# Patient Record
Sex: MALE | HISPANIC OR LATINO | Employment: OTHER | URBAN - METROPOLITAN AREA
[De-identification: names, ages, dates, MRNs, and addresses within clinical notes are randomized per-mention and may not be internally consistent; named-entity substitution may affect disease eponyms.]

---

## 2024-07-23 ENCOUNTER — APPOINTMENT (OUTPATIENT)
Dept: CARDIOLOGY | Facility: HOSPITAL | Age: 69
DRG: 682 | End: 2024-07-23

## 2024-07-23 ENCOUNTER — APPOINTMENT (OUTPATIENT)
Dept: GENERAL RADIOLOGY | Facility: HOSPITAL | Age: 69
DRG: 682 | End: 2024-07-23

## 2024-07-23 ENCOUNTER — HOSPITAL ENCOUNTER (INPATIENT)
Facility: HOSPITAL | Age: 69
LOS: 21 days | Discharge: HOSPICE | DRG: 682 | End: 2024-08-13
Attending: EMERGENCY MEDICINE | Admitting: INTERNAL MEDICINE

## 2024-07-23 DIAGNOSIS — I50.1 ACUTE PULMONARY EDEMA WITH CONGESTIVE HEART FAILURE: ICD-10-CM

## 2024-07-23 DIAGNOSIS — E87.1 HYPONATREMIA: ICD-10-CM

## 2024-07-23 DIAGNOSIS — N18.4 CHRONIC RENAL FAILURE (CRF), STAGE 4 (SEVERE): Primary | ICD-10-CM

## 2024-07-23 DIAGNOSIS — R79.89 ELEVATED TROPONIN LEVEL NOT DUE MYOCARDIAL INFARCTION: ICD-10-CM

## 2024-07-23 DIAGNOSIS — Z51.5 HOSPICE CARE PATIENT: ICD-10-CM

## 2024-07-23 DIAGNOSIS — D63.1 ANEMIA DUE TO STAGE 4 CHRONIC KIDNEY DISEASE: ICD-10-CM

## 2024-07-23 DIAGNOSIS — N18.4 ANEMIA DUE TO STAGE 4 CHRONIC KIDNEY DISEASE: ICD-10-CM

## 2024-07-23 LAB
ALBUMIN SERPL-MCNC: 3.3 G/DL (ref 3.5–5.2)
ALBUMIN/GLOB SERPL: 1.1 G/DL
ALP SERPL-CCNC: 97 U/L (ref 39–117)
ALT SERPL W P-5'-P-CCNC: 28 U/L (ref 1–41)
ANION GAP SERPL CALCULATED.3IONS-SCNC: 11.1 MMOL/L (ref 5–15)
ANION GAP SERPL CALCULATED.3IONS-SCNC: 13.6 MMOL/L (ref 5–15)
ANION GAP SERPL CALCULATED.3IONS-SCNC: 16 MMOL/L (ref 5–15)
ANION GAP SERPL CALCULATED.3IONS-SCNC: 9 MMOL/L (ref 5–15)
AST SERPL-CCNC: 39 U/L (ref 1–40)
BACTERIA UR QL AUTO: NORMAL /HPF
BASOPHILS # BLD AUTO: 0.02 10*3/MM3 (ref 0–0.2)
BASOPHILS # BLD AUTO: 0.02 10*3/MM3 (ref 0–0.2)
BASOPHILS NFR BLD AUTO: 0.3 % (ref 0–1.5)
BASOPHILS NFR BLD AUTO: 0.3 % (ref 0–1.5)
BILIRUB SERPL-MCNC: 0.2 MG/DL (ref 0–1.2)
BILIRUB UR QL STRIP: NEGATIVE
BUN SERPL-MCNC: 74 MG/DL (ref 8–23)
BUN SERPL-MCNC: 76 MG/DL (ref 8–23)
BUN SERPL-MCNC: 80 MG/DL (ref 8–23)
BUN SERPL-MCNC: 93 MG/DL (ref 8–23)
BUN/CREAT SERPL: 16.1 (ref 7–25)
BUN/CREAT SERPL: 16.3 (ref 7–25)
BUN/CREAT SERPL: 17.9 (ref 7–25)
BUN/CREAT SERPL: 18.9 (ref 7–25)
CALCIUM SPEC-SCNC: 7.3 MG/DL (ref 8.6–10.5)
CALCIUM SPEC-SCNC: 7.4 MG/DL (ref 8.6–10.5)
CHLORIDE SERPL-SCNC: 82 MMOL/L (ref 98–107)
CHLORIDE SERPL-SCNC: 82 MMOL/L (ref 98–107)
CHLORIDE SERPL-SCNC: 83 MMOL/L (ref 98–107)
CHLORIDE SERPL-SCNC: 84 MMOL/L (ref 98–107)
CHLORIDE UR-SCNC: 54 MMOL/L
CLARITY UR: CLEAR
CO2 SERPL-SCNC: 21 MMOL/L (ref 22–29)
CO2 SERPL-SCNC: 22.4 MMOL/L (ref 22–29)
CO2 SERPL-SCNC: 22.9 MMOL/L (ref 22–29)
CO2 SERPL-SCNC: 24 MMOL/L (ref 22–29)
COLOR UR: YELLOW
CREAT SERPL-MCNC: 4.48 MG/DL (ref 0.76–1.27)
CREAT SERPL-MCNC: 4.61 MG/DL (ref 0.76–1.27)
CREAT SERPL-MCNC: 4.66 MG/DL (ref 0.76–1.27)
CREAT SERPL-MCNC: 4.93 MG/DL (ref 0.76–1.27)
CREAT UR-MCNC: 58.7 MG/DL
D-LACTATE SERPL-SCNC: 1.1 MMOL/L (ref 0.5–2)
DEPRECATED RDW RBC AUTO: 41.8 FL (ref 37–54)
DEPRECATED RDW RBC AUTO: 47.3 FL (ref 37–54)
EGFRCR SERPLBLD CKD-EPI 2021: 11.9 ML/MIN/1.73
EGFRCR SERPLBLD CKD-EPI 2021: 12.8 ML/MIN/1.73
EGFRCR SERPLBLD CKD-EPI 2021: 12.9 ML/MIN/1.73
EGFRCR SERPLBLD CKD-EPI 2021: 13.4 ML/MIN/1.73
EOSINOPHIL # BLD AUTO: 0.09 10*3/MM3 (ref 0–0.4)
EOSINOPHIL # BLD AUTO: 0.12 10*3/MM3 (ref 0–0.4)
EOSINOPHIL NFR BLD AUTO: 1.3 % (ref 0.3–6.2)
EOSINOPHIL NFR BLD AUTO: 1.6 % (ref 0.3–6.2)
ERYTHROCYTE [DISTWIDTH] IN BLOOD BY AUTOMATED COUNT: 13.3 % (ref 12.3–15.4)
ERYTHROCYTE [DISTWIDTH] IN BLOOD BY AUTOMATED COUNT: 14.9 % (ref 12.3–15.4)
FLUAV SUBTYP SPEC NAA+PROBE: NOT DETECTED
FLUBV RNA ISLT QL NAA+PROBE: NOT DETECTED
FOLATE SERPL-MCNC: 10.5 NG/ML (ref 4.78–24.2)
GEN 5 2HR TROPONIN T REFLEX: 78 NG/L
GLOBULIN UR ELPH-MCNC: 3.1 GM/DL
GLUCOSE BLDC GLUCOMTR-MCNC: 137 MG/DL (ref 70–130)
GLUCOSE BLDC GLUCOMTR-MCNC: 142 MG/DL (ref 70–130)
GLUCOSE BLDC GLUCOMTR-MCNC: 150 MG/DL (ref 70–130)
GLUCOSE BLDC GLUCOMTR-MCNC: 152 MG/DL (ref 70–130)
GLUCOSE SERPL-MCNC: 121 MG/DL (ref 65–99)
GLUCOSE SERPL-MCNC: 126 MG/DL (ref 65–99)
GLUCOSE SERPL-MCNC: 129 MG/DL (ref 65–99)
GLUCOSE SERPL-MCNC: 152 MG/DL (ref 65–99)
GLUCOSE UR STRIP-MCNC: ABNORMAL MG/DL
HBA1C MFR BLD: 6.1 % (ref 4.8–5.6)
HCT VFR BLD AUTO: 24.2 % (ref 37.5–51)
HCT VFR BLD AUTO: 25.2 % (ref 37.5–51)
HGB BLD-MCNC: 8.1 G/DL (ref 13–17.7)
HGB BLD-MCNC: 8.4 G/DL (ref 13–17.7)
HGB UR QL STRIP.AUTO: NEGATIVE
HYALINE CASTS UR QL AUTO: NORMAL /LPF
IMM GRANULOCYTES # BLD AUTO: 0.03 10*3/MM3 (ref 0–0.05)
IMM GRANULOCYTES # BLD AUTO: 0.04 10*3/MM3 (ref 0–0.05)
IMM GRANULOCYTES NFR BLD AUTO: 0.4 % (ref 0–0.5)
IMM GRANULOCYTES NFR BLD AUTO: 0.6 % (ref 0–0.5)
IRON 24H UR-MRATE: 46 MCG/DL (ref 59–158)
IRON SATN MFR SERPL: 17 % (ref 20–50)
KETONES UR QL STRIP: NEGATIVE
LEUKOCYTE ESTERASE UR QL STRIP.AUTO: NEGATIVE
LYMPHOCYTES # BLD AUTO: 0.6 10*3/MM3 (ref 0.7–3.1)
LYMPHOCYTES # BLD AUTO: 0.61 10*3/MM3 (ref 0.7–3.1)
LYMPHOCYTES NFR BLD AUTO: 7.8 % (ref 19.6–45.3)
LYMPHOCYTES NFR BLD AUTO: 8.9 % (ref 19.6–45.3)
MAGNESIUM SERPL-MCNC: 4.5 MG/DL (ref 1.6–2.4)
MAGNESIUM SERPL-MCNC: 6.8 MG/DL (ref 1.6–2.4)
MCH RBC QN AUTO: 28.8 PG (ref 26.6–33)
MCH RBC QN AUTO: 29.5 PG (ref 26.6–33)
MCHC RBC AUTO-ENTMCNC: 33.3 G/DL (ref 31.5–35.7)
MCHC RBC AUTO-ENTMCNC: 33.5 G/DL (ref 31.5–35.7)
MCV RBC AUTO: 86.3 FL (ref 79–97)
MCV RBC AUTO: 88 FL (ref 79–97)
MONOCYTES # BLD AUTO: 0.38 10*3/MM3 (ref 0.1–0.9)
MONOCYTES # BLD AUTO: 0.4 10*3/MM3 (ref 0.1–0.9)
MONOCYTES NFR BLD AUTO: 4.9 % (ref 5–12)
MONOCYTES NFR BLD AUTO: 5.8 % (ref 5–12)
NEUTROPHILS NFR BLD AUTO: 5.69 10*3/MM3 (ref 1.7–7)
NEUTROPHILS NFR BLD AUTO: 6.55 10*3/MM3 (ref 1.7–7)
NEUTROPHILS NFR BLD AUTO: 83.1 % (ref 42.7–76)
NEUTROPHILS NFR BLD AUTO: 85 % (ref 42.7–76)
NITRITE UR QL STRIP: NEGATIVE
NRBC BLD AUTO-RTO: 0 /100 WBC (ref 0–0.2)
NT-PROBNP SERPL-MCNC: 8498 PG/ML (ref 0–900)
OSMOLALITY SERPL: 284 MOSM/KG (ref 280–301)
OSMOLALITY UR: 344 MOSM/KG
PH UR STRIP.AUTO: 6.5 [PH] (ref 5–8)
PHOSPHATE SERPL-MCNC: 6.7 MG/DL (ref 2.5–4.5)
PHOSPHATE SERPL-MCNC: NORMAL MG/DL
PLATELET # BLD AUTO: 379 10*3/MM3 (ref 140–450)
PLATELET # BLD AUTO: 414 10*3/MM3 (ref 140–450)
PMV BLD AUTO: 10.1 FL (ref 6–12)
PMV BLD AUTO: 10.3 FL (ref 6–12)
POTASSIUM SERPL-SCNC: 3.6 MMOL/L (ref 3.5–5.2)
POTASSIUM SERPL-SCNC: 3.6 MMOL/L (ref 3.5–5.2)
POTASSIUM SERPL-SCNC: 3.7 MMOL/L (ref 3.5–5.2)
POTASSIUM SERPL-SCNC: 4.3 MMOL/L (ref 3.5–5.2)
PROT ?TM UR-MCNC: 591.7 MG/DL
PROT SERPL-MCNC: 6.4 G/DL (ref 6–8.5)
PROT UR QL STRIP: ABNORMAL
PROT/CREAT UR: ABNORMAL MG/G CREA (ref 0–200)
RBC # BLD AUTO: 2.75 10*6/MM3 (ref 4.14–5.8)
RBC # BLD AUTO: 2.92 10*6/MM3 (ref 4.14–5.8)
RBC # UR STRIP: NORMAL /HPF
REF LAB TEST METHOD: NORMAL
SARS-COV-2 RNA RESP QL NAA+PROBE: NOT DETECTED
SODIUM SERPL-SCNC: 116 MMOL/L (ref 136–145)
SODIUM SERPL-SCNC: 117 MMOL/L (ref 136–145)
SODIUM SERPL-SCNC: 118 MMOL/L (ref 136–145)
SODIUM SERPL-SCNC: 120 MMOL/L (ref 136–145)
SODIUM UR-SCNC: 46 MMOL/L
SP GR UR STRIP: 1.02 (ref 1–1.03)
SQUAMOUS #/AREA URNS HPF: NORMAL /HPF
TIBC SERPL-MCNC: 274 MCG/DL (ref 298–536)
TRANSFERRIN SERPL-MCNC: 184 MG/DL (ref 200–360)
TROPONIN T DELTA: -9 NG/L
TROPONIN T SERPL HS-MCNC: 87 NG/L
TSH SERPL DL<=0.05 MIU/L-ACNC: 1.76 UIU/ML (ref 0.27–4.2)
TSH SERPL DL<=0.05 MIU/L-ACNC: NORMAL M[IU]/L
UROBILINOGEN UR QL STRIP: ABNORMAL
VIT B12 BLD-MCNC: >2000 PG/ML (ref 211–946)
WBC # UR STRIP: NORMAL /HPF
WBC NRBC COR # BLD AUTO: 6.85 10*3/MM3 (ref 3.4–10.8)
WBC NRBC COR # BLD AUTO: 7.7 10*3/MM3 (ref 3.4–10.8)

## 2024-07-23 PROCEDURE — 83735 ASSAY OF MAGNESIUM: CPT

## 2024-07-23 PROCEDURE — 84156 ASSAY OF PROTEIN URINE: CPT | Performed by: INTERNAL MEDICINE

## 2024-07-23 PROCEDURE — 99254 IP/OBS CNSLTJ NEW/EST MOD 60: CPT | Performed by: INTERNAL MEDICINE

## 2024-07-23 PROCEDURE — 82746 ASSAY OF FOLIC ACID SERUM: CPT | Performed by: INTERNAL MEDICINE

## 2024-07-23 PROCEDURE — 85025 COMPLETE CBC W/AUTO DIFF WBC: CPT

## 2024-07-23 PROCEDURE — 84300 ASSAY OF URINE SODIUM: CPT | Performed by: INTERNAL MEDICINE

## 2024-07-23 PROCEDURE — 83735 ASSAY OF MAGNESIUM: CPT | Performed by: INTERNAL MEDICINE

## 2024-07-23 PROCEDURE — 63710000001 INSULIN REGULAR HUMAN PER 5 UNITS

## 2024-07-23 PROCEDURE — 36415 COLL VENOUS BLD VENIPUNCTURE: CPT

## 2024-07-23 PROCEDURE — 25010000002 HYDRALAZINE PER 20 MG: Performed by: INTERNAL MEDICINE

## 2024-07-23 PROCEDURE — 80053 COMPREHEN METABOLIC PANEL: CPT | Performed by: EMERGENCY MEDICINE

## 2024-07-23 PROCEDURE — 82570 ASSAY OF URINE CREATININE: CPT | Performed by: INTERNAL MEDICINE

## 2024-07-23 PROCEDURE — 83880 ASSAY OF NATRIURETIC PEPTIDE: CPT | Performed by: EMERGENCY MEDICINE

## 2024-07-23 PROCEDURE — 25010000002 HYDRALAZINE PER 20 MG

## 2024-07-23 PROCEDURE — 82948 REAGENT STRIP/BLOOD GLUCOSE: CPT

## 2024-07-23 PROCEDURE — 84443 ASSAY THYROID STIM HORMONE: CPT | Performed by: INTERNAL MEDICINE

## 2024-07-23 PROCEDURE — 93010 ELECTROCARDIOGRAM REPORT: CPT | Performed by: INTERNAL MEDICINE

## 2024-07-23 PROCEDURE — 93005 ELECTROCARDIOGRAM TRACING: CPT | Performed by: EMERGENCY MEDICINE

## 2024-07-23 PROCEDURE — 85025 COMPLETE CBC W/AUTO DIFF WBC: CPT | Performed by: EMERGENCY MEDICINE

## 2024-07-23 PROCEDURE — 84443 ASSAY THYROID STIM HORMONE: CPT

## 2024-07-23 PROCEDURE — 82436 ASSAY OF URINE CHLORIDE: CPT | Performed by: INTERNAL MEDICINE

## 2024-07-23 PROCEDURE — 99285 EMERGENCY DEPT VISIT HI MDM: CPT

## 2024-07-23 PROCEDURE — 84484 ASSAY OF TROPONIN QUANT: CPT | Performed by: EMERGENCY MEDICINE

## 2024-07-23 PROCEDURE — 84100 ASSAY OF PHOSPHORUS: CPT

## 2024-07-23 PROCEDURE — 83605 ASSAY OF LACTIC ACID: CPT | Performed by: EMERGENCY MEDICINE

## 2024-07-23 PROCEDURE — 82607 VITAMIN B-12: CPT | Performed by: INTERNAL MEDICINE

## 2024-07-23 PROCEDURE — 99284 EMERGENCY DEPT VISIT MOD MDM: CPT | Performed by: EMERGENCY MEDICINE

## 2024-07-23 PROCEDURE — 83935 ASSAY OF URINE OSMOLALITY: CPT | Performed by: INTERNAL MEDICINE

## 2024-07-23 PROCEDURE — 83540 ASSAY OF IRON: CPT | Performed by: INTERNAL MEDICINE

## 2024-07-23 PROCEDURE — 83036 HEMOGLOBIN GLYCOSYLATED A1C: CPT

## 2024-07-23 PROCEDURE — 81001 URINALYSIS AUTO W/SCOPE: CPT | Performed by: INTERNAL MEDICINE

## 2024-07-23 PROCEDURE — 84466 ASSAY OF TRANSFERRIN: CPT | Performed by: INTERNAL MEDICINE

## 2024-07-23 PROCEDURE — 71045 X-RAY EXAM CHEST 1 VIEW: CPT

## 2024-07-23 PROCEDURE — 84100 ASSAY OF PHOSPHORUS: CPT | Performed by: INTERNAL MEDICINE

## 2024-07-23 PROCEDURE — 25010000002 BUMETANIDE PER 0.5 MG: Performed by: INTERNAL MEDICINE

## 2024-07-23 PROCEDURE — 87636 SARSCOV2 & INF A&B AMP PRB: CPT | Performed by: EMERGENCY MEDICINE

## 2024-07-23 PROCEDURE — 83930 ASSAY OF BLOOD OSMOLALITY: CPT | Performed by: INTERNAL MEDICINE

## 2024-07-23 RX ORDER — ACETAMINOPHEN 325 MG/1
650 TABLET ORAL EVERY 4 HOURS PRN
Status: DISCONTINUED | OUTPATIENT
Start: 2024-07-23 | End: 2024-08-13 | Stop reason: HOSPADM

## 2024-07-23 RX ORDER — LEVOTHYROXINE SODIUM 0.1 MG/1
100 TABLET ORAL DAILY
COMMUNITY

## 2024-07-23 RX ORDER — SODIUM CHLORIDE 9 MG/ML
40 INJECTION, SOLUTION INTRAVENOUS AS NEEDED
Status: DISCONTINUED | OUTPATIENT
Start: 2024-07-23 | End: 2024-08-13 | Stop reason: HOSPADM

## 2024-07-23 RX ORDER — VALSARTAN 160 MG/1
1 TABLET ORAL DAILY
COMMUNITY
Start: 2024-06-13 | End: 2024-08-13 | Stop reason: HOSPADM

## 2024-07-23 RX ORDER — BUMETANIDE 0.25 MG/ML
4 INJECTION INTRAMUSCULAR; INTRAVENOUS EVERY 8 HOURS
Qty: 48 ML | Refills: 0 | Status: COMPLETED | OUTPATIENT
Start: 2024-07-23 | End: 2024-07-24

## 2024-07-23 RX ORDER — AMOXICILLIN 250 MG
2 CAPSULE ORAL 2 TIMES DAILY
Status: DISCONTINUED | OUTPATIENT
Start: 2024-07-23 | End: 2024-08-13 | Stop reason: HOSPADM

## 2024-07-23 RX ORDER — DEXTROSE MONOHYDRATE 25 G/50ML
25 INJECTION, SOLUTION INTRAVENOUS
Status: DISCONTINUED | OUTPATIENT
Start: 2024-07-23 | End: 2024-08-13 | Stop reason: HOSPADM

## 2024-07-23 RX ORDER — IBUPROFEN 600 MG/1
1 TABLET ORAL
Status: DISCONTINUED | OUTPATIENT
Start: 2024-07-23 | End: 2024-08-13 | Stop reason: HOSPADM

## 2024-07-23 RX ORDER — SODIUM CHLORIDE 0.9 % (FLUSH) 0.9 %
10 SYRINGE (ML) INJECTION EVERY 12 HOURS SCHEDULED
Status: DISCONTINUED | OUTPATIENT
Start: 2024-07-23 | End: 2024-08-13 | Stop reason: HOSPADM

## 2024-07-23 RX ORDER — SODIUM CHLORIDE 0.9 % (FLUSH) 0.9 %
10 SYRINGE (ML) INJECTION AS NEEDED
Status: DISCONTINUED | OUTPATIENT
Start: 2024-07-23 | End: 2024-08-13 | Stop reason: HOSPADM

## 2024-07-23 RX ORDER — NICOTINE POLACRILEX 4 MG
15 LOZENGE BUCCAL
Status: DISCONTINUED | OUTPATIENT
Start: 2024-07-23 | End: 2024-08-13 | Stop reason: HOSPADM

## 2024-07-23 RX ORDER — HYDRALAZINE HYDROCHLORIDE 20 MG/ML
10 INJECTION INTRAMUSCULAR; INTRAVENOUS EVERY 4 HOURS PRN
Status: DISCONTINUED | OUTPATIENT
Start: 2024-07-23 | End: 2024-07-25

## 2024-07-23 RX ORDER — BISACODYL 10 MG
10 SUPPOSITORY, RECTAL RECTAL DAILY PRN
Status: DISCONTINUED | OUTPATIENT
Start: 2024-07-23 | End: 2024-08-13 | Stop reason: HOSPADM

## 2024-07-23 RX ORDER — NITROGLYCERIN 0.4 MG/1
0.4 TABLET SUBLINGUAL
Status: DISCONTINUED | OUTPATIENT
Start: 2024-07-23 | End: 2024-08-13 | Stop reason: HOSPADM

## 2024-07-23 RX ORDER — BISACODYL 5 MG/1
5 TABLET, DELAYED RELEASE ORAL DAILY PRN
Status: DISCONTINUED | OUTPATIENT
Start: 2024-07-23 | End: 2024-08-13 | Stop reason: HOSPADM

## 2024-07-23 RX ORDER — LEVOTHYROXINE SODIUM 0.1 MG/1
100 TABLET ORAL DAILY
Status: DISCONTINUED | OUTPATIENT
Start: 2024-07-23 | End: 2024-08-13 | Stop reason: HOSPADM

## 2024-07-23 RX ORDER — ACETAMINOPHEN 650 MG/1
650 SUPPOSITORY RECTAL EVERY 4 HOURS PRN
Status: DISCONTINUED | OUTPATIENT
Start: 2024-07-23 | End: 2024-08-13 | Stop reason: HOSPADM

## 2024-07-23 RX ORDER — ONDANSETRON 2 MG/ML
4 INJECTION INTRAMUSCULAR; INTRAVENOUS EVERY 6 HOURS PRN
Status: DISCONTINUED | OUTPATIENT
Start: 2024-07-23 | End: 2024-08-13 | Stop reason: HOSPADM

## 2024-07-23 RX ORDER — ALLOPURINOL 100 MG/1
2 TABLET ORAL DAILY
COMMUNITY
Start: 2024-06-10 | End: 2024-08-13 | Stop reason: HOSPADM

## 2024-07-23 RX ORDER — CARVEDILOL 25 MG/1
25 TABLET ORAL 2 TIMES DAILY
COMMUNITY

## 2024-07-23 RX ORDER — POLYETHYLENE GLYCOL 3350 17 G/17G
17 POWDER, FOR SOLUTION ORAL DAILY PRN
Status: DISCONTINUED | OUTPATIENT
Start: 2024-07-23 | End: 2024-08-13 | Stop reason: HOSPADM

## 2024-07-23 RX ORDER — CARVEDILOL 25 MG/1
25 TABLET ORAL 2 TIMES DAILY
Status: DISCONTINUED | OUTPATIENT
Start: 2024-07-23 | End: 2024-07-24 | Stop reason: ALTCHOICE

## 2024-07-23 RX ORDER — HYDRALAZINE HYDROCHLORIDE 20 MG/ML
10 INJECTION INTRAMUSCULAR; INTRAVENOUS EVERY 6 HOURS
Status: DISCONTINUED | OUTPATIENT
Start: 2024-07-23 | End: 2024-07-24

## 2024-07-23 RX ORDER — AMLODIPINE BESYLATE 10 MG/1
1 TABLET ORAL EVERY 12 HOURS SCHEDULED
COMMUNITY
Start: 2024-06-10

## 2024-07-23 RX ADMIN — CARVEDILOL 25 MG: 25 TABLET, FILM COATED ORAL at 21:26

## 2024-07-23 RX ADMIN — INSULIN HUMAN 2 UNITS: 100 INJECTION, SOLUTION PARENTERAL at 06:26

## 2024-07-23 RX ADMIN — HYDRALAZINE HYDROCHLORIDE 10 MG: 20 INJECTION INTRAMUSCULAR; INTRAVENOUS at 19:40

## 2024-07-23 RX ADMIN — Medication 10 ML: at 21:39

## 2024-07-23 RX ADMIN — HYDRALAZINE HYDROCHLORIDE 10 MG: 20 INJECTION INTRAMUSCULAR; INTRAVENOUS at 20:17

## 2024-07-23 RX ADMIN — BUMETANIDE 4 MG: 0.25 INJECTION INTRAMUSCULAR; INTRAVENOUS at 09:54

## 2024-07-23 RX ADMIN — BUMETANIDE 4 MG: 0.25 INJECTION INTRAMUSCULAR; INTRAVENOUS at 17:48

## 2024-07-23 RX ADMIN — Medication 10 ML: at 09:55

## 2024-07-23 RX ADMIN — HYDRALAZINE HYDROCHLORIDE 10 MG: 20 INJECTION INTRAMUSCULAR; INTRAVENOUS at 13:47

## 2024-07-23 NOTE — CASE MANAGEMENT/SOCIAL WORK
Discharge Planning Assessment  [facility]     Patient Name: Marcial Bernard  MRN: [id number]  Today's Date: 7/23/2024    Admit Date: 7/23/2024    Plan: Home no needs   Discharge Needs Assessment       Row Name 07/23/24 3314       Living Environment    People in Home child(erin), adult;spouse    Current Living Arrangements home    Potentially Unsafe Housing Conditions none    In the past 12 months has the electric, gas, oil, or water company threatened to shut off services in your home? No    Primary Care Provided by self    Provides Primary Care For no one    Family Caregiver if Needed none    Quality of Family Relationships supportive    Able to Return to Prior Arrangements no       Resource/Environmental Concerns    Resource/Environmental Concerns none    Transportation Concerns none       Transportation Needs    In the past 12 months, has lack of transportation kept you from medical appointments or from getting medications? no    In the past 12 months, has lack of transportation kept you from meetings, work, or from getting things needed for daily living? No       Food Insecurity    Within the past 12 months, you worried that your food would run out before you got the money to buy more. Never true    Within the past 12 months, the food you bought just didn't last and you didn't have money to get more. Never true       Transition Planning    Patient/Family Anticipates Transition to home with family    Patient/Family Anticipated Services at Transition none       Discharge Needs Assessment    Equipment Currently Used at Home none    Concerns to be Addressed discharge planning    Equipment Needed After Discharge none                   Discharge Plan       Row Name 07/23/24 8265       Plan    Plan Home no needs    Plan Comments CCP spoke to family member at bedside via intrepter.  Pt lives in a house with his wife and adult daughter.  Pt uses no DME to ambulate.  Pt is IADL's.  He plans to return home at discharge  He  has no insurance  CCP will follow for med assist to evaluate patient.  CCP following for needs                  Continued Care and Services - Admitted Since 7/23/2024    No active coordination exists for this encounter.          Demographic Summary    No documentation.                  Functional Status    No documentation.                  Psychosocial    No documentation.                  Abuse/Neglect    No documentation.                  Legal    No documentation.                  Substance Abuse    No documentation.                  Patient Forms    No documentation.                     Marcelle Romero RN

## 2024-07-23 NOTE — CONSULTS
Date of Hospital Visit: 2024  Encounter Provider: Neptali Becker MD  Place of Service: [facility] CARDIOLOGY  Patient Name: Marcial Bernard  :1955  Referral Provider: No ref. provider found    Chief complaint weakness     History of Present Illness Marcial Bernard is a 69 year old pt with a history of HTN, DM, hypothyroidism, diabetic retinopathy and CKD.  The patient is originally from [facility] and does not speak much English.  He is confused and cannot answer questions even with a .  The majority of historical details are taken from family at bedside.  Apparently he is known about chronic kidney disease for several years and has been following with a nephrologist in [facility] for the last 10 to 15 years.  He has been in the [facility] in 2023.  They state that he followed with a cardiologist for hypertension but are unaware of any specific pathology such as coronary disease or structural heart disease.    Pt presented to  [facility] ER on 24 with complaints of weakness. Per his family, pt was unable to walk and they had to left him out of the chair to the bed. He is also having increased bilateral lower extremity swelling and cough. In ER, troponin T 87, proBNP 8498, , CR 4.93, Lactate 1.1, WBC 7.70, HGB 8.4, CXR showed bilateral alveolar interstitial infiltrates concerning for fluid overload. Nephrology consulted for stage 4 CKD.     EKG shows sinus bradycardia with nonspecific interventricular conduction delay and no acute ischemic changes.  There is an echocardiogram from [facility] 2023 which showed hyperdynamic left ventricle, EF greater than 70% and grade 1 diastolic dysfunction with no significant valvular heart disease.    HPI was reviewed, updated and amended when necessary.    ECHO 23  Left ventricular global and regional systolic function is normal.   Calculated left ventricular ejection fraction of 73 % (Biplane  "Hastings's   method).       Past Medical History:   Diagnosis Date    Asthma     Dementia     Renal disorder        History reviewed. No pertinent surgical history.    Medications Prior to Admission   Medication Sig Dispense Refill Last Dose    allopurinol (ZYLOPRIM) 100 MG tablet Take 2 tablets by mouth Daily.   7/22/2024    amLODIPine (NORVASC) 10 MG tablet Take 1 tablet by mouth Every 12 (Twelve) Hours.   7/22/2024    carvedilol (COREG) 25 MG tablet Take 1 tablet by mouth 2 (Two) Times a Day.   7/22/2024    levothyroxine (SYNTHROID, LEVOTHROID) 100 MCG tablet Take 1 tablet by mouth Daily.   7/22/2024    valsartan (DIOVAN) 160 MG tablet Take 1 tablet by mouth Daily.   7/22/2024       Current Meds  Scheduled Meds:bumetanide, 4 mg, Intravenous, Q8H  insulin regular, 2-7 Units, Subcutaneous, Q6H  senna-docusate sodium, 2 tablet, Oral, BID  sodium chloride, 10 mL, Intravenous, Q12H      Continuous Infusions:   PRN Meds:.  acetaminophen **OR** acetaminophen    senna-docusate sodium **AND** polyethylene glycol **AND** bisacodyl **AND** bisacodyl    dextrose    dextrose    glucagon (human recombinant)    hydrALAZINE    nitroglycerin    ondansetron    sodium chloride    sodium chloride    Allergies as of 07/23/2024    (No Known Allergies)       Social History     Socioeconomic History    Marital status:        History reviewed. No pertinent family history.    Review of Systems   Unable to perform ROS: Mental status change            Objective:   Temp:  [97.5 °F (36.4 °C)-97.8 °F (36.6 °C)] 97.5 °F (36.4 °C)  Heart Rate:  [45-54] 50  Resp:  [12-17] 12  BP: (127-181)/() 127/78  Body mass index is 32.56 kg/m².  Flowsheet Rows      Flowsheet Row First Filed Value   Admission Height 175.3 cm (69\") Documented at 07/23/2024 0044   Admission Weight 90.7 kg (200 lb) Documented at 07/23/2024 0044          Vitals:    07/23/24 0758   BP:    Pulse:    Resp:    Temp: 97.5 °F (36.4 °C)   SpO2:        Vitals reviewed. "   Constitutional:       Appearance: Frail. Acutely ill-appearing.   Neck:      Vascular: No JVR. JVD normal.   Pulmonary:      Effort: Pulmonary effort is normal.      Breath sounds: No wheezing. No rhonchi. Rales present.   Chest:      Chest wall: Not tender to palpatation.   Cardiovascular:      PMI at left midclavicular line. Normal rate. Regular rhythm. Normal S1. Normal S2.       Murmurs: There is no murmur.      No gallop.  No click. No rub.   Pulses:     Intact distal pulses.   Edema:     Pretibial: bilateral 3+ edema of the pretibial area.     Ankle: bilateral 3+ edema of the ankle.     Feet: bilateral 3+ edema of the feet.  Abdominal:      General: There is distension.      Palpations: Abdomen is soft.      Tenderness: There is no abdominal tenderness.   Musculoskeletal: Normal range of motion.         General: No tenderness. Skin:     General: Skin is warm and dry.   Neurological:      Mental Status: Exhibits altered mental status.                 Lab Review:      Results from last 7 days   Lab Units 07/23/24  0700 07/23/24  0110   SODIUM mmol/L 117* 116*   POTASSIUM mmol/L 3.6 4.3   CHLORIDE mmol/L 84* 82*   CO2 mmol/L 24.0 22.9   BUN mg/dL 80* 93*   CREATININE mg/dL 4.48* 4.93*   CALCIUM mg/dL 7.3* 7.4*   BILIRUBIN mg/dL  --  0.2   ALK PHOS U/L  --  97   ALT (SGPT) U/L  --  28   AST (SGOT) U/L  --  39   GLUCOSE mg/dL 129* 152*     Results from last 7 days   Lab Units 07/23/24  0251 07/23/24  0110   HSTROP T ng/L 78* 87*     @LABRCNTbnp@  Results from last 7 days   Lab Units 07/23/24  0550 07/23/24  0110   WBC 10*3/mm3 6.85 7.70   HEMOGLOBIN g/dL 8.1* 8.4*   HEMATOCRIT % 24.2* 25.2*   PLATELETS 10*3/mm3 379 414         Results from last 7 days   Lab Units 07/23/24  0700   MAGNESIUM mg/dL 6.8*     @LABNT(chol,trig,hdl,ldl)                      I personally viewed and interpreted the patient's EKG/Telemetry data    Chronic renal failure (CRF), stage 4 (severe)    Hyponatremia    Acute pulmonary edema  with congestive heart failure    Anemia due to stage 4 chronic kidney disease    Elevated troponin level not due myocardial infarction    Assessment and Plan:    Elevated cardiac enzymes -likely NSTEMI type II secondary to heart failure, acute on chronic kidney disease.  Repeat echocardiogram.  Patient is making urine and is currently on diuretic regimen per nephrology.  No indication for ischemic workup at this time.  Hypervolemic state -multifactorial.  There is certainly an element of acute on chronic diastolic heart failure and we will continue diuretics.  Blood pressure is poorly controlled and we will address that below.  Advanced kidney disease being managed by Dr. Flannery.  Per family report he is at a steady decline over the past 2 months.  We will follow diagnostic testing results and clinical response to medical intervention for further treatment recommendations.  CKD stage IV  Diabetes -A1c 6.1  Hypertension -patient is currently not scheduled for valsartan given acute kidney dysfunction and amlodipine was held.  Altered mental status at this time and get a schedule IV hydralazine but plan to switch back to oral amlodipine once he is optimized and taking pills reliably.    Neptali Becker MD  07/23/24  10:35 EDT.  Time spent in reviewing chart, discussion and examination:

## 2024-07-23 NOTE — PLAN OF CARE
Goal Outcome Evaluation:      Pt arrived on unit at 0500 from [facility] ED. Pt and family are Czech speaking and require . Pt is AO, VSS. No complaints of pain. Pt states he makes very little urine. Nephrology consult entered for today.

## 2024-07-23 NOTE — Clinical Note
Level of Care: Critical Care [6]   Diagnosis: Chronic renal failure (CRF), stage 4 (severe) [1937034]   Admitting Physician: OSMAN HOPE [[id number]]   Isolate for COVID?: No [0]   Certification: I Certify That Inpatient Hospital Services Are Medically Necessary For Greater Than 2 Midnights

## 2024-07-23 NOTE — PLAN OF CARE
Goal Outcome Evaluation:      VSS, patient not very alert but arousable, follows commands, ~500 UOP on Bumex plus an unmeasured occurrence in brief.  No acute events this shift, will continue to monior.    Problem: Skin Injury Risk Increased  Goal: Skin Health and Integrity  Outcome: Ongoing, Progressing  Intervention: Optimize Skin Protection  Recent Flowsheet Documentation  Taken 7/23/2024 1800 by Paulo Almonte RN  Head of Bed (HOB) Positioning: HOB at 20-30 degrees  Taken 7/23/2024 1600 by Paulo Almonte RN  Head of Bed (HOB) Positioning: HOB at 20-30 degrees  Taken 7/23/2024 1400 by Paulo Almonte RN  Head of Bed (HOB) Positioning: HOB at 20-30 degrees  Taken 7/23/2024 1200 by Paulo Almonte RN  Head of Bed (HOB) Positioning: HOB at 20-30 degrees  Taken 7/23/2024 1000 by Paulo Almonte RN  Head of Bed (HOB) Positioning: HOB at 20-30 degrees  Taken 7/23/2024 0800 by Paulo Almonte RN  Head of Bed (HOB) Positioning: HOB at 20-30 degrees     Problem: Adjustment to Illness (Chronic Kidney Disease)  Goal: Optimal Coping with Chronic Illness  Outcome: Ongoing, Progressing  Intervention: Support Psychosocial Response  Recent Flowsheet Documentation  Taken 7/23/2024 1600 by Paulo Almonte RN  Family/Support System Care: support provided  Taken 7/23/2024 1200 by Paulo Almonte RN  Family/Support System Care: support provided  Taken 7/23/2024 0800 by Paulo Almonte RN  Family/Support System Care: support provided     Problem: Electrolyte Imbalance (Chronic Kidney Disease)  Goal: Electrolyte Balance  Outcome: Ongoing, Progressing     Problem: Fluid Volume Excess (Chronic Kidney Disease)  Goal: Fluid Balance  Outcome: Ongoing, Progressing     Problem: Functional Decline (Chronic Kidney Disease)  Goal: Optimal Functional Ability  Outcome: Ongoing, Progressing     Problem: Hematologic Alteration (Chronic Kidney Disease)  Goal: Absence of Anemia Signs and Symptoms  Outcome: Ongoing, Progressing     Problem:  Oral Intake Inadequate (Chronic Kidney Disease)  Goal: Optimal Oral Intake  Outcome: Ongoing, Progressing     Problem: Pain (Chronic Kidney Disease)  Goal: Acceptable Pain Control  Outcome: Ongoing, Progressing     Problem: Renal Function Impairment (Chronic Kidney Disease)  Goal: Minimize Renal Failure Effects  Outcome: Ongoing, Progressing  Intervention: Protect and Monitor Dialysis Access Site  Recent Flowsheet Documentation  Taken 7/23/2024 1800 by Paulo Almonte RN  Safety Precautions: emergency equipment at bedside  Taken 7/23/2024 1700 by Paulo Almonte RN  Safety Precautions: emergency equipment at bedside  Taken 7/23/2024 1600 by Paulo Almonte RN  Safety Precautions: emergency equipment at bedside  Taken 7/23/2024 1500 by Paulo Almonte RN  Safety Precautions: emergency equipment at bedside  Taken 7/23/2024 1400 by Paulo Almonte RN  Safety Precautions: emergency equipment at bedside  Taken 7/23/2024 1300 by Paulo Almonte RN  Safety Precautions: emergency equipment at bedside  Taken 7/23/2024 1200 by Paulo Almonte RN  Safety Precautions: emergency equipment at bedside  Taken 7/23/2024 1100 by Paulo Almonte RN  Safety Precautions: emergency equipment at bedside  Taken 7/23/2024 1000 by Paulo Almonte RN  Safety Precautions: emergency equipment at bedside  Taken 7/23/2024 0900 by Paulo Almonte RN  Safety Precautions: emergency equipment at bedside  Taken 7/23/2024 0800 by Paulo Almonte RN  Safety Precautions: emergency equipment at bedside  Intervention: Monitor and Support Renal Function  Recent Flowsheet Documentation  Taken 7/23/2024 1800 by Paulo Almonte RN  Medication Review/Management: medications reviewed  Taken 7/23/2024 1700 by Paulo Almonte RN  Medication Review/Management: medications reviewed  Taken 7/23/2024 1600 by Paulo Almonte RN  Medication Review/Management: medications reviewed  Taken 7/23/2024 1500 by Paulo Almonte RN  Medication Review/Management:  medications reviewed  Taken 7/23/2024 1400 by Paulo Almonte RN  Medication Review/Management: medications reviewed  Taken 7/23/2024 1300 by Paulo Almonte RN  Medication Review/Management: medications reviewed  Taken 7/23/2024 1200 by Paulo Almonte RN  Medication Review/Management: medications reviewed  Taken 7/23/2024 1100 by Paulo Almonte RN  Medication Review/Management: medications reviewed  Taken 7/23/2024 1000 by Paulo Almonte RN  Medication Review/Management: medications reviewed  Taken 7/23/2024 0900 by Paulo Almonte RN  Medication Review/Management: medications reviewed  Taken 7/23/2024 0800 by Paulo Almonte RN  Medication Review/Management: medications reviewed     Problem: Adult Inpatient Plan of Care  Goal: Plan of Care Review  Outcome: Ongoing, Progressing  Goal: Patient-Specific Goal (Individualized)  Outcome: Ongoing, Progressing  Goal: Absence of Hospital-Acquired Illness or Injury  Outcome: Ongoing, Progressing  Intervention: Identify and Manage Fall Risk  Recent Flowsheet Documentation  Taken 7/23/2024 1800 by Paulo Almonte RN  Safety Promotion/Fall Prevention:   safety round/check completed   room organization consistent   fall prevention program maintained   clutter free environment maintained   activity supervised  Taken 7/23/2024 1700 by Paulo Almonte RN  Safety Promotion/Fall Prevention:   safety round/check completed   room organization consistent   fall prevention program maintained   clutter free environment maintained   activity supervised  Taken 7/23/2024 1600 by Paulo Almonte RN  Safety Promotion/Fall Prevention:   safety round/check completed   room organization consistent   fall prevention program maintained   clutter free environment maintained   activity supervised  Taken 7/23/2024 1500 by Paulo Almonte RN  Safety Promotion/Fall Prevention:   safety round/check completed   room organization consistent   fall prevention program maintained   clutter free  environment maintained   activity supervised  Taken 7/23/2024 1400 by Paulo Almonte RN  Safety Promotion/Fall Prevention:   safety round/check completed   room organization consistent   fall prevention program maintained   clutter free environment maintained   activity supervised  Taken 7/23/2024 1300 by Paulo Almonte RN  Safety Promotion/Fall Prevention:   safety round/check completed   room organization consistent   fall prevention program maintained   clutter free environment maintained   activity supervised  Taken 7/23/2024 1200 by Paulo Almonte RN  Safety Promotion/Fall Prevention:   safety round/check completed   room organization consistent   fall prevention program maintained   clutter free environment maintained   activity supervised  Taken 7/23/2024 1100 by Paulo Almonte RN  Safety Promotion/Fall Prevention:   safety round/check completed   room organization consistent   fall prevention program maintained   clutter free environment maintained   activity supervised  Taken 7/23/2024 1000 by Paulo Almonte RN  Safety Promotion/Fall Prevention:   safety round/check completed   room organization consistent   fall prevention program maintained   clutter free environment maintained   activity supervised  Taken 7/23/2024 0900 by Paulo Almonte RN  Safety Promotion/Fall Prevention:   safety round/check completed   room organization consistent   fall prevention program maintained   clutter free environment maintained   activity supervised  Taken 7/23/2024 0800 by Paulo Almonte RN  Safety Promotion/Fall Prevention:   safety round/check completed   room organization consistent   fall prevention program maintained   clutter free environment maintained   activity supervised  Intervention: Prevent Skin Injury  Recent Flowsheet Documentation  Taken 7/23/2024 1800 by Paulo Almonte RN  Body Position:   tilted   left  Taken 7/23/2024 1600 by Paulo Almonte RN  Body Position:   tilted   right  Taken  7/23/2024 1400 by Paulo Almonte RN  Body Position:   tilted   left  Taken 7/23/2024 1200 by Paulo Almonte RN  Body Position:   tilted   right  Taken 7/23/2024 1000 by Paulo Almonte RN  Body Position:   tilted   left  Taken 7/23/2024 0800 by Paulo Almonte RN  Body Position:   tilted   right  Intervention: Prevent and Manage VTE (Venous Thromboembolism) Risk  Recent Flowsheet Documentation  Taken 7/23/2024 1600 by Paulo Almonte RN  VTE Prevention/Management:   bilateral   sequential compression devices on  Taken 7/23/2024 1200 by Paulo Almonte RN  VTE Prevention/Management:   bilateral   sequential compression devices on  Taken 7/23/2024 0800 by Paulo Almonte RN  VTE Prevention/Management:   bilateral   sequential compression devices on  Intervention: Prevent Infection  Recent Flowsheet Documentation  Taken 7/23/2024 1800 by Paulo Almonte RN  Infection Prevention:   visitors restricted/screened   single patient room provided   rest/sleep promoted   personal protective equipment utilized   hand hygiene promoted   environmental surveillance performed  Taken 7/23/2024 1700 by Paulo Almonte RN  Infection Prevention:   visitors restricted/screened   single patient room provided   rest/sleep promoted   personal protective equipment utilized   hand hygiene promoted   environmental surveillance performed  Taken 7/23/2024 1600 by Paulo Almonte RN  Infection Prevention:   visitors restricted/screened   single patient room provided   rest/sleep promoted   personal protective equipment utilized   hand hygiene promoted   environmental surveillance performed  Taken 7/23/2024 1500 by Paulo Almonte RN  Infection Prevention:   visitors restricted/screened   single patient room provided   rest/sleep promoted   personal protective equipment utilized   hand hygiene promoted   environmental surveillance performed  Taken 7/23/2024 1400 by Paulo Almonte RN  Infection Prevention:   visitors  restricted/screened   single patient room provided   rest/sleep promoted   personal protective equipment utilized   hand hygiene promoted   environmental surveillance performed  Taken 7/23/2024 1300 by Paulo Almonte RN  Infection Prevention:   visitors restricted/screened   single patient room provided   rest/sleep promoted   personal protective equipment utilized   hand hygiene promoted   environmental surveillance performed  Taken 7/23/2024 1200 by Paulo Almonte RN  Infection Prevention:   visitors restricted/screened   single patient room provided   rest/sleep promoted   personal protective equipment utilized   hand hygiene promoted   environmental surveillance performed  Taken 7/23/2024 1100 by Paulo Almonte RN  Infection Prevention:   visitors restricted/screened   single patient room provided   rest/sleep promoted   personal protective equipment utilized   hand hygiene promoted   environmental surveillance performed  Taken 7/23/2024 1000 by Paulo Almonte RN  Infection Prevention:   visitors restricted/screened   single patient room provided   rest/sleep promoted   personal protective equipment utilized   hand hygiene promoted   environmental surveillance performed  Taken 7/23/2024 0900 by Paulo Almonte RN  Infection Prevention:   visitors restricted/screened   single patient room provided   rest/sleep promoted   personal protective equipment utilized   hand hygiene promoted   environmental surveillance performed  Taken 7/23/2024 0800 by Paulo Almonte RN  Infection Prevention:   visitors restricted/screened   single patient room provided   rest/sleep promoted   personal protective equipment utilized   hand hygiene promoted   environmental surveillance performed  Goal: Optimal Comfort and Wellbeing  Outcome: Ongoing, Progressing  Intervention: Provide Person-Centered Care  Recent Flowsheet Documentation  Taken 7/23/2024 1600 by Paulo Almonte RN  Trust Relationship/Rapport:   care explained    reassurance provided   thoughts/feelings acknowledged  Taken 7/23/2024 1200 by Paulo Almonte, RN  Trust Relationship/Rapport:   care explained   reassurance provided   thoughts/feelings acknowledged  Taken 7/23/2024 0800 by Paulo Almonte, RN  Trust Relationship/Rapport:   care explained   reassurance provided   thoughts/feelings acknowledged  Goal: Readiness for Transition of Care  Outcome: Ongoing, Progressing

## 2024-07-23 NOTE — [facility] PROVIDER NOTE
Subjective   History of Present Illness  Patient has had chronic renal insufficiency for a long time.  They believe though that his medications are causing his legs to swell and not his renal failure.  They are resistant to allowing him to go on dialysis and there hopes are that his diet will help to keep him from going to dialysis.  The patient was brought to us not because he could not walk which she could not we literally had to lift him with the family present to get him into bed.  And despite what they had said that he does not know or understand that he needs to get up out of bed or get up out of the chair he is incapable of doing so he is too weak and his legs are too swollen.  He has apparently had a cough and they are thinking that it is his asthma however he is not wheezing.  He does have some crackles at the base I suspect he is volume overload secondary to his renal insufficiency.  There is certainly some resistance to addressing his kidneys and they would like to address other things that they feel they can reverse.      Review of Systems   Respiratory:  Positive for shortness of breath.    Cardiovascular:  Positive for leg swelling.       Past Medical History:   Diagnosis Date    Asthma     Dementia     Renal disorder        No Known Allergies    History reviewed. No pertinent surgical history.    History reviewed. No pertinent family history.    Social History     Socioeconomic History    Marital status:            Objective   Physical Exam  Vitals and nursing note reviewed.   Constitutional:       General: He is not in acute distress.     Appearance: Normal appearance. He is not ill-appearing, toxic-appearing or diaphoretic.   HENT:      Head: Normocephalic and atraumatic.      Nose: Nose normal.   Eyes:      Extraocular Movements: Extraocular movements intact.      Pupils: Pupils are equal, round, and reactive to light.   Cardiovascular:      Rate and Rhythm: Normal rate and regular rhythm.    Pulmonary:      Effort: Pulmonary effort is normal. No respiratory distress.      Breath sounds: Normal breath sounds. No stridor. No wheezing or rhonchi.   Abdominal:      General: Bowel sounds are normal. There is distension.      Palpations: Abdomen is soft.      Comments: Patient possibly has a ascites very protuberant belly does not appear to be tender   Musculoskeletal:         General: Swelling present.      Cervical back: Normal range of motion and neck supple.      Comments: Patient is unable to move his legs are very large from all the swelling and he cannot stand according to nursing when I got him out of his chair.   Skin:     General: Skin is warm and dry.   Neurological:      Mental Status: He is alert.         Procedures           ED Course                                           Medical Decision Making  Patient has a acute on chronic renal failure stage IV anemia secondary to that elevated troponin secondary to that congestive heart failure with pulmonary edema secondary to that and he has hyponatremia hypochloremia.  Patient is can to require dialysis his GFR is only 11.  I discussed this with the family and they realize that he likely will need dialysis now.  Notifying the hospitalist patient may end up in the ICU versus stepdown his potassium is normal and he is not in significant respiratory distress although he is bradycardic at 44.    Discussed the case with Stacie Mcclure and she is excepted the patient for Dr. Bruno    Problems Addressed:  Acute pulmonary edema with congestive heart failure: complicated acute illness or injury  Anemia due to stage 4 chronic kidney disease: complicated acute illness or injury  Chronic renal failure (CRF), stage 4 (severe): complicated acute illness or injury  Elevated troponin level not due myocardial infarction: complicated acute illness or injury  Hyponatremia: complicated acute illness or injury    Amount and/or Complexity of Data Reviewed  Labs:  ordered.     Details: Patient's white count is only 7.7 but his hemoglobin is 8.4 consistent with chronic renal failure.  Patient's BUN is 93 creatinine is 4.93 again stage IV renal failure.  Patient's sodium is only 116 his potassium is normal though at 4.3 and his chlorides low as well at 82.  His calcium is only 7.4.  The patient's troponin is 87 and very elevated and his BNP is also very elevated at 8498 consistent with his chest x-ray congestive heart failure secondary to his chronic renal failure.  Radiology: ordered.     Details: Chest x-ray shows pulmonary edema with pleural effusion congestive heart failure  ECG/medicine tests: ordered.     Details: EKG interpreted by myself shows a sinus bradycardia 44 heart rate RI interval 285 ms QT corrected 473 ms there is a nonspecific interventricular conduction delay and a prolonged RI interval.    Risk  Decision regarding hospitalization.        Final diagnoses:   Chronic renal failure (CRF), stage 4 (severe)   Hyponatremia   Acute pulmonary edema with congestive heart failure   Anemia due to stage 4 chronic kidney disease   Elevated troponin level not due myocardial infarction       ED Disposition  ED Disposition       ED Disposition   Decision to Admit    Condition   --    Comment   Level of Care: Critical Care [6]   Diagnosis: Chronic renal failure (CRF), stage 4 (severe) [[id number]]   Admitting Physician: OSMAN HOPE [[id number]]   Isolate for COVID?: No [0]   Certification: I Certify That Inpatient Hospital Services Are Medically Necessary For Greater Than 2 Midnights                 No follow-up provider specified.       Medication List      No changes were made to your prescriptions during this visit.

## 2024-07-23 NOTE — PROGRESS NOTES
LOS: 0 days   Patient Care Team:  Justina Liriano APRN as PCP - General (Nurse Practitioner)    Subjective     Patient new to me today picking up coverage from Dr. Bruno I reviewed his another records patient with chronic renal insufficiency who presents from outside ED with weakness and hyponatremia was transferred here for further management, he has a history of hypertension type 2 diabetes hypothyroidism chronic kidney disease, diabetic retinopathy  Patient does not speak much even with  most of history is from his family.  He has been a diabetic since 2018 only takes metformin, has had increasing swelling for a couple of weeks he recently started a medication they thought it might be that they were waiting a follow-up visit with his primary care provider he goes to some kind of clinic for that but that is not for another week or so.  He has had increased shortness of breath and swelling minimal cough no fevers or chills no chest pain.  Only heart problem is that he has hypertension.  Does not smoke drink or use illicit drugs.  Review of Systems:          Objective     Vital Signs  Vital Sign Min/Max for last 24 hours  Temp  Min: 97.8 °F (36.6 °C)  Max: 97.8 °F (36.6 °C)   BP  Min: 127/78  Max: 181/71   Pulse  Min: 45  Max: 54   Resp  Min: 12  Max: 17   SpO2  Min: 90 %  Max: 98 %   Flow (L/min)  Min: 2  Max: 2   Weight  Min: 90.7 kg (200 lb)  Max: 100 kg (220 lb 7.4 oz)        Ventilator/Non-Invasive Ventilation Settings (From admission, onward)      None                         Body mass index is 32.56 kg/m².  No intake/output data recorded.  No intake/output data recorded.        Physical Exam:  General Appearance: Obese  male resting in bed he is not real communicative, even with a  he defers everything to his family  Eyes: Conjunctiva are clear he has almost anicteric appearance to his sclera but I am not certain.  Pupils are equal and reactive to  light  ENT: His membranes are moist no erythema no exudates Mallampati type II almost 3 airway  Neck: Trachea midline neck is obese no visible jugular venous distention and I do not see any definite hepatojugular reflux  Lungs: Rales bilaterally he is not using accessory muscles  Cardiac: Regular rate rhythm I do not hear definite murmur  Abdomen: Soft nontender no palpable hepatosplenomegaly or masses  : Not examined  Musculoskeletal: He has 4+ lower extremity edema and upper extremity edema as well at least 2+, he is get some presacral edema so I think he is really more appropriately anasarca  Skin: Warm and dry no jaundice no petechiae  Neuro: He follows commands he is moving his extremities he is just not communicating much  Extremities/P Vascular: Palpable radial pulses too much edema I do not palpate pulses in the distal lower extremities but the feet are warm  MSE: Hard to tell unusual personality does not make eye contact he avoids it.       Labs:  Results from last 7 days   Lab Units 07/23/24  0110   GLUCOSE mg/dL 152*   SODIUM mmol/L 116*   POTASSIUM mmol/L 4.3   CO2 mmol/L 22.9   CHLORIDE mmol/L 82*   ANION GAP mmol/L 11.1   CREATININE mg/dL 4.93*   BUN mg/dL 93*   BUN / CREAT RATIO  18.9   CALCIUM mg/dL 7.4*   ALK PHOS U/L 97   TOTAL PROTEIN g/dL 6.4   ALT (SGPT) U/L 28   AST (SGOT) U/L 39   BILIRUBIN mg/dL 0.2   ALBUMIN g/dL 3.3*   GLOBULIN gm/dL 3.1     Estimated Creatinine Clearance: 16.2 mL/min (A) (by C-G formula based on SCr of 4.93 mg/dL (H)).      Results from last 7 days   Lab Units 07/23/24  0550 07/23/24  0110   WBC 10*3/mm3 6.85 7.70   RBC 10*6/mm3 2.75* 2.92*   HEMOGLOBIN g/dL 8.1* 8.4*   HEMATOCRIT % 24.2* 25.2*   MCV fL 88.0 86.3   MCH pg 29.5 28.8   MCHC g/dL 33.5 33.3   RDW % 14.9 13.3   RDW-SD fl 47.3 41.8   MPV fL 10.3 10.1   PLATELETS 10*3/mm3 379 414   NEUTROPHIL % % 83.1* 85.0*   LYMPHOCYTE % % 8.9* 7.8*   MONOCYTES % % 5.8 4.9*   EOSINOPHIL % % 1.3 1.6   BASOPHIL % % 0.3 0.3    IMM GRAN % % 0.6* 0.4   NEUTROS ABS 10*3/mm3 5.69 6.55   LYMPHS ABS 10*3/mm3 0.61* 0.60*   MONOS ABS 10*3/mm3 0.40 0.38   EOS ABS 10*3/mm3 0.09 0.12   BASOS ABS 10*3/mm3 0.02 0.02   IMMATURE GRANS (ABS) 10*3/mm3 0.04 0.03   NRBC /100 WBC 0.0  --          Results from last 7 days   Lab Units 07/23/24  0251 07/23/24  0110   HSTROP T ng/L 78* 87*     Results from last 7 days   Lab Units 07/23/24  0110   PROBNP pg/mL 8,498.0*         Results from last 7 days   Lab Units 07/23/24  0110   LACTATE mmol/L 1.1         Microbiology Results (last 10 days)       Procedure Component Value - Date/Time    COVID-19 and FLU A/B PCR, 1 HR TAT - Swab, Nasopharynx [[id number]]  (Normal) Collected: 07/23/24 0047    Lab Status: Final result Specimen: Swab from Nasopharynx Updated: 07/23/24 0110     COVID19 Not Detected     Influenza A PCR Not Detected     Influenza B PCR Not Detected    Narrative:      Fact sheet for providers: https://www.fda.gov/media/544621/download    Fact sheet for patients: https://www.fda.gov/media/494340/download    Test performed by PCR.                insulin regular, 2-7 Units, Subcutaneous, Q6H  senna-docusate sodium, 2 tablet, Oral, BID  sodium chloride, 10 mL, Intravenous, Q12H           Diagnostics:  XR Chest 1 View    Result Date: 7/23/2024  SINGLE VIEW OF THE CHEST  HISTORY: Cough and leg swelling  COMPARISON: None available.  FINDINGS: There is cardiomegaly. There are bilateral alveolar and interstitial infiltrates. Some of this may be related to edema. Superimposed pneumonia is not excluded, particularly within the right upper lobe. No pneumothorax is seen. I cannot exclude a trace left pleural effusion. There is calcification of the aorta.      Bilateral alveolar and interstitial infiltrates.  This report was finalized on 7/23/2024 1:52 AM by Dr. Giovana Koch M.D on Workstation: [id number]        Reviewed his admit chest x-ray    Active Hospital Problems    Diagnosis  POA    **Chronic renal  failure (CRF), stage 4 (severe) [N18.4]  Unknown    Hyponatremia [E87.1]  Unknown    Acute pulmonary edema with congestive heart failure [I50.1]  Unknown    Anemia due to stage 4 chronic kidney disease [N18.4, D63.1]  Unknown    Elevated troponin level not due myocardial infarction [R79.89]  Unknown      Resolved Hospital Problems   No resolved problems to display.         Assessment & Plan     Hyponatremia symptomatic likely slow process getting here correct slowly, nephrology helping and assisting with this  Chronic kidney disease stage IV with acute kidney injury if he does not show good response he may need dialysis in the very near future  CHF volume overload with pulmonary edema echocardiogram 9/23 showed grade 1 diastolic dysfunction with an LVEF of 73%.  I am going to get a repeat echocardiogram  Elevated troponin can find an old EKG his current EKG has a nonspecific interventricular conduction delay troponin could be up due to renal dysfunction or could be non-ST elevation MI type I or type II certainly has reason for demand ischemia.  Cardiology to evaluate  Anemia moderate question chronic disease or other will check indices  Diabetes mellitus type 2 sliding scale insulin for now  Hypertension blood pressure been very labile he was a little bradycardic earlier better now we will restart Coreg and get a hold amlodipine with all of his edema and will hold his ACE inhibitor with his renal dysfunction.,  As needed hydralazine.  Hypothyroidism on replacement continue  Weakness question some mildly altered mental status he certainly unusual personality if not probably related to hyponatremia uremia/metabolic encephalopathy he does not have focal deficits will follow as we correct his sodium    Plan for disposition:    Juan Ventura Jr, MD  07/23/24  06:34 EDT    Time: Critical care time 44 minutes

## 2024-07-23 NOTE — H&P
Group: [facility]        HISTORY AND PHYSICAL    Patient Identification:  Marcial Bernard  69 y.o.  male  1955  [id number]            CC: Weakness    History of Present Illness:  Marcial Bernard is a 69-year-old gentleman with a past medical history of hypertension, diabetes mellitus, hypothyroidism, diabetic retinopathy, and chronic kidney disease.  Patient is a poor historian, not interactive to provide history.  History obtained through conversation with ED provider and review of patient's medical chart.    He presented to the [facility] at [facility] on [facility]/23/2024 with complaints of weakness.  Patient's family reported that patient was unable to walk and they had to lift him to get him out of chair to the bed, which is uncommon for him.  Family also noted increased swelling of bilateral lower extremities and cough.  Family is not present at bedside at time of assessment, however they report that they brought him into the emergency department with concerns for asthma exacerbation.  They are aware of his poor kidney function, however do not seem to be aware of the severity of his kidney failure.  He has been historically resistant to conversations about dialysis in hopes that improving diet may improve kidney function (communicated to me by ER provider, again family is at bedside).  ED evaluation included: High-sensitivity troponin 87, reflex 78, proBNP 8498, sodium  116, creatinine 4.93, lactate 1.1, WBC 7.70, hemoglobin 8.4.  Chest x-ray shows bilateral alveolar interstitial infiltrates concerning for fluid overload.    Admission to the intensive care unit was requested for hyponatremia and acute renal failure.    Review of Systems:  Limited due to patient's participation in exam- denies shortness of breath, chest pain, abdominal pain.     Past Medical History:  Past Medical History:   Diagnosis Date    Asthma     Dementia     Renal disorder        Past Surgical History:  History reviewed. No  "pertinent surgical history.     Home Meds:  No medications prior to admission.       Allergies:  No Known Allergies    Social History:   Social History     Socioeconomic History    Marital status:        Family History:  History reviewed. No pertinent family history.    Physical Exam:  /75   Pulse (!) 49   Temp 97.8 °F (36.6 °C) (Oral)   Resp 12   Ht 175.3 cm (69\")   Wt 90.7 kg (200 lb)   SpO2 95%   BMI 29.53 kg/m²  Body mass index is 29.53 kg/m². 95% 90.7 kg (200 lb)    Constitutional: Middle aged pt in bed, No acute respiratory distress, no accessory muscle use  Head: - NCAT  Eyes: No pallor, Anicteric conjunctiva, EOMI.  ENMT:  Mallampati 3, no oral thrush. Moist MM.   NECK: Trachea midline, No thyromegaly, no palpable cervical LNpathy  Heart: Nicho, regular, no murmur. 3+ BLE edema  Lungs: ANA M +, crackles throughout, no wheezing  Abdomen: Soft, rounded. No tenderness, guarding or rigidity. No palpable masses  Extremities: Extremities warm and well perfused. No cyanosis/ clubbing  Neuro: Conscious, alert to self, time, unclear about situation, no focal deficits    PPE recommended per [facility] infectious disease Isolation protocol for the current clinical scenario(as mentioned below) was followed.      LABS:  COVID19   Date Value Ref Range Status   07/23/2024 Not Detected Not Detected - Ref. Range Final       Lab Results   Component Value Date    CALCIUM 7.4 (L) 07/23/2024     Results from last 7 days   Lab Units 07/23/24  0110   SODIUM mmol/L 116*   POTASSIUM mmol/L 4.3   CHLORIDE mmol/L 82*   CO2 mmol/L 22.9   BUN mg/dL 93*   CREATININE mg/dL 4.93*   GLUCOSE mg/dL 152*   CALCIUM mg/dL 7.4*   WBC 10*3/mm3 7.70   HEMOGLOBIN g/dL 8.4*   PLATELETS 10*3/mm3 414   ALT (SGPT) U/L 28   AST (SGOT) U/L 39   PROBNP pg/mL 8,498.0*     Lab Results   Component Value Date    TROPONINT 78 (C) 07/23/2024     Results from last 7 days   Lab Units 07/23/24  0251 07/23/24  0110   HSTROP T ng/L 78* 87* " "        Results from last 7 days   Lab Units 07/23/24  0110   LACTATE mmol/L 1.1         Results from last 7 days   Lab Units 07/23/24  0047   INFLUENZA B PCR  Not Detected             No results found for: \"TSH\"  Estimated Creatinine Clearance: 15.5 mL/min (A) (by C-G formula based on SCr of 4.93 mg/dL (H)).         Imaging: I personally visualized the images of scans/x-rays performed within last 3 days.      Assessment:  Chronic kidney disease  Hypervolemic hyponatremia  Anemia  Hyperglycemia  Type 2 diabetes mellitus  Hypertension  Hypothyroidism  Diabetic retinopathy    Recommendations:  Chronic kidney disease  Hypervolemic hyponatremia  Creatinine 4.93 with marked decrease in urine output.  Nephrology consulted.  Bladder scans ordered and as needed straight catheterization.  Sodium 116 with symptoms of fatigue and weakness-defer management to nephrology.    Anemia  Hemoglobin 8.4, no evidence of acute blood loss.  No indication to transfuse at this time.  Suspect related to chronic kidney disease.    Hyperglycemia  Type 2 diabetes mellitus  Checks and sliding scale insulin ordered.  Home medication: Metformin, hold.  Check hemoglobin A1c.    Hypertension  Per office visit from 2022, patient was on multiple antihypertensive medications including amlodipine, carvedilol, losartan- need to reconcile medications to determine current regimen.   Hydralazine PRN ordered for SBP > 160    Hypothyroidism  Per office visit from 2022, patient was on levothyroxine, need to reconcile home medication list.   Check TSH.    Diabetic retinopathy    NPO  Full code  SCDs      Patient was placed in face mask upon entering room and kept mask on throughout our encounter. I wore full protective equipment throughout this patient encounter including a face mask, gown and gloves. Hand hygiene was performed before donning protective equipment and after removal when leaving the room.    Laura Jewell, APRN  7/23/2024  04:44 EDT      Much " of this encounter note is an electronic transcription/translation of spoken language to printed text using Dragon Software.

## 2024-07-23 NOTE — CONSULTS
[facility] Consult Note      Patient Name: Marcial Bernard  : 1955  MRN: [id number]  Primary Care Physician:  Justina Liriano APRN  Referring Physician: No ref. provider found  Date of admission: 2024    Subjective     Reason for Consult: Acute kidney injury on chronic kidney disease and severe hyponatremia    HPI:   Marcial Bernard is a 69 y.o. male patient was admitted on 2024 when he presented with difficulty walking was shortness of breath noted to have increased creatinine from his baseline and also hyponatremia and evidence of congestive heart failure  Patient has been diabetic for approximately 20 years with known diabetic retinopathy and polyneuropathy and nephropathy also history of hypertension for approximately 30 years.  Reported history of dementia and asthma  I could not get much information from the patient my discussion was mainly with his daughter via , he has been experiencing additional to his shortness of breath and swelling difficulty walking    Review of Systems:   Not obtainable    Personal History     Past Medical History:   Diagnosis Date    Asthma     Dementia     Renal disorder        History reviewed. No pertinent surgical history.    Family History: family history is not on file.    Social History:      Home Medications:  Prior to Admission medications    Medication Sig Start Date End Date Taking? Authorizing Provider   allopurinol (ZYLOPRIM) 100 MG tablet Take 2 tablets by mouth Daily. 6/10/24  Yes ProviderPiyush MD   amLODIPine (NORVASC) 10 MG tablet Take 1 tablet by mouth Every 12 (Twelve) Hours. 6/10/24  Yes ProviderPiyush MD   carvedilol (COREG) 25 MG tablet Take 1 tablet by mouth 2 (Two) Times a Day.   Yes ProviderPiyush MD   levothyroxine (SYNTHROID, LEVOTHROID) 100 MCG tablet Take 1 tablet by mouth Daily.   Yes ProviderPiyush MD   valsartan (DIOVAN) 160 MG tablet Take 1 tablet by mouth Daily.  6/13/24  Yes Provider, Historical, MD       Allergies:  No Known Allergies    Objective     Vitals:   Temp:  [97.5 °F (36.4 °C)-97.8 °F (36.6 °C)] 97.5 °F (36.4 °C)  Heart Rate:  [45-54] 50  Resp:  [12-17] 12  BP: (127-181)/() 127/78  Flow (L/min):  [2] 2  No intake or output data in the 24 hours ending 07/23/24 0834    Physical Exam:   Constitutional: Lethargic, difficult to arouse, chronically ill, no acute distress.  HEENT: Sclera anicteric, no conjunctival injection  Neck: Increased JVD  Respiratory: Bilateral rhonchi and crackles, nonlabored respiration  Cardiovascular: RRR, no murmurs, positive S3 gallop, no carotid bruit  Gastrointestinal: Positive bowel sounds, abdomen is soft, nontender and nondistended  : No palpable bladder  Musculoskeletal: 3+ lower extremity edema, no clubbing or cyanosis  Psychiatric: Unable to assess  Neurologic: Unable to assess   Skin: Warm and dry       Scheduled Meds:     bumetanide, 4 mg, Intravenous, Q8H  insulin regular, 2-7 Units, Subcutaneous, Q6H  senna-docusate sodium, 2 tablet, Oral, BID  sodium chloride, 10 mL, Intravenous, Q12H      IV Meds:        Results Reviewed:   I have personally reviewed the results from the time of this admission to 7/23/2024 08:34 EDT     Lab Results   Component Value Date    GLUCOSE 129 (H) 07/23/2024    CALCIUM 7.3 (L) 07/23/2024     (C) 07/23/2024    K 3.6 07/23/2024    CO2 24.0 07/23/2024    CL 84 (L) 07/23/2024    BUN 80 (H) 07/23/2024    CREATININE 4.48 (H) 07/23/2024    BCR 17.9 07/23/2024    ANIONGAP 9.0 07/23/2024      Lab Results   Component Value Date    MG 6.8 (H) 07/23/2024    PHOS 6.7 (H) 07/23/2024    ALBUMIN 3.3 (L) 07/23/2024           Assessment / Plan       Chronic renal failure (CRF), stage 4 (severe)    Hyponatremia    Acute pulmonary edema with congestive heart failure    Anemia due to stage 4 chronic kidney disease    Elevated troponin level not due myocardial infarction      ASSESSMENT:  Acute kidney injury  on chronic kidney disease stage IV creatinine was 4.93 on admission repeat 7 AM was 4.48 his baseline creatinine in the 2.4 range as noted in September 2023.  Secondary to cardiorenal syndrome  Chronic kidney disease stage IV baseline creatinine about 2.4-second diabetic and hypertensive glomerulosclerosis  Congestive heart failure with cardiorenal syndrome ejection fraction is not known at this time.  Diabetes mellitus type 2 with renal complication and diabetic retinopathy  Hypertension with chronic kidney disease  Hyponatremia associated with volume excess    PLAN:  Check random urine for sodium, chloride, urine and serum osmolality, random urine for protein to creatinine ratio  Diurese with bumetanide 4 mg every 8 hours x 3 doses  Bladder scan and consider placing a Cheema catheter if urine volume greater than 400 cc  Monitor his sodium closely to prevent the rapid rise of the sodium to avoid osmotic demyelination  No indication for dialysis at this time  Surveillance labs  Monitor for diuretic toxicity    I reviewed the chart and other providers notes, reviewed labs and imaging  I discussed the case with the patient's daughter at the bedside also with the patient's nurse.    Thank you for involving us in the care of Marcial Bernard.  Please feel free to call with any questions.    Mike Flannery MD  07/23/24  08:34 EDT    [facility]  [phone]      Please note that portions of this note were completed with a voice recognition program.

## 2024-07-24 ENCOUNTER — APPOINTMENT (OUTPATIENT)
Dept: CARDIOLOGY | Facility: HOSPITAL | Age: 69
DRG: 682 | End: 2024-07-24

## 2024-07-24 LAB
ALBUMIN SERPL-MCNC: 3.1 G/DL (ref 3.5–5.2)
ALBUMIN/GLOB SERPL: 1.1 G/DL
ALP SERPL-CCNC: 89 U/L (ref 39–117)
ALT SERPL W P-5'-P-CCNC: 27 U/L (ref 1–41)
ANION GAP SERPL CALCULATED.3IONS-SCNC: 13.1 MMOL/L (ref 5–15)
ANION GAP SERPL CALCULATED.3IONS-SCNC: 14 MMOL/L (ref 5–15)
ANION GAP SERPL CALCULATED.3IONS-SCNC: 15.2 MMOL/L (ref 5–15)
ANION GAP SERPL CALCULATED.3IONS-SCNC: 16.8 MMOL/L (ref 5–15)
ANION GAP SERPL CALCULATED.3IONS-SCNC: 17.9 MMOL/L (ref 5–15)
ASCENDING AORTA: 3.2 CM
AST SERPL-CCNC: 25 U/L (ref 1–40)
BASOPHILS # BLD AUTO: 0.03 10*3/MM3 (ref 0–0.2)
BASOPHILS NFR BLD AUTO: 0.3 % (ref 0–1.5)
BH CV ECHO LEFT VENTRICLE GLOBAL LONGITUDINAL STRAIN: -22.1 %
BH CV ECHO MEAS - ACS: 2.2 CM
BH CV ECHO MEAS - AO MAX PG: 22.1 MMHG
BH CV ECHO MEAS - AO MEAN PG: 10 MMHG
BH CV ECHO MEAS - AO ROOT DIAM: 3.3 CM
BH CV ECHO MEAS - AO V2 MAX: 235 CM/SEC
BH CV ECHO MEAS - AO V2 VTI: 57.3 CM
BH CV ECHO MEAS - AVA(I,D): 1.88 CM2
BH CV ECHO MEAS - EDV(CUBED): 140.6 ML
BH CV ECHO MEAS - EDV(MOD-SP2): 150 ML
BH CV ECHO MEAS - EDV(MOD-SP4): 134 ML
BH CV ECHO MEAS - EF(MOD-BP): 62.8 %
BH CV ECHO MEAS - EF(MOD-SP2): 64 %
BH CV ECHO MEAS - EF(MOD-SP4): 63.4 %
BH CV ECHO MEAS - ESV(CUBED): 22 ML
BH CV ECHO MEAS - ESV(MOD-SP2): 54 ML
BH CV ECHO MEAS - ESV(MOD-SP4): 49 ML
BH CV ECHO MEAS - FS: 46.2 %
BH CV ECHO MEAS - IVS/LVPW: 1.14 CM
BH CV ECHO MEAS - IVSD: 1.2 CM
BH CV ECHO MEAS - LAT PEAK E' VEL: 7.8 CM/SEC
BH CV ECHO MEAS - LV MASS(C)D: 227.6 GRAMS
BH CV ECHO MEAS - LV MAX PG: 7.5 MMHG
BH CV ECHO MEAS - LV MEAN PG: 4 MMHG
BH CV ECHO MEAS - LV V1 MAX: 137 CM/SEC
BH CV ECHO MEAS - LV V1 VTI: 34.3 CM
BH CV ECHO MEAS - LVIDD: 5.2 CM
BH CV ECHO MEAS - LVIDS: 2.8 CM
BH CV ECHO MEAS - LVOT AREA: 3.1 CM2
BH CV ECHO MEAS - LVOT DIAM: 2 CM
BH CV ECHO MEAS - LVPWD: 1.05 CM
BH CV ECHO MEAS - MED PEAK E' VEL: 7.2 CM/SEC
BH CV ECHO MEAS - MR MAX PG: 115.8 MMHG
BH CV ECHO MEAS - MR MAX VEL: 538 CM/SEC
BH CV ECHO MEAS - MV A DUR: 0.14 SEC
BH CV ECHO MEAS - MV A MAX VEL: 106 CM/SEC
BH CV ECHO MEAS - MV DEC SLOPE: 601 CM/SEC2
BH CV ECHO MEAS - MV DEC TIME: 0.18 SEC
BH CV ECHO MEAS - MV E MAX VEL: 123 CM/SEC
BH CV ECHO MEAS - MV E/A: 1.16
BH CV ECHO MEAS - MV MAX PG: 7.6 MMHG
BH CV ECHO MEAS - MV MEAN PG: 3 MMHG
BH CV ECHO MEAS - MV P1/2T: 64.3 MSEC
BH CV ECHO MEAS - MV V2 VTI: 50.1 CM
BH CV ECHO MEAS - MVA(P1/2T): 3.4 CM2
BH CV ECHO MEAS - MVA(VTI): 2.15 CM2
BH CV ECHO MEAS - PA ACC TIME: 0.22 SEC
BH CV ECHO MEAS - PA V2 MAX: 166 CM/SEC
BH CV ECHO MEAS - PULM A REVS DUR: 0.12 SEC
BH CV ECHO MEAS - PULM A REVS VEL: 25.3 CM/SEC
BH CV ECHO MEAS - PULM DIAS VEL: 72.2 CM/SEC
BH CV ECHO MEAS - PULM S/D: 1.16
BH CV ECHO MEAS - PULM SYS VEL: 83.9 CM/SEC
BH CV ECHO MEAS - RV MAX PG: 5.1 MMHG
BH CV ECHO MEAS - RV V1 MAX: 113 CM/SEC
BH CV ECHO MEAS - RV V1 VTI: 28.4 CM
BH CV ECHO MEAS - SV(LVOT): 107.8 ML
BH CV ECHO MEAS - SV(MOD-SP2): 96 ML
BH CV ECHO MEAS - SV(MOD-SP4): 85 ML
BH CV ECHO MEAS - TAPSE (>1.6): 2.6 CM
BH CV ECHO MEASUREMENTS AVERAGE E/E' RATIO: 16.4
BH CV XLRA - RV BASE: 3.9 CM
BH CV XLRA - RV LENGTH: 7.9 CM
BH CV XLRA - RV MID: 3.7 CM
BH CV XLRA - TDI S': 15.9 CM/SEC
BILIRUB SERPL-MCNC: 0.2 MG/DL (ref 0–1.2)
BUN SERPL-MCNC: 73 MG/DL (ref 8–23)
BUN SERPL-MCNC: 74 MG/DL (ref 8–23)
BUN SERPL-MCNC: 74 MG/DL (ref 8–23)
BUN SERPL-MCNC: 76 MG/DL (ref 8–23)
BUN SERPL-MCNC: 79 MG/DL (ref 8–23)
BUN/CREAT SERPL: 14.6 (ref 7–25)
BUN/CREAT SERPL: 15.8 (ref 7–25)
BUN/CREAT SERPL: 16.2 (ref 7–25)
BUN/CREAT SERPL: 16.7 (ref 7–25)
BUN/CREAT SERPL: 18.5 (ref 7–25)
CALCIUM SPEC-SCNC: 7 MG/DL (ref 8.6–10.5)
CALCIUM SPEC-SCNC: 7.1 MG/DL (ref 8.6–10.5)
CALCIUM SPEC-SCNC: 7.2 MG/DL (ref 8.6–10.5)
CALCIUM SPEC-SCNC: 7.4 MG/DL (ref 8.6–10.5)
CALCIUM SPEC-SCNC: 7.5 MG/DL (ref 8.6–10.5)
CHLORIDE SERPL-SCNC: 82 MMOL/L (ref 98–107)
CHLORIDE SERPL-SCNC: 83 MMOL/L (ref 98–107)
CHLORIDE SERPL-SCNC: 84 MMOL/L (ref 98–107)
CHLORIDE SERPL-SCNC: 85 MMOL/L (ref 98–107)
CHLORIDE SERPL-SCNC: 85 MMOL/L (ref 98–107)
CO2 SERPL-SCNC: 21.1 MMOL/L (ref 22–29)
CO2 SERPL-SCNC: 22.2 MMOL/L (ref 22–29)
CO2 SERPL-SCNC: 22.8 MMOL/L (ref 22–29)
CO2 SERPL-SCNC: 23.9 MMOL/L (ref 22–29)
CO2 SERPL-SCNC: 24 MMOL/L (ref 22–29)
CREAT SERPL-MCNC: 4.26 MG/DL (ref 0.76–1.27)
CREAT SERPL-MCNC: 4.54 MG/DL (ref 0.76–1.27)
CREAT SERPL-MCNC: 4.58 MG/DL (ref 0.76–1.27)
CREAT SERPL-MCNC: 4.67 MG/DL (ref 0.76–1.27)
CREAT SERPL-MCNC: 5.01 MG/DL (ref 0.76–1.27)
DEPRECATED RDW RBC AUTO: 46 FL (ref 37–54)
EGFRCR SERPLBLD CKD-EPI 2021: 11.7 ML/MIN/1.73
EGFRCR SERPLBLD CKD-EPI 2021: 12.7 ML/MIN/1.73
EGFRCR SERPLBLD CKD-EPI 2021: 13 ML/MIN/1.73
EGFRCR SERPLBLD CKD-EPI 2021: 13.2 ML/MIN/1.73
EGFRCR SERPLBLD CKD-EPI 2021: 14.2 ML/MIN/1.73
EOSINOPHIL # BLD AUTO: 0.1 10*3/MM3 (ref 0–0.4)
EOSINOPHIL NFR BLD AUTO: 1.1 % (ref 0.3–6.2)
ERYTHROCYTE [DISTWIDTH] IN BLOOD BY AUTOMATED COUNT: 14.7 % (ref 12.3–15.4)
GLOBULIN UR ELPH-MCNC: 2.8 GM/DL
GLUCOSE BLDC GLUCOMTR-MCNC: 104 MG/DL (ref 70–130)
GLUCOSE BLDC GLUCOMTR-MCNC: 105 MG/DL (ref 70–130)
GLUCOSE BLDC GLUCOMTR-MCNC: 116 MG/DL (ref 70–130)
GLUCOSE BLDC GLUCOMTR-MCNC: 117 MG/DL (ref 70–130)
GLUCOSE BLDC GLUCOMTR-MCNC: 122 MG/DL (ref 70–130)
GLUCOSE SERPL-MCNC: 103 MG/DL (ref 65–99)
GLUCOSE SERPL-MCNC: 104 MG/DL (ref 65–99)
GLUCOSE SERPL-MCNC: 122 MG/DL (ref 65–99)
GLUCOSE SERPL-MCNC: 92 MG/DL (ref 65–99)
GLUCOSE SERPL-MCNC: 99 MG/DL (ref 65–99)
HCT VFR BLD AUTO: 24.8 % (ref 37.5–51)
HGB BLD-MCNC: 8.3 G/DL (ref 13–17.7)
IMM GRANULOCYTES # BLD AUTO: 0.11 10*3/MM3 (ref 0–0.05)
IMM GRANULOCYTES NFR BLD AUTO: 1.2 % (ref 0–0.5)
LEFT ATRIUM VOLUME INDEX: 29.8 ML/M2
LYMPHOCYTES # BLD AUTO: 0.74 10*3/MM3 (ref 0.7–3.1)
LYMPHOCYTES NFR BLD AUTO: 8.2 % (ref 19.6–45.3)
MAGNESIUM SERPL-MCNC: 6.5 MG/DL (ref 1.6–2.4)
MCH RBC QN AUTO: 29 PG (ref 26.6–33)
MCHC RBC AUTO-ENTMCNC: 33.5 G/DL (ref 31.5–35.7)
MCV RBC AUTO: 86.7 FL (ref 79–97)
MONOCYTES # BLD AUTO: 0.62 10*3/MM3 (ref 0.1–0.9)
MONOCYTES NFR BLD AUTO: 6.9 % (ref 5–12)
NEUTROPHILS NFR BLD AUTO: 7.37 10*3/MM3 (ref 1.7–7)
NEUTROPHILS NFR BLD AUTO: 82.3 % (ref 42.7–76)
NRBC BLD AUTO-RTO: 0 /100 WBC (ref 0–0.2)
PHOSPHATE SERPL-MCNC: 7 MG/DL (ref 2.5–4.5)
PLATELET # BLD AUTO: 417 10*3/MM3 (ref 140–450)
PMV BLD AUTO: 10 FL (ref 6–12)
POTASSIUM SERPL-SCNC: 3.1 MMOL/L (ref 3.5–5.2)
POTASSIUM SERPL-SCNC: 3.2 MMOL/L (ref 3.5–5.2)
POTASSIUM SERPL-SCNC: 3.4 MMOL/L (ref 3.5–5.2)
POTASSIUM SERPL-SCNC: 3.5 MMOL/L (ref 3.5–5.2)
POTASSIUM SERPL-SCNC: 3.6 MMOL/L (ref 3.5–5.2)
PROT SERPL-MCNC: 5.9 G/DL (ref 6–8.5)
QT INTERVAL: 552 MS
QTC INTERVAL: 473 MS
RBC # BLD AUTO: 2.86 10*6/MM3 (ref 4.14–5.8)
SINUS: 2.6 CM
SODIUM SERPL-SCNC: 120 MMOL/L (ref 136–145)
SODIUM SERPL-SCNC: 121 MMOL/L (ref 136–145)
SODIUM SERPL-SCNC: 123 MMOL/L (ref 136–145)
STJ: 2.31 CM
URATE SERPL-MCNC: 8 MG/DL (ref 3.4–7)
WBC NRBC COR # BLD AUTO: 8.97 10*3/MM3 (ref 3.4–10.8)

## 2024-07-24 PROCEDURE — 92610 EVALUATE SWALLOWING FUNCTION: CPT

## 2024-07-24 PROCEDURE — 25010000002 POTASSIUM CHLORIDE 10 MEQ/100ML SOLUTION: Performed by: INTERNAL MEDICINE

## 2024-07-24 PROCEDURE — 93356 MYOCRD STRAIN IMG SPCKL TRCK: CPT

## 2024-07-24 PROCEDURE — 84550 ASSAY OF BLOOD/URIC ACID: CPT | Performed by: INTERNAL MEDICINE

## 2024-07-24 PROCEDURE — 93306 TTE W/DOPPLER COMPLETE: CPT

## 2024-07-24 PROCEDURE — 83735 ASSAY OF MAGNESIUM: CPT | Performed by: INTERNAL MEDICINE

## 2024-07-24 PROCEDURE — 85025 COMPLETE CBC W/AUTO DIFF WBC: CPT | Performed by: INTERNAL MEDICINE

## 2024-07-24 PROCEDURE — 63710000001 INSULIN REGULAR HUMAN PER 5 UNITS

## 2024-07-24 PROCEDURE — 25010000002 BUMETANIDE PER 0.5 MG: Performed by: INTERNAL MEDICINE

## 2024-07-24 PROCEDURE — 99232 SBSQ HOSP IP/OBS MODERATE 35: CPT | Performed by: INTERNAL MEDICINE

## 2024-07-24 PROCEDURE — 80053 COMPREHEN METABOLIC PANEL: CPT | Performed by: INTERNAL MEDICINE

## 2024-07-24 PROCEDURE — 84100 ASSAY OF PHOSPHORUS: CPT | Performed by: INTERNAL MEDICINE

## 2024-07-24 PROCEDURE — 82948 REAGENT STRIP/BLOOD GLUCOSE: CPT

## 2024-07-24 PROCEDURE — 93306 TTE W/DOPPLER COMPLETE: CPT | Performed by: INTERNAL MEDICINE

## 2024-07-24 PROCEDURE — 25010000002 HYDRALAZINE PER 20 MG

## 2024-07-24 PROCEDURE — 93356 MYOCRD STRAIN IMG SPCKL TRCK: CPT | Performed by: INTERNAL MEDICINE

## 2024-07-24 PROCEDURE — 25010000002 HYDRALAZINE PER 20 MG: Performed by: INTERNAL MEDICINE

## 2024-07-24 RX ORDER — HYDRALAZINE HYDROCHLORIDE 20 MG/ML
20 INJECTION INTRAMUSCULAR; INTRAVENOUS EVERY 6 HOURS
Status: DISCONTINUED | OUTPATIENT
Start: 2024-07-24 | End: 2024-07-26

## 2024-07-24 RX ORDER — POTASSIUM CHLORIDE 7.45 MG/ML
10 INJECTION INTRAVENOUS
Status: COMPLETED | OUTPATIENT
Start: 2024-07-24 | End: 2024-07-25

## 2024-07-24 RX ORDER — BUMETANIDE 0.25 MG/ML
4 INJECTION INTRAMUSCULAR; INTRAVENOUS ONCE
Status: COMPLETED | OUTPATIENT
Start: 2024-07-24 | End: 2024-07-24

## 2024-07-24 RX ORDER — POTASSIUM CHLORIDE 7.45 MG/ML
10 INJECTION INTRAVENOUS
Status: COMPLETED | OUTPATIENT
Start: 2024-07-24 | End: 2024-07-24

## 2024-07-24 RX ORDER — POTASSIUM CHLORIDE 750 MG/1
40 TABLET, FILM COATED, EXTENDED RELEASE ORAL ONCE
Status: DISCONTINUED | OUTPATIENT
Start: 2024-07-24 | End: 2024-07-24

## 2024-07-24 RX ORDER — POTASSIUM CHLORIDE 7.45 MG/ML
10 INJECTION INTRAVENOUS
Status: DISCONTINUED | OUTPATIENT
Start: 2024-07-24 | End: 2024-07-24

## 2024-07-24 RX ADMIN — POTASSIUM CHLORIDE 10 MEQ: 7.46 INJECTION, SOLUTION INTRAVENOUS at 23:47

## 2024-07-24 RX ADMIN — METOPROLOL TARTRATE 2.5 MG: 1 INJECTION, SOLUTION INTRAVENOUS at 23:50

## 2024-07-24 RX ADMIN — POTASSIUM CHLORIDE 10 MEQ: 7.46 INJECTION, SOLUTION INTRAVENOUS at 20:22

## 2024-07-24 RX ADMIN — Medication 10 ML: at 09:14

## 2024-07-24 RX ADMIN — POTASSIUM CHLORIDE 10 MEQ: 7.46 INJECTION, SOLUTION INTRAVENOUS at 22:43

## 2024-07-24 RX ADMIN — HYDRALAZINE HYDROCHLORIDE 10 MG: 20 INJECTION INTRAMUSCULAR; INTRAVENOUS at 00:21

## 2024-07-24 RX ADMIN — POTASSIUM CHLORIDE 10 MEQ: 7.46 INJECTION, SOLUTION INTRAVENOUS at 21:44

## 2024-07-24 RX ADMIN — HYDRALAZINE HYDROCHLORIDE 10 MG: 20 INJECTION INTRAMUSCULAR; INTRAVENOUS at 22:14

## 2024-07-24 RX ADMIN — Medication 10 ML: at 21:45

## 2024-07-24 RX ADMIN — HYDRALAZINE HYDROCHLORIDE 10 MG: 20 INJECTION INTRAMUSCULAR; INTRAVENOUS at 06:21

## 2024-07-24 RX ADMIN — POTASSIUM CHLORIDE 10 MEQ: 7.46 INJECTION, SOLUTION INTRAVENOUS at 12:01

## 2024-07-24 RX ADMIN — HYDRALAZINE HYDROCHLORIDE 20 MG: 20 INJECTION INTRAMUSCULAR; INTRAVENOUS at 12:26

## 2024-07-24 RX ADMIN — BUMETANIDE 4 MG: 0.25 INJECTION INTRAMUSCULAR; INTRAVENOUS at 09:13

## 2024-07-24 RX ADMIN — BUMETANIDE 4 MG: 0.25 INJECTION INTRAMUSCULAR; INTRAVENOUS at 00:20

## 2024-07-24 RX ADMIN — HYDRALAZINE HYDROCHLORIDE 20 MG: 20 INJECTION INTRAMUSCULAR; INTRAVENOUS at 18:21

## 2024-07-24 RX ADMIN — BUMETANIDE 1 MG/HR: 0.25 INJECTION INTRAMUSCULAR; INTRAVENOUS at 10:14

## 2024-07-24 RX ADMIN — POTASSIUM CHLORIDE 10 MEQ: 7.46 INJECTION, SOLUTION INTRAVENOUS at 10:59

## 2024-07-24 RX ADMIN — INSULIN HUMAN 2 UNITS: 100 INJECTION, SOLUTION PARENTERAL at 00:21

## 2024-07-24 NOTE — THERAPY EVALUATION
Acute Care - Speech Language Pathology   Swallow Initial Evaluation [facility]     Patient Name: Marcial Bernard  : 1955  MRN: [id number]  Today's Date: 2024               Admit Date: 2024    Visit Dx:     ICD-10-CM ICD-9-CM   1. Chronic renal failure (CRF), stage 4 (severe)  N18.4 585.4   2. Hyponatremia  E87.1 276.1   3. Acute pulmonary edema with congestive heart failure  I50.1 428.1   4. Anemia due to stage 4 chronic kidney disease  N18.4 285.21    D63.1 585.4   5. Elevated troponin level not due myocardial infarction  R79.89 790.6     Patient Active Problem List   Diagnosis    Chronic renal failure (CRF), stage 4 (severe)    Hyponatremia    Acute pulmonary edema with congestive heart failure    Anemia due to stage 4 chronic kidney disease    Elevated troponin level not due myocardial infarction     Past Medical History:   Diagnosis Date    Asthma     Dementia     Renal disorder      History reviewed. No pertinent surgical history.    SLP Recommendation and Plan  SLP Swallowing Diagnosis: other (see comments) (dysphagia 2/2 cognition) (24)  SLP Diet Recommendation: ice chips between meals after oral care, with supervision (24)     SLP Rec. for Method of Medication Administration: meds crushed, with puree, as tolerated (24)     Monitor for Signs of Aspiration: yes, notify SLP if any concerns (24)  Recommended Diagnostics: reassess via clinical swallow evaluation (24)           Therapy Frequency (Swallow): PRN (24)  Predicted Duration Therapy Intervention (Days): until discharge (24)  Oral Care Recommendations: Before ice/water (24)                                        Plan of Care Reviewed With: patient  Outcome Evaluation: Patient seen for clinical swallow assessment. Spouse and son-in-law at bedside.  ipad utilized. Pt with vascular dementia. Good appetite at baseline per wife, but is a total  "feed d/t tremors. Pt does chew large pills instead of swallowing whole, which is often seen with dementia. Pt only follow single step commands with 25% acc. Voice clear indicating current management of secretions. Phasic bite observed with spoon trials, which is not a baseline behavior for patient. Adequate bolus preparation with ice. Voice clear post swallow. Laryngeal elevation adequate. Pt unable to accept spoon and cup drinks adequately and trials \"dropped into\" oral cavity. Anterior loss with cup drinks. Swallow appeared mistimed. Voice clear. Pt required verbal cues to swallow puree trials. No oral residue. Spontaneous swallow appears intake, but volitional swallow is impaired likely 2/2 cognitive state. SLP recs NPO. Can present critical meds crushed in puree. Can allow free water protocol with ice. Suspect pt will tolerate PO diet once sodium levels improve. Results discussed in detail with patient and family present. All questions answered via  ipad.      SWALLOW EVALUATION (Last 72 Hours)       SLP Adult Swallow Evaluation       Row Name 07/24/24 1100                   Rehab Evaluation    Document Type evaluation  -SH        Patient Effort poor  -           General Information    Patient Profile Reviewed yes  -        Pertinent History Of Current Problem \"encephalopathy metabolic secondary to hyponatremia and probably uremia\", vascular dementia  -        Current Method of Nutrition NPO  -        Precautions/Limitations, Vision difficult to assess  -        Precautions/Limitations, Hearing WFL;for purposes of eval  -        Prior Level of Function-Communication English as a second language  -        Prior Level of Function-Swallowing no diet consistency restrictions;other (see comments)  dysphagia with pills  -        Plans/Goals Discussed with family;agreed upon  -        Barriers to Rehab cognitive status  -        Family Goals for Discharge patient able to eat/drink without " "coughing/choking;patient able to return to all previous activities/roles  -           Oral Musculature and Cranial Nerve Assessment    Oral Motor General Assessment generalized oral motor weakness;other (see comments)  unable to open eyes despite commands, oriented to name and partially to   -           Clinical Swallow Eval    Clinical Swallow Evaluation Summary Patient seen for clinical swallow assessment. Spouse and son-in-law at bedside.  ipad utilized. Pt with vascular dementia. Good appetite at baseline per wife, but is a total feed d/t tremors. Pt does chew large pills instead of swallowing whole, which is often seen with dementia. Pt only follow single step commands with 25% acc. Voice clear indicating current management of secretions. Phasic bite observed with spoon trials, which is not a baseline behavior for patient. Adequate bolus preparation with ice. Voice clear post swallow. Laryngeal elevation adequate. Pt unable to accept spoon and cup drinks adequately and trials \"dropped into\" oral cavity. Anterior loss with cup drinks. Swallow appeared mistimed. Voice clear. Pt required verbal cues to swallow puree trials. No oral residue. Spontaneous swallow appears intake, but volitional swallow is impaired likely 2/2 cognitive state. SLP recs NPO. Can present critical meds crushed in puree. Can allow free water protocol with ice. Suspect pt will tolerate PO diet once sodium levels improve. Results discussed in detail with patient and family present. All questions answered via  ipad.  -           SLP Evaluation Clinical Impression    SLP Swallowing Diagnosis other (see comments)  dysphagia 2/2 cognition  -           Recommendations    Therapy Frequency (Swallow) PRN  -        Predicted Duration Therapy Intervention (Days) until discharge  -        SLP Diet Recommendation ice chips between meals after oral care, with supervision  -        Recommended Diagnostics reassess " via clinical swallow evaluation  -        Oral Care Recommendations Before ice/water  -        SLP Rec. for Method of Medication Administration meds crushed;with puree;as tolerated  -        Monitor for Signs of Aspiration yes;notify SLP if any concerns  -                  User Key  (r) = Recorded By, (t) = Taken By, (c) = Cosigned By      Initials Name Effective Dates     Magdalena Romo SLP 01/05/24 -                     EDUCATION  The patient has been educated in the following areas:   Dysphagia (Swallowing Impairment).                Time Calculation:    Time Calculation- SLP       Row Name 07/24/24 1556             Time Calculation- [facility]    SLP Start Time 1030  -      SLP Received On 07/24/24  -                User Key  (r) = Recorded By, (t) = Taken By, (c) = Cosigned By      Initials Name Provider Type    Magdalena Fox SLP Speech and Language Pathologist                    Therapy Charges for Today       Code Description Service Date Service Provider Modifiers Qty    [id number]  ST EVAL ORAL PHARYNG SWALLOW 4 7/24/2024 Magdalena Romo SLP GN 1                 COLLIN Plata  7/24/2024

## 2024-07-24 NOTE — PROGRESS NOTES
"[facility]    Patient Name: Marcial Bernard  :1955  69 y.o.  LOS: 1  Encounter Provider: Neptali Becker Jr, MD      Patient Care Team:  Justina Liriano APRN as PCP - General (Nurse Practitioner)    Chief Complaint: Follow-up altered mental status, acute on chronic kidney disease, acute on chronic diastolic heart failure, diabetes    Interval History: Negative fluid balance if I's and O's are accurate.  A.m. labs reviewed.  Nephrology following for diuretic versus dialysis needs.  Currently family does not want dialysis.  Discussed cardiac update with daughter at bedside.       Objective   Vital Signs  Temp:  [97.9 °F (36.6 °C)-99.8 °F (37.7 °C)] 98.1 °F (36.7 °C)  Heart Rate:  [46-66] 51  Resp:  [12] 12  BP: (132-193)/() 151/76    Intake/Output Summary (Last 24 hours) at 2024 0928  Last data filed at 2024 0600  Gross per 24 hour   Intake --   Output 1400 ml   Net -1400 ml     Flowsheet Rows      Flowsheet Row First Filed Value   Admission Height 175.3 cm (69\") Documented at 2024 0044   Admission Weight 90.7 kg (200 lb) Documented at 2024 0044              Physical Exam  Vitals reviewed.   Constitutional:       Appearance: Frail. Acutely ill-appearing.   Neck:      Vascular: No JVR. JVD normal.   Pulmonary:      Effort: Pulmonary effort is normal.      Breath sounds: No wheezing. No rhonchi. Rales present.   Chest:      Chest wall: Not tender to palpatation.   Cardiovascular:      PMI at left midclavicular line. Normal rate. Regular rhythm. Normal S1. Normal S2.       Murmurs: There is no murmur.      No gallop.  No click. No rub.   Pulses:     Intact distal pulses.   Edema:     Pretibial: bilateral 3+ edema of the pretibial area.     Ankle: bilateral 3+ edema of the ankle.     Feet: bilateral 3+ edema of the feet.  Abdominal:      General: There is distension.      Palpations: Abdomen is soft.      Tenderness: There is no abdominal tenderness. " "  Musculoskeletal: Normal range of motion.         General: No tenderness. Skin:     General: Skin is warm and dry.   Neurological:      Mental Status: Exhibits altered mental status.     Physical exam was reviewed, updated and amended when necessary.    Pertinent Test Results:  Results from last 7 days   Lab Units 07/24/24  0546 07/24/24  0324 07/23/24  2344 07/23/24  1756 07/23/24  1200 07/23/24  0700 07/23/24  0110   SODIUM mmol/L 120* 123* 121* 120* 118* 117* 116*   POTASSIUM mmol/L 3.1* 3.2* 3.5 3.6 3.7 3.6 4.3   CHLORIDE mmol/L 83* 85* 82* 83* 82* 84* 82*   CO2 mmol/L 23.9 24.0 22.2 21.0* 22.4 24.0 22.9   BUN mg/dL 74* 79* 76* 76* 74* 80* 93*   CREATININE mg/dL 4.58* 4.26* 4.54* 4.66* 4.61* 4.48* 4.93*   GLUCOSE mg/dL 92 99 122* 126* 121* 129* 152*   CALCIUM mg/dL 7.1* 7.4* 7.5* 7.3* 7.3* 7.3* 7.4*   AST (SGOT) U/L  --  25  --   --   --   --  39   ALT (SGPT) U/L  --  27  --   --   --   --  28     Results from last 7 days   Lab Units 07/23/24  0251 07/23/24  0110   HSTROP T ng/L 78* 87*     Results from last 7 days   Lab Units 07/24/24  0324 07/23/24  0550 07/23/24  0110   WBC 10*3/mm3 8.97 6.85 7.70   HEMOGLOBIN g/dL 8.3* 8.1* 8.4*   HEMATOCRIT % 24.8* 24.2* 25.2*   PLATELETS 10*3/mm3 417 379 414         Results from last 7 days   Lab Units 07/24/24  0324 07/23/24  0700 07/23/24  0550   MAGNESIUM mg/dL 6.5* 6.8* 4.5*           Invalid input(s): \"LDLCALC\"  Results from last 7 days   Lab Units 07/23/24  0110   PROBNP pg/mL 8,498.0*     Results from last 7 days   Lab Units 07/23/24  0700   TSH uIU/mL 1.760           Medication Review:   carvedilol, 25 mg, Oral, BID  hydrALAZINE, 10 mg, Intravenous, Q6H  insulin regular, 2-7 Units, Subcutaneous, Q6H  levothyroxine, 100 mcg, Oral, Daily  senna-docusate sodium, 2 tablet, Oral, BID  sodium chloride, 10 mL, Intravenous, Q12H         bumetanide, 1 mg/hr        Assessment & Plan     Active Hospital Problems    Diagnosis  POA    **Chronic renal failure (CRF), stage 4 " (severe) [N18.4]  Unknown    Hyponatremia [E87.1]  Unknown    Acute pulmonary edema with congestive heart failure [I50.1]  Unknown    Anemia due to stage 4 chronic kidney disease [N18.4, D63.1]  Unknown    Elevated troponin level not due myocardial infarction [R79.89]  Unknown      Resolved Hospital Problems   No resolved problems to display.        Elevated cardiac enzymes -likely NSTEMI type II secondary to heart failure, acute on chronic kidney disease.  Repeat echocardiogram pending.  Patient is making urine and is currently on diuretic regimen per nephrology.  No indication for ischemic workup at this time.  Hypervolemic state -multifactorial.  There is certainly an element of acute on chronic diastolic heart failure and we will continue diuretics.  Blood pressure is poorly controlled and we will address that below.  Advanced kidney disease being managed by Dr. Flannery.  Per family report he is at a steady decline over the past 2 months.  We will follow diagnostic testing results and clinical response to medical intervention for further treatment recommendations.  CKD stage IV  Diabetes -A1c 6.1  Hypertension -patient is currently not scheduled for valsartan given acute kidney dysfunction and amlodipine was held.  Altered mental status at this time and will increase dose of schedule IV hydralazine but plan to switch back to oral amlodipine once he is optimized and taking pills reliably.  Altered mental status -multifactorial.  BUN 75-80.           Neptali Becker Jr, MD  [facility]   [facility]  [address] [place] [address] - [address]  [place] [address]  Office: [phone]    07/24/24  09:28 EDT

## 2024-07-24 NOTE — PLAN OF CARE
Goal Outcome Evaluation:  Progress: no change   Pt lethargic, oriented to self. Moving all extremities.  IPad utilized during assessments. Bumex gtt on. Awaiting evening lab results. Family updated at bedside with .

## 2024-07-24 NOTE — PROGRESS NOTES
LOS: 1 day   Patient Care Team:  Justina Liriano APRN as PCP - General (Nurse Practitioner)    Subjective     patient with chronic renal insufficiency who presents from outside ED with weakness and hyponatremia was transferred here for further management, he has a history of hypertension type 2 diabetes hypothyroidism chronic kidney disease, diabetic retinopathy  Patient still not talking a whole lot he did answer a few questions via the  and his family he denies shortness of breath or chest pain.  He indicated that he probably would do dialysis if that is the only option  Review of Systems:          Objective     Vital Signs  Vital Sign Min/Max for last 24 hours  Temp  Min: 97.5 °F (36.4 °C)  Max: 99.8 °F (37.7 °C)   BP  Min: 130/57  Max: 193/113   Pulse  Min: 42  Max: 66   No data recorded   SpO2  Min: 91 %  Max: 99 %   Flow (L/min)  Min: 2  Max: 2   No data recorded        Ventilator/Non-Invasive Ventilation Settings (From admission, onward)      None                         Body mass index is 32.56 kg/m².  I/O last 3 completed shifts:  In: -   Out: 500 [Urine:500]  I/O this shift:  In: -   Out: 800 [Urine:800]        Physical Exam:  General Appearance: Obese  male resting in bed he is not real communicative, more so than yesterday  Eyes: Conjunctiva are clear .  Pupils are equal and reactive to light  ENT: His membranes are moist no erythema no exudates Mallampati type II almost 3 airway  Neck: Trachea midline neck is obese no visible jugular venous distention and I do not see any definite hepatojugular reflux  Lungs: Rales bilaterally he is not using accessory muscles  Cardiac: Regular rate rhythm I do not hear definite murmur  Abdomen: Soft nontender no palpable hepatosplenomegaly or masses  : Not examined  Musculoskeletal: He has 4+ lower extremity edema and upper extremity edema as well at least 2+, he has some presacral edema so I think he is really more appropriately  anasarca no improvement from yesterday  Skin: Warm and dry no jaundice no petechiae  Neuro: He follows commands he is moving his extremities he is just not communicating much  Extremities/P Vascular: Palpable radial pulses too much edema I do not palpate pulses in the distal lower extremities but the feet are warm  MSE: Hard to tell unusual personality does not make eye contact he avoids it.       Labs:  Results from last 7 days   Lab Units 07/24/24  0546 07/24/24  0324 07/23/24  2344 07/23/24  1756 07/23/24  1200 07/23/24  0700 07/23/24  0550 07/23/24  0110   GLUCOSE mg/dL 92 99 122* 126* 121* 129*  --  152*   SODIUM mmol/L 120* 123* 121* 120* 118* 117*  --  116*   POTASSIUM mmol/L 3.1* 3.2* 3.5 3.6 3.7 3.6  --  4.3   MAGNESIUM mg/dL  --  6.5*  --   --   --  6.8* 4.5*  --    CO2 mmol/L 23.9 24.0 22.2 21.0* 22.4 24.0  --  22.9   CHLORIDE mmol/L 83* 85* 82* 83* 82* 84*  --  82*   ANION GAP mmol/L 13.1 14.0 16.8* 16.0* 13.6 9.0  --  11.1   CREATININE mg/dL 4.58* 4.26* 4.54* 4.66* 4.61* 4.48*  --  4.93*   BUN mg/dL 74* 79* 76* 76* 74* 80*  --  93*   BUN / CREAT RATIO  16.2 18.5 16.7 16.3 16.1 17.9  --  18.9   CALCIUM mg/dL 7.1* 7.4* 7.5* 7.3* 7.3* 7.3*  --  7.4*   ALK PHOS U/L  --  89  --   --   --   --   --  97   TOTAL PROTEIN g/dL  --  5.9*  --   --   --   --   --  6.4   ALT (SGPT) U/L  --  27  --   --   --   --   --  28   AST (SGOT) U/L  --  25  --   --   --   --   --  39   BILIRUBIN mg/dL  --  0.2  --   --   --   --   --  0.2   ALBUMIN g/dL  --  3.1*  --   --   --   --   --  3.3*   GLOBULIN gm/dL  --  2.8  --   --   --   --   --  3.1     Estimated Creatinine Clearance: 17.5 mL/min (A) (by C-G formula based on SCr of 4.58 mg/dL (H)).      Results from last 7 days   Lab Units 07/24/24  0324 07/23/24  0550 07/23/24  0110   WBC 10*3/mm3 8.97 6.85 7.70   RBC 10*6/mm3 2.86* 2.75* 2.92*   HEMOGLOBIN g/dL 8.3* 8.1* 8.4*   HEMATOCRIT % 24.8* 24.2* 25.2*   MCV fL 86.7 88.0 86.3   MCH pg 29.0 29.5 28.8   MCHC g/dL 33.5 33.5  33.3   RDW % 14.7 14.9 13.3   RDW-SD fl 46.0 47.3 41.8   MPV fL 10.0 10.3 10.1   PLATELETS 10*3/mm3 417 379 414   NEUTROPHIL % % 82.3* 83.1* 85.0*   LYMPHOCYTE % % 8.2* 8.9* 7.8*   MONOCYTES % % 6.9 5.8 4.9*   EOSINOPHIL % % 1.1 1.3 1.6   BASOPHIL % % 0.3 0.3 0.3   IMM GRAN % % 1.2* 0.6* 0.4   NEUTROS ABS 10*3/mm3 7.37* 5.69 6.55   LYMPHS ABS 10*3/mm3 0.74 0.61* 0.60*   MONOS ABS 10*3/mm3 0.62 0.40 0.38   EOS ABS 10*3/mm3 0.10 0.09 0.12   BASOS ABS 10*3/mm3 0.03 0.02 0.02   IMMATURE GRANS (ABS) 10*3/mm3 0.11* 0.04 0.03   NRBC /100 WBC 0.0 0.0  --          Results from last 7 days   Lab Units 07/23/24  0251 07/23/24  0110   HSTROP T ng/L 78* 87*     Results from last 7 days   Lab Units 07/23/24  0110   PROBNP pg/mL 8,498.0*     Results from last 7 days   Lab Units 07/23/24  0700   TSH uIU/mL 1.760     Results from last 7 days   Lab Units 07/23/24  0110   LACTATE mmol/L 1.1         Microbiology Results (last 10 days)       Procedure Component Value - Date/Time    COVID-19 and FLU A/B PCR, 1 HR TAT - Swab, Nasopharynx [[id number]]  (Normal) Collected: 07/23/24 0047    Lab Status: Final result Specimen: Swab from Nasopharynx Updated: 07/23/24 0110     COVID19 Not Detected     Influenza A PCR Not Detected     Influenza B PCR Not Detected    Narrative:      Fact sheet for providers: https://www.fda.gov/media/022597/download    Fact sheet for patients: https://www.fda.gov/media/905774/download    Test performed by PCR.                carvedilol, 25 mg, Oral, BID  hydrALAZINE, 10 mg, Intravenous, Q6H  insulin regular, 2-7 Units, Subcutaneous, Q6H  levothyroxine, 100 mcg, Oral, Daily  senna-docusate sodium, 2 tablet, Oral, BID  sodium chloride, 10 mL, Intravenous, Q12H           Diagnostics:  XR Chest 1 View    Result Date: 7/23/2024  SINGLE VIEW OF THE CHEST  HISTORY: Cough and leg swelling  COMPARISON: None available.  FINDINGS: There is cardiomegaly. There are bilateral alveolar and interstitial infiltrates. Some of this  may be related to edema. Superimposed pneumonia is not excluded, particularly within the right upper lobe. No pneumothorax is seen. I cannot exclude a trace left pleural effusion. There is calcification of the aorta.      Bilateral alveolar and interstitial infiltrates.  This report was finalized on 7/23/2024 1:52 AM by Dr. Giovana Koch M.D on Workstation: [id number]        Reviewed his admit chest x-ray    Active Hospital Problems    Diagnosis  POA    **Chronic renal failure (CRF), stage 4 (severe) [N18.4]  Unknown    Hyponatremia [E87.1]  Unknown    Acute pulmonary edema with congestive heart failure [I50.1]  Unknown    Anemia due to stage 4 chronic kidney disease [N18.4, D63.1]  Unknown    Elevated troponin level not due myocardial infarction [R79.89]  Unknown      Resolved Hospital Problems   No resolved problems to display.         Assessment & Plan     Hyponatremia symptomatic likely slow process getting here correct slowly, nephrology helping and assisting with this   encephalopathy metabolic secondary to hyponatremia and probably uremia  Chronic kidney disease stage IV with acute kidney injury if he does not show good response he may need dialysis in the very near future, patient is very reluctant.  CHF volume overload with pulmonary edema echocardiogram 9/23 showed grade 1 diastolic dysfunction with an LVEF of 73%.  I am going to get a repeat echocardiogram  Elevated troponin can find an old EKG his current EKG has a nonspecific interventricular conduction delay troponin could be up due to renal dysfunction or could be non-ST elevation MI type I or type II certainly has reason for demand ischemia.  Cardiology to evaluate  Anemia moderate question chronic disease or other will check indices  Diabetes mellitus type 2 sliding scale insulin for now  Hypertension blood pressure been very labile he was a little bradycardic earlier better now we will restart Coreg and get a hold amlodipine with all of his  edema and will hold his ACE inhibitor with his renal dysfunction.,  As needed hydralazine.  Hypothyroidism on replacement continue      Plan for disposition:    Juan Ventura Jr, MD  07/24/24  06:25 EDT    Time: Critical care time 34 minutes

## 2024-07-24 NOTE — PLAN OF CARE
Goal Outcome Evaluation:   Pt VSS. Pt remains lethargic but follows commands. Pt still on NPO status but able to tolerate sips with PO meds. Pt sodium continues to trend up (see charting). Pt urine output remains adequate with 800ml out this shift.

## 2024-07-24 NOTE — PLAN OF CARE
"Goal Outcome Evaluation:  Plan of Care Reviewed With: patient           Outcome Evaluation: Patient seen for clinical swallow assessment. Spouse and son-in-law at bedside.  ipad utilized. Pt with vascular dementia. Good appetite at baseline per wife, but is a total feed d/t tremors. Pt does chew large pills instead of swallowing whole, which is often seen with dementia. Pt only follow single step commands with 25% acc. Voice clear indicating current management of secretions. Phasic bite observed with spoon trials, which is not a baseline behavior for patient. Adequate bolus preparation with ice. Voice clear post swallow. Laryngeal elevation adequate. Pt unable to accept spoon and cup drinks adequately and trials \"dropped into\" oral cavity. Anterior loss with cup drinks. Swallow appeared mistimed. Voice clear. Pt required verbal cues to swallow puree trials. No oral residue. Spontaneous swallow appears intake, but volitional swallow is impaired likely 2/2 cognitive state. SLP recs NPO. Can present critical meds crushed in puree. Can allow free water protocol with ice. Suspect pt will tolerate PO diet once sodium levels improve. Results discussed in detail with patient and family present. All questions answered via  ipad.                               "

## 2024-07-24 NOTE — PROGRESS NOTES
[facility] Progress Note      Patient Name: Marcial Bernard  : 1955  MRN: [id number]  Primary Care Physician:  Justina Liriano APRN  Date of admission: 2024    Subjective     Interval History:   Follow-up acute kidney injury on chronic kidney disease and hyponatremia    The patient received the IV diuretics he has good urine output but his renal function is worsening, he denies any chest pain, less short of breath, no nausea or vomiting, no abdominal pain, no dysuria or gross hematuria has significant lower extremity edema    Review of Systems:   As noted above    Objective     Vitals:   Temp:  [97.9 °F (36.6 °C)-99.8 °F (37.7 °C)] 99.8 °F (37.7 °C)  Heart Rate:  [42-66] 53  BP: (130-193)/() 151/76  Flow (L/min):  [2] 2    Intake/Output Summary (Last 24 hours) at 2024 0829  Last data filed at 2024 0600  Gross per 24 hour   Intake --   Output 1400 ml   Net -1400 ml       Physical Exam:    General Appearance: alert, awake, chronically ill, no acute distress   Skin: warm and dry  HEENT: oral mucosa normal, nonicteric sclera  Neck: Increased JVD  Lungs: Bilateral rhonchi and crackles, breathing effort not labored  Heart: RRR, positive S3, no rub  Abdomen: soft, nontender, nondistended  : no palpable bladder  Extremities: 3+ lower extremity edema  Neuro: Moving all extremities    Scheduled Meds:     bumetanide, 4 mg, Intravenous, Once  carvedilol, 25 mg, Oral, BID  hydrALAZINE, 10 mg, Intravenous, Q6H  insulin regular, 2-7 Units, Subcutaneous, Q6H  levothyroxine, 100 mcg, Oral, Daily  potassium chloride, 40 mEq, Oral, Once  senna-docusate sodium, 2 tablet, Oral, BID  sodium chloride, 10 mL, Intravenous, Q12H      IV Meds:   bumetanide, 1 mg/hr        Results Reviewed:   I have personally reviewed the results from the time of this admission to 2024 08:29 EDT     Results from last 7 days   Lab Units 24  0546 24  0324 24  7135  07/23/24  0700 07/23/24  0110   SODIUM mmol/L 120* 123* 121*   < > 116*   POTASSIUM mmol/L 3.1* 3.2* 3.5   < > 4.3   CHLORIDE mmol/L 83* 85* 82*   < > 82*   CO2 mmol/L 23.9 24.0 22.2   < > 22.9   BUN mg/dL 74* 79* 76*   < > 93*   CREATININE mg/dL 4.58* 4.26* 4.54*   < > 4.93*   CALCIUM mg/dL 7.1* 7.4* 7.5*   < > 7.4*   BILIRUBIN mg/dL  --  0.2  --   --  0.2   ALK PHOS U/L  --  89  --   --  97   ALT (SGPT) U/L  --  27  --   --  28   AST (SGOT) U/L  --  25  --   --  39   GLUCOSE mg/dL 92 99 122*   < > 152*    < > = values in this interval not displayed.       Estimated Creatinine Clearance: 17.2 mL/min (A) (by C-G formula based on SCr of 4.58 mg/dL (H)).    Results from last 7 days   Lab Units 07/24/24  0324 07/23/24  0700 07/23/24  0550   MAGNESIUM mg/dL 6.5* 6.8* 4.5*   PHOSPHORUS mg/dL 7.0* 6.7*  --        Results from last 7 days   Lab Units 07/24/24  0324   URIC ACID mg/dL 8.0*       Results from last 7 days   Lab Units 07/24/24  0324 07/23/24  0550 07/23/24  0110   WBC 10*3/mm3 8.97 6.85 7.70   HEMOGLOBIN g/dL 8.3* 8.1* 8.4*   PLATELETS 10*3/mm3 417 379 414             Assessment / Plan     ASSESSMENT:  Acute kidney injury on chronic kidney disease stage IV creatinine was 4.93 on admission, yesterday morning was 4.48 and this morning 4.58 his baseline creatinine in the 2.4 range as noted in September 2023.  Secondary to cardiorenal syndrome  Chronic kidney disease stage IV baseline creatinine about 2.4-second diabetic and hypertensive glomerulosclerosis  Congestive heart failure with cardiorenal syndrome ejection fraction is not known at this time.  Diabetes mellitus type 2 with renal complication and diabetic retinopathy  Hypertension with chronic kidney disease  Hyponatremia associated with volume excess, sodium is 120  Hypokalemia associate with diuretic potassium 3.1    PLAN:  Will start the patient on Bumex drip 1 mg/h after giving him another bolus of Bumex 4 mg IV x 1  Replete potassium  May need dialysis  in the next 24 hours  Will continue to monitor sodium very closely to prevent rapid rise.  I discussed the case with the patient and his daughter at the bedside also patient's nurse with the use of an online   Surveillance labs    I reviewed the chart and other providers notes, reviewed labs.  Copied text in this note has been reviewed and is accurate as of 07/24/24.       Thank you for involving us in the care of Marcial Bernard.  Please feel free to call with any questions.    Mike Flannery MD  07/24/24  08:29 EDT    [facility]  [phone]    Please note that portions of this note were completed with a voice recognition program.

## 2024-07-25 LAB
ALBUMIN SERPL-MCNC: 2.8 G/DL (ref 3.5–5.2)
ALBUMIN/GLOB SERPL: 1 G/DL
ALP SERPL-CCNC: 88 U/L (ref 39–117)
ALT SERPL W P-5'-P-CCNC: 23 U/L (ref 1–41)
ANION GAP SERPL CALCULATED.3IONS-SCNC: 15.9 MMOL/L (ref 5–15)
ANION GAP SERPL CALCULATED.3IONS-SCNC: 16 MMOL/L (ref 5–15)
ANION GAP SERPL CALCULATED.3IONS-SCNC: 17.4 MMOL/L (ref 5–15)
ANION GAP SERPL CALCULATED.3IONS-SCNC: 17.7 MMOL/L (ref 5–15)
AST SERPL-CCNC: 18 U/L (ref 1–40)
BASOPHILS # BLD AUTO: 0.03 10*3/MM3 (ref 0–0.2)
BASOPHILS NFR BLD AUTO: 0.4 % (ref 0–1.5)
BILIRUB SERPL-MCNC: 0.2 MG/DL (ref 0–1.2)
BUN SERPL-MCNC: 67 MG/DL (ref 8–23)
BUN SERPL-MCNC: 78 MG/DL (ref 8–23)
BUN SERPL-MCNC: 79 MG/DL (ref 8–23)
BUN SERPL-MCNC: 80 MG/DL (ref 8–23)
BUN/CREAT SERPL: 14.7 (ref 7–25)
BUN/CREAT SERPL: 17.1 (ref 7–25)
BUN/CREAT SERPL: 17.4 (ref 7–25)
BUN/CREAT SERPL: 17.7 (ref 7–25)
CALCIUM SPEC-SCNC: 7.1 MG/DL (ref 8.6–10.5)
CALCIUM SPEC-SCNC: 7.3 MG/DL (ref 8.6–10.5)
CALCIUM SPEC-SCNC: 7.4 MG/DL (ref 8.6–10.5)
CALCIUM SPEC-SCNC: 7.4 MG/DL (ref 8.6–10.5)
CHLORIDE SERPL-SCNC: 84 MMOL/L (ref 98–107)
CHLORIDE SERPL-SCNC: 85 MMOL/L (ref 98–107)
CO2 SERPL-SCNC: 21.6 MMOL/L (ref 22–29)
CO2 SERPL-SCNC: 22.1 MMOL/L (ref 22–29)
CO2 SERPL-SCNC: 22.3 MMOL/L (ref 22–29)
CO2 SERPL-SCNC: 23 MMOL/L (ref 22–29)
CREAT SERPL-MCNC: 4.53 MG/DL (ref 0.76–1.27)
CREAT SERPL-MCNC: 4.54 MG/DL (ref 0.76–1.27)
CREAT SERPL-MCNC: 4.56 MG/DL (ref 0.76–1.27)
CREAT SERPL-MCNC: 4.57 MG/DL (ref 0.76–1.27)
DEPRECATED RDW RBC AUTO: 45.1 FL (ref 37–54)
EGFRCR SERPLBLD CKD-EPI 2021: 13.1 ML/MIN/1.73
EGFRCR SERPLBLD CKD-EPI 2021: 13.1 ML/MIN/1.73
EGFRCR SERPLBLD CKD-EPI 2021: 13.2 ML/MIN/1.73
EGFRCR SERPLBLD CKD-EPI 2021: 13.2 ML/MIN/1.73
EOSINOPHIL # BLD AUTO: 0.2 10*3/MM3 (ref 0–0.4)
EOSINOPHIL NFR BLD AUTO: 2.4 % (ref 0.3–6.2)
ERYTHROCYTE [DISTWIDTH] IN BLOOD BY AUTOMATED COUNT: 14.6 % (ref 12.3–15.4)
GLOBULIN UR ELPH-MCNC: 2.8 GM/DL
GLUCOSE BLDC GLUCOMTR-MCNC: 117 MG/DL (ref 70–130)
GLUCOSE BLDC GLUCOMTR-MCNC: 126 MG/DL (ref 70–130)
GLUCOSE BLDC GLUCOMTR-MCNC: 167 MG/DL (ref 70–130)
GLUCOSE SERPL-MCNC: 106 MG/DL (ref 65–99)
GLUCOSE SERPL-MCNC: 108 MG/DL (ref 65–99)
GLUCOSE SERPL-MCNC: 108 MG/DL (ref 65–99)
GLUCOSE SERPL-MCNC: 110 MG/DL (ref 65–99)
HCT VFR BLD AUTO: 26.4 % (ref 37.5–51)
HGB BLD-MCNC: 8.9 G/DL (ref 13–17.7)
IMM GRANULOCYTES # BLD AUTO: 0.09 10*3/MM3 (ref 0–0.05)
IMM GRANULOCYTES NFR BLD AUTO: 1.1 % (ref 0–0.5)
LYMPHOCYTES # BLD AUTO: 0.85 10*3/MM3 (ref 0.7–3.1)
LYMPHOCYTES NFR BLD AUTO: 10 % (ref 19.6–45.3)
MAGNESIUM SERPL-MCNC: 5.7 MG/DL (ref 1.6–2.4)
MCH RBC QN AUTO: 28.8 PG (ref 26.6–33)
MCHC RBC AUTO-ENTMCNC: 33.7 G/DL (ref 31.5–35.7)
MCV RBC AUTO: 85.4 FL (ref 79–97)
MONOCYTES # BLD AUTO: 0.75 10*3/MM3 (ref 0.1–0.9)
MONOCYTES NFR BLD AUTO: 8.9 % (ref 5–12)
NEUTROPHILS NFR BLD AUTO: 6.54 10*3/MM3 (ref 1.7–7)
NEUTROPHILS NFR BLD AUTO: 77.2 % (ref 42.7–76)
NRBC BLD AUTO-RTO: 0 /100 WBC (ref 0–0.2)
PHOSPHATE SERPL-MCNC: 7.3 MG/DL (ref 2.5–4.5)
PLATELET # BLD AUTO: 449 10*3/MM3 (ref 140–450)
PMV BLD AUTO: 9.8 FL (ref 6–12)
POTASSIUM SERPL-SCNC: 3.3 MMOL/L (ref 3.5–5.2)
POTASSIUM SERPL-SCNC: 3.7 MMOL/L (ref 3.5–5.2)
POTASSIUM SERPL-SCNC: 3.9 MMOL/L (ref 3.5–5.2)
POTASSIUM SERPL-SCNC: 4.1 MMOL/L (ref 3.5–5.2)
PROT SERPL-MCNC: 5.6 G/DL (ref 6–8.5)
RBC # BLD AUTO: 3.09 10*6/MM3 (ref 4.14–5.8)
SODIUM SERPL-SCNC: 123 MMOL/L (ref 136–145)
SODIUM SERPL-SCNC: 124 MMOL/L (ref 136–145)
URATE SERPL-MCNC: 8.4 MG/DL (ref 3.4–7)
WBC NRBC COR # BLD AUTO: 8.46 10*3/MM3 (ref 3.4–10.8)

## 2024-07-25 PROCEDURE — 25010000002 BUMETANIDE PER 0.5 MG: Performed by: INTERNAL MEDICINE

## 2024-07-25 PROCEDURE — 63710000001 INSULIN REGULAR HUMAN PER 5 UNITS

## 2024-07-25 PROCEDURE — 25010000002 HYDRALAZINE PER 20 MG

## 2024-07-25 PROCEDURE — 84100 ASSAY OF PHOSPHORUS: CPT | Performed by: INTERNAL MEDICINE

## 2024-07-25 PROCEDURE — 82948 REAGENT STRIP/BLOOD GLUCOSE: CPT

## 2024-07-25 PROCEDURE — 80053 COMPREHEN METABOLIC PANEL: CPT | Performed by: INTERNAL MEDICINE

## 2024-07-25 PROCEDURE — 85025 COMPLETE CBC W/AUTO DIFF WBC: CPT | Performed by: INTERNAL MEDICINE

## 2024-07-25 PROCEDURE — 83735 ASSAY OF MAGNESIUM: CPT | Performed by: INTERNAL MEDICINE

## 2024-07-25 PROCEDURE — 25010000002 HYDRALAZINE PER 20 MG: Performed by: INTERNAL MEDICINE

## 2024-07-25 PROCEDURE — 84550 ASSAY OF BLOOD/URIC ACID: CPT | Performed by: INTERNAL MEDICINE

## 2024-07-25 PROCEDURE — 99232 SBSQ HOSP IP/OBS MODERATE 35: CPT | Performed by: INTERNAL MEDICINE

## 2024-07-25 RX ORDER — POTASSIUM CHLORIDE 750 MG/1
40 TABLET, FILM COATED, EXTENDED RELEASE ORAL EVERY 4 HOURS
Status: COMPLETED | OUTPATIENT
Start: 2024-07-25 | End: 2024-07-25

## 2024-07-25 RX ORDER — HYDRALAZINE HYDROCHLORIDE 20 MG/ML
20 INJECTION INTRAMUSCULAR; INTRAVENOUS EVERY 4 HOURS PRN
Status: DISCONTINUED | OUTPATIENT
Start: 2024-07-25 | End: 2024-08-11

## 2024-07-25 RX ADMIN — HYDRALAZINE HYDROCHLORIDE 20 MG: 20 INJECTION INTRAMUSCULAR; INTRAVENOUS at 00:58

## 2024-07-25 RX ADMIN — METOPROLOL TARTRATE 5 MG: 1 INJECTION, SOLUTION INTRAVENOUS at 23:45

## 2024-07-25 RX ADMIN — POTASSIUM CHLORIDE 40 MEQ: 750 TABLET, EXTENDED RELEASE ORAL at 09:19

## 2024-07-25 RX ADMIN — METOPROLOL TARTRATE 2.5 MG: 1 INJECTION, SOLUTION INTRAVENOUS at 05:34

## 2024-07-25 RX ADMIN — HYDRALAZINE HYDROCHLORIDE 10 MG: 20 INJECTION INTRAMUSCULAR; INTRAVENOUS at 20:37

## 2024-07-25 RX ADMIN — BUMETANIDE 1 MG/HR: 0.25 INJECTION INTRAMUSCULAR; INTRAVENOUS at 09:02

## 2024-07-25 RX ADMIN — HYDRALAZINE HYDROCHLORIDE 20 MG: 20 INJECTION INTRAMUSCULAR; INTRAVENOUS at 18:19

## 2024-07-25 RX ADMIN — ACETAMINOPHEN 325MG 650 MG: 325 TABLET ORAL at 20:37

## 2024-07-25 RX ADMIN — HYDRALAZINE HYDROCHLORIDE 20 MG: 20 INJECTION INTRAMUSCULAR; INTRAVENOUS at 08:07

## 2024-07-25 RX ADMIN — POTASSIUM CHLORIDE 40 MEQ: 750 TABLET, EXTENDED RELEASE ORAL at 05:35

## 2024-07-25 RX ADMIN — Medication 10 ML: at 08:08

## 2024-07-25 RX ADMIN — HYDRALAZINE HYDROCHLORIDE 20 MG: 20 INJECTION INTRAMUSCULAR; INTRAVENOUS at 12:30

## 2024-07-25 RX ADMIN — INSULIN HUMAN 2 UNITS: 100 INJECTION, SOLUTION PARENTERAL at 17:52

## 2024-07-25 NOTE — PROGRESS NOTES
LOS: 2 days   Patient Care Team:  Justina Liriano APRN as PCP - General (Nurse Practitioner)    Subjective     patient with chronic renal insufficiency who presents from outside ED with weakness and hyponatremia was transferred here for further management, he has a history of hypertension type 2 diabetes hypothyroidism chronic kidney disease, diabetic retinopathy  Patient talking much more today he talked with the  and answered all the questions seem to be answering questions appropriately he has not had anything to eat or drink and he is hungry.  Review of Systems:          Objective     Vital Signs  Vital Sign Min/Max for last 24 hours  Temp  Min: 97.9 °F (36.6 °C)  Max: 99.5 °F (37.5 °C)   BP  Min: 101/80  Max: 172/83   Pulse  Min: 49  Max: 72   Resp  Min: 12  Max: 16   SpO2  Min: 95 %  Max: 99 %   Flow (L/min)  Min: 2  Max: 2   Weight  Min: 95.7 kg (211 lb)  Max: 97 kg (213 lb 13.5 oz)        Ventilator/Non-Invasive Ventilation Settings (From admission, onward)      None                         Body mass index is 31.58 kg/m².  I/O last 3 completed shifts:  In: 286 [I.V.:254; IV Piggyback:32]  Out: 2300 [Urine:2300]  I/O this shift:  In: 463 [I.V.:463]  Out: 1900 [Urine:1900]        Physical Exam:  General Appearance: Obese  male resting in bed talking and answering questions appropriately he is following commands, does not look in acute distress  Eyes: Conjunctiva are clear .  Pupils are equal and reactive to light  ENT: His membranes are moist no erythema no exudates Mallampati type II almost 3 airway  Neck: Trachea midline neck is obese no visible jugular venous distention and I do not see any definite hepatojugular reflux  Lungs: Rales have resolved chest is pretty clear little decreased towards the bases  Cardiac: Regular rate rhythm I do not hear definite murmur  Abdomen: Soft nontender no palpable hepatosplenomegaly or masses  : Not examined  Musculoskeletal: He has 3+  lower extremity edema and upper extremity edema as well at least 1+, he has some presacral edema   Skin: Warm and dry no jaundice no petechiae  Neuro: He follows commands he is moving his extremities he is just not communicating much  Extremities/P Vascular: Palpable radial pulses they Feel some dorsalis pedis pulses in both feet, they are warm  MSE: A little more engaged today       Labs:  Results from last 7 days   Lab Units 07/25/24  0420 07/25/24  0011 07/24/24  1828 07/24/24  1322 07/24/24  0546 07/24/24  0324 07/23/24  2344 07/23/24  1200 07/23/24  0700 07/23/24  0550 07/23/24  0110   GLUCOSE mg/dL 106* 110* 104* 103* 92 99 122*   < > 129*  --  152*   SODIUM mmol/L 123* 123* 123* 123* 120* 123* 121*   < > 117*  --  116*   POTASSIUM mmol/L 3.3* 3.7 3.4* 3.6 3.1* 3.2* 3.5   < > 3.6  --  4.3   MAGNESIUM mg/dL 5.7*  --   --   --   --  6.5*  --   --  6.8* 4.5*  --    CO2 mmol/L 23.0 22.1 21.1* 22.8 23.9 24.0 22.2   < > 24.0  --  22.9   CHLORIDE mmol/L 84* 85* 84* 85* 83* 85* 82*   < > 84*  --  82*   ANION GAP mmol/L 16.0* 15.9* 17.9* 15.2* 13.1 14.0 16.8*   < > 9.0  --  11.1   CREATININE mg/dL 4.57* 4.56* 5.01* 4.67* 4.58* 4.26* 4.54*   < > 4.48*  --  4.93*   BUN mg/dL 78* 67* 73* 74* 74* 79* 76*   < > 80*  --  93*   BUN / CREAT RATIO  17.1 14.7 14.6 15.8 16.2 18.5 16.7   < > 17.9  --  18.9   CALCIUM mg/dL 7.3* 7.1* 7.2* 7.0* 7.1* 7.4* 7.5*   < > 7.3*  --  7.4*   ALK PHOS U/L 88  --   --   --   --  89  --   --   --   --  97   TOTAL PROTEIN g/dL 5.6*  --   --   --   --  5.9*  --   --   --   --  6.4   ALT (SGPT) U/L 23  --   --   --   --  27  --   --   --   --  28   AST (SGOT) U/L 18  --   --   --   --  25  --   --   --   --  39   BILIRUBIN mg/dL 0.2  --   --   --   --  0.2  --   --   --   --  0.2   ALBUMIN g/dL 2.8*  --   --   --   --  3.1*  --   --   --   --  3.3*   GLOBULIN gm/dL 2.8  --   --   --   --  2.8  --   --   --   --  3.1    < > = values in this interval not displayed.     Estimated Creatinine Clearance:  17.3 mL/min (A) (by C-G formula based on SCr of 4.57 mg/dL (H)).      Results from last 7 days   Lab Units 07/25/24  0420 07/24/24  0324 07/23/24  0550 07/23/24  0110   WBC 10*3/mm3 8.46 8.97 6.85 7.70   RBC 10*6/mm3 3.09* 2.86* 2.75* 2.92*   HEMOGLOBIN g/dL 8.9* 8.3* 8.1* 8.4*   HEMATOCRIT % 26.4* 24.8* 24.2* 25.2*   MCV fL 85.4 86.7 88.0 86.3   MCH pg 28.8 29.0 29.5 28.8   MCHC g/dL 33.7 33.5 33.5 33.3   RDW % 14.6 14.7 14.9 13.3   RDW-SD fl 45.1 46.0 47.3 41.8   MPV fL 9.8 10.0 10.3 10.1   PLATELETS 10*3/mm3 449 417 379 414   NEUTROPHIL % % 77.2* 82.3* 83.1* 85.0*   LYMPHOCYTE % % 10.0* 8.2* 8.9* 7.8*   MONOCYTES % % 8.9 6.9 5.8 4.9*   EOSINOPHIL % % 2.4 1.1 1.3 1.6   BASOPHIL % % 0.4 0.3 0.3 0.3   IMM GRAN % % 1.1* 1.2* 0.6* 0.4   NEUTROS ABS 10*3/mm3 6.54 7.37* 5.69 6.55   LYMPHS ABS 10*3/mm3 0.85 0.74 0.61* 0.60*   MONOS ABS 10*3/mm3 0.75 0.62 0.40 0.38   EOS ABS 10*3/mm3 0.20 0.10 0.09 0.12   BASOS ABS 10*3/mm3 0.03 0.03 0.02 0.02   IMMATURE GRANS (ABS) 10*3/mm3 0.09* 0.11* 0.04 0.03   NRBC /100 WBC 0.0 0.0 0.0  --          Results from last 7 days   Lab Units 07/23/24  0251 07/23/24  0110   HSTROP T ng/L 78* 87*     Results from last 7 days   Lab Units 07/23/24  0110   PROBNP pg/mL 8,498.0*     Results from last 7 days   Lab Units 07/23/24  0700   TSH uIU/mL 1.760     Results from last 7 days   Lab Units 07/23/24  0110   LACTATE mmol/L 1.1         Microbiology Results (last 10 days)       Procedure Component Value - Date/Time    COVID-19 and FLU A/B PCR, 1 HR TAT - Swab, Nasopharynx [[id number]]  (Normal) Collected: 07/23/24 0047    Lab Status: Final result Specimen: Swab from Nasopharynx Updated: 07/23/24 0110     COVID19 Not Detected     Influenza A PCR Not Detected     Influenza B PCR Not Detected    Narrative:      Fact sheet for providers: https://www.fda.gov/media/532980/download    Fact sheet for patients: https://www.fda.gov/media/205344/download    Test performed by PCR.                hydrALAZINE,  20 mg, Intravenous, Q6H  insulin regular, 2-7 Units, Subcutaneous, Q6H  levothyroxine, 100 mcg, Oral, Daily  metoprolol tartrate, 2.5 mg, Intravenous, Q6H  potassium chloride, 40 mEq, Oral, Q4H  senna-docusate sodium, 2 tablet, Oral, BID  sodium chloride, 10 mL, Intravenous, Q12H      bumetanide, 1 mg/hr, Last Rate: 1 mg/hr (07/24/24 1014)        Diagnostics:  XR Chest 1 View    Result Date: 7/23/2024  SINGLE VIEW OF THE CHEST  HISTORY: Cough and leg swelling  COMPARISON: None available.  FINDINGS: There is cardiomegaly. There are bilateral alveolar and interstitial infiltrates. Some of this may be related to edema. Superimposed pneumonia is not excluded, particularly within the right upper lobe. No pneumothorax is seen. I cannot exclude a trace left pleural effusion. There is calcification of the aorta.      Bilateral alveolar and interstitial infiltrates.  This report was finalized on 7/23/2024 1:52 AM by Dr. Giovana Koch M.D on Workstation: [id number]      Results for orders placed during the hospital encounter of 07/23/24    Adult Transthoracic Echo Complete W/ Cont if Necessary Per Protocol    Interpretation Summary    Left ventricular systolic function is normal. Left ventricular ejection fraction appears to be 61 - 65%.    Left ventricular wall thickness is consistent with mild concentric hypertrophy.    Left ventricular diastolic function was indeterminate.    There is a small (<1cm) pericardial effusion. There is no evidence of cardiac tamponade.    There is a moderate sized left pleural effusion.    Reviewed his admit chest x-ray    Active Hospital Problems    Diagnosis  POA    **Chronic renal failure (CRF), stage 4 (severe) [N18.4]  Unknown    Hyponatremia [E87.1]  Unknown    Acute pulmonary edema with congestive heart failure [I50.1]  Unknown    Anemia due to stage 4 chronic kidney disease [N18.4, D63.1]  Unknown    Elevated troponin level not due myocardial infarction [R79.89]  Unknown       Resolved Hospital Problems   No resolved problems to display.         Assessment & Plan     Hyponatremia symptomatic likely slow process getting here correct slowly, nephrology helping and assisting with this   encephalopathy metabolic secondary to hyponatremia and probably uremia proved.  Chronic kidney disease stage IV with acute kidney injury but did show good response to Bumex drip his potassium actually went down-hold the drip and replace potassium he diuresed well his creatinine improved slightly.  No dialysis at this time continue diuresis management per nephrology  CHF volume overload with pulmonary edema echocardiogram 9/23 showed grade 1 diastolic dysfunction echo done.  Patient indeterminate for diastolic function normal systolic function  Elevated troponin can find an old EKG his current EKG has a nonspecific interventricular conduction delay troponin could be up due to renal dysfunction or could be non-ST elevation MI type I or type II certainly has reason for demand ischemia.  Discussed with cardiology likely non-ST elevation MI type II demand ischemia indication for ischemic workup at this time  Anemia moderate question chronic disease or other will check indices  Diabetes mellitus type 2 sliding scale insulin for now  Hypertension blood pressure been very labile he was a little bradycardic earlier better now we will restart Coreg and  hold amlodipine with all of his edema and will hold his ACE inhibitor with his renal dysfunction.,  As needed hydralazine.  Hypothyroidism on replacement continue    Talked with him via  services also answered family questions via  service  Plan for disposition:    Juan Ventura Jr, MD  07/25/24  06:31 EDT    Time: Critical care time 34 minutes

## 2024-07-25 NOTE — PLAN OF CARE
Goal Outcome Evaluation:      Pt VSS. Pt AO only to self. Pt continues to need potassium replacement. Nephrology notified of low values following previous replacement, and PO dose ordered. Pt remains on Bumex infusion. Pt urine output near 1500 for shift (see charting).

## 2024-07-25 NOTE — PROGRESS NOTES
[facility] Progress Note      Patient Name: Marcial Bernard  : 1955  MRN: [id number]  Primary Care Physician:  Justina Liriano APRN  Date of admission: 2024    Subjective     Interval History:   Follow-up acute kidney injury on chronic kidney disease and hyponatremia    Patient is obtunded but arousable, he has increased urine output currently on Bumex drip    Review of Systems:   As noted above    Objective     Vitals:   Temp:  [97.9 °F (36.6 °C)-99.5 °F (37.5 °C)] 97.9 °F (36.6 °C)  Heart Rate:  [49-72] 52  Resp:  [16] 16  BP: (101-182)/(58-91) 182/73  Flow (L/min):  [2] 2    Intake/Output Summary (Last 24 hours) at 2024 0907  Last data filed at 2024 0600  Gross per 24 hour   Intake 749 ml   Output 2800 ml   Net -2051 ml       Physical Exam:    General Appearance: Lethargic, arousable, chronically ill, no acute distress   Skin: warm and dry  HEENT: oral mucosa normal, nonicteric sclera  Neck: Increased JVD  Lungs: Bilateral rhonchi and crackles, breathing effort not labored  Heart: RRR, positive S3, no rub  Abdomen: soft, nontender, nondistended  : no palpable bladder  Extremities: 3+ lower extremity edema  Neuro: Moving all extremities    Scheduled Meds:     hydrALAZINE, 20 mg, Intravenous, Q6H  insulin regular, 2-7 Units, Subcutaneous, Q6H  levothyroxine, 100 mcg, Oral, Daily  metoprolol tartrate, 2.5 mg, Intravenous, Q6H  potassium chloride, 40 mEq, Oral, Q4H  senna-docusate sodium, 2 tablet, Oral, BID  sodium chloride, 10 mL, Intravenous, Q12H      IV Meds:   bumetanide, 1 mg/hr, Last Rate: 1 mg/hr (24 0902)        Results Reviewed:   I have personally reviewed the results from the time of this admission to 2024 09:07 EDT     Results from last 7 days   Lab Units 24  0420 24  0011 24  1828 24  0546 24  0324 24  0700 24  0110   SODIUM mmol/L 123* 123* 123*   < > 123*   < > 116*   POTASSIUM mmol/L  3.3* 3.7 3.4*   < > 3.2*   < > 4.3   CHLORIDE mmol/L 84* 85* 84*   < > 85*   < > 82*   CO2 mmol/L 23.0 22.1 21.1*   < > 24.0   < > 22.9   BUN mg/dL 78* 67* 73*   < > 79*   < > 93*   CREATININE mg/dL 4.57* 4.56* 5.01*   < > 4.26*   < > 4.93*   CALCIUM mg/dL 7.3* 7.1* 7.2*   < > 7.4*   < > 7.4*   BILIRUBIN mg/dL 0.2  --   --   --  0.2  --  0.2   ALK PHOS U/L 88  --   --   --  89  --  97   ALT (SGPT) U/L 23  --   --   --  27  --  28   AST (SGOT) U/L 18  --   --   --  25  --  39   GLUCOSE mg/dL 106* 110* 104*   < > 99   < > 152*    < > = values in this interval not displayed.       Estimated Creatinine Clearance: 17.3 mL/min (A) (by C-G formula based on SCr of 4.57 mg/dL (H)).    Results from last 7 days   Lab Units 07/25/24  0420 07/24/24  0324 07/23/24  0700   MAGNESIUM mg/dL 5.7* 6.5* 6.8*   PHOSPHORUS mg/dL 7.3* 7.0* 6.7*       Results from last 7 days   Lab Units 07/25/24  0420 07/24/24  0324   URIC ACID mg/dL 8.4* 8.0*       Results from last 7 days   Lab Units 07/25/24  0420 07/24/24  0324 07/23/24  0550 07/23/24  0110   WBC 10*3/mm3 8.46 8.97 6.85 7.70   HEMOGLOBIN g/dL 8.9* 8.3* 8.1* 8.4*   PLATELETS 10*3/mm3 449 417 379 414             Assessment / Plan     ASSESSMENT:  Acute kidney injury on chronic kidney disease stage IV creatinine was 4.93 on admission, yesterday morning was 4.48 and this morning 4.57, very stable over the past 24 hours, his baseline creatinine in the 2.4 range as noted in September 2023.  Secondary to cardiorenal syndrome  Chronic kidney disease stage IV baseline creatinine about 2.4-second diabetic and hypertensive glomerulosclerosis  Acute on chronic diastolic congestive heart failure with cardiorenal syndrome, volume status improving with diuresis  Diabetes mellitus type 2 with renal complication and diabetic retinopathy  Hypertension with chronic kidney disease  Hyponatremia associated with volume excess, sodium is up to 123  Hypokalemia associate with diuretic potassium  3.3    PLAN:  Continue Bumex drip  Replete potassium  No indication for dialysis today  Will continue to monitor sodium very closely to prevent rapid rise.  I discussed the case with Dr. Ventura and the patient's nurse  Surveillance labs    I reviewed the chart and other providers notes, reviewed labs.  Copied text in this note has been reviewed and is accurate as of 07/25/24.       Thank you for involving us in the care of Marcial Bernard.  Please feel free to call with any questions.    Mike Flannery MD  07/25/24  09:07 EDT    [facility]  [phone]    Please note that portions of this note were completed with a voice recognition program.

## 2024-07-25 NOTE — PROGRESS NOTES
"[facility]    Patient Name: Marcial Bernard  :1955  69 y.o.  LOS: 2  Encounter Provider: Neptali Becker Jr, MD      Patient Care Team:  Justina Liriano APRN as PCP - General (Nurse Practitioner)    Chief Complaint: Follow-up altered mental status, acute on chronic kidney disease, acute on chronic diastolic heart failure    Interval History: Negative fluid balance 1500 cc over past 24 hours.  Nephrology following.  Digital documentation reviewed.  Discussed with wife at bedside.  Cardiogram shows normal EF with no significant valvular heart disease.       Objective   Vital Signs  Temp:  [97.9 °F (36.6 °C)-99.5 °F (37.5 °C)] 98 °F (36.7 °C)  Heart Rate:  [49-64] 56  BP: (101-182)/(61-91) 182/73    Intake/Output Summary (Last 24 hours) at 2024 1303  Last data filed at 2024 0600  Gross per 24 hour   Intake 749 ml   Output 2250 ml   Net -1501 ml     Flowsheet Rows      Flowsheet Row First Filed Value   Admission Height 175.3 cm (69\") Documented at 2024 0044   Admission Weight 90.7 kg (200 lb) Documented at 2024 0044              Vitals reviewed.   Constitutional:       Appearance: Frail. Acutely ill-appearing.   Neck:      Vascular: No JVR. JVD normal.   Pulmonary:      Effort: Pulmonary effort is normal.      Breath sounds: No wheezing. No rhonchi. Bibasilar Rales present.   Chest:      Chest wall: Not tender to palpatation.   Cardiovascular:      PMI at left midclavicular line. Normal rate. Regular rhythm. Normal S1. Normal S2.       Murmurs: There is no murmur.      No gallop.  No click. No rub.   Pulses:     Intact distal pulses.   Edema:     Pretibial: bilateral 2+ edema of the pretibial area.     Ankle: bilateral 2+ edema of the ankle.     Feet: bilateral 2+ edema of the feet.  Abdominal:      General: Bowel sounds are normal.      Palpations: Abdomen is soft.      Tenderness: There is no abdominal tenderness.   Musculoskeletal: Normal range of motion.       " "  General: No tenderness. Skin:     General: Skin is warm and dry.   Neurological:      Mental Status: Exhibits altered mental status.           Pertinent Test Results:  Results from last 7 days   Lab Units 07/25/24  1057 07/25/24  0420 07/25/24  0011 07/24/24  1828 07/24/24  1322 07/24/24  0546 07/24/24  0324 07/23/24  0700 07/23/24  0110   SODIUM mmol/L 124* 123* 123* 123* 123* 120* 123*   < > 116*   POTASSIUM mmol/L 4.1 3.3* 3.7 3.4* 3.6 3.1* 3.2*   < > 4.3   CHLORIDE mmol/L 84* 84* 85* 84* 85* 83* 85*   < > 82*   CO2 mmol/L 22.3 23.0 22.1 21.1* 22.8 23.9 24.0   < > 22.9   BUN mg/dL 79* 78* 67* 73* 74* 74* 79*   < > 93*   CREATININE mg/dL 4.54* 4.57* 4.56* 5.01* 4.67* 4.58* 4.26*   < > 4.93*   GLUCOSE mg/dL 108* 106* 110* 104* 103* 92 99   < > 152*   CALCIUM mg/dL 7.4* 7.3* 7.1* 7.2* 7.0* 7.1* 7.4*   < > 7.4*   AST (SGOT) U/L  --  18  --   --   --   --  25  --  39   ALT (SGPT) U/L  --  23  --   --   --   --  27  --  28    < > = values in this interval not displayed.     Results from last 7 days   Lab Units 07/23/24  0251 07/23/24  0110   HSTROP T ng/L 78* 87*     Results from last 7 days   Lab Units 07/25/24  0420 07/24/24  0324 07/23/24  0550 07/23/24  0110   WBC 10*3/mm3 8.46 8.97 6.85 7.70   HEMOGLOBIN g/dL 8.9* 8.3* 8.1* 8.4*   HEMATOCRIT % 26.4* 24.8* 24.2* 25.2*   PLATELETS 10*3/mm3 449 417 379 414         Results from last 7 days   Lab Units 07/25/24  0420 07/24/24  0324 07/23/24  0700 07/23/24  0550   MAGNESIUM mg/dL 5.7* 6.5* 6.8* 4.5*           Invalid input(s): \"LDLCALC\"  Results from last 7 days   Lab Units 07/23/24  0110   PROBNP pg/mL 8,498.0*     Results from last 7 days   Lab Units 07/23/24  0700   TSH uIU/mL 1.760           Medication Review:   hydrALAZINE, 20 mg, Intravenous, Q6H  insulin regular, 2-7 Units, Subcutaneous, Q6H  levothyroxine, 100 mcg, Oral, Daily  metoprolol tartrate, 5 mg, Intravenous, Q6H  senna-docusate sodium, 2 tablet, Oral, BID  sodium chloride, 10 mL, Intravenous, Q12H     "     bumetanide, 1 mg/hr, Last Rate: 1 mg/hr (07/25/24 0902)        Assessment & Plan     Active Hospital Problems    Diagnosis  POA    **Chronic renal failure (CRF), stage 4 (severe) [N18.4]  Unknown    Hyponatremia [E87.1]  Unknown    Acute pulmonary edema with congestive heart failure [I50.1]  Unknown    Anemia due to stage 4 chronic kidney disease [N18.4, D63.1]  Unknown    Elevated troponin level not due myocardial infarction [R79.89]  Unknown      Resolved Hospital Problems   No resolved problems to display.        Elevated cardiac enzymes -likely NSTEMI type II secondary to heart failure, acute on chronic kidney disease.  Echocardiogram shows EF 60 to 65% with mild concentric hypertrophy, small pericardial effusion with no evidence of tamponade and moderate size left pleural effusion.  Patient is making urine and is currently on diuretic regimen per nephrology.  No indication for ischemic workup at this time.  Hypervolemic state -multifactorial.  There is certainly an element of acute on chronic diastolic heart failure and there is better response to current diuretic regimen managed by nephrology.  Blood pressure is poorly controlled and we will address that below.  Per family report he is at a steady decline over the past 2 months.  We will follow diagnostic testing results and clinical response to medical intervention for further treatment recommendations.  CKD stage IV  Diabetes -A1c 6.1  Hypertension -patient is currently not scheduled for valsartan given acute kidney dysfunction and amlodipine was held.  Altered mental status at this time and will increase dose of schedule IV hydralazine but plan to switch back to oral amlodipine once he is optimized and taking pills reliably.  Carvedilol being held and we will increase IV beta-blocker scheduled.  Altered mental status -multifactorial.  BUN 75-80.           Neptali Becker Jr, MD  [facility]   [facility]  [address]  [facility] [address]  [place] [address]  Office: [phone]    07/25/24  13:03 EDT

## 2024-07-26 LAB
ALBUMIN SERPL-MCNC: 2.8 G/DL (ref 3.5–5.2)
ANION GAP SERPL CALCULATED.3IONS-SCNC: 13.2 MMOL/L (ref 5–15)
ANION GAP SERPL CALCULATED.3IONS-SCNC: 14 MMOL/L (ref 5–15)
ANION GAP SERPL CALCULATED.3IONS-SCNC: 14.4 MMOL/L (ref 5–15)
BASOPHILS # BLD AUTO: 0.04 10*3/MM3 (ref 0–0.2)
BASOPHILS NFR BLD AUTO: 0.5 % (ref 0–1.5)
BUN SERPL-MCNC: 73 MG/DL (ref 8–23)
BUN SERPL-MCNC: 75 MG/DL (ref 8–23)
BUN SERPL-MCNC: 78 MG/DL (ref 8–23)
BUN/CREAT SERPL: 15.2 (ref 7–25)
BUN/CREAT SERPL: 16.2 (ref 7–25)
BUN/CREAT SERPL: 16.3 (ref 7–25)
CALCIUM SPEC-SCNC: 7.4 MG/DL (ref 8.6–10.5)
CALCIUM SPEC-SCNC: 7.6 MG/DL (ref 8.6–10.5)
CALCIUM SPEC-SCNC: 7.7 MG/DL (ref 8.6–10.5)
CHLORIDE SERPL-SCNC: 86 MMOL/L (ref 98–107)
CHLORIDE SERPL-SCNC: 87 MMOL/L (ref 98–107)
CHLORIDE SERPL-SCNC: 88 MMOL/L (ref 98–107)
CO2 SERPL-SCNC: 24 MMOL/L (ref 22–29)
CO2 SERPL-SCNC: 24.6 MMOL/L (ref 22–29)
CO2 SERPL-SCNC: 25.8 MMOL/L (ref 22–29)
CREAT SERPL-MCNC: 4.5 MG/DL (ref 0.76–1.27)
CREAT SERPL-MCNC: 4.8 MG/DL (ref 0.76–1.27)
CREAT SERPL-MCNC: 4.94 MG/DL (ref 0.76–1.27)
DEPRECATED RDW RBC AUTO: 46.9 FL (ref 37–54)
EGFRCR SERPLBLD CKD-EPI 2021: 11.9 ML/MIN/1.73
EGFRCR SERPLBLD CKD-EPI 2021: 12.3 ML/MIN/1.73
EGFRCR SERPLBLD CKD-EPI 2021: 13.3 ML/MIN/1.73
EOSINOPHIL # BLD AUTO: 0.45 10*3/MM3 (ref 0–0.4)
EOSINOPHIL NFR BLD AUTO: 5.1 % (ref 0.3–6.2)
ERYTHROCYTE [DISTWIDTH] IN BLOOD BY AUTOMATED COUNT: 14.6 % (ref 12.3–15.4)
GLUCOSE BLDC GLUCOMTR-MCNC: 104 MG/DL (ref 70–130)
GLUCOSE BLDC GLUCOMTR-MCNC: 112 MG/DL (ref 70–130)
GLUCOSE BLDC GLUCOMTR-MCNC: 128 MG/DL (ref 70–130)
GLUCOSE BLDC GLUCOMTR-MCNC: 136 MG/DL (ref 70–130)
GLUCOSE SERPL-MCNC: 105 MG/DL (ref 65–99)
GLUCOSE SERPL-MCNC: 106 MG/DL (ref 65–99)
GLUCOSE SERPL-MCNC: 153 MG/DL (ref 65–99)
HCT VFR BLD AUTO: 28 % (ref 37.5–51)
HGB BLD-MCNC: 9.4 G/DL (ref 13–17.7)
IMM GRANULOCYTES # BLD AUTO: 0.08 10*3/MM3 (ref 0–0.05)
IMM GRANULOCYTES NFR BLD AUTO: 0.9 % (ref 0–0.5)
LYMPHOCYTES # BLD AUTO: 0.84 10*3/MM3 (ref 0.7–3.1)
LYMPHOCYTES NFR BLD AUTO: 9.5 % (ref 19.6–45.3)
MAGNESIUM SERPL-MCNC: 4.6 MG/DL (ref 1.6–2.4)
MCH RBC QN AUTO: 29.5 PG (ref 26.6–33)
MCHC RBC AUTO-ENTMCNC: 33.6 G/DL (ref 31.5–35.7)
MCV RBC AUTO: 87.8 FL (ref 79–97)
MONOCYTES # BLD AUTO: 1.08 10*3/MM3 (ref 0.1–0.9)
MONOCYTES NFR BLD AUTO: 12.3 % (ref 5–12)
NEUTROPHILS NFR BLD AUTO: 6.31 10*3/MM3 (ref 1.7–7)
NEUTROPHILS NFR BLD AUTO: 71.7 % (ref 42.7–76)
NRBC BLD AUTO-RTO: 0 /100 WBC (ref 0–0.2)
PHOSPHATE SERPL-MCNC: 7 MG/DL (ref 2.5–4.5)
PLATELET # BLD AUTO: 446 10*3/MM3 (ref 140–450)
PMV BLD AUTO: 10.2 FL (ref 6–12)
POTASSIUM SERPL-SCNC: 3.3 MMOL/L (ref 3.5–5.2)
POTASSIUM SERPL-SCNC: 3.4 MMOL/L (ref 3.5–5.2)
POTASSIUM SERPL-SCNC: 3.7 MMOL/L (ref 3.5–5.2)
RBC # BLD AUTO: 3.19 10*6/MM3 (ref 4.14–5.8)
SODIUM SERPL-SCNC: 124 MMOL/L (ref 136–145)
SODIUM SERPL-SCNC: 126 MMOL/L (ref 136–145)
SODIUM SERPL-SCNC: 127 MMOL/L (ref 136–145)
URATE SERPL-MCNC: 8.7 MG/DL (ref 3.4–7)
WBC NRBC COR # BLD AUTO: 8.8 10*3/MM3 (ref 3.4–10.8)

## 2024-07-26 PROCEDURE — 84550 ASSAY OF BLOOD/URIC ACID: CPT | Performed by: INTERNAL MEDICINE

## 2024-07-26 PROCEDURE — 82948 REAGENT STRIP/BLOOD GLUCOSE: CPT

## 2024-07-26 PROCEDURE — 99232 SBSQ HOSP IP/OBS MODERATE 35: CPT | Performed by: INTERNAL MEDICINE

## 2024-07-26 PROCEDURE — 80069 RENAL FUNCTION PANEL: CPT | Performed by: INTERNAL MEDICINE

## 2024-07-26 PROCEDURE — 25010000002 HYDRALAZINE PER 20 MG: Performed by: INTERNAL MEDICINE

## 2024-07-26 PROCEDURE — 85025 COMPLETE CBC W/AUTO DIFF WBC: CPT | Performed by: INTERNAL MEDICINE

## 2024-07-26 PROCEDURE — 25010000002 HYDRALAZINE PER 20 MG

## 2024-07-26 PROCEDURE — 25010000002 BUMETANIDE PER 0.5 MG: Performed by: INTERNAL MEDICINE

## 2024-07-26 PROCEDURE — 83735 ASSAY OF MAGNESIUM: CPT | Performed by: INTERNAL MEDICINE

## 2024-07-26 PROCEDURE — 80048 BASIC METABOLIC PNL TOTAL CA: CPT | Performed by: INTERNAL MEDICINE

## 2024-07-26 PROCEDURE — 92526 ORAL FUNCTION THERAPY: CPT

## 2024-07-26 RX ORDER — CLONIDINE HYDROCHLORIDE 0.1 MG/1
0.1 TABLET ORAL EVERY 12 HOURS SCHEDULED
Status: DISCONTINUED | OUTPATIENT
Start: 2024-07-26 | End: 2024-07-27

## 2024-07-26 RX ORDER — HYDRALAZINE HYDROCHLORIDE 20 MG/ML
40 INJECTION INTRAMUSCULAR; INTRAVENOUS EVERY 6 HOURS
Status: DISCONTINUED | OUTPATIENT
Start: 2024-07-26 | End: 2024-07-27

## 2024-07-26 RX ORDER — POTASSIUM CHLORIDE 1.5 G/1.58G
20 POWDER, FOR SOLUTION ORAL ONCE
Status: COMPLETED | OUTPATIENT
Start: 2024-07-26 | End: 2024-07-26

## 2024-07-26 RX ORDER — CARVEDILOL 25 MG/1
25 TABLET ORAL 2 TIMES DAILY WITH MEALS
Status: DISCONTINUED | OUTPATIENT
Start: 2024-07-26 | End: 2024-07-27

## 2024-07-26 RX ORDER — POTASSIUM CHLORIDE 1.5 G/1.58G
40 POWDER, FOR SOLUTION ORAL EVERY 4 HOURS
Status: COMPLETED | OUTPATIENT
Start: 2024-07-26 | End: 2024-07-26

## 2024-07-26 RX ORDER — AMLODIPINE BESYLATE 10 MG/1
10 TABLET ORAL
Status: DISCONTINUED | OUTPATIENT
Start: 2024-07-26 | End: 2024-08-13 | Stop reason: HOSPADM

## 2024-07-26 RX ADMIN — CLONIDINE HYDROCHLORIDE 0.1 MG: 0.1 TABLET ORAL at 22:26

## 2024-07-26 RX ADMIN — POTASSIUM CHLORIDE 40 MEQ: 1.5 POWDER, FOR SOLUTION ORAL at 21:15

## 2024-07-26 RX ADMIN — POTASSIUM CHLORIDE 40 MEQ: 1.5 POWDER, FOR SOLUTION ORAL at 18:38

## 2024-07-26 RX ADMIN — HYDRALAZINE HYDROCHLORIDE 20 MG: 20 INJECTION INTRAMUSCULAR; INTRAVENOUS at 06:47

## 2024-07-26 RX ADMIN — LEVOTHYROXINE SODIUM 100 MCG: 100 TABLET ORAL at 12:22

## 2024-07-26 RX ADMIN — METOPROLOL TARTRATE 5 MG: 1 INJECTION, SOLUTION INTRAVENOUS at 05:50

## 2024-07-26 RX ADMIN — HYDRALAZINE HYDROCHLORIDE 20 MG: 20 INJECTION INTRAMUSCULAR; INTRAVENOUS at 00:34

## 2024-07-26 RX ADMIN — SENNOSIDES AND DOCUSATE SODIUM 2 TABLET: 50; 8.6 TABLET ORAL at 14:57

## 2024-07-26 RX ADMIN — HYDRALAZINE HYDROCHLORIDE 40 MG: 20 INJECTION INTRAMUSCULAR; INTRAVENOUS at 12:24

## 2024-07-26 RX ADMIN — SENNOSIDES AND DOCUSATE SODIUM 2 TABLET: 50; 8.6 TABLET ORAL at 20:20

## 2024-07-26 RX ADMIN — AMLODIPINE BESYLATE 10 MG: 10 TABLET ORAL at 12:21

## 2024-07-26 RX ADMIN — CARVEDILOL 25 MG: 25 TABLET, FILM COATED ORAL at 18:39

## 2024-07-26 RX ADMIN — POTASSIUM CHLORIDE 20 MEQ: 1.5 POWDER, FOR SOLUTION ORAL at 06:47

## 2024-07-26 RX ADMIN — HYDRALAZINE HYDROCHLORIDE 40 MG: 20 INJECTION INTRAMUSCULAR; INTRAVENOUS at 18:38

## 2024-07-26 RX ADMIN — HYDRALAZINE HYDROCHLORIDE 20 MG: 20 INJECTION INTRAMUSCULAR; INTRAVENOUS at 08:18

## 2024-07-26 RX ADMIN — HYDRALAZINE HYDROCHLORIDE 20 MG: 20 INJECTION INTRAMUSCULAR; INTRAVENOUS at 02:00

## 2024-07-26 RX ADMIN — HYDRALAZINE HYDROCHLORIDE 20 MG: 20 INJECTION INTRAMUSCULAR; INTRAVENOUS at 21:09

## 2024-07-26 RX ADMIN — Medication 10 ML: at 09:30

## 2024-07-26 RX ADMIN — Medication 10 ML: at 20:20

## 2024-07-26 RX ADMIN — BUMETANIDE 1 MG/HR: 0.25 INJECTION INTRAMUSCULAR; INTRAVENOUS at 02:00

## 2024-07-26 NOTE — PLAN OF CARE
Goal Outcome Evaluation:   Pt A&Ox1 overnight, SBP elevated throughout shift, PRN and scheduled meds given with little to no improvement, pulm notified, recommended to add more PO meds today to help resolve the HTN. K+ 3.4, replacements ordered per nephrology. Bumex gtt continued

## 2024-07-26 NOTE — PLAN OF CARE
Goal Outcome Evaluation:  Plan of Care Reviewed With: patient           Outcome Evaluation: Patient seen for clinical swallow assessment.  ipad utilized. Oral mech exam unremarkable. Patient able to follow commands with delay and is speaking fluently. Family present and report cardio approved food last date. Family reports patient tolerated a tray of food, but NPO still listed in chart. No overt s/s of aspiration with ice, thins via spoon/cup/straw, puree, mixed, or regular solids. No oral residue. SLP recs regular and thins. Meds as mabel. Family reports patient often chews large pills at baseline, consider crushing large pills.Assist with meals. Message left for [facility] regarding diet initiation.

## 2024-07-26 NOTE — THERAPY RE-EVALUATION
Acute Care - Speech Language Pathology   Swallow Re-Evaluation [facility]     Patient Name: Marcial Bernard  : 1955  MRN: [id number]  Today's Date: 2024               Admit Date: 2024    Visit Dx:     ICD-10-CM ICD-9-CM   1. Chronic renal failure (CRF), stage 4 (severe)  N18.4 585.4   2. Hyponatremia  E87.1 276.1   3. Acute pulmonary edema with congestive heart failure  I50.1 428.1   4. Anemia due to stage 4 chronic kidney disease  N18.4 285.21    D63.1 585.4   5. Elevated troponin level not due myocardial infarction  R79.89 790.6     Patient Active Problem List   Diagnosis    Chronic renal failure (CRF), stage 4 (severe)    Hyponatremia    Acute pulmonary edema with congestive heart failure    Anemia due to stage 4 chronic kidney disease    Elevated troponin level not due myocardial infarction     Past Medical History:   Diagnosis Date    Asthma     Dementia     Renal disorder      History reviewed. No pertinent surgical history.    SLP Recommendation and Plan                                                                               Plan of Care Reviewed With: patient  Outcome Evaluation: Patient seen for clinical swallow assessment.  ipad utilized. Oral mech exam unremarkable. Patient able to follow commands with delay and is speaking fluently. Family present and report cardio approved food last date. Family reports patient tolerated a tray of food, but NPO still listed in chart. No overt s/s of aspiration with ice, thins via spoon/cup/straw, puree, mixed, or regular solids. No oral residue. SLP recs regular and thins. Meds as mabel. Family reports patient often chews large pills at baseline, consider crushing large pills.      SWALLOW EVALUATION (Last 72 Hours)       SLP Adult Swallow Evaluation       Row Name 24 1100 24 1100       Rehab Evaluation    Document Type re-evaluation  -SH evaluation  -SH    Patient Effort good  -SH poor  -SH       General Information     "Patient Profile Reviewed yes  -SH yes  -    Pertinent History Of Current Problem -- \"encephalopathy metabolic secondary to hyponatremia and probably uremia\", vascular dementia  -    Current Method of Nutrition -- NPO  -    Precautions/Limitations, Vision -- difficult to assess  -    Precautions/Limitations, Hearing -- WFL;for purposes of eval  -    Prior Level of Function-Communication -- English as a second language  -    Prior Level of Function-Swallowing -- no diet consistency restrictions;other (see comments)  dysphagia with pills  -    Plans/Goals Discussed with -- family;agreed upon  -    Barriers to Rehab -- cognitive status  -    Family Goals for Discharge -- patient able to eat/drink without coughing/choking;patient able to return to all previous activities/roles  -       Pain    Additional Documentation Pain Scale: FACES Pre/Post-Treatment (Group)  - --       Pain Scale: FACES Pre/Post-Treatment    Pain: FACES Scale, Pretreatment 0-->no hurt  - --       Oral Musculature and Cranial Nerve Assessment    Oral Motor General Assessment -- generalized oral motor weakness;other (see comments)  unable to open eyes despite commands, oriented to name and partially to   -       Clinical Swallow Eval    Clinical Swallow Evaluation Summary -- Patient seen for clinical swallow assessment. Spouse and son-in-law at bedside.  ipad utilized. Pt with vascular dementia. Good appetite at baseline per wife, but is a total feed d/t tremors. Pt does chew large pills instead of swallowing whole, which is often seen with dementia. Pt only follow single step commands with 25% acc. Voice clear indicating current management of secretions. Phasic bite observed with spoon trials, which is not a baseline behavior for patient. Adequate bolus preparation with ice. Voice clear post swallow. Laryngeal elevation adequate. Pt unable to accept spoon and cup drinks adequately and trials \"dropped into\" oral " cavity. Anterior loss with cup drinks. Swallow appeared mistimed. Voice clear. Pt required verbal cues to swallow puree trials. No oral residue. Spontaneous swallow appears intake, but volitional swallow is impaired likely 2/2 cognitive state. SLP recs NPO. Can present critical meds crushed in puree. Can allow free water protocol with ice. Suspect pt will tolerate PO diet once sodium levels improve. Results discussed in detail with patient and family present. All questions answered via  ipad.  -       SLP Evaluation Clinical Impression    SLP Swallowing Diagnosis -- other (see comments)  dysphagia 2/2 cognition  -       Recommendations    Therapy Frequency (Swallow) -- PRN  -    Predicted Duration Therapy Intervention (Days) -- until discharge  -    SLP Diet Recommendation -- ice chips between meals after oral care, with supervision  -    Recommended Diagnostics -- reassess via clinical swallow evaluation  -    Oral Care Recommendations -- Before ice/water  -    SLP Rec. for Method of Medication Administration -- meds crushed;with puree;as tolerated  -    Monitor for Signs of Aspiration -- yes;notify SLP if any concerns  -              User Key  (r) = Recorded By, (t) = Taken By, (c) = Cosigned By      Initials Name Effective Dates     Magdalena Romo, SLP 01/05/24 -                     EDUCATION  The patient has been educated in the following areas:   Dysphagia (Swallowing Impairment).        SLP GOALS       Row Name 07/26/24 1100             (LTG) Patient will demonstrate functional swallow for    Diet Texture (Demonstrate functional swallow) regular textures  -      Liquid viscosity (Demonstrate functional swallow) thin liquids  -      Graham (Demonstrate functional swallow) independently (over 90% accuracy)  -      Comment (Demonstrate functional swallow) Patient seen for clinical swallow assessment.  ipad utilized. Oral mech exam unremarkable. Patient able to  follow commands with delay and is speaking fluently. Family present and report cardio approved food last date. Family reports patient tolerated a tray of food, but NPO still listed in chart. No overt s/s of aspiration with ice, thins via spoon/cup/straw, puree, mixed, or regular solids. No oral residue. SLP recs regular and thins. Meds as mabel. Family reports patient often chews large pills at baseline, consider crushing large pills.Assist with meals. Message left for [facility] regarding diet initiation.  -                User Key  (r) = Recorded By, (t) = Taken By, (c) = Cosigned By      Initials Name Provider Type    Magdalena Fox SLP Speech and Language Pathologist                         Time Calculation:    Time Calculation- SLP       Row Name 07/26/24 1131             Time Calculation- SLP    SLP Start Time 1000  -      SLP Received On 07/26/24  -                User Key  (r) = Recorded By, (t) = Taken By, (c) = Cosigned By      Initials Name Provider Type     Magdalena Romo SLP Speech and Language Pathologist                    Therapy Charges for Today       Code Description Service Date Service Provider Modifiers Qty    [id number]  ST TREATMENT SWALLOW 4 7/26/2024 Magdalena Romo SLP GN 1                 COLLIN Plata  7/26/2024

## 2024-07-26 NOTE — PROGRESS NOTES
"[facility]    Patient Name: Marcial Bernard  :1955  69 y.o.  LOS: 3  Encounter Provider: Neptali Becker Jr, MD      Patient Care Team:  Justina Liriano APRN as PCP - General (Nurse Practitioner)    Chief Complaint: Follow-up altered mental status, uremia, acute on chronic kidney disease, acute on chronic diastolic heart failure, diabetes, uncontrolled hypertension    Interval History: Mental status slightly improved.  He maintains a negative fluid balance and creatinine is stable.       Objective   Vital Signs  Temp:  [97.9 °F (36.6 °C)-98.6 °F (37 °C)] 98.5 °F (36.9 °C)  Heart Rate:  [50-61] 54  Resp:  [18-20] 18  BP: (145-204)/(63-91) 145/68    Intake/Output Summary (Last 24 hours) at 2024 1431  Last data filed at 2024 1100  Gross per 24 hour   Intake 189.06 ml   Output 3800 ml   Net -3610.94 ml     Flowsheet Rows      Flowsheet Row First Filed Value   Admission Height 175.3 cm (69\") Documented at 2024 0044   Admission Weight 90.7 kg (200 lb) Documented at 2024 0044              Physical Exam  Vitals reviewed.   Constitutional:       Appearance: Frail. Acutely ill-appearing.   Neck:      Vascular: No JVR. JVD normal.   Pulmonary:      Effort: Pulmonary effort is normal.      Breath sounds: No wheezing. No rhonchi. Bibasilar Rales present.   Chest:      Chest wall: Not tender to palpatation.   Cardiovascular:      PMI at left midclavicular line. Normal rate. Regular rhythm. Normal S1. Normal S2.       Murmurs: There is no murmur.      No gallop.  No click. No rub.   Pulses:     Intact distal pulses.   Edema:     Pretibial: bilateral 2+ edema of the pretibial area.     Ankle: bilateral 2+ edema of the ankle.     Feet: bilateral 2+ edema of the feet.  Abdominal:      General: Bowel sounds are normal.      Palpations: Abdomen is soft.      Tenderness: There is no abdominal tenderness.   Musculoskeletal: Normal range of motion.         General: No tenderness. " "Skin:     General: Skin is warm and dry.   Neurological:      Mental Status: Exhibits altered mental status.     Physical exam was reviewed, updated and amended when necessary.    Pertinent Test Results:  Results from last 7 days   Lab Units 07/26/24  0415 07/26/24  0002 07/25/24  1658 07/25/24  1057 07/25/24  0420 07/25/24  0011 07/24/24  1828 07/24/24  0546 07/24/24  0324 07/23/24  0700 07/23/24  0110   SODIUM mmol/L 126* 124* 123* 124* 123* 123* 123*   < > 123*   < > 116*   POTASSIUM mmol/L 3.4* 3.7 3.9 4.1 3.3* 3.7 3.4*   < > 3.2*   < > 4.3   CHLORIDE mmol/L 87* 86* 84* 84* 84* 85* 84*   < > 85*   < > 82*   CO2 mmol/L 24.6 24.0 21.6* 22.3 23.0 22.1 21.1*   < > 24.0   < > 22.9   BUN mg/dL 73* 78* 80* 79* 78* 67* 73*   < > 79*   < > 93*   CREATININE mg/dL 4.50* 4.80* 4.53* 4.54* 4.57* 4.56* 5.01*   < > 4.26*   < > 4.93*   GLUCOSE mg/dL 106* 105* 108* 108* 106* 110* 104*   < > 99   < > 152*   CALCIUM mg/dL 7.7* 7.4* 7.4* 7.4* 7.3* 7.1* 7.2*   < > 7.4*   < > 7.4*   AST (SGOT) U/L  --   --   --   --  18  --   --   --  25  --  39   ALT (SGPT) U/L  --   --   --   --  23  --   --   --  27  --  28    < > = values in this interval not displayed.     Results from last 7 days   Lab Units 07/23/24  0251 07/23/24  0110   HSTROP T ng/L 78* 87*     Results from last 7 days   Lab Units 07/26/24  0415 07/25/24  0420 07/24/24  0324 07/23/24  0550 07/23/24  0110   WBC 10*3/mm3 8.80 8.46 8.97 6.85 7.70   HEMOGLOBIN g/dL 9.4* 8.9* 8.3* 8.1* 8.4*   HEMATOCRIT % 28.0* 26.4* 24.8* 24.2* 25.2*   PLATELETS 10*3/mm3 446 449 417 379 414         Results from last 7 days   Lab Units 07/26/24  0415 07/25/24  0420 07/24/24  0324 07/23/24  0700 07/23/24  0550   MAGNESIUM mg/dL 4.6* 5.7* 6.5* 6.8* 4.5*           Invalid input(s): \"LDLCALC\"  Results from last 7 days   Lab Units 07/23/24  0110   PROBNP pg/mL 8,498.0*     Results from last 7 days   Lab Units 07/23/24  0700   TSH uIU/mL 1.760           Medication Review:   amLODIPine, 10 mg, Oral, " Q24H  carvedilol, 25 mg, Oral, BID With Meals  hydrALAZINE, 40 mg, Intravenous, Q6H  insulin regular, 2-7 Units, Subcutaneous, Q6H  levothyroxine, 100 mcg, Oral, Daily  senna-docusate sodium, 2 tablet, Oral, BID  sodium chloride, 10 mL, Intravenous, Q12H         bumetanide, 1 mg/hr, Last Rate: 1 mg/hr (07/26/24 0800)        Assessment & Plan     Active Hospital Problems    Diagnosis  POA    **Chronic renal failure (CRF), stage 4 (severe) [N18.4]  Unknown    Hyponatremia [E87.1]  Unknown    Acute pulmonary edema with congestive heart failure [I50.1]  Unknown    Anemia due to stage 4 chronic kidney disease [N18.4, D63.1]  Unknown    Elevated troponin level not due myocardial infarction [R79.89]  Unknown      Resolved Hospital Problems   No resolved problems to display.        Elevated cardiac enzymes -likely NSTEMI type II secondary to heart failure, acute on chronic kidney disease.  Echocardiogram shows EF 60 to 65% with mild concentric hypertrophy, small pericardial effusion with no evidence of tamponade and moderate size left pleural effusion.  Patient is making urine and is currently on diuretic regimen per nephrology.  No indication for ischemic workup at this time.  Hypervolemic state -multifactorial.  There is certainly an element of acute on chronic diastolic heart failure and there is better response to current diuretic regimen managed by nephrology.  Blood pressure is poorly controlled and we will address that below.  Per family report he is at a steady decline over the past 2 months.  We will follow diagnostic testing results and clinical response to medical intervention for further treatment recommendations.  CKD stage IV  Diabetes -A1c 6.1  Hypertension -patient is cleared by swallow study to start taking pills again.  We will restart amlodipine and carvedilol.  He is on very high dose of IV hydralazine but tolerating it well.  Monitor clinical progress  Altered mental status -multifactorial.  BUN  75-80.           Neptali Becker Jr, MD  [facility]   [facility]  [address]  [place] [address]  Office: [phone]    07/26/24  14:31 EDT

## 2024-07-26 NOTE — PROGRESS NOTES
[facility] Progress Note      Patient Name: Marcial Bernard  : 1955  MRN: [id number]  Primary Care Physician:  Justina Liriano APRN  Date of admission: 2024    Subjective     Interval History:   Follow-up acute kidney injury on chronic kidney disease and hyponatremia    The patient is more awake and alert, he is currently on Bumex drip he has excellent urine output his renal function is stable continue to have significant edema denies chest pain or shortness of air, no nausea or vomiting.    Review of Systems:   As noted above    Objective     Vitals:   Temp:  [97.9 °F (36.6 °C)-98.6 °F (37 °C)] 97.9 °F (36.6 °C)  Heart Rate:  [50-60] 55  Resp:  [18] 18  BP: (158-204)/(63-91) 169/72  Flow (L/min):  [2] 2    Intake/Output Summary (Last 24 hours) at 2024 1057  Last data filed at 2024 0600  Gross per 24 hour   Intake 89.06 ml   Output 3500 ml   Net -3410.94 ml       Physical Exam:    General Appearance: More awake, chronically ill, no acute distress   Skin: warm and dry  HEENT: oral mucosa normal, nonicteric sclera  Neck: Increased JVD  Lungs: Bilateral rhonchi and crackles, breathing effort not labored  Heart: RRR, positive S3, no rub  Abdomen: soft, nontender, nondistended  : no palpable bladder  Extremities: 3+ lower extremity edema  Neuro: Moving all extremities    Scheduled Meds:     hydrALAZINE, 40 mg, Intravenous, Q6H  insulin regular, 2-7 Units, Subcutaneous, Q6H  levothyroxine, 100 mcg, Oral, Daily  metoprolol tartrate, 5 mg, Intravenous, Q6H  senna-docusate sodium, 2 tablet, Oral, BID  sodium chloride, 10 mL, Intravenous, Q12H      IV Meds:   bumetanide, 1 mg/hr, Last Rate: 1 mg/hr (24 0200)        Results Reviewed:   I have personally reviewed the results from the time of this admission to 2024 10:57 EDT     Results from last 7 days   Lab Units 24  0415 24  0002 24  1658 24  1057 24  0420 24  0546  07/24/24 0324 07/23/24  0700 07/23/24  0110   SODIUM mmol/L 126* 124* 123*   < > 123*   < > 123*   < > 116*   POTASSIUM mmol/L 3.4* 3.7 3.9   < > 3.3*   < > 3.2*   < > 4.3   CHLORIDE mmol/L 87* 86* 84*   < > 84*   < > 85*   < > 82*   CO2 mmol/L 24.6 24.0 21.6*   < > 23.0   < > 24.0   < > 22.9   BUN mg/dL 73* 78* 80*   < > 78*   < > 79*   < > 93*   CREATININE mg/dL 4.50* 4.80* 4.53*   < > 4.57*   < > 4.26*   < > 4.93*   CALCIUM mg/dL 7.7* 7.4* 7.4*   < > 7.3*   < > 7.4*   < > 7.4*   BILIRUBIN mg/dL  --   --   --   --  0.2  --  0.2  --  0.2   ALK PHOS U/L  --   --   --   --  88  --  89  --  97   ALT (SGPT) U/L  --   --   --   --  23  --  27  --  28   AST (SGOT) U/L  --   --   --   --  18  --  25  --  39   GLUCOSE mg/dL 106* 105* 108*   < > 106*   < > 99   < > 152*    < > = values in this interval not displayed.       Estimated Creatinine Clearance: 17 mL/min (A) (by C-G formula based on SCr of 4.5 mg/dL (H)).    Results from last 7 days   Lab Units 07/26/24 0415 07/25/24 0420 07/24/24  0324   MAGNESIUM mg/dL 4.6* 5.7* 6.5*   PHOSPHORUS mg/dL 7.0* 7.3* 7.0*       Results from last 7 days   Lab Units 07/26/24 0415 07/25/24  0420 07/24/24  0324   URIC ACID mg/dL 8.7* 8.4* 8.0*       Results from last 7 days   Lab Units 07/26/24 0415 07/25/24  0420 07/24/24  0324 07/23/24  0550 07/23/24  0110   WBC 10*3/mm3 8.80 8.46 8.97 6.85 7.70   HEMOGLOBIN g/dL 9.4* 8.9* 8.3* 8.1* 8.4*   PLATELETS 10*3/mm3 446 449 417 379 414             Assessment / Plan     ASSESSMENT:  Acute kidney injury on chronic kidney disease stage IV creatinine was 4.93 on admission, creatinine today 4.5, potassium 3.4 and sodium up to 126, uric acid 8.7,, his baseline creatinine in the 2.4 range as noted in September 2023.  Secondary to cardiorenal syndrome  Chronic kidney disease stage IV baseline creatinine about 2.4-second diabetic and hypertensive glomerulosclerosis  Acute on chronic diastolic congestive heart failure with cardiorenal syndrome,  volume status improving with diuresis  Diabetes mellitus type 2 with renal complication and diabetic retinopathy  Hypertension with chronic kidney disease  Hyponatremia associated with volume excess, sodium is up to 126  Hypokalemia associate with diuretic potassium 3.4    PLAN:  Continue Bumex drip  Replete potassium  No indication for dialysis today.  I discussed the case with the patient and his family and the nursing staff with the use of an audiovisual   Surveillance labs    I reviewed the chart and other providers notes, reviewed labs.  Copied text in this note has been reviewed and is accurate as of 07/26/24.       Thank you for involving us in the care of Marcial Bernard.  Please feel free to call with any questions.    Mike Flannery MD  07/26/24  10:57 EDT    [facility]  [phone]    Please note that portions of this note were completed with a voice recognition program.

## 2024-07-26 NOTE — PROGRESS NOTES
LOS: 3 days   Patient Care Team:  Justina Liriano APRN as PCP - General (Nurse Practitioner)    Subjective     patient with chronic renal insufficiency who presents from outside ED with weakness and hyponatremia was transferred here for further management, he has a history of hypertension type 2 diabetes hypothyroidism chronic kidney disease, diabetic retinopathy  Patient denies shortness of breath or pain although when he goes to lift his right arm he says he has pain apparently he had an IV may be infiltrated over there that is caused some pain that is what he and his wife seem to indicate.  No nausea and no shortness of breath.  Review of Systems:          Objective     Vital Signs  Vital Sign Min/Max for last 24 hours  Temp  Min: 97.9 °F (36.6 °C)  Max: 98.6 °F (37 °C)   BP  Min: 143/83  Max: 185/70   Pulse  Min: 49  Max: 60   No data recorded   SpO2  Min: 94 %  Max: 99 %   Flow (L/min)  Min: 2  Max: 2   No data recorded        Ventilator/Non-Invasive Ventilation Settings (From admission, onward)      None                         Body mass index is 31.58 kg/m².  I/O last 3 completed shifts:  In: 788.7 [I.V.:717; IV Piggyback:71.7]  Out: 4150 [Urine:4150]  I/O this shift:  In: 49.4 [IV Piggyback:49.4]  Out: 2150 [Urine:2150]        Physical Exam:  General Appearance: Obese  male resting in bed talking and answering questions appropriately he is following commands, does not look in acute distress  Eyes: Conjunctiva are clear .  Pupils are equal and reactive to light  ENT: His membranes are moist no erythema no exudates Mallampati type II almost 3 airway  Neck: Trachea midline neck is obese no visible jugular venous distention and I do not see any definite hepatojugular reflux  Lungs: Rales have resolved chest is pretty clear little decreased towards the bases  Cardiac: Regular rate rhythm I do not hear definite murmur  Abdomen: Soft nontender no palpable hepatosplenomegaly or masses  :  Not examined  Musculoskeletal: He has 2-3+ lower extremity edema and upper extremity edema as well at least 1+, he has some presacral edema.  I do not see any definite wound or evidence of cellulitis in that right arm the right forearm is a little more swollen than the left  Skin: Warm and dry no jaundice no petechiae  Neuro: He follows commands he is moving his extremities to command.  he does not talk a whole lot but he seems to be answering questions appropriately via the   Extremities/P Vascular: Palpable radial pulses they Feel some dorsalis pedis pulses in both feet, they are warm  MSE: Still a pretty flat affect but he is answering questions and responding       Labs:  Results from last 7 days   Lab Units 07/26/24  0415 07/26/24  0002 07/25/24  1658 07/25/24  1057 07/25/24  0420 07/25/24  0011 07/24/24  1828 07/24/24  0546 07/24/24  0324 07/23/24  1200 07/23/24  0700 07/23/24  0550 07/23/24  0110   GLUCOSE mg/dL 106* 105* 108* 108* 106* 110* 104*   < > 99   < > 129*  --  152*   SODIUM mmol/L 126* 124* 123* 124* 123* 123* 123*   < > 123*   < > 117*  --  116*   POTASSIUM mmol/L 3.4* 3.7 3.9 4.1 3.3* 3.7 3.4*   < > 3.2*   < > 3.6  --  4.3   MAGNESIUM mg/dL 4.6*  --   --   --  5.7*  --   --   --  6.5*  --  6.8* 4.5*  --    CO2 mmol/L 24.6 24.0 21.6* 22.3 23.0 22.1 21.1*   < > 24.0   < > 24.0  --  22.9   CHLORIDE mmol/L 87* 86* 84* 84* 84* 85* 84*   < > 85*   < > 84*  --  82*   ANION GAP mmol/L 14.4 14.0 17.4* 17.7* 16.0* 15.9* 17.9*   < > 14.0   < > 9.0  --  11.1   CREATININE mg/dL 4.50* 4.80* 4.53* 4.54* 4.57* 4.56* 5.01*   < > 4.26*   < > 4.48*  --  4.93*   BUN mg/dL 73* 78* 80* 79* 78* 67* 73*   < > 79*   < > 80*  --  93*   BUN / CREAT RATIO  16.2 16.3 17.7 17.4 17.1 14.7 14.6   < > 18.5   < > 17.9  --  18.9   CALCIUM mg/dL 7.7* 7.4* 7.4* 7.4* 7.3* 7.1* 7.2*   < > 7.4*   < > 7.3*  --  7.4*   ALK PHOS U/L  --   --   --   --  88  --   --   --  89  --   --   --  97   TOTAL PROTEIN g/dL  --   --   --    --  5.6*  --   --   --  5.9*  --   --   --  6.4   ALT (SGPT) U/L  --   --   --   --  23  --   --   --  27  --   --   --  28   AST (SGOT) U/L  --   --   --   --  18  --   --   --  25  --   --   --  39   BILIRUBIN mg/dL  --   --   --   --  0.2  --   --   --  0.2  --   --   --  0.2   ALBUMIN g/dL 2.8*  --   --   --  2.8*  --   --   --  3.1*  --   --   --  3.3*   GLOBULIN gm/dL  --   --   --   --  2.8  --   --   --  2.8  --   --   --  3.1    < > = values in this interval not displayed.     Estimated Creatinine Clearance: 17.5 mL/min (A) (by C-G formula based on SCr of 4.5 mg/dL (H)).      Results from last 7 days   Lab Units 07/26/24  0415 07/25/24  0420 07/24/24  0324 07/23/24  0550 07/23/24  0110   WBC 10*3/mm3 8.80 8.46 8.97 6.85 7.70   RBC 10*6/mm3 3.19* 3.09* 2.86* 2.75* 2.92*   HEMOGLOBIN g/dL 9.4* 8.9* 8.3* 8.1* 8.4*   HEMATOCRIT % 28.0* 26.4* 24.8* 24.2* 25.2*   MCV fL 87.8 85.4 86.7 88.0 86.3   MCH pg 29.5 28.8 29.0 29.5 28.8   MCHC g/dL 33.6 33.7 33.5 33.5 33.3   RDW % 14.6 14.6 14.7 14.9 13.3   RDW-SD fl 46.9 45.1 46.0 47.3 41.8   MPV fL 10.2 9.8 10.0 10.3 10.1   PLATELETS 10*3/mm3 446 449 417 379 414   NEUTROPHIL % % 71.7 77.2* 82.3* 83.1* 85.0*   LYMPHOCYTE % % 9.5* 10.0* 8.2* 8.9* 7.8*   MONOCYTES % % 12.3* 8.9 6.9 5.8 4.9*   EOSINOPHIL % % 5.1 2.4 1.1 1.3 1.6   BASOPHIL % % 0.5 0.4 0.3 0.3 0.3   IMM GRAN % % 0.9* 1.1* 1.2* 0.6* 0.4   NEUTROS ABS 10*3/mm3 6.31 6.54 7.37* 5.69 6.55   LYMPHS ABS 10*3/mm3 0.84 0.85 0.74 0.61* 0.60*   MONOS ABS 10*3/mm3 1.08* 0.75 0.62 0.40 0.38   EOS ABS 10*3/mm3 0.45* 0.20 0.10 0.09 0.12   BASOS ABS 10*3/mm3 0.04 0.03 0.03 0.02 0.02   IMMATURE GRANS (ABS) 10*3/mm3 0.08* 0.09* 0.11* 0.04 0.03   NRBC /100 WBC 0.0 0.0 0.0 0.0  --          Results from last 7 days   Lab Units 07/23/24  0251 07/23/24  0110   HSTROP T ng/L 78* 87*     Results from last 7 days   Lab Units 07/23/24  0110   PROBNP pg/mL 8,498.0*     Results from last 7 days   Lab Units 07/23/24  0700   TSH uIU/mL  1.760     Results from last 7 days   Lab Units 07/23/24  0110   LACTATE mmol/L 1.1         Microbiology Results (last 10 days)       Procedure Component Value - Date/Time    COVID-19 and FLU A/B PCR, 1 HR TAT - Swab, Nasopharynx [[id number]]  (Normal) Collected: 07/23/24 0047    Lab Status: Final result Specimen: Swab from Nasopharynx Updated: 07/23/24 0110     COVID19 Not Detected     Influenza A PCR Not Detected     Influenza B PCR Not Detected    Narrative:      Fact sheet for providers: https://www.fda.gov/media/580249/download    Fact sheet for patients: https://www.fda.gov/media/805289/download    Test performed by PCR.                hydrALAZINE, 20 mg, Intravenous, Q6H  insulin regular, 2-7 Units, Subcutaneous, Q6H  levothyroxine, 100 mcg, Oral, Daily  metoprolol tartrate, 5 mg, Intravenous, Q6H  potassium chloride, 20 mEq, Oral, Once  senna-docusate sodium, 2 tablet, Oral, BID  sodium chloride, 10 mL, Intravenous, Q12H      bumetanide, 1 mg/hr, Last Rate: 1 mg/hr (07/26/24 0200)        Diagnostics:  XR Chest 1 View    Result Date: 7/23/2024  SINGLE VIEW OF THE CHEST  HISTORY: Cough and leg swelling  COMPARISON: None available.  FINDINGS: There is cardiomegaly. There are bilateral alveolar and interstitial infiltrates. Some of this may be related to edema. Superimposed pneumonia is not excluded, particularly within the right upper lobe. No pneumothorax is seen. I cannot exclude a trace left pleural effusion. There is calcification of the aorta.      Bilateral alveolar and interstitial infiltrates.  This report was finalized on 7/23/2024 1:52 AM by Dr. Giovana Koch M.D on Workstation: [id number]      Results for orders placed during the hospital encounter of 07/23/24    Adult Transthoracic Echo Complete W/ Cont if Necessary Per Protocol    Interpretation Summary    Left ventricular systolic function is normal. Left ventricular ejection fraction appears to be 61 - 65%.    Left ventricular wall thickness  is consistent with mild concentric hypertrophy.    Left ventricular diastolic function was indeterminate.    There is a small (<1cm) pericardial effusion. There is no evidence of cardiac tamponade.    There is a moderate sized left pleural effusion.    Reviewed his admit chest x-ray    Active Hospital Problems    Diagnosis  POA    **Chronic renal failure (CRF), stage 4 (severe) [N18.4]  Unknown    Hyponatremia [E87.1]  Unknown    Acute pulmonary edema with congestive heart failure [I50.1]  Unknown    Anemia due to stage 4 chronic kidney disease [N18.4, D63.1]  Unknown    Elevated troponin level not due myocardial infarction [R79.89]  Unknown      Resolved Hospital Problems   No resolved problems to display.         Assessment & Plan     Hyponatremia symptomatic likely slow process getting here correct slowly, nephrology helping and assisting with this and he is slowly correcting  encephalopathy metabolic secondary to hyponatremia and probably uremia improved.  Chronic kidney disease stage IV with acute kidney injury but did show good response to Bumex we have been able to hold off on dialysis continue diuresis per nephrology  CHF volume overload with pulmonary edema echocardiogram 9/23 showed grade 1 diastolic dysfunction echo done.  Current echocardiogram indeterminate for diastolic function, normal systolic function.  Patient responded well to diuretics, he is -7 L now over the last 3 days  Elevated troponin can find an old EKG his current EKG has a nonspecific interventricular conduction delay troponin could be up due to renal dysfunction or could be non-ST elevation MI type I or type II certainly has reason for demand ischemia.  Discussed with cardiology likely non-ST elevation MI type II demand ischemia indication for ischemic workup at this time  Anemia moderate chronic disease   Diabetes mellitus type 2 sliding scale insulin for now  Hypertension blood pressure been very labile trending higher cardiology and  nephrology are adjusting up medications.  Hypothyroidism on replacement continue    Talked with him via  services also answered family questions via  service  Plan for disposition:    Juan Ventura Jr, MD  07/26/24  06:42 EDT    Time: Critical care time 39 minutes

## 2024-07-26 NOTE — SIGNIFICANT NOTE
"   07/26/24 1012   Peripheral IV 07/26/24 1012 Anterior;Left;Upper Arm   Placement date: If unknown, DO NOT use \"Add Comment\" note/Placement time: If unknown, DO NOT use \"Add Comment\" note: 07/26/24 1012   Hand Hygiene Completed: Yes  Size (Gauge): 20 G  Orientation: Anterior;Left;Upper  Location: Arm  Site Prep: Chlorhexi...   Site Assessment Clean;Dry   Dressing Type Transparent   Line Status Blood return noted   Dressing Status Clean;Dry;Intact   Phlebitis 0-->no symptoms     Ultrasound Inserted IV Site: KIP    Catheter Length: 2.5IN    Diameter:0.45CM    Depth:1.5CM      Vascular Access Score=5  1) Palpable / Visible / Dis  2) Palpable / Viasible / Not Distended  3) Easily Palpable / Not Visibile  4) Poorly Palpable / Visible  5) Poorly / Nonpalpable / NV  "

## 2024-07-27 LAB
ALBUMIN SERPL-MCNC: 2.6 G/DL (ref 3.5–5.2)
ALBUMIN/GLOB SERPL: 0.9 G/DL
ALP SERPL-CCNC: 75 U/L (ref 39–117)
ALT SERPL W P-5'-P-CCNC: 19 U/L (ref 1–41)
ANION GAP SERPL CALCULATED.3IONS-SCNC: 12.1 MMOL/L (ref 5–15)
ANION GAP SERPL CALCULATED.3IONS-SCNC: 13.2 MMOL/L (ref 5–15)
ANION GAP SERPL CALCULATED.3IONS-SCNC: 14.8 MMOL/L (ref 5–15)
ANION GAP SERPL CALCULATED.3IONS-SCNC: 14.8 MMOL/L (ref 5–15)
AST SERPL-CCNC: 18 U/L (ref 1–40)
BASOPHILS # BLD AUTO: 0.04 10*3/MM3 (ref 0–0.2)
BASOPHILS NFR BLD AUTO: 0.4 % (ref 0–1.5)
BILIRUB SERPL-MCNC: <0.2 MG/DL (ref 0–1.2)
BUN SERPL-MCNC: 73 MG/DL (ref 8–23)
BUN SERPL-MCNC: 74 MG/DL (ref 8–23)
BUN/CREAT SERPL: 15.3 (ref 7–25)
BUN/CREAT SERPL: 15.5 (ref 7–25)
BUN/CREAT SERPL: 16.4 (ref 7–25)
BUN/CREAT SERPL: 16.4 (ref 7–25)
CALCIUM SPEC-SCNC: 7.5 MG/DL (ref 8.6–10.5)
CHLORIDE SERPL-SCNC: 90 MMOL/L (ref 98–107)
CHLORIDE SERPL-SCNC: 92 MMOL/L (ref 98–107)
CO2 SERPL-SCNC: 25.2 MMOL/L (ref 22–29)
CO2 SERPL-SCNC: 25.2 MMOL/L (ref 22–29)
CO2 SERPL-SCNC: 25.9 MMOL/L (ref 22–29)
CO2 SERPL-SCNC: 26.8 MMOL/L (ref 22–29)
CREAT SERPL-MCNC: 4.51 MG/DL (ref 0.76–1.27)
CREAT SERPL-MCNC: 4.51 MG/DL (ref 0.76–1.27)
CREAT SERPL-MCNC: 4.76 MG/DL (ref 0.76–1.27)
CREAT SERPL-MCNC: 4.77 MG/DL (ref 0.76–1.27)
DEPRECATED RDW RBC AUTO: 45.6 FL (ref 37–54)
EGFRCR SERPLBLD CKD-EPI 2021: 12.4 ML/MIN/1.73
EGFRCR SERPLBLD CKD-EPI 2021: 12.5 ML/MIN/1.73
EGFRCR SERPLBLD CKD-EPI 2021: 13.3 ML/MIN/1.73
EGFRCR SERPLBLD CKD-EPI 2021: 13.3 ML/MIN/1.73
EOSINOPHIL # BLD AUTO: 0.56 10*3/MM3 (ref 0–0.4)
EOSINOPHIL NFR BLD AUTO: 6.1 % (ref 0.3–6.2)
ERYTHROCYTE [DISTWIDTH] IN BLOOD BY AUTOMATED COUNT: 14.4 % (ref 12.3–15.4)
GLOBULIN UR ELPH-MCNC: 2.8 GM/DL
GLUCOSE BLDC GLUCOMTR-MCNC: 112 MG/DL (ref 70–130)
GLUCOSE BLDC GLUCOMTR-MCNC: 125 MG/DL (ref 70–130)
GLUCOSE BLDC GLUCOMTR-MCNC: 128 MG/DL (ref 70–130)
GLUCOSE BLDC GLUCOMTR-MCNC: 135 MG/DL (ref 70–130)
GLUCOSE BLDC GLUCOMTR-MCNC: 175 MG/DL (ref 70–130)
GLUCOSE BLDC GLUCOMTR-MCNC: 193 MG/DL (ref 70–130)
GLUCOSE SERPL-MCNC: 117 MG/DL (ref 65–99)
GLUCOSE SERPL-MCNC: 117 MG/DL (ref 65–99)
GLUCOSE SERPL-MCNC: 122 MG/DL (ref 65–99)
GLUCOSE SERPL-MCNC: 154 MG/DL (ref 65–99)
HCT VFR BLD AUTO: 26.5 % (ref 37.5–51)
HGB BLD-MCNC: 8.8 G/DL (ref 13–17.7)
IMM GRANULOCYTES # BLD AUTO: 0.07 10*3/MM3 (ref 0–0.05)
IMM GRANULOCYTES NFR BLD AUTO: 0.8 % (ref 0–0.5)
LYMPHOCYTES # BLD AUTO: 0.78 10*3/MM3 (ref 0.7–3.1)
LYMPHOCYTES NFR BLD AUTO: 8.5 % (ref 19.6–45.3)
MAGNESIUM SERPL-MCNC: 4.2 MG/DL (ref 1.6–2.4)
MCH RBC QN AUTO: 29 PG (ref 26.6–33)
MCHC RBC AUTO-ENTMCNC: 33.2 G/DL (ref 31.5–35.7)
MCV RBC AUTO: 87.5 FL (ref 79–97)
MONOCYTES # BLD AUTO: 1 10*3/MM3 (ref 0.1–0.9)
MONOCYTES NFR BLD AUTO: 11 % (ref 5–12)
NEUTROPHILS NFR BLD AUTO: 6.68 10*3/MM3 (ref 1.7–7)
NEUTROPHILS NFR BLD AUTO: 73.2 % (ref 42.7–76)
NRBC BLD AUTO-RTO: 0 /100 WBC (ref 0–0.2)
PHOSPHATE SERPL-MCNC: 6.5 MG/DL (ref 2.5–4.5)
PLATELET # BLD AUTO: 395 10*3/MM3 (ref 140–450)
PMV BLD AUTO: 9.5 FL (ref 6–12)
POTASSIUM SERPL-SCNC: 3.3 MMOL/L (ref 3.5–5.2)
POTASSIUM SERPL-SCNC: 3.6 MMOL/L (ref 3.5–5.2)
POTASSIUM SERPL-SCNC: 3.6 MMOL/L (ref 3.5–5.2)
POTASSIUM SERPL-SCNC: 3.9 MMOL/L (ref 3.5–5.2)
PROT SERPL-MCNC: 5.4 G/DL (ref 6–8.5)
RBC # BLD AUTO: 3.03 10*6/MM3 (ref 4.14–5.8)
SODIUM SERPL-SCNC: 130 MMOL/L (ref 136–145)
URATE SERPL-MCNC: 8.5 MG/DL (ref 3.4–7)
WBC NRBC COR # BLD AUTO: 9.13 10*3/MM3 (ref 3.4–10.8)

## 2024-07-27 PROCEDURE — 84550 ASSAY OF BLOOD/URIC ACID: CPT | Performed by: INTERNAL MEDICINE

## 2024-07-27 PROCEDURE — 99232 SBSQ HOSP IP/OBS MODERATE 35: CPT | Performed by: INTERNAL MEDICINE

## 2024-07-27 PROCEDURE — 25010000002 HEPARIN (PORCINE) PER 1000 UNITS: Performed by: INTERNAL MEDICINE

## 2024-07-27 PROCEDURE — 85025 COMPLETE CBC W/AUTO DIFF WBC: CPT | Performed by: INTERNAL MEDICINE

## 2024-07-27 PROCEDURE — 25010000002 BUMETANIDE PER 0.5 MG: Performed by: INTERNAL MEDICINE

## 2024-07-27 PROCEDURE — 84100 ASSAY OF PHOSPHORUS: CPT | Performed by: INTERNAL MEDICINE

## 2024-07-27 PROCEDURE — 82948 REAGENT STRIP/BLOOD GLUCOSE: CPT

## 2024-07-27 PROCEDURE — 25010000002 HYDRALAZINE PER 20 MG: Performed by: INTERNAL MEDICINE

## 2024-07-27 PROCEDURE — 83735 ASSAY OF MAGNESIUM: CPT | Performed by: INTERNAL MEDICINE

## 2024-07-27 PROCEDURE — 80053 COMPREHEN METABOLIC PANEL: CPT | Performed by: INTERNAL MEDICINE

## 2024-07-27 PROCEDURE — 63710000001 INSULIN REGULAR HUMAN PER 5 UNITS: Performed by: INTERNAL MEDICINE

## 2024-07-27 RX ORDER — HYDRALAZINE HYDROCHLORIDE 25 MG/1
25 TABLET, FILM COATED ORAL EVERY 8 HOURS SCHEDULED
Status: DISCONTINUED | OUTPATIENT
Start: 2024-07-27 | End: 2024-07-29

## 2024-07-27 RX ORDER — HEPARIN SODIUM 5000 [USP'U]/ML
5000 INJECTION, SOLUTION INTRAVENOUS; SUBCUTANEOUS EVERY 12 HOURS SCHEDULED
Status: DISCONTINUED | OUTPATIENT
Start: 2024-07-27 | End: 2024-08-08

## 2024-07-27 RX ORDER — CARVEDILOL 12.5 MG/1
12.5 TABLET ORAL 2 TIMES DAILY WITH MEALS
Status: DISCONTINUED | OUTPATIENT
Start: 2024-07-27 | End: 2024-08-03

## 2024-07-27 RX ORDER — POTASSIUM CHLORIDE 1.5 G/1.58G
40 POWDER, FOR SOLUTION ORAL
Status: COMPLETED | OUTPATIENT
Start: 2024-07-27 | End: 2024-07-27

## 2024-07-27 RX ADMIN — SENNOSIDES AND DOCUSATE SODIUM 2 TABLET: 50; 8.6 TABLET ORAL at 22:42

## 2024-07-27 RX ADMIN — LEVOTHYROXINE SODIUM 100 MCG: 100 TABLET ORAL at 08:58

## 2024-07-27 RX ADMIN — HYDRALAZINE HYDROCHLORIDE 40 MG: 20 INJECTION INTRAMUSCULAR; INTRAVENOUS at 00:57

## 2024-07-27 RX ADMIN — HEPARIN SODIUM 5000 UNITS: 5000 INJECTION INTRAVENOUS; SUBCUTANEOUS at 20:52

## 2024-07-27 RX ADMIN — AMLODIPINE BESYLATE 10 MG: 10 TABLET ORAL at 08:58

## 2024-07-27 RX ADMIN — CARVEDILOL 12.5 MG: 12.5 TABLET, FILM COATED ORAL at 17:10

## 2024-07-27 RX ADMIN — POTASSIUM CHLORIDE 40 MEQ: 1.5 POWDER, FOR SOLUTION ORAL at 22:42

## 2024-07-27 RX ADMIN — HYDRALAZINE HYDROCHLORIDE 25 MG: 25 TABLET ORAL at 22:42

## 2024-07-27 RX ADMIN — POTASSIUM CHLORIDE 40 MEQ: 1.5 POWDER, FOR SOLUTION ORAL at 20:50

## 2024-07-27 RX ADMIN — CLONIDINE HYDROCHLORIDE 0.1 MG: 0.1 TABLET ORAL at 08:58

## 2024-07-27 RX ADMIN — HYDRALAZINE HYDROCHLORIDE 25 MG: 25 TABLET ORAL at 15:29

## 2024-07-27 RX ADMIN — CARVEDILOL 25 MG: 25 TABLET, FILM COATED ORAL at 08:58

## 2024-07-27 RX ADMIN — SENNOSIDES AND DOCUSATE SODIUM 2 TABLET: 50; 8.6 TABLET ORAL at 08:58

## 2024-07-27 RX ADMIN — BUMETANIDE 1 MG/HR: 0.25 INJECTION INTRAMUSCULAR; INTRAVENOUS at 02:23

## 2024-07-27 RX ADMIN — HYDRALAZINE HYDROCHLORIDE 40 MG: 20 INJECTION INTRAMUSCULAR; INTRAVENOUS at 06:40

## 2024-07-27 RX ADMIN — Medication 10 ML: at 21:03

## 2024-07-27 RX ADMIN — INSULIN HUMAN 2 UNITS: 100 INJECTION, SOLUTION PARENTERAL at 17:11

## 2024-07-27 RX ADMIN — Medication 10 ML: at 09:25

## 2024-07-27 NOTE — PLAN OF CARE
Goal Outcome Evaluation:   Patient is A&Ox2.oxygen saturation mentain in room air. Was very drowsy in the morning but after 2pm patient is more awake.poor oral intake.adequate urine output,consult flowchart. Inj Bumex@1mg/hr continue . Has transfer to telemetry unit order. Waiting for bed.

## 2024-07-27 NOTE — NURSING NOTE
Pt alert to self/ drowsy/lethargic. Moves all extremities. Follows some commands   used for all assessments.   Na 130 at midnight; nephrology notified.   1200 urine output.

## 2024-07-27 NOTE — PROGRESS NOTES
[facility] Progress Note      Patient Name: Marcial Bernard  : 1955  MRN: [id number]  Primary Care Physician:  Justina Liriano APRN  Date of admission: 2024    Subjective     Interval History:   Follow-up acute kidney injury on chronic kidney disease and hyponatremia    The patient is more awake and alert, he is currently on Bumex drip he has excellent urine output his renal function is stable continue to have significant edema denies chest pain or shortness of air, no nausea or vomiting.  Urine output over the past 24 hours was 3100 and he had 9 L negative fluid balance since admission    Review of Systems:   As noted above    Objective     Vitals:   Temp:  [97.9 °F (36.6 °C)-98.5 °F (36.9 °C)] 98 °F (36.7 °C)  Heart Rate:  [51-68] 55  Resp:  [12-20] 12  BP: (127-196)/(49-84) 161/75  Flow (L/min):  [2] 2    Intake/Output Summary (Last 24 hours) at 2024 0926  Last data filed at 2024 0500  Gross per 24 hour   Intake 889 ml   Output 2800 ml   Net -1911 ml       Physical Exam:    General Appearance: More awake, chronically ill, no acute distress   Skin: warm and dry  HEENT: oral mucosa normal, nonicteric sclera  Neck: Increased JVD  Lungs: Bilateral rhonchi and crackles, breathing effort not labored  Heart: RRR, positive S3, no rub  Abdomen: soft, nontender, nondistended  : no palpable bladder  Extremities: 3+ lower extremity edema  Neuro: Moving all extremities    Scheduled Meds:     amLODIPine, 10 mg, Oral, Q24H  carvedilol, 25 mg, Oral, BID With Meals  cloNIDine, 0.1 mg, Oral, Q12H  hydrALAZINE, 40 mg, Intravenous, Q6H  insulin regular, 2-7 Units, Subcutaneous, Q6H  levothyroxine, 100 mcg, Oral, Daily  senna-docusate sodium, 2 tablet, Oral, BID  sodium chloride, 10 mL, Intravenous, Q12H      IV Meds:   bumetanide, 1 mg/hr, Last Rate: 1 mg/hr (24 0223)        Results Reviewed:   I have personally reviewed the results from the time of this admission to  7/27/2024 09:26 EDT     Results from last 7 days   Lab Units 07/27/24  0252 07/27/24  0056 07/26/24  1451 07/25/24  1057 07/25/24  0420 07/24/24  0546 07/24/24  0324   SODIUM mmol/L 130*  130* 130* 127*   < > 123*   < > 123*   POTASSIUM mmol/L 3.6  3.6 3.9 3.3*   < > 3.3*   < > 3.2*   CHLORIDE mmol/L 90*  90* 90* 88*   < > 84*   < > 85*   CO2 mmol/L 25.2  25.2 26.8 25.8   < > 23.0   < > 24.0   BUN mg/dL 74*  74* 74* 75*   < > 78*   < > 79*   CREATININE mg/dL 4.51*  4.51* 4.77* 4.94*   < > 4.57*   < > 4.26*   CALCIUM mg/dL 7.5*  7.5* 7.5* 7.6*   < > 7.3*   < > 7.4*   BILIRUBIN mg/dL <0.2  --   --   --  0.2  --  0.2   ALK PHOS U/L 75  --   --   --  88  --  89   ALT (SGPT) U/L 19  --   --   --  23  --  27   AST (SGOT) U/L 18  --   --   --  18  --  25   GLUCOSE mg/dL 117*  117* 122* 153*   < > 106*   < > 99    < > = values in this interval not displayed.       Estimated Creatinine Clearance: 17 mL/min (A) (by C-G formula based on SCr of 4.51 mg/dL (H)).    Results from last 7 days   Lab Units 07/27/24  0252 07/26/24  0415 07/25/24  0420   MAGNESIUM mg/dL 4.2* 4.6* 5.7*   PHOSPHORUS mg/dL 6.5* 7.0* 7.3*       Results from last 7 days   Lab Units 07/27/24  0252 07/26/24  0415 07/25/24  0420 07/24/24  0324   URIC ACID mg/dL 8.5* 8.7* 8.4* 8.0*       Results from last 7 days   Lab Units 07/27/24  0252 07/26/24  0415 07/25/24  0420 07/24/24  0324 07/23/24  0550   WBC 10*3/mm3 9.13 8.80 8.46 8.97 6.85   HEMOGLOBIN g/dL 8.8* 9.4* 8.9* 8.3* 8.1*   PLATELETS 10*3/mm3 395 446 449 417 379             Assessment / Plan     ASSESSMENT:  Acute kidney injury on chronic kidney disease stage IV creatinine was 4.93 on admission, creatinine today 4.51, very stable, potassium 3.6 and sodium up to 130, patient has cardiorenal syndrome diuresing quite well  Chronic kidney disease stage IV baseline creatinine about 2.4-second diabetic and hypertensive glomerulosclerosis  Acute on chronic diastolic congestive heart failure with  cardiorenal syndrome, volume status improving with diuresis  Diabetes mellitus type 2 with renal complication and diabetic retinopathy  Hypertension with chronic kidney disease  Hyponatremia associated with volume excess, sodium is up to 130  Hypokalemia associate with diuretic potassium 3.6    PLAN:  Continue Bumex drip  Replete potassium as needed  No indication for dialysis today.  I discussed the case with the patient and his family member by using the [employer]   I discussed the case with the patient's nurse  Surveillance labs    I reviewed the chart and other providers notes, reviewed labs.  Copied text in this note has been reviewed and is accurate as of 07/27/24.       Thank you for involving us in the care of Marcial Bernard.  Please feel free to call with any questions.    Mike Flannery MD  07/27/24  09:26 EDT    [facility]  [phone]    Please note that portions of this note were completed with a voice recognition program.

## 2024-07-27 NOTE — PROGRESS NOTES
[facility]  [phone]  Dr. Cristian Chinchilla    Subjective:  LOS: 4    Chief Complaint:  Hyponatremia    Patient is somnolent.  Does not awaken when aroused but during examination he does wake up and is somewhat restless.  Family states this is his baseline in the mornings.    Objective   Vital Signs past 24hrs  Temp range: Temp (24hrs), Av.2 °F (36.8 °C), Min:97.9 °F (36.6 °C), Max:98.4 °F (36.9 °C)    BP range: BP: (109-192)/(49-84) 134/68  Pulse range: Heart Rate:  [50-68] 53  Resp rate range: Resp:  [12-20] 14  Device (Oxygen Therapy): room airFlow (L/min):  [2] 2  Oxygen range:SpO2:  [90 %-99 %] 96 %   Mechanical Ventilator:     Physical Exam  Constitutional:       General: He is sleeping.   Cardiovascular:      Rate and Rhythm: Normal rate and regular rhythm.      Heart sounds: No murmur heard.  Pulmonary:      Effort: Pulmonary effort is normal.      Breath sounds: Normal breath sounds.   Abdominal:      General: Bowel sounds are normal.      Palpations: Abdomen is soft. There is no mass.      Tenderness: There is no abdominal tenderness.   Musculoskeletal:         General: Swelling present.       Results Review:    I have reviewed the laboratory and imaging data since the last note by [facility] physician.  My annotations are noted in assessment and plan.      Result Review:  I have personally reviewed the results from last note by [facility] physician to 2024 12:55 EDT and agree with these findings:  [x]  Laboratory list / accordion  [x]  Microbiology  [x]  Radiology  []  EKG/Telemetry   []  Cardiology/Vascular   []  Pathology  []  Old records  []  Other:    Medication Review:  I have reviewed the current MAR.  My annotations are noted in assessment and plan.    amLODIPine, 10 mg, Oral, Q24H  carvedilol, 25 mg, Oral, BID With Meals  cloNIDine, 0.1 mg, Oral, Q12H  hydrALAZINE, 40 mg, Intravenous, Q6H  insulin regular, 2-7 Units, Subcutaneous, Q6H  levothyroxine, 100 mcg, Oral, Daily  senna-docusate  sodium, 2 tablet, Oral, BID  sodium chloride, 10 mL, Intravenous, Q12H        bumetanide, 1 mg/hr, Last Rate: 1 mg/hr (07/27/24 0223)      Lines, Drains & Airways       Active LDAs       Name Placement date Placement time Site Days    Peripheral IV 07/23/24 1150 Anterior;Left;Proximal Forearm 07/23/24  1150  Forearm  4    Peripheral IV 07/26/24 1012 Anterior;Left;Upper Arm 07/26/24  1012  Arm  1    External Urinary Catheter 07/24/24  0800  --  3                  Isolation status: No active isolations    Dietary Orders (From admission, onward)       Start     Ordered    07/26/24 1042  Diet: Regular/House, Cardiac, Fluid Restriction (240 mL/tray); Low Sodium (2g); 1500 mL/day; Texture: Regular (IDDSI 7); Fluid Consistency: Thin (IDDSI 0)  Diet Effective Now        References:    Diet Order [place]   Question Answer Comment   Diets: Regular/House    Diets: Cardiac    Diets: Fluid Restriction (240 mL/tray)    Cardiac Diet: Low Sodium (2g)    Fluid Restriction Diet (240 mL/tray): 1500 mL/day    Texture: Regular (IDDSI 7)    Fluid Consistency: Thin (IDDSI 0)        07/26/24 1042                    PCCM Problems  Altered mental status due to acute metabolic encephalopathy  Acute hyponatremia  LIDIA with CKD 4  Acute on chronic HFpEF  Acute pulmonary edema  Labile blood pressures  Relevant Medical Diagnoses  Type 2 diabetes mellitus  Diabetic retinopathy  Hypothyroidism  Chronic hypertension  Anemia      THESE ARE NEW MEDICAL PROBLEMS TO ME.    Plan of Treatment    Somewhat more obtunded.  However long discussion with daughter and wife at bedside.  They feel patient is usually somnolent in the mornings and more awake in the afternoons.  This happens at home as well.  He does have underlying dementia.  Per the RN he does seem more sleepy today.  His blood pressure is also on the lower side.  Note addition of medications to control blood pressure strictly.  This could be contributing to his mental status as well.    Labile  blood pressures.  Will loosen blood pressure parameters and keep systolic less than 160.  His heart rate is presently 46.  I will therefore decrease dose of Coreg.  I will stop the clonidine and change the hydralazine to p.o. only.  Continue IV as needed hydralazine to control systolic blood pressures above 160.    Fluid overload with acute pulmonary edema and currently on Bumex drip.  Appears to be diuresing well and this may be responsible for lower blood pressures.  Despite this he is on room air and tolerating.    Underlying diabetes and blood sugars are controlled.  Patient is taking p.o. diet when awake.    No evidence of acute blood loss.  Will add subcu heparin for DVT prophylaxis.    Transfer out of ICU.  Ask [facility] to help with blood sugar management.      Cristian Chinchilla MD  07/27/24  12:55 EDT      Part of this note may be an electronic transcription/translation of spoken language to printed text using the Dragon Dictation System.

## 2024-07-27 NOTE — NURSING NOTE
Patient is A&Ox2. More alert today but still confused. Oxygen saturation maintained in room air. Right hand IV site was inflammed tender, iv took out, Intensivist well aware of the condition. Warm compress done. Ipad  used to do assessment to patient. Adequate urine output.

## 2024-07-27 NOTE — PROGRESS NOTES
"[facility]    Patient Name: Marcial Bernard  :1955  69 y.o.  LOS: 4  Encounter Provider: Marcio Vidal III, MD      Patient Care Team:  Justina Liriano APRN as PCP - General (Nurse Practitioner)    Chief Complaint: Follow-up altered mental status, uremia, acute on chronic kidney disease, acute on chronic diastolic heart failure, diabetes, uncontrolled hypertension    Interval History: no CP/SOB       Objective   Vital Signs  Temp:  [97.9 °F (36.6 °C)-98.5 °F (36.9 °C)] 98 °F (36.7 °C)  Heart Rate:  [51-68] 55  Resp:  [12-20] 12  BP: (127-196)/(49-84) 161/75    Intake/Output Summary (Last 24 hours) at 2024 0926  Last data filed at 2024 0500  Gross per 24 hour   Intake 889 ml   Output 2800 ml   Net -1911 ml     Flowsheet Rows      Flowsheet Row First Filed Value   Admission Height 175.3 cm (69\") Documented at 2024 004   Admission Weight 90.7 kg (200 lb) Documented at 2024 0044              Physical Exam  Vitals reviewed.   Constitutional:       Appearance: Frail. Acutely ill-appearing.   Neck:      Vascular: No JVR. JVD normal.   Pulmonary:      Effort: Pulmonary effort is normal.      Breath sounds: No wheezing. No rhonchi. Bibasilar Rales present.   Chest:      Chest wall: Not tender to palpatation.   Cardiovascular:      PMI at left midclavicular line. Normal rate. Regular rhythm. Normal S1. Normal S2.       Murmurs: There is no murmur.      No gallop.  No click. No rub.   Pulses:     Intact distal pulses.   Edema:     Pretibial: bilateral 2+ edema of the pretibial area.     Ankle: bilateral 2+ edema of the ankle.     Feet: bilateral 2+ edema of the feet.  Abdominal:      General: Bowel sounds are normal.      Palpations: Abdomen is soft.      Tenderness: There is no abdominal tenderness.   Musculoskeletal: Normal range of motion.         General: No tenderness. Skin:     General: Skin is warm and dry.   Neurological:      Mental Status: Exhibits altered " "mental status.     Physical exam was reviewed, updated and amended when necessary.    Pertinent Test Results:  Results from last 7 days   Lab Units 07/27/24  0252 07/27/24  0056 07/26/24  1451 07/26/24  0415 07/26/24  0002 07/25/24  1658 07/25/24  1057 07/25/24  0420 07/24/24  0546 07/24/24  0324 07/23/24  0700 07/23/24  0110   SODIUM mmol/L 130*  130* 130* 127* 126* 124* 123* 124* 123*   < > 123*   < > 116*   POTASSIUM mmol/L 3.6  3.6 3.9 3.3* 3.4* 3.7 3.9 4.1 3.3*   < > 3.2*   < > 4.3   CHLORIDE mmol/L 90*  90* 90* 88* 87* 86* 84* 84* 84*   < > 85*   < > 82*   CO2 mmol/L 25.2  25.2 26.8 25.8 24.6 24.0 21.6* 22.3 23.0   < > 24.0   < > 22.9   BUN mg/dL 74*  74* 74* 75* 73* 78* 80* 79* 78*   < > 79*   < > 93*   CREATININE mg/dL 4.51*  4.51* 4.77* 4.94* 4.50* 4.80* 4.53* 4.54* 4.57*   < > 4.26*   < > 4.93*   GLUCOSE mg/dL 117*  117* 122* 153* 106* 105* 108* 108* 106*   < > 99   < > 152*   CALCIUM mg/dL 7.5*  7.5* 7.5* 7.6* 7.7* 7.4* 7.4* 7.4* 7.3*   < > 7.4*   < > 7.4*   AST (SGOT) U/L 18  --   --   --   --   --   --  18  --  25  --  39   ALT (SGPT) U/L 19  --   --   --   --   --   --  23  --  27  --  28    < > = values in this interval not displayed.     Results from last 7 days   Lab Units 07/23/24  0251 07/23/24  0110   HSTROP T ng/L 78* 87*     Results from last 7 days   Lab Units 07/27/24  0252 07/26/24  0415 07/25/24  0420 07/24/24  0324 07/23/24  0550 07/23/24  0110   WBC 10*3/mm3 9.13 8.80 8.46 8.97 6.85 7.70   HEMOGLOBIN g/dL 8.8* 9.4* 8.9* 8.3* 8.1* 8.4*   HEMATOCRIT % 26.5* 28.0* 26.4* 24.8* 24.2* 25.2*   PLATELETS 10*3/mm3 395 446 449 417 379 414         Results from last 7 days   Lab Units 07/27/24  0252 07/26/24  0415 07/25/24  0420 07/24/24  0324 07/23/24  0700 07/23/24  0550   MAGNESIUM mg/dL 4.2* 4.6* 5.7* 6.5* 6.8* 4.5*           Invalid input(s): \"LDLCALC\"  Results from last 7 days   Lab Units 07/23/24  0110   PROBNP pg/mL 8,498.0*     Results from last 7 days   Lab Units 07/23/24  0700 "   TSH uIU/mL 1.760           Medication Review:   amLODIPine, 10 mg, Oral, Q24H  carvedilol, 25 mg, Oral, BID With Meals  cloNIDine, 0.1 mg, Oral, Q12H  hydrALAZINE, 40 mg, Intravenous, Q6H  insulin regular, 2-7 Units, Subcutaneous, Q6H  levothyroxine, 100 mcg, Oral, Daily  senna-docusate sodium, 2 tablet, Oral, BID  sodium chloride, 10 mL, Intravenous, Q12H         bumetanide, 1 mg/hr, Last Rate: 1 mg/hr (07/27/24 0223)        Assessment & Plan     Active Hospital Problems    Diagnosis  POA    **Chronic renal failure (CRF), stage 4 (severe) [N18.4]  Unknown    Hyponatremia [E87.1]  Unknown    Acute pulmonary edema with congestive heart failure [I50.1]  Unknown    Anemia due to stage 4 chronic kidney disease [N18.4, D63.1]  Unknown    Elevated troponin level not due myocardial infarction [R79.89]  Unknown      Resolved Hospital Problems   No resolved problems to display.        Elevated cardiac enzymes -likely NSTEMI type II secondary to heart failure, acute on chronic kidney disease.  Echocardiogram shows EF 60 to 65% with mild concentric hypertrophy, small pericardial effusion with no evidence of tamponade and moderate size left pleural effusion.  No indication for ischemic workup at this time.  Hypervolemic state -multifactorial.  Nicely, nephrology is managing diuretics.  CKD stage IV  Diabetes -A1c 6.1  Hypertension -patient is cleared by swallow study to start taking pills again. Now on clonidine, amlodipine and carvedilol.  He is on very high dose of IV hydralazine but tolerating it well.  Monitor clinical progress  Altered mental status -multifactorial.  Improving           Marcio Vidal III, MD  [facility]   [facility]  [address] [place] [address] - [address]  [place] [address]  Office: [phone]    07/27/24  09:26 EDT

## 2024-07-28 ENCOUNTER — APPOINTMENT (OUTPATIENT)
Dept: GENERAL RADIOLOGY | Facility: HOSPITAL | Age: 69
DRG: 682 | End: 2024-07-28

## 2024-07-28 LAB
ALBUMIN SERPL-MCNC: 2.9 G/DL (ref 3.5–5.2)
ALBUMIN/GLOB SERPL: 0.8 G/DL
ALP SERPL-CCNC: 89 U/L (ref 39–117)
ALT SERPL W P-5'-P-CCNC: 17 U/L (ref 1–41)
ANION GAP SERPL CALCULATED.3IONS-SCNC: 14.4 MMOL/L (ref 5–15)
AST SERPL-CCNC: 22 U/L (ref 1–40)
BASOPHILS # BLD AUTO: 0.05 10*3/MM3 (ref 0–0.2)
BASOPHILS NFR BLD AUTO: 0.6 % (ref 0–1.5)
BILIRUB SERPL-MCNC: 0.2 MG/DL (ref 0–1.2)
BUN SERPL-MCNC: 71 MG/DL (ref 8–23)
BUN/CREAT SERPL: 15.8 (ref 7–25)
CALCIUM SPEC-SCNC: 8 MG/DL (ref 8.6–10.5)
CHLORIDE SERPL-SCNC: 92 MMOL/L (ref 98–107)
CO2 SERPL-SCNC: 23.6 MMOL/L (ref 22–29)
CREAT SERPL-MCNC: 4.5 MG/DL (ref 0.76–1.27)
DEPRECATED RDW RBC AUTO: 44.2 FL (ref 37–54)
EGFRCR SERPLBLD CKD-EPI 2021: 13.3 ML/MIN/1.73
EOSINOPHIL # BLD AUTO: 0.65 10*3/MM3 (ref 0–0.4)
EOSINOPHIL NFR BLD AUTO: 7.2 % (ref 0.3–6.2)
ERYTHROCYTE [DISTWIDTH] IN BLOOD BY AUTOMATED COUNT: 14 % (ref 12.3–15.4)
GLOBULIN UR ELPH-MCNC: 3.5 GM/DL
GLUCOSE BLDC GLUCOMTR-MCNC: 118 MG/DL (ref 70–130)
GLUCOSE BLDC GLUCOMTR-MCNC: 136 MG/DL (ref 70–130)
GLUCOSE BLDC GLUCOMTR-MCNC: 150 MG/DL (ref 70–130)
GLUCOSE BLDC GLUCOMTR-MCNC: 155 MG/DL (ref 70–130)
GLUCOSE SERPL-MCNC: 107 MG/DL (ref 65–99)
HCT VFR BLD AUTO: 31.3 % (ref 37.5–51)
HGB BLD-MCNC: 10.5 G/DL (ref 13–17.7)
IMM GRANULOCYTES # BLD AUTO: 0.08 10*3/MM3 (ref 0–0.05)
IMM GRANULOCYTES NFR BLD AUTO: 0.9 % (ref 0–0.5)
LYMPHOCYTES # BLD AUTO: 1.04 10*3/MM3 (ref 0.7–3.1)
LYMPHOCYTES NFR BLD AUTO: 11.5 % (ref 19.6–45.3)
MAGNESIUM SERPL-MCNC: 4.2 MG/DL (ref 1.6–2.4)
MCH RBC QN AUTO: 29.2 PG (ref 26.6–33)
MCHC RBC AUTO-ENTMCNC: 33.5 G/DL (ref 31.5–35.7)
MCV RBC AUTO: 87.2 FL (ref 79–97)
MONOCYTES # BLD AUTO: 0.9 10*3/MM3 (ref 0.1–0.9)
MONOCYTES NFR BLD AUTO: 9.9 % (ref 5–12)
NEUTROPHILS NFR BLD AUTO: 6.35 10*3/MM3 (ref 1.7–7)
NEUTROPHILS NFR BLD AUTO: 69.9 % (ref 42.7–76)
NRBC BLD AUTO-RTO: 0 /100 WBC (ref 0–0.2)
PHOSPHATE SERPL-MCNC: 6.1 MG/DL (ref 2.5–4.5)
PLATELET # BLD AUTO: 383 10*3/MM3 (ref 140–450)
PMV BLD AUTO: 9.9 FL (ref 6–12)
POTASSIUM SERPL-SCNC: 4.1 MMOL/L (ref 3.5–5.2)
PROT SERPL-MCNC: 6.4 G/DL (ref 6–8.5)
RBC # BLD AUTO: 3.59 10*6/MM3 (ref 4.14–5.8)
SODIUM SERPL-SCNC: 130 MMOL/L (ref 136–145)
URATE SERPL-MCNC: 8.9 MG/DL (ref 3.4–7)
WBC NRBC COR # BLD AUTO: 9.07 10*3/MM3 (ref 3.4–10.8)

## 2024-07-28 PROCEDURE — 82948 REAGENT STRIP/BLOOD GLUCOSE: CPT

## 2024-07-28 PROCEDURE — 25010000002 BUMETANIDE PER 0.5 MG: Performed by: INTERNAL MEDICINE

## 2024-07-28 PROCEDURE — 83735 ASSAY OF MAGNESIUM: CPT | Performed by: INTERNAL MEDICINE

## 2024-07-28 PROCEDURE — 85025 COMPLETE CBC W/AUTO DIFF WBC: CPT | Performed by: INTERNAL MEDICINE

## 2024-07-28 PROCEDURE — 80053 COMPREHEN METABOLIC PANEL: CPT | Performed by: INTERNAL MEDICINE

## 2024-07-28 PROCEDURE — 99232 SBSQ HOSP IP/OBS MODERATE 35: CPT | Performed by: INTERNAL MEDICINE

## 2024-07-28 PROCEDURE — 71045 X-RAY EXAM CHEST 1 VIEW: CPT

## 2024-07-28 PROCEDURE — 63710000001 INSULIN REGULAR HUMAN PER 5 UNITS: Performed by: INTERNAL MEDICINE

## 2024-07-28 PROCEDURE — 25010000002 HYDRALAZINE PER 20 MG: Performed by: INTERNAL MEDICINE

## 2024-07-28 PROCEDURE — 84550 ASSAY OF BLOOD/URIC ACID: CPT | Performed by: INTERNAL MEDICINE

## 2024-07-28 PROCEDURE — 84100 ASSAY OF PHOSPHORUS: CPT | Performed by: INTERNAL MEDICINE

## 2024-07-28 PROCEDURE — 25010000002 HEPARIN (PORCINE) PER 1000 UNITS: Performed by: INTERNAL MEDICINE

## 2024-07-28 RX ADMIN — SENNOSIDES AND DOCUSATE SODIUM 2 TABLET: 50; 8.6 TABLET ORAL at 21:20

## 2024-07-28 RX ADMIN — LEVOTHYROXINE SODIUM 100 MCG: 100 TABLET ORAL at 10:03

## 2024-07-28 RX ADMIN — SENNOSIDES AND DOCUSATE SODIUM 2 TABLET: 50; 8.6 TABLET ORAL at 10:04

## 2024-07-28 RX ADMIN — BUMETANIDE 1 MG/HR: 0.25 INJECTION INTRAMUSCULAR; INTRAVENOUS at 09:40

## 2024-07-28 RX ADMIN — Medication 10 ML: at 21:20

## 2024-07-28 RX ADMIN — CARVEDILOL 12.5 MG: 12.5 TABLET, FILM COATED ORAL at 10:03

## 2024-07-28 RX ADMIN — INSULIN HUMAN 2 UNITS: 100 INJECTION, SOLUTION PARENTERAL at 18:16

## 2024-07-28 RX ADMIN — AMLODIPINE BESYLATE 10 MG: 10 TABLET ORAL at 10:03

## 2024-07-28 RX ADMIN — HYDRALAZINE HYDROCHLORIDE 25 MG: 25 TABLET ORAL at 21:21

## 2024-07-28 RX ADMIN — CARVEDILOL 12.5 MG: 12.5 TABLET, FILM COATED ORAL at 18:16

## 2024-07-28 RX ADMIN — HYDRALAZINE HYDROCHLORIDE 25 MG: 25 TABLET ORAL at 15:29

## 2024-07-28 RX ADMIN — HEPARIN SODIUM 5000 UNITS: 5000 INJECTION INTRAVENOUS; SUBCUTANEOUS at 10:04

## 2024-07-28 RX ADMIN — HYDRALAZINE HYDROCHLORIDE 25 MG: 25 TABLET ORAL at 07:21

## 2024-07-28 RX ADMIN — HYDRALAZINE HYDROCHLORIDE 20 MG: 20 INJECTION INTRAMUSCULAR; INTRAVENOUS at 05:13

## 2024-07-28 RX ADMIN — HEPARIN SODIUM 5000 UNITS: 5000 INJECTION INTRAVENOUS; SUBCUTANEOUS at 21:20

## 2024-07-28 RX ADMIN — Medication 10 ML: at 10:04

## 2024-07-28 NOTE — PROGRESS NOTES
"DAILY PROGRESS NOTE  [facility]    Patient Identification:  Name: Marcial Bernard  Age: 69 y.o.  Sex: male  :  1955  MRN: [id number]         Primary Care Physician: Justina Liriano APRN    Subjective:  Interval History: He is sleepy and lethargic.  Having good diuresis with Bumex drip.    Objective:    Scheduled Meds:amLODIPine, 10 mg, Oral, Q24H  carvedilol, 12.5 mg, Oral, BID With Meals  heparin (porcine), 5,000 Units, Subcutaneous, Q12H  hydrALAZINE, 25 mg, Oral, Q8H  insulin regular, 2-7 Units, Subcutaneous, 4x Daily AC & at Bedtime  levothyroxine, 100 mcg, Oral, Daily  senna-docusate sodium, 2 tablet, Oral, BID  sodium chloride, 10 mL, Intravenous, Q12H      Continuous Infusions:bumetanide, 1 mg/hr, Last Rate: 1 mg/hr (24 0940)        Vital signs in last 24 hours:  Temp:  [97.3 °F (36.3 °C)-98.9 °F (37.2 °C)] 97.7 °F (36.5 °C)  Heart Rate:  [48-63] 56  Resp:  [16-18] 18  BP: (128-181)/(56-98) 145/58    Intake/Output:    Intake/Output Summary (Last 24 hours) at 2024 1531  Last data filed at 2024 1505  Gross per 24 hour   Intake 390 ml   Output 1200 ml   Net -810 ml       Exam:  /58   Pulse 56   Temp 97.7 °F (36.5 °C) (Oral)   Resp 18   Ht 175.3 cm (69\")   Wt 89.3 kg (196 lb 13.9 oz)   SpO2 96%   BMI 29.07 kg/m²     General Appearance:  Sleepy and lethargic, no distress   Head:    Normocephalic, without obvious abnormality, atraumatic   Eyes:       Throat:   Lips, tongue, gums normal   Neck:   Supple, symmetrical, trachea midline, no JVD   Lungs:     Clear to auscultation bilaterally, respirations unlabored   Chest Wall:    No tenderness or deformity    Heart:    Regular rate and rhythm, S1 and S2 normal, no murmur,no  rub or gallop   Abdomen:     Soft, nontender, bowel sounds active, no masses, no organomegaly    Extremities:   Extremities normal, atraumatic, no cyanosis or edema   Pulses:      Skin:   Skin is warm and dry,  no rashes or palpable " lesions   Neurologic:     Sleepy and lethargic      Lab Results (last 72 hours)       Procedure Component Value Units Date/Time    POC Glucose Once [[id number]]  (Abnormal) Collected: 07/28/24 1102    Specimen: Blood Updated: 07/28/24 1104     Glucose 136 mg/dL     Comprehensive Metabolic Panel [[id number]]  (Abnormal) Collected: 07/28/24 0605    Specimen: Blood Updated: 07/28/24 0723     Glucose 107 mg/dL      BUN 71 mg/dL      Creatinine 4.50 mg/dL      Sodium 130 mmol/L      Potassium 4.1 mmol/L      Comment: Slight hemolysis detected by analyzer. Result may be falsely elevated.        Chloride 92 mmol/L      CO2 23.6 mmol/L      Calcium 8.0 mg/dL      Total Protein 6.4 g/dL      Albumin 2.9 g/dL      ALT (SGPT) 17 U/L      AST (SGOT) 22 U/L      Alkaline Phosphatase 89 U/L      Total Bilirubin 0.2 mg/dL      Globulin 3.5 gm/dL      A/G Ratio 0.8 g/dL      BUN/Creatinine Ratio 15.8     Anion Gap 14.4 mmol/L      eGFR 13.3 mL/min/1.73      Comment: <15 Indicative of kidney failure       Narrative:      GFR Normal >60  Chronic Kidney Disease <60  Kidney Failure <15      Magnesium [[id number]]  (Abnormal) Collected: 07/28/24 0605    Specimen: Blood Updated: 07/28/24 0723     Magnesium 4.2 mg/dL     Uric Acid [[id number]]  (Abnormal) Collected: 07/28/24 0605    Specimen: Blood Updated: 07/28/24 0712     Uric Acid 8.9 mg/dL     Phosphorus [[id number]]  (Abnormal) Collected: 07/28/24 0605    Specimen: Blood Updated: 07/28/24 0712     Phosphorus 6.1 mg/dL     CBC & Differential [[id number]]  (Abnormal) Collected: 07/28/24 0605    Specimen: Blood Updated: 07/28/24 0655    Narrative:      The following orders were created for panel order CBC & Differential.  Procedure                               Abnormality         Status                     ---------                               -----------         ------                     CBC Auto Differential[[id number]]        Abnormal            Final result                 Please view  results for these tests on the individual orders.    CBC Auto Differential [[id number]]  (Abnormal) Collected: 07/28/24 0605    Specimen: Blood Updated: 07/28/24 0655     WBC 9.07 10*3/mm3      RBC 3.59 10*6/mm3      Hemoglobin 10.5 g/dL      Hematocrit 31.3 %      MCV 87.2 fL      MCH 29.2 pg      MCHC 33.5 g/dL      RDW 14.0 %      RDW-SD 44.2 fl      MPV 9.9 fL      Platelets 383 10*3/mm3      Neutrophil % 69.9 %      Lymphocyte % 11.5 %      Monocyte % 9.9 %      Eosinophil % 7.2 %      Basophil % 0.6 %      Immature Grans % 0.9 %      Neutrophils, Absolute 6.35 10*3/mm3      Lymphocytes, Absolute 1.04 10*3/mm3      Monocytes, Absolute 0.90 10*3/mm3      Eosinophils, Absolute 0.65 10*3/mm3      Basophils, Absolute 0.05 10*3/mm3      Immature Grans, Absolute 0.08 10*3/mm3      nRBC 0.0 /100 WBC     POC Glucose Once [[id number]]  (Normal) Collected: 07/28/24 0606    Specimen: Blood Updated: 07/28/24 0611     Glucose 118 mg/dL     POC Glucose Once [[id number]]  (Normal) Collected: 07/27/24 2102    Specimen: Blood Updated: 07/27/24 2103     Glucose 112 mg/dL     Basic Metabolic Panel [[id number]]  (Abnormal) Collected: 07/27/24 1825    Specimen: Blood Updated: 07/27/24 1855     Glucose 154 mg/dL      BUN 73 mg/dL      Creatinine 4.76 mg/dL      Sodium 130 mmol/L      Potassium 3.3 mmol/L      Chloride 92 mmol/L      CO2 25.9 mmol/L      Calcium 7.5 mg/dL      BUN/Creatinine Ratio 15.3     Anion Gap 12.1 mmol/L      eGFR 12.5 mL/min/1.73      Comment: <15 Indicative of kidney failure       Narrative:      GFR Normal >60  Chronic Kidney Disease <60  Kidney Failure <15      POC Glucose Once [[id number]]  (Abnormal) Collected: 07/27/24 1817    Specimen: Blood Updated: 07/27/24 1819     Glucose 175 mg/dL     POC Glucose Once [[id number]]  (Abnormal) Collected: 07/27/24 1647    Specimen: Blood Updated: 07/27/24 1652     Glucose 193 mg/dL     POC Glucose Once [[id number]]  (Abnormal) Collected: 07/27/24 1238    Specimen: Blood  Updated: 07/27/24 1239     Glucose 135 mg/dL     POC Glucose Once [[id number]]  (Normal) Collected: 07/27/24 0634    Specimen: Blood Updated: 07/27/24 0635     Glucose 125 mg/dL     Basic Metabolic Panel [[id number]]  (Abnormal) Collected: 07/27/24 0252    Specimen: Blood Updated: 07/27/24 0338     Glucose 117 mg/dL      BUN 74 mg/dL      Creatinine 4.51 mg/dL      Sodium 130 mmol/L      Potassium 3.6 mmol/L      Chloride 90 mmol/L      CO2 25.2 mmol/L      Calcium 7.5 mg/dL      BUN/Creatinine Ratio 16.4     Anion Gap 14.8 mmol/L      eGFR 13.3 mL/min/1.73      Comment: <15 Indicative of kidney failure       Narrative:      GFR Normal >60  Chronic Kidney Disease <60  Kidney Failure <15      Comprehensive Metabolic Panel [[id number]]  (Abnormal) Collected: 07/27/24 0252    Specimen: Blood Updated: 07/27/24 0338     Glucose 117 mg/dL      BUN 74 mg/dL      Creatinine 4.51 mg/dL      Sodium 130 mmol/L      Potassium 3.6 mmol/L      Chloride 90 mmol/L      CO2 25.2 mmol/L      Calcium 7.5 mg/dL      Total Protein 5.4 g/dL      Albumin 2.6 g/dL      ALT (SGPT) 19 U/L      AST (SGOT) 18 U/L      Alkaline Phosphatase 75 U/L      Total Bilirubin <0.2 mg/dL      Globulin 2.8 gm/dL      A/G Ratio 0.9 g/dL      BUN/Creatinine Ratio 16.4     Anion Gap 14.8 mmol/L      eGFR 13.3 mL/min/1.73      Comment: <15 Indicative of kidney failure       Narrative:      GFR Normal >60  Chronic Kidney Disease <60  Kidney Failure <15      Phosphorus [[id number]]  (Abnormal) Collected: 07/27/24 0252    Specimen: Blood Updated: 07/27/24 0338     Phosphorus 6.5 mg/dL     Magnesium [[id number]]  (Abnormal) Collected: 07/27/24 0252    Specimen: Blood Updated: 07/27/24 0338     Magnesium 4.2 mg/dL     Uric Acid [[id number]]  (Abnormal) Collected: 07/27/24 0252    Specimen: Blood Updated: 07/27/24 0338     Uric Acid 8.5 mg/dL     CBC & Differential [[id number]]  (Abnormal) Collected: 07/27/24 0252    Specimen: Blood Updated: 07/27/24 0320    Narrative:       The following orders were created for panel order CBC & Differential.  Procedure                               Abnormality         Status                     ---------                               -----------         ------                     CBC Auto Differential[[id number]]        Abnormal            Final result                 Please view results for these tests on the individual orders.    CBC Auto Differential [[id number]]  (Abnormal) Collected: 07/27/24 0252    Specimen: Blood Updated: 07/27/24 0320     WBC 9.13 10*3/mm3      RBC 3.03 10*6/mm3      Hemoglobin 8.8 g/dL      Hematocrit 26.5 %      MCV 87.5 fL      MCH 29.0 pg      MCHC 33.2 g/dL      RDW 14.4 %      RDW-SD 45.6 fl      MPV 9.5 fL      Platelets 395 10*3/mm3      Neutrophil % 73.2 %      Lymphocyte % 8.5 %      Monocyte % 11.0 %      Eosinophil % 6.1 %      Basophil % 0.4 %      Immature Grans % 0.8 %      Neutrophils, Absolute 6.68 10*3/mm3      Lymphocytes, Absolute 0.78 10*3/mm3      Monocytes, Absolute 1.00 10*3/mm3      Eosinophils, Absolute 0.56 10*3/mm3      Basophils, Absolute 0.04 10*3/mm3      Immature Grans, Absolute 0.07 10*3/mm3      nRBC 0.0 /100 WBC     Basic Metabolic Panel [[id number]]  (Abnormal) Collected: 07/27/24 0056    Specimen: Blood Updated: 07/27/24 0134     Glucose 122 mg/dL      BUN 74 mg/dL      Creatinine 4.77 mg/dL      Sodium 130 mmol/L      Potassium 3.9 mmol/L      Chloride 90 mmol/L      CO2 26.8 mmol/L      Calcium 7.5 mg/dL      BUN/Creatinine Ratio 15.5     Anion Gap 13.2 mmol/L      eGFR 12.4 mL/min/1.73      Comment: <15 Indicative of kidney failure       Narrative:      GFR Normal >60  Chronic Kidney Disease <60  Kidney Failure <15      POC Glucose Once [[id number]]  (Normal) Collected: 07/27/24 0034    Specimen: Blood Updated: 07/27/24 0035     Glucose 128 mg/dL     POC Glucose Once [[id number]]  (Abnormal) Collected: 07/26/24 1843    Specimen: Blood Updated: 07/26/24 1855     Glucose 136 mg/dL     Basic  Metabolic Panel [[id number]]  (Abnormal) Collected: 07/26/24 1451    Specimen: Blood Updated: 07/26/24 1524     Glucose 153 mg/dL      BUN 75 mg/dL      Creatinine 4.94 mg/dL      Sodium 127 mmol/L      Potassium 3.3 mmol/L      Chloride 88 mmol/L      CO2 25.8 mmol/L      Calcium 7.6 mg/dL      BUN/Creatinine Ratio 15.2     Anion Gap 13.2 mmol/L      eGFR 11.9 mL/min/1.73      Comment: <15 Indicative of kidney failure       Narrative:      GFR Normal >60  Chronic Kidney Disease <60  Kidney Failure <15      POC Glucose Once [[id number]]  (Normal) Collected: 07/26/24 1211    Specimen: Blood Updated: 07/26/24 1230     Glucose 128 mg/dL     POC Glucose Once [[id number]]  (Normal) Collected: 07/26/24 0604    Specimen: Blood Updated: 07/26/24 0605     Glucose 104 mg/dL     Renal Function Panel [[id number]]  (Abnormal) Collected: 07/26/24 0415    Specimen: Blood Updated: 07/26/24 0533     Glucose 106 mg/dL      BUN 73 mg/dL      Creatinine 4.50 mg/dL      Sodium 126 mmol/L      Potassium 3.4 mmol/L      Comment: Slight hemolysis detected by analyzer. Result may be falsely elevated.        Chloride 87 mmol/L      CO2 24.6 mmol/L      Calcium 7.7 mg/dL      Albumin 2.8 g/dL      Phosphorus 7.0 mg/dL      Anion Gap 14.4 mmol/L      BUN/Creatinine Ratio 16.2     eGFR 13.3 mL/min/1.73      Comment: <15 Indicative of kidney failure       Narrative:      GFR Normal >60  Chronic Kidney Disease <60  Kidney Failure <15      Magnesium [[id number]]  (Abnormal) Collected: 07/26/24 0415    Specimen: Blood Updated: 07/26/24 0533     Magnesium 4.6 mg/dL     Uric Acid [[id number]]  (Abnormal) Collected: 07/26/24 0415    Specimen: Blood Updated: 07/26/24 0533     Uric Acid 8.7 mg/dL     CBC & Differential [[id number]]  (Abnormal) Collected: 07/26/24 0415    Specimen: Blood Updated: 07/26/24 0512    Narrative:      The following orders were created for panel order CBC & Differential.  Procedure                               Abnormality          Status                     ---------                               -----------         ------                     CBC Auto Differential[[id number]]        Abnormal            Final result                 Please view results for these tests on the individual orders.    CBC Auto Differential [[id number]]  (Abnormal) Collected: 07/26/24 0415    Specimen: Blood Updated: 07/26/24 0512     WBC 8.80 10*3/mm3      RBC 3.19 10*6/mm3      Hemoglobin 9.4 g/dL      Hematocrit 28.0 %      MCV 87.8 fL      MCH 29.5 pg      MCHC 33.6 g/dL      RDW 14.6 %      RDW-SD 46.9 fl      MPV 10.2 fL      Platelets 446 10*3/mm3      Neutrophil % 71.7 %      Lymphocyte % 9.5 %      Monocyte % 12.3 %      Eosinophil % 5.1 %      Basophil % 0.5 %      Immature Grans % 0.9 %      Neutrophils, Absolute 6.31 10*3/mm3      Lymphocytes, Absolute 0.84 10*3/mm3      Monocytes, Absolute 1.08 10*3/mm3      Eosinophils, Absolute 0.45 10*3/mm3      Basophils, Absolute 0.04 10*3/mm3      Immature Grans, Absolute 0.08 10*3/mm3      nRBC 0.0 /100 WBC     Basic Metabolic Panel [[id number]]  (Abnormal) Collected: 07/26/24 0002    Specimen: Blood Updated: 07/26/24 0045     Glucose 105 mg/dL      BUN 78 mg/dL      Creatinine 4.80 mg/dL      Sodium 124 mmol/L      Potassium 3.7 mmol/L      Comment: Slight hemolysis detected by analyzer. Result may be falsely elevated.        Chloride 86 mmol/L      CO2 24.0 mmol/L      Calcium 7.4 mg/dL      BUN/Creatinine Ratio 16.3     Anion Gap 14.0 mmol/L      eGFR 12.3 mL/min/1.73      Comment: <15 Indicative of kidney failure       Narrative:      GFR Normal >60  Chronic Kidney Disease <60  Kidney Failure <15      POC Glucose Once [[id number]]  (Normal) Collected: 07/26/24 0011    Specimen: Blood Updated: 07/26/24 0013     Glucose 112 mg/dL     POC Glucose Once [[id number]]  (Abnormal) Collected: 07/25/24 1800    Specimen: Blood Updated: 07/25/24 1829     Glucose 167 mg/dL     Basic Metabolic Panel [[id number]]  (Abnormal)  Collected: 07/25/24 1658    Specimen: Blood Updated: 07/25/24 1733     Glucose 108 mg/dL      BUN 80 mg/dL      Creatinine 4.53 mg/dL      Sodium 123 mmol/L      Potassium 3.9 mmol/L      Chloride 84 mmol/L      CO2 21.6 mmol/L      Calcium 7.4 mg/dL      BUN/Creatinine Ratio 17.7     Anion Gap 17.4 mmol/L      eGFR 13.2 mL/min/1.73      Comment: <15 Indicative of kidney failure       Narrative:      GFR Normal >60  Chronic Kidney Disease <60  Kidney Failure <15            Data Review:  Results from last 7 days   Lab Units 07/28/24  0605 07/27/24  1825 07/27/24  0252   SODIUM mmol/L 130* 130* 130*  130*   POTASSIUM mmol/L 4.1 3.3* 3.6  3.6   CHLORIDE mmol/L 92* 92* 90*  90*   CO2 mmol/L 23.6 25.9 25.2  25.2   BUN mg/dL 71* 73* 74*  74*   CREATININE mg/dL 4.50* 4.76* 4.51*  4.51*   GLUCOSE mg/dL 107* 154* 117*  117*   CALCIUM mg/dL 8.0* 7.5* 7.5*  7.5*     Results from last 7 days   Lab Units 07/28/24  0605 07/27/24  0252 07/26/24  0415   WBC 10*3/mm3 9.07 9.13 8.80   HEMOGLOBIN g/dL 10.5* 8.8* 9.4*   HEMATOCRIT % 31.3* 26.5* 28.0*   PLATELETS 10*3/mm3 383 395 446     Results from last 7 days   Lab Units 07/23/24  0700   TSH uIU/mL 1.760     Results from last 7 days   Lab Units 07/23/24  0550   HEMOGLOBIN A1C % 6.10*     Lab Results   Lab Value Date/Time    TROPONINT 78 (C) 07/23/2024 0251    TROPONINT 87 (C) 07/23/2024 0110         Results from last 7 days   Lab Units 07/28/24  0605 07/27/24  0252 07/25/24  0420   ALK PHOS U/L 89 75 88   BILIRUBIN mg/dL 0.2 <0.2 0.2   ALT (SGPT) U/L 17 19 23   AST (SGOT) U/L 22 18 18     Results from last 7 days   Lab Units 07/23/24  0700   TSH uIU/mL 1.760     Results from last 7 days   Lab Units 07/23/24  0550   HEMOGLOBIN A1C % 6.10*     Glucose   Date/Time Value Ref Range Status   07/28/2024 1102 136 (H) 70 - 130 mg/dL Final   07/28/2024 0606 118 70 - 130 mg/dL Final   07/27/2024 2102 112 70 - 130 mg/dL Final   07/27/2024 1817 175 (H) 70 - 130 mg/dL Final   07/27/2024  1647 193 (H) 70 - 130 mg/dL Final   07/27/2024 1238 135 (H) 70 - 130 mg/dL Final   07/27/2024 0634 125 70 - 130 mg/dL Final   07/27/2024 0034 128 70 - 130 mg/dL Final           Past Medical History:   Diagnosis Date    Asthma     Dementia     Renal disorder        Assessment:  Active Hospital Problems    Diagnosis  POA    **Chronic renal failure (CRF), stage 4 (severe) [N18.4]  Unknown    Hyponatremia [E87.1]  Unknown    Acute pulmonary edema with congestive heart failure [I50.1]  Unknown    Anemia due to stage 4 chronic kidney disease [N18.4, D63.1]  Unknown    Elevated troponin level not due myocardial infarction [R79.89]  Unknown      Resolved Hospital Problems   No resolved problems to display.       Plan:  Continue with Bumex drip.  Plans as per cardiology and nephrology.  Follow-up labs.  DC planning.  Hopefully he can get well enough to go home.  Medicine can take over for primary care.    Chavo Martins MD  7/28/2024  15:31 EDT

## 2024-07-28 NOTE — PROGRESS NOTES
[facility] Progress Note      Patient Name: Marcial Bernard  : 1955  MRN: [id number]  Primary Care Physician:  Justina Liriano APRN  Date of admission: 2024    Subjective     Interval History:   Follow-up acute kidney injury on chronic kidney disease and hyponatremia    The patient is lethargic today remains on Bumex drip, try to communicate with him with the use of an online  in the presence of his family he would not answer much question.    Review of Systems:   As noted above    Objective     Vitals:   Temp:  [97.3 °F (36.3 °C)-98.9 °F (37.2 °C)] 97.7 °F (36.5 °C)  Heart Rate:  [48-63] 56  Resp:  [16-18] 18  BP: (128-181)/(56-98) 169/61    Intake/Output Summary (Last 24 hours) at 2024 1431  Last data filed at 2024 2242  Gross per 24 hour   Intake 390 ml   Output 600 ml   Net -210 ml       Physical Exam:    General Appearance: More awake, chronically ill, no acute distress   Skin: warm and dry  HEENT: oral mucosa normal, nonicteric sclera  Neck: Increased JVD  Lungs: Bilateral rhonchi and crackles, breathing effort not labored  Heart: RRR, positive S3, no rub  Abdomen: soft, nontender, nondistended  : no palpable bladder  Extremities: 3+ lower extremity edema  Neuro: Moving all extremities    Scheduled Meds:     amLODIPine, 10 mg, Oral, Q24H  carvedilol, 12.5 mg, Oral, BID With Meals  heparin (porcine), 5,000 Units, Subcutaneous, Q12H  hydrALAZINE, 25 mg, Oral, Q8H  insulin regular, 2-7 Units, Subcutaneous, 4x Daily AC & at Bedtime  levothyroxine, 100 mcg, Oral, Daily  senna-docusate sodium, 2 tablet, Oral, BID  sodium chloride, 10 mL, Intravenous, Q12H      IV Meds:   bumetanide, 1 mg/hr, Last Rate: 1 mg/hr (24 0940)        Results Reviewed:   I have personally reviewed the results from the time of this admission to 2024 14:31 EDT     Results from last 7 days   Lab Units 24  0605 24  1825 24  0252 24  1057  07/25/24  0420   SODIUM mmol/L 130* 130* 130*  130*   < > 123*   POTASSIUM mmol/L 4.1 3.3* 3.6  3.6   < > 3.3*   CHLORIDE mmol/L 92* 92* 90*  90*   < > 84*   CO2 mmol/L 23.6 25.9 25.2  25.2   < > 23.0   BUN mg/dL 71* 73* 74*  74*   < > 78*   CREATININE mg/dL 4.50* 4.76* 4.51*  4.51*   < > 4.57*   CALCIUM mg/dL 8.0* 7.5* 7.5*  7.5*   < > 7.3*   BILIRUBIN mg/dL 0.2  --  <0.2  --  0.2   ALK PHOS U/L 89  --  75  --  88   ALT (SGPT) U/L 17  --  19  --  23   AST (SGOT) U/L 22  --  18  --  18   GLUCOSE mg/dL 107* 154* 117*  117*   < > 106*    < > = values in this interval not displayed.       Estimated Creatinine Clearance: 16.9 mL/min (A) (by C-G formula based on SCr of 4.5 mg/dL (H)).    Results from last 7 days   Lab Units 07/28/24  0605 07/27/24 0252 07/26/24  0415   MAGNESIUM mg/dL 4.2* 4.2* 4.6*   PHOSPHORUS mg/dL 6.1* 6.5* 7.0*       Results from last 7 days   Lab Units 07/28/24  0605 07/27/24  0252 07/26/24  0415 07/25/24  0420 07/24/24  0324   URIC ACID mg/dL 8.9* 8.5* 8.7* 8.4* 8.0*       Results from last 7 days   Lab Units 07/28/24  0605 07/27/24  0252 07/26/24  0415 07/25/24  0420 07/24/24  0324   WBC 10*3/mm3 9.07 9.13 8.80 8.46 8.97   HEMOGLOBIN g/dL 10.5* 8.8* 9.4* 8.9* 8.3*   PLATELETS 10*3/mm3 383 395 446 449 417             Assessment / Plan     ASSESSMENT:  Acute kidney injury on chronic kidney disease stage IV creatinine was 4.93 on admission, creatinine today 4.5, very stable, potassium 3.6 and sodium up to 130, patient has cardiorenal syndrome diuresing quite well  Chronic kidney disease stage IV baseline creatinine about 2.4-second diabetic and hypertensive glomerulosclerosis  Acute on chronic diastolic congestive heart failure with cardiorenal syndrome, volume status improving with diuresis  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  No indication for dialysis today.  I discussed the case with the patient and his family member and the patient's nurse who with the A/V   Surveillance labs    I reviewed the chart and other providers notes, reviewed labs.  Copied text in this note has been reviewed and is accurate as of 07/28/24.       Thank you for involving us in the care of Marcial Bernard.  Please feel free to call with any questions.    Mike Flannery MD  07/28/24  14:31 EDT    [facility]  [phone]    Please note that portions of this note were completed with a voice recognition program.

## 2024-07-28 NOTE — PLAN OF CARE
Goal Outcome Evaluation:                 Bumex drip continued. Q2 turns.  used. Patient very lethargic. Only oriented to self. VSS.

## 2024-07-28 NOTE — PROGRESS NOTES
"[facility]    Patient Name: Marcial Bernard  :1955  69 y.o.  LOS: 5  Encounter Provider: Marcio Vidal III, MD      Patient Care Team:  Justina Liriano APRN as PCP - General (Nurse Practitioner)    Chief Complaint: Follow-up altered mental status, uremia, acute on chronic kidney disease, acute on chronic diastolic heart failure, diabetes, uncontrolled hypertension    Interval History: no CP/SOB       Objective   Vital Signs  Temp:  [97.3 °F (36.3 °C)-98.9 °F (37.2 °C)] 97.3 °F (36.3 °C)  Heart Rate:  [45-63] 53  Resp:  [14-18] 18  BP: (101-179)/(56-98) 175/64    Intake/Output Summary (Last 24 hours) at 2024 0902  Last data filed at 2024 2242  Gross per 24 hour   Intake 540 ml   Output 1150 ml   Net -610 ml     Flowsheet Rows      Flowsheet Row First Filed Value   Admission Height 175.3 cm (69\") Documented at 2024 0044   Admission Weight 90.7 kg (200 lb) Documented at 2024 0044              Physical Exam  Vitals reviewed.   Constitutional:       Appearance: Frail. Acutely ill-appearing.   Neck:      Vascular: No JVR. JVD normal.   Pulmonary:      Effort: Pulmonary effort is normal.      Breath sounds: No wheezing. No rhonchi.   Chest:      Chest wall: Not tender to palpatation.   Cardiovascular:      PMI at left midclavicular line. Normal rate. Regular rhythm. Normal S1. Normal S2.       Murmurs: There is no murmur.      No gallop.  No click. No rub.   Pulses:     Intact distal pulses.   Edema:     Pretibial: bilateral 2+ edema of the pretibial area.     Ankle: bilateral 2+ edema of the ankle.     Feet: bilateral 2+ edema of the feet.  Abdominal:      General: Bowel sounds are normal.      Palpations: Abdomen is soft.      Tenderness: There is no abdominal tenderness.   Musculoskeletal: Normal range of motion.         General: No tenderness. Skin:     General: Skin is warm and dry.   Neurological:      Mental Status: Exhibits altered mental status. " "    Physical exam was reviewed, updated and amended when necessary.    Pertinent Test Results:  Results from last 7 days   Lab Units 07/28/24  0605 07/27/24  1825 07/27/24  0252 07/27/24  0056 07/26/24  1451 07/26/24  0415 07/26/24  0002 07/25/24  1057 07/25/24  0420 07/24/24  0546 07/24/24  0324 07/23/24  0700 07/23/24  0110   SODIUM mmol/L 130* 130* 130*  130* 130* 127* 126* 124*   < > 123*   < > 123*   < > 116*   POTASSIUM mmol/L 4.1 3.3* 3.6  3.6 3.9 3.3* 3.4* 3.7   < > 3.3*   < > 3.2*   < > 4.3   CHLORIDE mmol/L 92* 92* 90*  90* 90* 88* 87* 86*   < > 84*   < > 85*   < > 82*   CO2 mmol/L 23.6 25.9 25.2  25.2 26.8 25.8 24.6 24.0   < > 23.0   < > 24.0   < > 22.9   BUN mg/dL 71* 73* 74*  74* 74* 75* 73* 78*   < > 78*   < > 79*   < > 93*   CREATININE mg/dL 4.50* 4.76* 4.51*  4.51* 4.77* 4.94* 4.50* 4.80*   < > 4.57*   < > 4.26*   < > 4.93*   GLUCOSE mg/dL 107* 154* 117*  117* 122* 153* 106* 105*   < > 106*   < > 99   < > 152*   CALCIUM mg/dL 8.0* 7.5* 7.5*  7.5* 7.5* 7.6* 7.7* 7.4*   < > 7.3*   < > 7.4*   < > 7.4*   AST (SGOT) U/L 22  --  18  --   --   --   --   --  18  --  25  --  39   ALT (SGPT) U/L 17  --  19  --   --   --   --   --  23  --  27  --  28    < > = values in this interval not displayed.     Results from last 7 days   Lab Units 07/23/24  0251 07/23/24  0110   HSTROP T ng/L 78* 87*     Results from last 7 days   Lab Units 07/28/24  0605 07/27/24  0252 07/26/24  0415 07/25/24  0420 07/24/24  0324 07/23/24  0550 07/23/24  0110   WBC 10*3/mm3 9.07 9.13 8.80 8.46 8.97 6.85 7.70   HEMOGLOBIN g/dL 10.5* 8.8* 9.4* 8.9* 8.3* 8.1* 8.4*   HEMATOCRIT % 31.3* 26.5* 28.0* 26.4* 24.8* 24.2* 25.2*   PLATELETS 10*3/mm3 383 395 446 449 417 379 414         Results from last 7 days   Lab Units 07/28/24  0605 07/27/24  0252 07/26/24  0415 07/25/24  0420 07/24/24  0324 07/23/24  0700 07/23/24  0550   MAGNESIUM mg/dL 4.2* 4.2* 4.6* 5.7* 6.5* 6.8* 4.5*           Invalid input(s): \"LDLCALC\"  Results from last 7 days "   Lab Units 07/23/24  0110   PROBNP pg/mL 8,498.0*     Results from last 7 days   Lab Units 07/23/24  0700   TSH uIU/mL 1.760           Medication Review:   amLODIPine, 10 mg, Oral, Q24H  carvedilol, 12.5 mg, Oral, BID With Meals  heparin (porcine), 5,000 Units, Subcutaneous, Q12H  hydrALAZINE, 25 mg, Oral, Q8H  insulin regular, 2-7 Units, Subcutaneous, 4x Daily AC & at Bedtime  levothyroxine, 100 mcg, Oral, Daily  senna-docusate sodium, 2 tablet, Oral, BID  sodium chloride, 10 mL, Intravenous, Q12H         bumetanide, 1 mg/hr, Last Rate: 1 mg/hr (07/27/24 0223)        Assessment & Plan     Active Hospital Problems    Diagnosis  POA    **Chronic renal failure (CRF), stage 4 (severe) [N18.4]  Unknown    Hyponatremia [E87.1]  Unknown    Acute pulmonary edema with congestive heart failure [I50.1]  Unknown    Anemia due to stage 4 chronic kidney disease [N18.4, D63.1]  Unknown    Elevated troponin level not due myocardial infarction [R79.89]  Unknown      Resolved Hospital Problems   No resolved problems to display.        Elevated cardiac enzymes -likely NSTEMI type II secondary to heart failure, acute on chronic kidney disease.  Echocardiogram shows EF 60 to 65% with mild concentric hypertrophy, small pericardial effusion with no evidence of tamponade and moderate size left pleural effusion.  No indication for ischemic workup at this time.  Hypervolemic state -multifactorial.  Diuresing nicely, nephrology is managing diuretics.  CKD stage IV  Diabetes -A1c 6.1  Hypertension -patient is cleared by swallow study to start taking pills again. Now on clonidine, amlodipine and carvedilol.  Carvedilol was decreased last night because of bradycardia.  Clonidine was also DC'd by intensive care, switch hydralazine to oral.  Altered mental status -multifactorial.  Improving           Marcio Vidal III, MD  [facility]   [facility]  [address] [place] [address] - [address]  [place] [address]  Office:  [phone]    07/28/24  09:02 EDT

## 2024-07-28 NOTE — PLAN OF CARE
Goal Outcome Evaluation:                          Outcome Evaluation: Pt oriented to self only, RA, transfered tonight from ICU, IPAD interpreator used to do assessment, Q2 turns, on BUMEX drip, no pain, no SOA, remaining potassium replaced, ACHS, Q2 turn, good urine output, VSS, daughter at bedside.

## 2024-07-28 NOTE — PROGRESS NOTES
"DAILY PROGRESS NOTE  [facility]    Patient Identification:  Name: Marcial Bernard  Age: 69 y.o.  Sex: male  :  1955  MRN: [id number]         Primary Care Physician: Justina Liriano APRN      Subjective  69-year-old gentleman admitted with severe hyponatremia associated with severe fluid overload with CKD 4.  Responding well to aggressive IV loop diuretics.  Voices no complaints but there is a history of both dementia as well as poor command of the English language.    Objective:  General Appearance:  Comfortable, well-appearing, in no acute distress and not in pain.    Vital signs: (most recent): Blood pressure 142/61, pulse 50, temperature 98.9 °F (37.2 °C), temperature source Oral, resp. rate 18, height 175.3 cm (69\"), weight 90.8 kg (200 lb 2.8 oz), SpO2 97%.    Lungs:  Normal effort and normal respiratory rate.  (Lungs clear as far as I can tell but inspiratory effort is suboptimal.)  Heart: Normal rate.  Regular rhythm.    Extremities: There is no dependent edema.    Neurological: (Sleeping on entering the room.  Fairly easily awakened.  Moderate cooperation with the exam.).    Skin:  Warm and dry.                  Vital signs in last 24 hours:  Temp:  [97.3 °F (36.3 °C)-98.9 °F (37.2 °C)] 98.9 °F (37.2 °C)  Heart Rate:  [45-68] 50  Resp:  [12-18] 18  BP: (101-176)/(49-84) 142/61    Intake/Output:    Intake/Output Summary (Last 24 hours) at 2024  Last data filed at 2024 1700  Gross per 24 hour   Intake 681 ml   Output 2150 ml   Net -1469 ml         Results from last 7 days   Lab Units 24  0252 24  0415 24  0420 24  0324 24  0550 24  0110   WBC 10*3/mm3 9.13 8.80 8.46 8.97 6.85 7.70   HEMOGLOBIN g/dL 8.8* 9.4* 8.9* 8.3* 8.1* 8.4*   PLATELETS 10*3/mm3 395 446 449 417 379 414     Results from last 7 days   Lab Units 24  1825 24  0252 24  0056 24  1451 24  0415 24  0002 24  1658   SODIUM " mmol/L 130* 130*  130* 130* 127* 126* 124* 123*   POTASSIUM mmol/L 3.3* 3.6  3.6 3.9 3.3* 3.4* 3.7 3.9   CHLORIDE mmol/L 92* 90*  90* 90* 88* 87* 86* 84*   CO2 mmol/L 25.9 25.2  25.2 26.8 25.8 24.6 24.0 21.6*   BUN mg/dL 73* 74*  74* 74* 75* 73* 78* 80*   CREATININE mg/dL 4.76* 4.51*  4.51* 4.77* 4.94* 4.50* 4.80* 4.53*   GLUCOSE mg/dL 154* 117*  117* 122* 153* 106* 105* 108*   Estimated Creatinine Clearance: 16.1 mL/min (A) (by C-G formula based on SCr of 4.76 mg/dL (H)).  Results from last 7 days   Lab Units 07/27/24  1825 07/27/24  0252 07/27/24  0056 07/26/24  1451 07/26/24  0415 07/26/24  0002 07/25/24  1658 07/25/24  1057 07/25/24  0420 07/24/24  0546 07/24/24  0324 07/23/24  1200 07/23/24  0700 07/23/24  0550 07/23/24  0110   CALCIUM mg/dL 7.5* 7.5*  7.5* 7.5* 7.6* 7.7* 7.4* 7.4*   < > 7.3*   < > 7.4*   < > 7.3*  --  7.4*   ALBUMIN g/dL  --  2.6*  --   --  2.8*  --   --   --  2.8*  --  3.1*  --   --   --  3.3*   MAGNESIUM mg/dL  --  4.2*  --   --  4.6*  --   --   --  5.7*  --  6.5*  --  6.8* 4.5*  --    PHOSPHORUS mg/dL  --  6.5*  --   --  7.0*  --   --   --  7.3*  --  7.0*  --  6.7*  --   --     < > = values in this interval not displayed.     Results from last 7 days   Lab Units 07/27/24  0252 07/26/24  0415 07/25/24  0420 07/24/24  0324 07/23/24  0110   ALBUMIN g/dL 2.6* 2.8* 2.8* 3.1* 3.3*   BILIRUBIN mg/dL <0.2  --  0.2 0.2 0.2   ALK PHOS U/L 75  --  88 89 97   AST (SGOT) U/L 18  --  18 25 39   ALT (SGPT) U/L 19  --  23 27 28       Assessment:  Severe hyponatremia: Secondary to severe fluid overload.  Admitting sodium of 116, now 130.  Nephrology but appreciated.  Fluid overload with third spacing: Associated with EF of 60-65 with mild concentric hypertrophy.  Responding well to loop diuretics.  Remains on Bumex drip.  CKD 4/LIDIA: Nephrology management appreciated.  Diabetes type 2: Blood sugar control presently reasonable.  Hypertension: Resistant but presently under reasonable control.  NSTEMI  type II:  Dementia:  Anemia: Associated with CKD.  Appears to be a component of iron deficiency also.    All problems new to this examiner.    Plan:  Please see above.    Larry Ac MD  7/27/2024  20:36 EDT

## 2024-07-28 NOTE — PROGRESS NOTES
[facility]  [phone]  Dr. Cristian Chinchilla    Subjective:  LOS: 5    Chief Complaint:  Hyponatremia    Patient is somnolent.  Awakens when aroused.    Objective   Vital Signs past 24hrs  Temp range: Temp (24hrs), Av.1 °F (36.7 °C), Min:97.3 °F (36.3 °C), Max:98.9 °F (37.2 °C)    BP range: BP: (128-181)/(56-98) 169/61  Pulse range: Heart Rate:  [48-63] 56  Resp rate range: Resp:  [16-18] 18  Device (Oxygen Therapy): room air   Oxygen range:SpO2:  [94 %-99 %] 96 %   Mechanical Ventilator:     Physical Exam  Constitutional:       General: He is sleeping.   Cardiovascular:      Rate and Rhythm: Normal rate and regular rhythm.      Heart sounds: No murmur heard.  Pulmonary:      Effort: Pulmonary effort is normal.      Breath sounds: Normal breath sounds.   Abdominal:      General: Bowel sounds are normal.      Palpations: Abdomen is soft. There is no mass.      Tenderness: There is no abdominal tenderness.   Musculoskeletal:         General: Swelling present.       Results Review:    I have reviewed the laboratory and imaging data since the last note by [facility] physician.  My annotations are noted in assessment and plan.      Result Review:  I have personally reviewed the results from last note by [facility] physician to 2024 13:50 EDT and agree with these findings:  [x]  Laboratory list / accordion  [x]  Microbiology  [x]  Radiology  []  EKG/Telemetry   []  Cardiology/Vascular   []  Pathology  []  Old records  []  Other:    Medication Review:  I have reviewed the current MAR.  My annotations are noted in assessment and plan.    amLODIPine, 10 mg, Oral, Q24H  carvedilol, 12.5 mg, Oral, BID With Meals  heparin (porcine), 5,000 Units, Subcutaneous, Q12H  hydrALAZINE, 25 mg, Oral, Q8H  insulin regular, 2-7 Units, Subcutaneous, 4x Daily AC & at Bedtime  levothyroxine, 100 mcg, Oral, Daily  senna-docusate sodium, 2 tablet, Oral, BID  sodium chloride, 10 mL, Intravenous, Q12H        bumetanide, 1 mg/hr, Last  Rate: 1 mg/hr (07/28/24 0940)      Lines, Drains & Airways       Active LDAs       Name Placement date Placement time Site Days    Peripheral IV 07/23/24 1150 Anterior;Left;Proximal Forearm 07/23/24  1150  Forearm  4    Peripheral IV 07/26/24 1012 Anterior;Left;Upper Arm 07/26/24  1012  Arm  1    External Urinary Catheter 07/24/24  0800  --  3                  Isolation status: No active isolations    Dietary Orders (From admission, onward)       Start     Ordered    07/27/24 1627  Diet: Regular/House, Cardiac, Fluid Restriction (240 mL/tray), Diabetic; Low Sodium (2g); Consistent Carbohydrate; 1500 mL/day; Texture: Regular (IDDSI 7); Fluid Consistency: Thin (IDDSI 0)  Diet Effective Now        References:    Diet Order [place]   Question Answer Comment   Diets: Regular/House    Diets: Cardiac    Diets: Fluid Restriction (240 mL/tray)    Diets: Diabetic    Cardiac Diet: Low Sodium (2g)    Diabetic Diet: Consistent Carbohydrate    Fluid Restriction Diet (240 mL/tray): 1500 mL/day    Texture: Regular (IDDSI 7)    Fluid Consistency: Thin (IDDSI 0)        07/27/24 1627                    PCCM Problems  Altered mental status due to acute metabolic encephalopathy  Acute hyponatremia  LIDIA with CKD 4  Acute on chronic HFpEF  Acute pulmonary edema  Labile blood pressures  Relevant Medical Diagnoses  Type 2 diabetes mellitus  Diabetic retinopathy  Hypothyroidism  Chronic hypertension  Anemia        Plan of Treatment    Underlying dementia.  More sleepy in the mornings and more awake in the afternoons.  Family states this is baseline.    Recommend controlling systolic blood pressure only if over 160.  Gradual reduction of systolic blood pressure goals to 140 can be considered in the future.    Fluid overload with acute pulmonary edema and currently on Bumex drip.  Continue as per nephrology.    Underlying diabetes and blood sugars are controlled.  Patient is taking p.o. diet when awake.    No evidence of acute blood loss.   On subcu heparin for DVT prophylaxis.    Now out of ICU.  Will see if [facility] can take over care.      Cristian Chinchilla MD  07/28/24  13:50 EDT      Part of this note may be an electronic transcription/translation of spoken language to printed text using the Dragon Dictation System.

## 2024-07-29 LAB
ALBUMIN SERPL-MCNC: 2.9 G/DL (ref 3.5–5.2)
ALBUMIN/GLOB SERPL: 0.9 G/DL
ALP SERPL-CCNC: 85 U/L (ref 39–117)
ALT SERPL W P-5'-P-CCNC: 16 U/L (ref 1–41)
ANION GAP SERPL CALCULATED.3IONS-SCNC: 16.1 MMOL/L (ref 5–15)
AST SERPL-CCNC: 18 U/L (ref 1–40)
BASOPHILS # BLD AUTO: 0.05 10*3/MM3 (ref 0–0.2)
BASOPHILS NFR BLD AUTO: 0.5 % (ref 0–1.5)
BILIRUB SERPL-MCNC: <0.2 MG/DL (ref 0–1.2)
BUN SERPL-MCNC: 68 MG/DL (ref 8–23)
BUN/CREAT SERPL: 14.4 (ref 7–25)
CALCIUM SPEC-SCNC: 8.1 MG/DL (ref 8.6–10.5)
CHLORIDE SERPL-SCNC: 93 MMOL/L (ref 98–107)
CO2 SERPL-SCNC: 27.9 MMOL/L (ref 22–29)
CREAT SERPL-MCNC: 4.73 MG/DL (ref 0.76–1.27)
DEPRECATED RDW RBC AUTO: 46.1 FL (ref 37–54)
EGFRCR SERPLBLD CKD-EPI 2021: 12.5 ML/MIN/1.73
EOSINOPHIL # BLD AUTO: 0.5 10*3/MM3 (ref 0–0.4)
EOSINOPHIL NFR BLD AUTO: 5 % (ref 0.3–6.2)
ERYTHROCYTE [DISTWIDTH] IN BLOOD BY AUTOMATED COUNT: 14.2 % (ref 12.3–15.4)
GLOBULIN UR ELPH-MCNC: 3.3 GM/DL
GLUCOSE BLDC GLUCOMTR-MCNC: 133 MG/DL (ref 70–130)
GLUCOSE BLDC GLUCOMTR-MCNC: 135 MG/DL (ref 70–130)
GLUCOSE BLDC GLUCOMTR-MCNC: 147 MG/DL (ref 70–130)
GLUCOSE BLDC GLUCOMTR-MCNC: 200 MG/DL (ref 70–130)
GLUCOSE SERPL-MCNC: 115 MG/DL (ref 65–99)
HCT VFR BLD AUTO: 30.5 % (ref 37.5–51)
HGB BLD-MCNC: 10.1 G/DL (ref 13–17.7)
IMM GRANULOCYTES # BLD AUTO: 0.07 10*3/MM3 (ref 0–0.05)
IMM GRANULOCYTES NFR BLD AUTO: 0.7 % (ref 0–0.5)
LYMPHOCYTES # BLD AUTO: 1.06 10*3/MM3 (ref 0.7–3.1)
LYMPHOCYTES NFR BLD AUTO: 10.5 % (ref 19.6–45.3)
MAGNESIUM SERPL-MCNC: 3.7 MG/DL (ref 1.6–2.4)
MCH RBC QN AUTO: 29.4 PG (ref 26.6–33)
MCHC RBC AUTO-ENTMCNC: 33.1 G/DL (ref 31.5–35.7)
MCV RBC AUTO: 88.7 FL (ref 79–97)
MONOCYTES # BLD AUTO: 1.05 10*3/MM3 (ref 0.1–0.9)
MONOCYTES NFR BLD AUTO: 10.4 % (ref 5–12)
NEUTROPHILS NFR BLD AUTO: 7.33 10*3/MM3 (ref 1.7–7)
NEUTROPHILS NFR BLD AUTO: 72.9 % (ref 42.7–76)
NRBC BLD AUTO-RTO: 0 /100 WBC (ref 0–0.2)
PHOSPHATE SERPL-MCNC: 6.1 MG/DL (ref 2.5–4.5)
PLATELET # BLD AUTO: 373 10*3/MM3 (ref 140–450)
PMV BLD AUTO: 9.4 FL (ref 6–12)
POTASSIUM SERPL-SCNC: 3.5 MMOL/L (ref 3.5–5.2)
PROT SERPL-MCNC: 6.2 G/DL (ref 6–8.5)
RBC # BLD AUTO: 3.44 10*6/MM3 (ref 4.14–5.8)
SODIUM SERPL-SCNC: 137 MMOL/L (ref 136–145)
URATE SERPL-MCNC: 8.9 MG/DL (ref 3.4–7)
WBC NRBC COR # BLD AUTO: 10.06 10*3/MM3 (ref 3.4–10.8)

## 2024-07-29 PROCEDURE — 97166 OT EVAL MOD COMPLEX 45 MIN: CPT

## 2024-07-29 PROCEDURE — 83735 ASSAY OF MAGNESIUM: CPT | Performed by: INTERNAL MEDICINE

## 2024-07-29 PROCEDURE — 85025 COMPLETE CBC W/AUTO DIFF WBC: CPT | Performed by: INTERNAL MEDICINE

## 2024-07-29 PROCEDURE — 97530 THERAPEUTIC ACTIVITIES: CPT

## 2024-07-29 PROCEDURE — 99232 SBSQ HOSP IP/OBS MODERATE 35: CPT | Performed by: NURSE PRACTITIONER

## 2024-07-29 PROCEDURE — 97162 PT EVAL MOD COMPLEX 30 MIN: CPT

## 2024-07-29 PROCEDURE — 80053 COMPREHEN METABOLIC PANEL: CPT | Performed by: INTERNAL MEDICINE

## 2024-07-29 PROCEDURE — 63710000001 INSULIN REGULAR HUMAN PER 5 UNITS: Performed by: INTERNAL MEDICINE

## 2024-07-29 PROCEDURE — 84550 ASSAY OF BLOOD/URIC ACID: CPT | Performed by: INTERNAL MEDICINE

## 2024-07-29 PROCEDURE — 25010000002 HEPARIN (PORCINE) PER 1000 UNITS: Performed by: INTERNAL MEDICINE

## 2024-07-29 PROCEDURE — 82948 REAGENT STRIP/BLOOD GLUCOSE: CPT

## 2024-07-29 PROCEDURE — 84100 ASSAY OF PHOSPHORUS: CPT | Performed by: INTERNAL MEDICINE

## 2024-07-29 PROCEDURE — 25010000002 HYDRALAZINE PER 20 MG: Performed by: INTERNAL MEDICINE

## 2024-07-29 RX ORDER — HYDRALAZINE HYDROCHLORIDE 50 MG/1
50 TABLET, FILM COATED ORAL EVERY 8 HOURS SCHEDULED
Status: DISCONTINUED | OUTPATIENT
Start: 2024-07-29 | End: 2024-07-31

## 2024-07-29 RX ORDER — TORSEMIDE 100 MG/1
100 TABLET ORAL
Status: DISCONTINUED | OUTPATIENT
Start: 2024-07-29 | End: 2024-08-03

## 2024-07-29 RX ADMIN — LEVOTHYROXINE SODIUM 100 MCG: 100 TABLET ORAL at 08:12

## 2024-07-29 RX ADMIN — CARVEDILOL 12.5 MG: 12.5 TABLET, FILM COATED ORAL at 08:12

## 2024-07-29 RX ADMIN — INSULIN HUMAN 3 UNITS: 100 INJECTION, SOLUTION PARENTERAL at 17:18

## 2024-07-29 RX ADMIN — CARVEDILOL 12.5 MG: 12.5 TABLET, FILM COATED ORAL at 17:18

## 2024-07-29 RX ADMIN — HYDRALAZINE HYDROCHLORIDE 20 MG: 20 INJECTION INTRAMUSCULAR; INTRAVENOUS at 04:06

## 2024-07-29 RX ADMIN — ACETAMINOPHEN 325MG 650 MG: 325 TABLET ORAL at 00:12

## 2024-07-29 RX ADMIN — SENNOSIDES AND DOCUSATE SODIUM 2 TABLET: 50; 8.6 TABLET ORAL at 08:12

## 2024-07-29 RX ADMIN — HYDRALAZINE HYDROCHLORIDE 20 MG: 20 INJECTION INTRAMUSCULAR; INTRAVENOUS at 10:43

## 2024-07-29 RX ADMIN — HYDRALAZINE HYDROCHLORIDE 20 MG: 20 INJECTION INTRAMUSCULAR; INTRAVENOUS at 00:12

## 2024-07-29 RX ADMIN — Medication 10 ML: at 08:13

## 2024-07-29 RX ADMIN — Medication 10 ML: at 21:16

## 2024-07-29 RX ADMIN — SENNOSIDES AND DOCUSATE SODIUM 2 TABLET: 50; 8.6 TABLET ORAL at 21:15

## 2024-07-29 RX ADMIN — TORSEMIDE 100 MG: 100 TABLET ORAL at 13:53

## 2024-07-29 RX ADMIN — HYDRALAZINE HYDROCHLORIDE 50 MG: 50 TABLET ORAL at 13:53

## 2024-07-29 RX ADMIN — HEPARIN SODIUM 5000 UNITS: 5000 INJECTION INTRAVENOUS; SUBCUTANEOUS at 21:15

## 2024-07-29 RX ADMIN — HEPARIN SODIUM 5000 UNITS: 5000 INJECTION INTRAVENOUS; SUBCUTANEOUS at 08:12

## 2024-07-29 RX ADMIN — AMLODIPINE BESYLATE 10 MG: 10 TABLET ORAL at 08:12

## 2024-07-29 RX ADMIN — HYDRALAZINE HYDROCHLORIDE 25 MG: 25 TABLET ORAL at 06:41

## 2024-07-29 RX ADMIN — TORSEMIDE 100 MG: 100 TABLET ORAL at 17:18

## 2024-07-29 NOTE — PROGRESS NOTES
"Nutrition Services    Patient Name:  Marcial Bernard  YOB: 1955  MRN: [id number]  Admit Date:  7/23/2024    Assessment Date:  07/29/24    NUTRITION SCREENING      Reason for Encounter MST score 2+, \"Unsure\" unintentional weight loss   Diagnosis/Problem Chronic renal failure (CRF), stage   Acute pulmonary edema with congestive heart failure   Anemia due to stage 4 chronic kidney disease   Elevated troponin level not due myocardial infarction    Current Issues Pt oriented to self. Pt lethargic today. Having good diuresis with Bumex drip per MD.   PO Diet Diet: Regular/House, Cardiac, Fluid Restriction (240 mL/tray), Diabetic; Low Sodium (2g); Consistent Carbohydrate; 1500 mL/day; Texture: Regular (IDDSI 7); Fluid Consistency: Thin (IDDSI 0)   Supplements    PO Intake % 25-75% - po intake improving today, 75% x 2 meals so far per EMR       Medications MAR reviewed by RD   Labs  Listed below, reviewed   Physical Findings Disoriented, responds to pain, withdrawn, tooth/teeth missing, 2+ mild edema   GI Function Nondistended, normoactive, fecal incontinence, Last BM 7/28   Skin Status Bruising       Height  Weight  BMI  Weight Trend     Height: 175.3 cm (69\")  Weight: 83.9 kg (184 lb 15.5 oz) (07/29/24 0338)  Body mass index is 27.31 kg/m².  Loss       Nutrition Problem (PES) Inadequate oral intake related to AMS, history of HTN, DM, hypothyroidism, diabetic retinopathy and CKD as evidenced by po intake, report/observation.       Intervention/Plan Addition of Boost Glucose Control daily with lunch to support po intake.  Encourage good po intakes.    RD to follow up per protocol.     Results from last 7 days   Lab Units 07/29/24  0526 07/28/24  0605 07/27/24  1825 07/27/24  0252   SODIUM mmol/L 137 130* 130* 130*  130*   POTASSIUM mmol/L 3.5 4.1 3.3* 3.6  3.6   CHLORIDE mmol/L 93* 92* 92* 90*  90*   CO2 mmol/L 27.9 23.6 25.9 25.2  25.2   BUN mg/dL 68* 71* 73* 74*  74*   CREATININE mg/dL 4.73* 4.50* 4.76* " 4.51*  4.51*   CALCIUM mg/dL 8.1* 8.0* 7.5* 7.5*  7.5*   BILIRUBIN mg/dL <0.2 0.2  --  <0.2   ALK PHOS U/L 85 89  --  75   ALT (SGPT) U/L 16 17  --  19   AST (SGOT) U/L 18 22  --  18   GLUCOSE mg/dL 115* 107* 154* 117*  117*     Results from last 7 days   Lab Units 07/29/24  0526 07/28/24  0605 07/27/24  0252   MAGNESIUM mg/dL 3.7* 4.2* 4.2*   PHOSPHORUS mg/dL 6.1* 6.1* 6.5*   HEMOGLOBIN g/dL 10.1* 10.5* 8.8*   HEMATOCRIT % 30.5* 31.3* 26.5*     Lab Results   Component Value Date    HGBA1C 6.10 (H) 07/23/2024         Electronically signed by:  Zehra Reyna  07/29/24 12:51 EDT

## 2024-07-29 NOTE — THERAPY EVALUATION
Patient Name: Marcial Bernard  : 1955    MRN: [id number]                              Today's Date: 2024       Admit Date: 2024    Visit Dx:     ICD-10-CM ICD-9-CM   1. Chronic renal failure (CRF), stage 4 (severe)  N18.4 585.4   2. Hyponatremia  E87.1 276.1   3. Acute pulmonary edema with congestive heart failure  I50.1 428.1   4. Anemia due to stage 4 chronic kidney disease  N18.4 285.21    D63.1 585.4   5. Elevated troponin level not due myocardial infarction  R79.89 790.6     Patient Active Problem List   Diagnosis    Chronic renal failure (CRF), stage 4 (severe)    Hyponatremia    Acute pulmonary edema with congestive heart failure    Anemia due to stage 4 chronic kidney disease    Elevated troponin level not due myocardial infarction     Past Medical History:   Diagnosis Date    Asthma     Dementia     Renal disorder      History reviewed. No pertinent surgical history.   General Information       Row Name 24 1538          OT Time and Intention    Document Type evaluation  -MM     Mode of Treatment co-treatment;physical therapy;occupational therapy  -MM       Row Name 24 1538          General Information    Patient Profile Reviewed yes  -MM     Prior Level of Function independent:  -MM     Existing Precautions/Restrictions fall  -MM     Barriers to Rehab medically complex  -MM       Row Name 24 1538          Living Environment    People in Home child(erin), adult;spouse  -MM       Row Name 24 1538          Cognition    Orientation Status (Cognition) oriented to;person;unable/difficult to assess   use  -MM       Row Name 24 1538          Safety Issues, Functional Mobility    Safety Issues Affecting Function (Mobility) ability to follow commands;safety precaution awareness;problem-solving;sequencing abilities  -MM     Impairments Affecting Function (Mobility) balance;endurance/activity tolerance;strength;range of motion (ROM);postural/trunk control  -MM      Comment, Safety Issues/Impairments (Mobility) Co-treatment medically necessary and appropriate d/t pt's acuity level, decreased endurance and activity tolerance, and safety of patient and staff. Treatment focused on progression of care and goals established in POC. Gait belt and non-skid socks worn.  -MM               User Key  (r) = Recorded By, (t) = Taken By, (c) = Cosigned By      Initials Name Provider Type    MM Zehra Adams OT Occupational Therapist                     Mobility/ADL's       Row Name 07/29/24 1539          Bed Mobility    Bed Mobility supine-sit;sit-supine  -MM     Supine-Sit [place] (Bed Mobility) maximum assist (25% patient effort);2 person assist;verbal cues;nonverbal cues (demo/gesture)  -MM     Sit-Supine [place] (Bed Mobility) maximum assist (25% patient effort);2 person assist;verbal cues;nonverbal cues (demo/gesture)  -MM     Assistive Device (Bed Mobility) head of bed elevated;bed rails  -MM     Comment, (Bed Mobility) visual cues  -MM       Row Name 07/29/24 1539          Transfers    Transfers sit-stand transfer  -MM       Row Name 07/29/24 1539          Sit-Stand Transfer    Sit-Stand [place] (Transfers) minimum assist (75% patient effort);2 person assist  -MM     Assistive Device (Sit-Stand Transfers) other (see comments)  -MM     Comment, (Sit-Stand Transfer) HHAx2, pt able to demo lateral steps to HOB, max cues for initiation as well as  support  -MM       Row Name 07/29/24 1539          Activities of Daily Living    BADL Assessment/Intervention lower body dressing;toileting  -       Row Name 07/29/24 1539          Lower Body Dressing Assessment/Training    [place] Level (Lower Body Dressing) socks;don;dependent (less than 25% patient effort)  -MM       Row Name 07/29/24 1539          Toileting Assessment/Training    [place] Level (Toileting) toileting skills;dependent (less than 25% patient effort)  -     Comment, (Toileting) adrien  donned, brief  -MM               User Key  (r) = Recorded By, (t) = Taken By, (c) = Cosigned By      Initials Name Provider Type    Zehra Uribe OT Occupational Therapist                   Obj/Interventions       Row Name 07/29/24 1540          Sensory Assessment (Somatosensory)    Sensory Assessment (Somatosensory) unable/difficult to assess  -MM       Row Name 07/29/24 1540          Vision Assessment/Intervention    Visual Impairment/Limitations unable/difficult to assess  -MM       Row Name 07/29/24 1540          Range of Motion Comprehensive    General Range of Motion bilateral upper extremity ROM WNL  -MM       Row Name 07/29/24 1540          Strength Comprehensive (MMT)    General Manual Muscle Testing (MMT) Assessment upper extremity strength deficits identified  -MM     Comment, General Manual Muscle Testing (MMT) Assessment generalized UE weakness, grossly 3+/5  -MM       Row Name 07/29/24 1540          Motor Skills    Motor Skills functional endurance  -MM     Functional Endurance poor  -MM       Row Name 07/29/24 1540          Balance    Balance Assessment sitting static balance;sitting dynamic balance;sit to stand dynamic balance;standing dynamic balance;standing static balance  -MM     Static Sitting Balance contact guard  -MM     Dynamic Sitting Balance minimal assist  -MM     Position, Sitting Balance sitting edge of bed  -MM     Sit to Stand Dynamic Balance minimal assist;2-person assist  -MM     Static Standing Balance contact guard;2-person assist  -MM     Dynamic Standing Balance minimal assist;2-person assist;verbal cues  -MM     Position/Device Used, Standing Balance supported;other (see comments)  HHAx2  -MM     Balance Interventions sitting;standing;sit to stand;supported;static;dynamic;moderate challenge  -MM     Comment, Balance poor posture  -MM               User Key  (r) = Recorded By, (t) = Taken By, (c) = Cosigned By      Initials Name Provider Type    Zehra Uribe, OT  Normal vision: sees adequately in most situations; can see medication labels, newsprint Occupational Therapist                   Goals/Plan       Row Name 07/29/24 1545          Transfer Goal 1 (OT)    Activity/Assistive Device (Transfer Goal 1, OT) transfers, all  -MM     [place] Level/Cues Needed (Transfer Goal 1, OT) minimum assist (75% or more patient effort)  -MM     Time Frame (Transfer Goal 1, OT) short term goal (STG);2 weeks  -MM     Progress/Outcome (Transfer Goal 1, OT) goal ongoing  -MM       Row Name 07/29/24 1541          Bathing Goal 1 (OT)    Activity/Device (Bathing Goal 1, OT) upper body bathing;lower body bathing  -MM     [place] Level/Cues Needed (Bathing Goal 1, OT) moderate assist (50-74% patient effort)  -MM     Time Frame (Bathing Goal 1, OT) short term goal (STG);2 weeks  -MM     Progress/Outcomes (Bathing Goal 1, OT) goal ongoing  -MM       Row Name 07/29/24 1549          Dressing Goal 1 (OT)    Activity/Device (Dressing Goal 1, OT) lower body dressing  -MM     [place]/Cues Needed (Dressing Goal 1, OT) minimum assist (75% or more patient effort)  -MM     Time Frame (Dressing Goal 1, OT) short term goal (STG);2 weeks  -MM     Progress/Outcome (Dressing Goal 1, OT) goal ongoing  -MM       Row Name 07/29/24 1543          Toileting Goal 1 (OT)    Activity/Device (Toileting Goal 1, OT) toileting skills, all  -MM     [place] Level/Cues Needed (Toileting Goal 1, OT) moderate assist (50-74% patient effort)  -MM     Time Frame (Toileting Goal 1, OT) short term goal (STG);2 weeks  -MM     Progress/Outcome (Toileting Goal 1, OT) goal ongoing  -MM       Row Name 07/29/24 1548          Grooming Goal 1 (OT)    Activity/Device (Grooming Goal 1, OT) grooming skills, all  -MM     [place] (Grooming Goal 1, OT) minimum assist (75% or more patient effort)  -MM     Time Frame (Grooming Goal 1, OT) short term goal (STG);2 weeks  -MM     Progress/Outcome (Grooming Goal 1, OT) goal ongoing  -MM       Row Name 07/29/24 1546          Therapy Assessment/Plan (OT)    Planned  Therapy Interventions (OT) activity tolerance training;BADL retraining;neuromuscular control/coordination retraining;patient/caregiver education/training;transfer/mobility retraining;cognitive/visual perception retraining;strengthening exercise;ROM/therapeutic exercise;occupation/activity based interventions;functional balance retraining  -MM               User Key  (r) = Recorded By, (t) = Taken By, (c) = Cosigned By      Initials Name Provider Type    MM Zehra Adams OT Occupational Therapist                   Clinical Impression       Row Name 07/29/24 1020          Pain Assessment    Pre/Posttreatment Pain Comment LLE pain to touch while donning sock, pt did not rate  -MM       Row Name 07/29/24 1548          Plan of Care Review    Plan of Care Reviewed With patient;family  -MM     Progress no change  -MM     Outcome Evaluation Pt is a 68 yo male admitted with severe hyponatremia and generalized weakness. He is seen this date for OT eval,  used during eval. Pt lives at home with spouse and adult daughter, (I) at [facility] and no AD use. Family reports he has not walked in 8 days. Pt presents with impaired activity tolerance, balance, strength, and overall (I) with ADLs. He requires max A x2 for all bed mobility, increased time required for tasks, dep for LBD and toileting at this time. Min ax2 for STS and lateral steps this date with HHAx2 to progress with functional transfers. Pt very lethargic throughout, increased time required for processing. Family member talking over  at times, communication difficult at times. He will continue to benefit from skilled OT to address noted functional deficits and progress toward improved (I). Rec SNF at d/c based on prior level of (I)  -MM       Row Name 07/29/24 3308          Therapy Assessment/Plan (OT)    Rehab Potential (OT) good, to achieve stated therapy goals  -MM     Criteria for Skilled Therapeutic Interventions Met (OT) yes;meets  criteria;skilled treatment is necessary  -MM     Therapy Frequency (OT) 5 times/wk  -MM       Row Name 07/29/24 1540          Therapy Plan Review/Discharge Plan (OT)    Anticipated Discharge Disposition (OT) skilled nursing facility  -       Row Name 07/29/24 1540          Vital Signs    O2 Delivery Pre Treatment room air  -MM       Row Name 07/29/24 1540          Positioning and Restraints    Pre-Treatment Position in bed  -MM     Post Treatment Position bed  -MM     In Bed notified nsg;fowlers;call light within reach;encouraged to call for assist;exit alarm on;side rails up x3;with family/caregiver  -MM               User Key  (r) = Recorded By, (t) = Taken By, (c) = Cosigned By      Initials Name Provider Type    Zehra Uribe OT Occupational Therapist                   Outcome Measures       Row Name 07/29/24 1546          How much help from another is currently needed...    Putting on and taking off regular lower body clothing? 1  -MM     Bathing (including washing, rinsing, and drying) 1  -MM     Toileting (which includes using toilet bed pan or urinal) 1  -MM     Putting on and taking off regular upper body clothing 2  -MM     Taking care of personal grooming (such as brushing teeth) 2  -MM     Eating meals 2  -MM     AM-PAC 6 Clicks Score (OT) 9  -MM       Row Name 07/29/24 1539 07/29/24 1415       How much help from another person do you currently need...    Turning from your back to your side while in flat bed without using bedrails? 2  -SM 2  -ES    Moving from lying on back to sitting on the side of a flat bed without bedrails? 2  -SM 1  -ES    Moving to and from a bed to a chair (including a wheelchair)? 2  -SM 1  -ES    Standing up from a chair using your arms (e.g., wheelchair, bedside chair)? 2  -SM 1  -ES    Climbing 3-5 steps with a railing? 1  -SM 1  -ES    To walk in hospital room? 2  -SM 1  -ES    AM-PAC 6 Clicks Score (PT) 11  -SM 7  -ES    Highest Level of Mobility Goal 4 --> Transfer to  chair/commode  -SM 2 --> Bed activities/dependent transfer  -ES      Row Name 07/29/24 0800          How much help from another person do you currently need...    Turning from your back to your side while in flat bed without using bedrails? 2  -ES     Moving from lying on back to sitting on the side of a flat bed without bedrails? 1  -ES     Moving to and from a bed to a chair (including a wheelchair)? 1  -ES     Standing up from a chair using your arms (e.g., wheelchair, bedside chair)? 1  -ES     Climbing 3-5 steps with a railing? 1  -ES     To walk in hospital room? 1  -ES     AM-PAC 6 Clicks Score (PT) 7  -ES     Highest Level of Mobility Goal 2 --> Bed activities/dependent transfer  -ES       Row Name 07/29/24 1546          Modified Sha Scale    Modified [place] Scale 4 - Moderately severe disability.  Unable to walk without assistance, and unable to attend to own bodily needs without assistance.  -MM       Row Name 07/29/24 1546 07/29/24 1539       Functional Assessment    Outcome Measure Options AM-PAC 6 Clicks Daily Activity (OT);Modified Sha  -MM AM-PAC 6 Clicks Basic Mobility (PT)  -SM              User Key  (r) = Recorded By, (t) = Taken By, (c) = Cosigned By      Initials Name Provider Type    Zehra Uribe, OT Occupational Therapist    Rama Gagnon, PT Physical Therapist    Hilda Morrison, RN Registered Nurse                    Occupational Therapy Education       Title: PT OT SLP Therapies (In Progress)       Topic: Occupational Therapy (Done)       Point: ADL training (Done)       Description:   Instruct learner(s) on proper safety adaptation and remediation techniques during self care or transfers.   Instruct in proper use of assistive devices.                  Learning Progress Summary             Patient Acceptance, E, VU,NR by MM at 7/29/2024 1546    Comment: role of OT, d/c rec   Family Acceptance, E, VU,NR by MM at 7/29/2024 1546    Comment: role of OT, d/c rec                          Point: Precautions (Done)       Description:   Instruct learner(s) on prescribed precautions during self-care and functional transfers.                  Learning Progress Summary             Patient Acceptance, E, VU,NR by MM at 7/29/2024 1546    Comment: role of OT, d/c rec   Family Acceptance, E, VU,NR by MM at 7/29/2024 1546    Comment: role of OT, d/c rec                         Point: Body mechanics (Done)       Description:   Instruct learner(s) on proper positioning and spine alignment during self-care, functional mobility activities and/or exercises.                  Learning Progress Summary             Patient Acceptance, E, VU,NR by MM at 7/29/2024 1546    Comment: role of OT, d/c rec   Family Acceptance, E, VU,NR by MM at 7/29/2024 1546    Comment: role of OT, d/c rec                                         User Key       Initials Effective Dates Name Provider Type Discipline     05/31/24 -  Zehra Adams OT Occupational Therapist OT                  OT Recommendation and Plan  Planned Therapy Interventions (OT): activity tolerance training, BADL retraining, neuromuscular control/coordination retraining, patient/caregiver education/training, transfer/mobility retraining, cognitive/visual perception retraining, strengthening exercise, ROM/therapeutic exercise, occupation/activity based interventions, functional balance retraining  Therapy Frequency (OT): 5 times/wk  Plan of Care Review  Plan of Care Reviewed With: patient, family  Progress: no change  Outcome Evaluation: Pt is a 70 yo male admitted with severe hyponatremia and generalized weakness. He is seen this date for OT eval,  used during eval. Pt lives at home with spouse and adult daughter, (I) at [facility] and no AD use. Family reports he has not walked in 8 days. Pt presents with impaired activity tolerance, balance, strength, and overall (I) with ADLs. He requires max A x2 for all bed mobility, increased time required  for tasks, dep for LBD and toileting at this time. Min ax2 for STS and lateral steps this date with HHAx2 to progress with functional transfers. Pt very lethargic throughout, increased time required for processing. Family member talking over  at times, communication difficult at times. He will continue to benefit from skilled OT to address noted functional deficits and progress toward improved (I). Rec SNF at d/c based on prior level of (I)     Time Calculation:   Evaluation Complexity (OT)  Review Occupational Profile/Medical/Therapy History Complexity: expanded/moderate complexity  Assessment, Occupational Performance/Identification of Deficit Complexity: 5 or more performance deficits  Clinical Decision Making Complexity (OT): detailed assessment/moderate complexity  Overall Complexity of Evaluation (OT): moderate complexity     Time Calculation- OT       Row Name 07/29/24 1547             Time Calculation- OT    OT Start Time 1411  -MM      OT Stop Time 1430  -MM      OT Time Calculation (min) 19 min  -MM      Total Timed Code Minutes- OT 10 minute(s)  -MM      OT Received On 07/29/24  -MM      OT - Next Appointment 07/30/24  -MM      OT Goal Re-Cert Due Date 08/12/24  -MM         Timed Charges    13667 - OT Therapeutic Activity Minutes 10  -MM         Untimed Charges    OT Eval/Re-eval Minutes 9  -MM         Total Minutes    Timed Charges Total Minutes 10  -MM      Untimed Charges Total Minutes 9  -MM       Total Minutes 19  -MM                User Key  (r) = Recorded By, (t) = Taken By, (c) = Cosigned By      Initials Name Provider Type    MM Zehra Adams  Occupational Therapist                  Therapy Charges for Today       Code Description Service Date Service Provider Modifiers Qty    [id number]  OT THERAPEUTIC ACT EA 15 MIN 7/29/2024 Zehra Adams OT GO 1    [id number]  OT EVAL MOD COMPLEXITY 2 7/29/2024 Zehra Adams OT GO 1                 Zehra Adams   7/29/2024

## 2024-07-29 NOTE — PLAN OF CARE
Goal Outcome Evaluation:  Plan of Care Reviewed With: patient, family        Progress: no change  Outcome Evaluation: Pt is a 68 yo male admitted with severe hyponatremia and generalized weakness. He is seen this date for OT eval,  used during eval. Pt lives at home with spouse and adult daughter, (I) at [facility] and no AD use. Family reports he has not walked in 8 days. Pt presents with impaired activity tolerance, balance, strength, and overall (I) with ADLs. He requires max A x2 for all bed mobility, increased time required for tasks, dep for LBD and toileting at this time. Min ax2 for STS and lateral steps this date with HHAx2 to progress with functional transfers. Pt very lethargic throughout, increased time required for processing. Family member talking over  at times, communication difficult at times. He will continue to benefit from skilled OT to address noted functional deficits and progress toward improved (I). Rec SNF at d/c based on prior level of (I)      Anticipated Discharge Disposition (OT): skilled nursing facility

## 2024-07-29 NOTE — PROGRESS NOTES
"    Patient Name: Marcial Bernard  :1955  69 y.o.      Patient Care Team:  Justina Liriano APRN as PCP - General (Nurse Practitioner)    Chief Complaint: follow up HFpEF    Interval History: excellent UOP. Had a bowel movement this morning. Otherwise no complaints.       Objective   Vital Signs  Temp:  [97.7 °F (36.5 °C)-100.4 °F (38 °C)] 98.2 °F (36.8 °C)  Heart Rate:  [56-70] 56  Resp:  [18] 18  BP: (145-210)/(58-80) 151/65    Intake/Output Summary (Last 24 hours) at 2024 1139  Last data filed at 2024 1039  Gross per 24 hour   Intake --   Output 3350 ml   Net -3350 ml     Flowsheet Rows      Flowsheet Row First Filed Value   Admission Height 175.3 cm (69\") Documented at 2024   Admission Weight 90.7 kg (200 lb) Documented at 2024            Physical Exam:   General Appearance:    Alert, cooperative, in no acute distress   Lungs:     Clear to auscultation.  Normal respiratory effort and rate.      Heart:    Regular rhythm and normal rate, normal S1 and S2, no murmurs, gallops or rubs.     Chest Wall:    No abnormalities observed   Abdomen:     Soft, nontender, positive bowel sounds.     Extremities:   no cyanosis, clubbing or edema.  No marked joint deformities.  Adequate musculoskeletal strength.       Results Review:    Results from last 7 days   Lab Units 24  0526   SODIUM mmol/L 137   POTASSIUM mmol/L 3.5   CHLORIDE mmol/L 93*   CO2 mmol/L 27.9   BUN mg/dL 68*   CREATININE mg/dL 4.73*   GLUCOSE mg/dL 115*   CALCIUM mg/dL 8.1*     Results from last 7 days   Lab Units 24  0251 24  0110   HSTROP T ng/L 78* 87*     Results from last 7 days   Lab Units 24  0526   WBC 10*3/mm3 10.06   HEMOGLOBIN g/dL 10.1*   HEMATOCRIT % 30.5*   PLATELETS 10*3/mm3 373         Results from last 7 days   Lab Units 24  0526   MAGNESIUM mg/dL 3.7*                   Medication Review:   amLODIPine, 10 mg, Oral, Q24H  carvedilol, 12.5 mg, Oral, BID With " Meals  heparin (porcine), 5,000 Units, Subcutaneous, Q12H  hydrALAZINE, 25 mg, Oral, Q8H  insulin regular, 2-7 Units, Subcutaneous, 4x Daily AC & at Bedtime  levothyroxine, 100 mcg, Oral, Daily  senna-docusate sodium, 2 tablet, Oral, BID  sodium chloride, 10 mL, Intravenous, Q12H         bumetanide, 1 mg/hr, Last Rate: 1 mg/hr (07/28/24 0940)        Assessment & Plan   Acute on chronic heart failure with preserved left ventricular systolic function. EF 60-65% due to hypertensive heart disease. His volume status is multifactorial.   Acute on chronic kidney disease, cardiorenal . Creatinine stable. Tolerating IV bumetanide drip. Nephrology is following.   Diabetes mellitus type II  Hypertension , uncontrolled. Has been getting IV hydralazine as needed pretty regularly. Has gotten 3 doses already today.   Sinus bradycardia - beta locker decreased and clonidine stopped.     Increase oral hydralazine.     GIULIANO Dominguez  [facility]  07/29/24  11:39 EDT

## 2024-07-29 NOTE — PROGRESS NOTES
"DAILY PROGRESS NOTE  [facility]    Patient Identification:  Name: Marcial Bernard  Age: 69 y.o.  Sex: male  :  1955  MRN: [id number]         Primary Care Physician: Justina Liriano APRN    Subjective:  Interval History: He is sleepy and lethargic.  He was able to stand up with physical therapy requiring a lot of assistance.    Objective:    Scheduled Meds:amLODIPine, 10 mg, Oral, Q24H  carvedilol, 12.5 mg, Oral, BID With Meals  heparin (porcine), 5,000 Units, Subcutaneous, Q12H  hydrALAZINE, 50 mg, Oral, Q8H  insulin regular, 2-7 Units, Subcutaneous, 4x Daily AC & at Bedtime  levothyroxine, 100 mcg, Oral, Daily  senna-docusate sodium, 2 tablet, Oral, BID  sodium chloride, 10 mL, Intravenous, Q12H  torsemide, 100 mg, Oral, BID      Continuous Infusions:       Vital signs in last 24 hours:  Temp:  [97.3 °F (36.3 °C)-100.4 °F (38 °C)] 97.3 °F (36.3 °C)  Heart Rate:  [56-70] 60  Resp:  [18] 18  BP: (145-210)/(58-80) 154/73    Intake/Output:    Intake/Output Summary (Last 24 hours) at 2024 1447  Last data filed at 2024 1039  Gross per 24 hour   Intake --   Output 3350 ml   Net -3350 ml       Exam:  /73 (BP Location: Right arm, Patient Position: Lying)   Pulse 60   Temp 97.3 °F (36.3 °C) (Oral)   Resp 18   Ht 175.3 cm (69\")   Wt 83.9 kg (184 lb 15.5 oz)   SpO2 97%   BMI 27.31 kg/m²     General Appearance:  Sleepy and lethargic, no distress   Head:    Normocephalic, without obvious abnormality, atraumatic   Eyes:       Throat:   Lips, tongue, gums normal   Neck:   Supple, symmetrical, trachea midline, no JVD   Lungs:     Clear to auscultation bilaterally, respirations unlabored   Chest Wall:    No tenderness or deformity    Heart:    Regular rate and rhythm, S1 and S2 normal, no murmur,no  rub or gallop   Abdomen:     Soft, nontender, bowel sounds active, no masses, no organomegaly    Extremities:   Extremities normal, atraumatic, no cyanosis or edema   Pulses:    "   Skin:   Skin is warm and dry,  no rashes or palpable lesions   Neurologic:     Sleepy and lethargic      Lab Results (last 72 hours)       Procedure Component Value Units Date/Time    POC Glucose Once [[id number]]  (Abnormal) Collected: 07/28/24 1102    Specimen: Blood Updated: 07/28/24 1104     Glucose 136 mg/dL     Comprehensive Metabolic Panel [[id number]]  (Abnormal) Collected: 07/28/24 0605    Specimen: Blood Updated: 07/28/24 0723     Glucose 107 mg/dL      BUN 71 mg/dL      Creatinine 4.50 mg/dL      Sodium 130 mmol/L      Potassium 4.1 mmol/L      Comment: Slight hemolysis detected by analyzer. Result may be falsely elevated.        Chloride 92 mmol/L      CO2 23.6 mmol/L      Calcium 8.0 mg/dL      Total Protein 6.4 g/dL      Albumin 2.9 g/dL      ALT (SGPT) 17 U/L      AST (SGOT) 22 U/L      Alkaline Phosphatase 89 U/L      Total Bilirubin 0.2 mg/dL      Globulin 3.5 gm/dL      A/G Ratio 0.8 g/dL      BUN/Creatinine Ratio 15.8     Anion Gap 14.4 mmol/L      eGFR 13.3 mL/min/1.73      Comment: <15 Indicative of kidney failure       Narrative:      GFR Normal >60  Chronic Kidney Disease <60  Kidney Failure <15      Magnesium [[id number]]  (Abnormal) Collected: 07/28/24 0605    Specimen: Blood Updated: 07/28/24 0723     Magnesium 4.2 mg/dL     Uric Acid [[id number]]  (Abnormal) Collected: 07/28/24 0605    Specimen: Blood Updated: 07/28/24 0712     Uric Acid 8.9 mg/dL     Phosphorus [[id number]]  (Abnormal) Collected: 07/28/24 0605    Specimen: Blood Updated: 07/28/24 0712     Phosphorus 6.1 mg/dL     CBC & Differential [[id number]]  (Abnormal) Collected: 07/28/24 0605    Specimen: Blood Updated: 07/28/24 0655    Narrative:      The following orders were created for panel order CBC & Differential.  Procedure                               Abnormality         Status                     ---------                               -----------         ------                     CBC Auto Differential[[id number]]         Abnormal            Final result                 Please view results for these tests on the individual orders.    CBC Auto Differential [[id number]]  (Abnormal) Collected: 07/28/24 0605    Specimen: Blood Updated: 07/28/24 0655     WBC 9.07 10*3/mm3      RBC 3.59 10*6/mm3      Hemoglobin 10.5 g/dL      Hematocrit 31.3 %      MCV 87.2 fL      MCH 29.2 pg      MCHC 33.5 g/dL      RDW 14.0 %      RDW-SD 44.2 fl      MPV 9.9 fL      Platelets 383 10*3/mm3      Neutrophil % 69.9 %      Lymphocyte % 11.5 %      Monocyte % 9.9 %      Eosinophil % 7.2 %      Basophil % 0.6 %      Immature Grans % 0.9 %      Neutrophils, Absolute 6.35 10*3/mm3      Lymphocytes, Absolute 1.04 10*3/mm3      Monocytes, Absolute 0.90 10*3/mm3      Eosinophils, Absolute 0.65 10*3/mm3      Basophils, Absolute 0.05 10*3/mm3      Immature Grans, Absolute 0.08 10*3/mm3      nRBC 0.0 /100 WBC     POC Glucose Once [[id number]]  (Normal) Collected: 07/28/24 0606    Specimen: Blood Updated: 07/28/24 0611     Glucose 118 mg/dL     POC Glucose Once [[id number]]  (Normal) Collected: 07/27/24 2102    Specimen: Blood Updated: 07/27/24 2103     Glucose 112 mg/dL     Basic Metabolic Panel [[id number]]  (Abnormal) Collected: 07/27/24 1825    Specimen: Blood Updated: 07/27/24 1855     Glucose 154 mg/dL      BUN 73 mg/dL      Creatinine 4.76 mg/dL      Sodium 130 mmol/L      Potassium 3.3 mmol/L      Chloride 92 mmol/L      CO2 25.9 mmol/L      Calcium 7.5 mg/dL      BUN/Creatinine Ratio 15.3     Anion Gap 12.1 mmol/L      eGFR 12.5 mL/min/1.73      Comment: <15 Indicative of kidney failure       Narrative:      GFR Normal >60  Chronic Kidney Disease <60  Kidney Failure <15      POC Glucose Once [[id number]]  (Abnormal) Collected: 07/27/24 1817    Specimen: Blood Updated: 07/27/24 1819     Glucose 175 mg/dL     POC Glucose Once [[id number]]  (Abnormal) Collected: 07/27/24 1647    Specimen: Blood Updated: 07/27/24 1652     Glucose 193 mg/dL     POC Glucose Once  [[id number]]  (Abnormal) Collected: 07/27/24 1238    Specimen: Blood Updated: 07/27/24 1239     Glucose 135 mg/dL     POC Glucose Once [[id number]]  (Normal) Collected: 07/27/24 0634    Specimen: Blood Updated: 07/27/24 0635     Glucose 125 mg/dL     Basic Metabolic Panel [[id number]]  (Abnormal) Collected: 07/27/24 0252    Specimen: Blood Updated: 07/27/24 0338     Glucose 117 mg/dL      BUN 74 mg/dL      Creatinine 4.51 mg/dL      Sodium 130 mmol/L      Potassium 3.6 mmol/L      Chloride 90 mmol/L      CO2 25.2 mmol/L      Calcium 7.5 mg/dL      BUN/Creatinine Ratio 16.4     Anion Gap 14.8 mmol/L      eGFR 13.3 mL/min/1.73      Comment: <15 Indicative of kidney failure       Narrative:      GFR Normal >60  Chronic Kidney Disease <60  Kidney Failure <15      Comprehensive Metabolic Panel [[id number]]  (Abnormal) Collected: 07/27/24 0252    Specimen: Blood Updated: 07/27/24 0338     Glucose 117 mg/dL      BUN 74 mg/dL      Creatinine 4.51 mg/dL      Sodium 130 mmol/L      Potassium 3.6 mmol/L      Chloride 90 mmol/L      CO2 25.2 mmol/L      Calcium 7.5 mg/dL      Total Protein 5.4 g/dL      Albumin 2.6 g/dL      ALT (SGPT) 19 U/L      AST (SGOT) 18 U/L      Alkaline Phosphatase 75 U/L      Total Bilirubin <0.2 mg/dL      Globulin 2.8 gm/dL      A/G Ratio 0.9 g/dL      BUN/Creatinine Ratio 16.4     Anion Gap 14.8 mmol/L      eGFR 13.3 mL/min/1.73      Comment: <15 Indicative of kidney failure       Narrative:      GFR Normal >60  Chronic Kidney Disease <60  Kidney Failure <15      Phosphorus [[id number]]  (Abnormal) Collected: 07/27/24 0252    Specimen: Blood Updated: 07/27/24 0338     Phosphorus 6.5 mg/dL     Magnesium [[id number]]  (Abnormal) Collected: 07/27/24 0252    Specimen: Blood Updated: 07/27/24 0338     Magnesium 4.2 mg/dL     Uric Acid [[id number]]  (Abnormal) Collected: 07/27/24 0252    Specimen: Blood Updated: 07/27/24 0338     Uric Acid 8.5 mg/dL     CBC & Differential [[id number]]  (Abnormal) Collected:  07/27/24 0252    Specimen: Blood Updated: 07/27/24 0320    Narrative:      The following orders were created for panel order CBC & Differential.  Procedure                               Abnormality         Status                     ---------                               -----------         ------                     CBC Auto Differential[[id number]]        Abnormal            Final result                 Please view results for these tests on the individual orders.    CBC Auto Differential [[id number]]  (Abnormal) Collected: 07/27/24 0252    Specimen: Blood Updated: 07/27/24 0320     WBC 9.13 10*3/mm3      RBC 3.03 10*6/mm3      Hemoglobin 8.8 g/dL      Hematocrit 26.5 %      MCV 87.5 fL      MCH 29.0 pg      MCHC 33.2 g/dL      RDW 14.4 %      RDW-SD 45.6 fl      MPV 9.5 fL      Platelets 395 10*3/mm3      Neutrophil % 73.2 %      Lymphocyte % 8.5 %      Monocyte % 11.0 %      Eosinophil % 6.1 %      Basophil % 0.4 %      Immature Grans % 0.8 %      Neutrophils, Absolute 6.68 10*3/mm3      Lymphocytes, Absolute 0.78 10*3/mm3      Monocytes, Absolute 1.00 10*3/mm3      Eosinophils, Absolute 0.56 10*3/mm3      Basophils, Absolute 0.04 10*3/mm3      Immature Grans, Absolute 0.07 10*3/mm3      nRBC 0.0 /100 WBC     Basic Metabolic Panel [[id number]]  (Abnormal) Collected: 07/27/24 0056    Specimen: Blood Updated: 07/27/24 0134     Glucose 122 mg/dL      BUN 74 mg/dL      Creatinine 4.77 mg/dL      Sodium 130 mmol/L      Potassium 3.9 mmol/L      Chloride 90 mmol/L      CO2 26.8 mmol/L      Calcium 7.5 mg/dL      BUN/Creatinine Ratio 15.5     Anion Gap 13.2 mmol/L      eGFR 12.4 mL/min/1.73      Comment: <15 Indicative of kidney failure       Narrative:      GFR Normal >60  Chronic Kidney Disease <60  Kidney Failure <15      POC Glucose Once [[id number]]  (Normal) Collected: 07/27/24 0034    Specimen: Blood Updated: 07/27/24 0035     Glucose 128 mg/dL     POC Glucose Once [[id number]]  (Abnormal) Collected: 07/26/24 2695     Specimen: Blood Updated: 07/26/24 1855     Glucose 136 mg/dL     Basic Metabolic Panel [[id number]]  (Abnormal) Collected: 07/26/24 1451    Specimen: Blood Updated: 07/26/24 1524     Glucose 153 mg/dL      BUN 75 mg/dL      Creatinine 4.94 mg/dL      Sodium 127 mmol/L      Potassium 3.3 mmol/L      Chloride 88 mmol/L      CO2 25.8 mmol/L      Calcium 7.6 mg/dL      BUN/Creatinine Ratio 15.2     Anion Gap 13.2 mmol/L      eGFR 11.9 mL/min/1.73      Comment: <15 Indicative of kidney failure       Narrative:      GFR Normal >60  Chronic Kidney Disease <60  Kidney Failure <15      POC Glucose Once [[id number]]  (Normal) Collected: 07/26/24 1211    Specimen: Blood Updated: 07/26/24 1230     Glucose 128 mg/dL     POC Glucose Once [[id number]]  (Normal) Collected: 07/26/24 0604    Specimen: Blood Updated: 07/26/24 0605     Glucose 104 mg/dL     Renal Function Panel [[id number]]  (Abnormal) Collected: 07/26/24 0415    Specimen: Blood Updated: 07/26/24 0533     Glucose 106 mg/dL      BUN 73 mg/dL      Creatinine 4.50 mg/dL      Sodium 126 mmol/L      Potassium 3.4 mmol/L      Comment: Slight hemolysis detected by analyzer. Result may be falsely elevated.        Chloride 87 mmol/L      CO2 24.6 mmol/L      Calcium 7.7 mg/dL      Albumin 2.8 g/dL      Phosphorus 7.0 mg/dL      Anion Gap 14.4 mmol/L      BUN/Creatinine Ratio 16.2     eGFR 13.3 mL/min/1.73      Comment: <15 Indicative of kidney failure       Narrative:      GFR Normal >60  Chronic Kidney Disease <60  Kidney Failure <15      Magnesium [[id number]]  (Abnormal) Collected: 07/26/24 0415    Specimen: Blood Updated: 07/26/24 0533     Magnesium 4.6 mg/dL     Uric Acid [[id number]]  (Abnormal) Collected: 07/26/24 0415    Specimen: Blood Updated: 07/26/24 0533     Uric Acid 8.7 mg/dL     CBC & Differential [[id number]]  (Abnormal) Collected: 07/26/24 0415    Specimen: Blood Updated: 07/26/24 0512    Narrative:      The following orders were created for panel order CBC &  Differential.  Procedure                               Abnormality         Status                     ---------                               -----------         ------                     CBC Auto Differential[[id number]]        Abnormal            Final result                 Please view results for these tests on the individual orders.    CBC Auto Differential [[id number]]  (Abnormal) Collected: 07/26/24 0415    Specimen: Blood Updated: 07/26/24 0512     WBC 8.80 10*3/mm3      RBC 3.19 10*6/mm3      Hemoglobin 9.4 g/dL      Hematocrit 28.0 %      MCV 87.8 fL      MCH 29.5 pg      MCHC 33.6 g/dL      RDW 14.6 %      RDW-SD 46.9 fl      MPV 10.2 fL      Platelets 446 10*3/mm3      Neutrophil % 71.7 %      Lymphocyte % 9.5 %      Monocyte % 12.3 %      Eosinophil % 5.1 %      Basophil % 0.5 %      Immature Grans % 0.9 %      Neutrophils, Absolute 6.31 10*3/mm3      Lymphocytes, Absolute 0.84 10*3/mm3      Monocytes, Absolute 1.08 10*3/mm3      Eosinophils, Absolute 0.45 10*3/mm3      Basophils, Absolute 0.04 10*3/mm3      Immature Grans, Absolute 0.08 10*3/mm3      nRBC 0.0 /100 WBC     Basic Metabolic Panel [[id number]]  (Abnormal) Collected: 07/26/24 0002    Specimen: Blood Updated: 07/26/24 0045     Glucose 105 mg/dL      BUN 78 mg/dL      Creatinine 4.80 mg/dL      Sodium 124 mmol/L      Potassium 3.7 mmol/L      Comment: Slight hemolysis detected by analyzer. Result may be falsely elevated.        Chloride 86 mmol/L      CO2 24.0 mmol/L      Calcium 7.4 mg/dL      BUN/Creatinine Ratio 16.3     Anion Gap 14.0 mmol/L      eGFR 12.3 mL/min/1.73      Comment: <15 Indicative of kidney failure       Narrative:      GFR Normal >60  Chronic Kidney Disease <60  Kidney Failure <15      POC Glucose Once [[id number]]  (Normal) Collected: 07/26/24 0011    Specimen: Blood Updated: 07/26/24 0013     Glucose 112 mg/dL     POC Glucose Once [[id number]]  (Abnormal) Collected: 07/25/24 1800    Specimen: Blood Updated: 07/25/24 6911      Glucose 167 mg/dL     Basic Metabolic Panel [[id number]]  (Abnormal) Collected: 07/25/24 1658    Specimen: Blood Updated: 07/25/24 1733     Glucose 108 mg/dL      BUN 80 mg/dL      Creatinine 4.53 mg/dL      Sodium 123 mmol/L      Potassium 3.9 mmol/L      Chloride 84 mmol/L      CO2 21.6 mmol/L      Calcium 7.4 mg/dL      BUN/Creatinine Ratio 17.7     Anion Gap 17.4 mmol/L      eGFR 13.2 mL/min/1.73      Comment: <15 Indicative of kidney failure       Narrative:      GFR Normal >60  Chronic Kidney Disease <60  Kidney Failure <15            Data Review:  Results from last 7 days   Lab Units 07/29/24  0526 07/28/24  0605 07/27/24  1825   SODIUM mmol/L 137 130* 130*   POTASSIUM mmol/L 3.5 4.1 3.3*   CHLORIDE mmol/L 93* 92* 92*   CO2 mmol/L 27.9 23.6 25.9   BUN mg/dL 68* 71* 73*   CREATININE mg/dL 4.73* 4.50* 4.76*   GLUCOSE mg/dL 115* 107* 154*   CALCIUM mg/dL 8.1* 8.0* 7.5*     Results from last 7 days   Lab Units 07/29/24  0526 07/28/24  0605 07/27/24  0252   WBC 10*3/mm3 10.06 9.07 9.13   HEMOGLOBIN g/dL 10.1* 10.5* 8.8*   HEMATOCRIT % 30.5* 31.3* 26.5*   PLATELETS 10*3/mm3 373 383 395     Results from last 7 days   Lab Units 07/23/24  0700   TSH uIU/mL 1.760     Results from last 7 days   Lab Units 07/23/24  0550   HEMOGLOBIN A1C % 6.10*     Lab Results   Lab Value Date/Time    TROPONINT 78 (C) 07/23/2024 0251    TROPONINT 87 (C) 07/23/2024 0110         Results from last 7 days   Lab Units 07/29/24  0526 07/28/24  0605 07/27/24  0252   ALK PHOS U/L 85 89 75   BILIRUBIN mg/dL <0.2 0.2 <0.2   ALT (SGPT) U/L 16 17 19   AST (SGOT) U/L 18 22 18     Results from last 7 days   Lab Units 07/23/24  0700   TSH uIU/mL 1.760     Results from last 7 days   Lab Units 07/23/24  0550   HEMOGLOBIN A1C % 6.10*     Glucose   Date/Time Value Ref Range Status   07/29/2024 1105 147 (H) 70 - 130 mg/dL Final   07/29/2024 0609 133 (H) 70 - 130 mg/dL Final   07/28/2024 2059 150 (H) 70 - 130 mg/dL Final   07/28/2024 1624 155 (H) 70 - 130  mg/dL Final   07/28/2024 1102 136 (H) 70 - 130 mg/dL Final   07/28/2024 0606 118 70 - 130 mg/dL Final   07/27/2024 2102 112 70 - 130 mg/dL Final   07/27/2024 1817 175 (H) 70 - 130 mg/dL Final           Past Medical History:   Diagnosis Date    Asthma     Dementia     Renal disorder        Assessment:  Active Hospital Problems    Diagnosis  POA    **Chronic renal failure (CRF), stage 4 (severe) [N18.4]  Unknown    Hyponatremia [E87.1]  Unknown    Acute pulmonary edema with congestive heart failure [I50.1]  Unknown    Anemia due to stage 4 chronic kidney disease [N18.4, D63.1]  Unknown    Elevated troponin level not due myocardial infarction [R79.89]  Unknown      Resolved Hospital Problems   No resolved problems to display.       Plan:  Continue with Bumex drip.  Plans as per cardiology and nephrology.  Follow-up labs.  DC planning.  Hopefully he can get well enough to go home.  Medicine can take over for primary care.  PT and OT ordered.    Chavo Martins MD  7/29/2024  14:47 EDT

## 2024-07-29 NOTE — PLAN OF CARE
Goal Outcome Evaluation:  Plan of Care Reviewed With: patient, family           Outcome Evaluation: Patient is a 69 y.o male who presented to [facility] with generalized weakness. Family at bedside,  used throughout session. Patient lives at home with his spouse and daughter. Patient is typically independent at baseline and does not use an AD. Patient very drowsy upon arrival but able to awaken to voice. Family at bedside reports patient has not walked in 8 days. Patient required maxAx2 for all bed mobilitly. Once at EOB patient able to follow commands to scoot his hips out and place feet on the floor. Patient required minAx2 and HHAx2 to perform STS from EOB. Patient required cues for upright posture. Patient took a few side steps towards HOB with HHAx2 and minAx2. Difficult to truly assess patient ability to follow commands as well as orientation as family often cueing patient in [place] when  trying to translate what therapist is saying making communication difficult at times. Anticipate need for SNF at d/c given PLOF and patient current functional mobility status. PT will continue to monitor and progress patient as tolerated.      Anticipated Discharge Disposition (PT): skilled nursing facility

## 2024-07-29 NOTE — PROGRESS NOTES
[facility] Progress Note      Patient Name: Marcial Bernard  : 1955  MRN: [id number]  Primary Care Physician:  Justina Liriano APRN  Date of admission: 2024    Subjective     Interval History:   Follow-up acute kidney injury on chronic kidney disease and hyponatremia    The patient is lethargic today remains on Bumex drip, try to communicate with him with the use of an online  in the presence of his family he would not answer much question.  His edema has improved significantly    Review of Systems:   As noted above    Objective     Vitals:   Temp:  [97.7 °F (36.5 °C)-100.4 °F (38 °C)] 98.2 °F (36.8 °C)  Heart Rate:  [56-70] 56  Resp:  [18] 18  BP: (145-210)/(58-80) 151/65    Intake/Output Summary (Last 24 hours) at 2024 1152  Last data filed at 2024 1039  Gross per 24 hour   Intake --   Output 3350 ml   Net -3350 ml       Physical Exam:    General Appearance: More awake, chronically ill, no acute distress   Skin: warm and dry  HEENT: oral mucosa normal, nonicteric sclera  Neck: Increased JVD  Lungs: Bilateral rhonchi and crackles, breathing effort not labored  Heart: RRR, no S3, no rub  Abdomen: soft, nontender, nondistended  : no palpable bladder  Extremities: No ankle edema lower extremity edema  Neuro: Moving all extremities    Scheduled Meds:     amLODIPine, 10 mg, Oral, Q24H  carvedilol, 12.5 mg, Oral, BID With Meals  heparin (porcine), 5,000 Units, Subcutaneous, Q12H  hydrALAZINE, 50 mg, Oral, Q8H  insulin regular, 2-7 Units, Subcutaneous, 4x Daily AC & at Bedtime  levothyroxine, 100 mcg, Oral, Daily  senna-docusate sodium, 2 tablet, Oral, BID  sodium chloride, 10 mL, Intravenous, Q12H      IV Meds:   bumetanide, 1 mg/hr, Last Rate: 1 mg/hr (24 0940)        Results Reviewed:   I have personally reviewed the results from the time of this admission to 2024 11:52 EDT     Results from last 7 days   Lab Units 24  0560  07/28/24  0605 07/27/24  1825 07/27/24  0252   SODIUM mmol/L 137 130* 130* 130*  130*   POTASSIUM mmol/L 3.5 4.1 3.3* 3.6  3.6   CHLORIDE mmol/L 93* 92* 92* 90*  90*   CO2 mmol/L 27.9 23.6 25.9 25.2  25.2   BUN mg/dL 68* 71* 73* 74*  74*   CREATININE mg/dL 4.73* 4.50* 4.76* 4.51*  4.51*   CALCIUM mg/dL 8.1* 8.0* 7.5* 7.5*  7.5*   BILIRUBIN mg/dL <0.2 0.2  --  <0.2   ALK PHOS U/L 85 89  --  75   ALT (SGPT) U/L 16 17  --  19   AST (SGOT) U/L 18 22  --  18   GLUCOSE mg/dL 115* 107* 154* 117*  117*       Estimated Creatinine Clearance: 17.2 mL/min (A) (by C-G formula based on SCr of 4.73 mg/dL (H)).    Results from last 7 days   Lab Units 07/29/24  0526 07/28/24  0605 07/27/24  0252   MAGNESIUM mg/dL 3.7* 4.2* 4.2*   PHOSPHORUS mg/dL 6.1* 6.1* 6.5*       Results from last 7 days   Lab Units 07/29/24  0526 07/28/24  0605 07/27/24  0252 07/26/24  0415 07/25/24  0420 07/24/24  0324   URIC ACID mg/dL 8.9* 8.9* 8.5* 8.7* 8.4* 8.0*       Results from last 7 days   Lab Units 07/29/24  0526 07/28/24  0605 07/27/24  0252 07/26/24  0415 07/25/24  0420   WBC 10*3/mm3 10.06 9.07 9.13 8.80 8.46   HEMOGLOBIN g/dL 10.1* 10.5* 8.8* 9.4* 8.9*   PLATELETS 10*3/mm3 373 383 395 446 449             Assessment / Plan     ASSESSMENT:  Acute kidney injury on chronic kidney disease stage IV creatinine was 4.93 on admission, creatinine today 4.73, very stable, potassium 3.6 and sodium up to 130, patient has cardiorenal syndrome diuresing quite well  Chronic kidney disease stage IV baseline creatinine about 2.4-second diabetic and hypertensive glomerulosclerosis  Acute on chronic diastolic congestive heart failure with cardiorenal syndrome, volume status improving with diuresis  Diabetes mellitus type 2 with renal complication and diabetic retinopathy  Hypertension with chronic kidney disease  Hyponatremia associated with volume excess, sodium is up to 137  Hypokalemia associate with diuretic potassium 3 point    PLAN:  Discontinue Bumex  drip and start torsemide 100 mg twice daily  No indication for dialysis today.  I discussed the case with the patient and his family member and the patient's nurse who with the A/V   Surveillance labs    I reviewed the chart and other providers notes, reviewed labs.  Copied text in this note has been reviewed and is accurate as of 07/29/24.       Thank you for involving us in the care of Marcial Bernard.  Please feel free to call with any questions.    Mike Flannery MD  07/29/24  11:52 EDT    [facility]  [phone]    Please note that portions of this note were completed with a voice recognition program.

## 2024-07-29 NOTE — PLAN OF CARE
Goal Outcome Evaluation:  Plan of Care Reviewed With: patient        Progress: no change  Outcome Evaluation: Pt oriented to self. Pt on RA. Purewick in place with good output. Q2 turns. BP elevated this shift, PRN IV Hydralazine given with little to no improvement, cardiology notified. Daughter at bedside.

## 2024-07-30 LAB
ALBUMIN SERPL-MCNC: 2.9 G/DL (ref 3.5–5.2)
ANION GAP SERPL CALCULATED.3IONS-SCNC: 13 MMOL/L (ref 5–15)
BASOPHILS # BLD AUTO: 0.03 10*3/MM3 (ref 0–0.2)
BASOPHILS NFR BLD AUTO: 0.3 % (ref 0–1.5)
BUN SERPL-MCNC: 72 MG/DL (ref 8–23)
BUN/CREAT SERPL: 16.2 (ref 7–25)
CALCIUM SPEC-SCNC: 8.5 MG/DL (ref 8.6–10.5)
CHLORIDE SERPL-SCNC: 95 MMOL/L (ref 98–107)
CO2 SERPL-SCNC: 30 MMOL/L (ref 22–29)
CREAT SERPL-MCNC: 4.44 MG/DL (ref 0.76–1.27)
DEPRECATED RDW RBC AUTO: 46.7 FL (ref 37–54)
EGFRCR SERPLBLD CKD-EPI 2021: 13.5 ML/MIN/1.73
EOSINOPHIL # BLD AUTO: 0.59 10*3/MM3 (ref 0–0.4)
EOSINOPHIL NFR BLD AUTO: 6.7 % (ref 0.3–6.2)
ERYTHROCYTE [DISTWIDTH] IN BLOOD BY AUTOMATED COUNT: 14.3 % (ref 12.3–15.4)
GLUCOSE BLDC GLUCOMTR-MCNC: 117 MG/DL (ref 70–130)
GLUCOSE BLDC GLUCOMTR-MCNC: 150 MG/DL (ref 70–130)
GLUCOSE BLDC GLUCOMTR-MCNC: 155 MG/DL (ref 70–130)
GLUCOSE BLDC GLUCOMTR-MCNC: 174 MG/DL (ref 70–130)
GLUCOSE SERPL-MCNC: 114 MG/DL (ref 65–99)
HCT VFR BLD AUTO: 30 % (ref 37.5–51)
HGB BLD-MCNC: 9.7 G/DL (ref 13–17.7)
IMM GRANULOCYTES # BLD AUTO: 0.05 10*3/MM3 (ref 0–0.05)
IMM GRANULOCYTES NFR BLD AUTO: 0.6 % (ref 0–0.5)
LYMPHOCYTES # BLD AUTO: 1.18 10*3/MM3 (ref 0.7–3.1)
LYMPHOCYTES NFR BLD AUTO: 13.4 % (ref 19.6–45.3)
MAGNESIUM SERPL-MCNC: 3.3 MG/DL (ref 1.6–2.4)
MCH RBC QN AUTO: 28.9 PG (ref 26.6–33)
MCHC RBC AUTO-ENTMCNC: 32.3 G/DL (ref 31.5–35.7)
MCV RBC AUTO: 89.3 FL (ref 79–97)
MONOCYTES # BLD AUTO: 0.91 10*3/MM3 (ref 0.1–0.9)
MONOCYTES NFR BLD AUTO: 10.4 % (ref 5–12)
NEUTROPHILS NFR BLD AUTO: 6.02 10*3/MM3 (ref 1.7–7)
NEUTROPHILS NFR BLD AUTO: 68.6 % (ref 42.7–76)
NRBC BLD AUTO-RTO: 0 /100 WBC (ref 0–0.2)
PHOSPHATE SERPL-MCNC: 6.4 MG/DL (ref 2.5–4.5)
PLATELET # BLD AUTO: 366 10*3/MM3 (ref 140–450)
PMV BLD AUTO: 9.8 FL (ref 6–12)
POTASSIUM SERPL-SCNC: 3.1 MMOL/L (ref 3.5–5.2)
RBC # BLD AUTO: 3.36 10*6/MM3 (ref 4.14–5.8)
SODIUM SERPL-SCNC: 138 MMOL/L (ref 136–145)
URATE SERPL-MCNC: 9.8 MG/DL (ref 3.4–7)
WBC NRBC COR # BLD AUTO: 8.78 10*3/MM3 (ref 3.4–10.8)

## 2024-07-30 PROCEDURE — 85025 COMPLETE CBC W/AUTO DIFF WBC: CPT | Performed by: INTERNAL MEDICINE

## 2024-07-30 PROCEDURE — 83735 ASSAY OF MAGNESIUM: CPT | Performed by: INTERNAL MEDICINE

## 2024-07-30 PROCEDURE — 80069 RENAL FUNCTION PANEL: CPT | Performed by: INTERNAL MEDICINE

## 2024-07-30 PROCEDURE — 25010000002 HYDRALAZINE PER 20 MG: Performed by: INTERNAL MEDICINE

## 2024-07-30 PROCEDURE — 82948 REAGENT STRIP/BLOOD GLUCOSE: CPT

## 2024-07-30 PROCEDURE — 97530 THERAPEUTIC ACTIVITIES: CPT

## 2024-07-30 PROCEDURE — 25010000002 HEPARIN (PORCINE) PER 1000 UNITS: Performed by: INTERNAL MEDICINE

## 2024-07-30 PROCEDURE — 84550 ASSAY OF BLOOD/URIC ACID: CPT | Performed by: INTERNAL MEDICINE

## 2024-07-30 PROCEDURE — 63710000001 INSULIN REGULAR HUMAN PER 5 UNITS: Performed by: INTERNAL MEDICINE

## 2024-07-30 PROCEDURE — 99232 SBSQ HOSP IP/OBS MODERATE 35: CPT | Performed by: NURSE PRACTITIONER

## 2024-07-30 RX ORDER — POTASSIUM CHLORIDE 750 MG/1
20 TABLET, FILM COATED, EXTENDED RELEASE ORAL ONCE
Status: COMPLETED | OUTPATIENT
Start: 2024-07-30 | End: 2024-07-30

## 2024-07-30 RX ORDER — POTASSIUM CHLORIDE 750 MG/1
40 TABLET, FILM COATED, EXTENDED RELEASE ORAL ONCE
Status: COMPLETED | OUTPATIENT
Start: 2024-07-30 | End: 2024-07-30

## 2024-07-30 RX ADMIN — HYDRALAZINE HYDROCHLORIDE 50 MG: 50 TABLET ORAL at 17:20

## 2024-07-30 RX ADMIN — HEPARIN SODIUM 5000 UNITS: 5000 INJECTION INTRAVENOUS; SUBCUTANEOUS at 21:37

## 2024-07-30 RX ADMIN — CARVEDILOL 12.5 MG: 12.5 TABLET, FILM COATED ORAL at 09:21

## 2024-07-30 RX ADMIN — POTASSIUM CHLORIDE 20 MEQ: 750 TABLET, EXTENDED RELEASE ORAL at 11:47

## 2024-07-30 RX ADMIN — CARVEDILOL 12.5 MG: 12.5 TABLET, FILM COATED ORAL at 17:20

## 2024-07-30 RX ADMIN — HYDRALAZINE HYDROCHLORIDE 50 MG: 50 TABLET ORAL at 00:26

## 2024-07-30 RX ADMIN — Medication 10 ML: at 09:22

## 2024-07-30 RX ADMIN — ACETAMINOPHEN 325MG 650 MG: 325 TABLET ORAL at 00:26

## 2024-07-30 RX ADMIN — POTASSIUM CHLORIDE 40 MEQ: 750 TABLET, EXTENDED RELEASE ORAL at 09:21

## 2024-07-30 RX ADMIN — Medication 10 ML: at 21:38

## 2024-07-30 RX ADMIN — HYDRALAZINE HYDROCHLORIDE 20 MG: 20 INJECTION INTRAMUSCULAR; INTRAVENOUS at 04:17

## 2024-07-30 RX ADMIN — TORSEMIDE 100 MG: 100 TABLET ORAL at 09:21

## 2024-07-30 RX ADMIN — HYDRALAZINE HYDROCHLORIDE 50 MG: 50 TABLET ORAL at 09:21

## 2024-07-30 RX ADMIN — INSULIN HUMAN 2 UNITS: 100 INJECTION, SOLUTION PARENTERAL at 17:20

## 2024-07-30 RX ADMIN — INSULIN HUMAN 2 UNITS: 100 INJECTION, SOLUTION PARENTERAL at 11:47

## 2024-07-30 RX ADMIN — AMLODIPINE BESYLATE 10 MG: 10 TABLET ORAL at 09:21

## 2024-07-30 RX ADMIN — LEVOTHYROXINE SODIUM 100 MCG: 100 TABLET ORAL at 09:21

## 2024-07-30 RX ADMIN — TORSEMIDE 100 MG: 100 TABLET ORAL at 17:20

## 2024-07-30 RX ADMIN — HEPARIN SODIUM 5000 UNITS: 5000 INJECTION INTRAVENOUS; SUBCUTANEOUS at 09:21

## 2024-07-30 NOTE — PROGRESS NOTES
"    Patient Name: Marcial Bernard  :1955  69 y.o.      Patient Care Team:  Justina Liriano APRN as PCP - General (Nurse Practitioner)    Chief Complaint: follow up HFpEF    Interval History: got a dose of IV hydralazine this morning around 4 AM. Potassium is low. Waste products slightly better. Albumin 2.9.       Objective   Vital Signs  Temp:  [97.3 °F (36.3 °C)-98.6 °F (37 °C)] 98.4 °F (36.9 °C)  Heart Rate:  [54-67] 54  Resp:  [18] 18  BP: (154-184)/(65-83) 165/65    Intake/Output Summary (Last 24 hours) at 2024 1053  Last data filed at 2024 0900  Gross per 24 hour   Intake 240 ml   Output 600 ml   Net -360 ml     Flowsheet Rows      Flowsheet Row First Filed Value   Admission Height 175.3 cm (69\") Documented at 20244   Admission Weight 90.7 kg (200 lb) Documented at 2024 0044            Physical Exam:   General Appearance:    Alert, cooperative, in no acute distress   Lungs:     Clear to auscultation.  Normal respiratory effort and rate.      Heart:    Regular rhythm and normal rate, normal S1 and S2, no murmurs, gallops or rubs.     Chest Wall:    No abnormalities observed   Abdomen:     Soft, nontender, positive bowel sounds.     Extremities:   no cyanosis, clubbing or edema.  No marked joint deformities.  Adequate musculoskeletal strength.       Results Review:    Results from last 7 days   Lab Units 24  0615   SODIUM mmol/L 138   POTASSIUM mmol/L 3.1*   CHLORIDE mmol/L 95*   CO2 mmol/L 30.0*   BUN mg/dL 72*   CREATININE mg/dL 4.44*   GLUCOSE mg/dL 114*   CALCIUM mg/dL 8.5*           Results from last 7 days   Lab Units 24  0616   WBC 10*3/mm3 8.78   HEMOGLOBIN g/dL 9.7*   HEMATOCRIT % 30.0*   PLATELETS 10*3/mm3 366         Results from last 7 days   Lab Units 24  0615   MAGNESIUM mg/dL 3.3*                   Medication Review:   amLODIPine, 10 mg, Oral, Q24H  carvedilol, 12.5 mg, Oral, BID With Meals  heparin (porcine), 5,000 Units, Subcutaneous, " Q12H  hydrALAZINE, 50 mg, Oral, Q8H  insulin regular, 2-7 Units, Subcutaneous, 4x Daily AC & at Bedtime  levothyroxine, 100 mcg, Oral, Daily  potassium chloride, 20 mEq, Oral, Once  senna-docusate sodium, 2 tablet, Oral, BID  sodium chloride, 10 mL, Intravenous, Q12H  torsemide, 100 mg, Oral, BID                Assessment & Plan   Acute on chronic heart failure with preserved left ventricular systolic function. EF 60-65% due to hypertensive heart disease. His volume status is multifactorial.   Acute on chronic kidney disease, cardiorenal . Creatinine stable. Tolerating IV bumetanide drip. Nephrology is following.   Diabetes mellitus type II  Hypertension , uncontrolled. Has been getting IV hydralazine as needed pretty regularly. Has gotten 3 doses already today.   Sinus bradycardia - beta locker decreased and clonidine stopped.     HR is stable.     Increased oral hydralazine on 7/29. BP still high but improving. Consider titrating tomorrow if still uncontrolled.      GIULIANO Dominguez  [facility]  07/30/24  10:53 EDT

## 2024-07-30 NOTE — PROGRESS NOTES
Appreciate [facility] taking over care. Intensivist team will sign off.    Electronically signed by Cristian Chinchilla MD, 07/30/24, 7:24 AM EDT.

## 2024-07-30 NOTE — PLAN OF CARE
Goal Outcome Evaluation:  Plan of Care Reviewed With: patient, family        Progress: declining  Outcome Evaluation: Pt was seen by PT this AM for tx. RN in room and  used throughout session. Pt was very lethargic and req max A x 2 for supine to sit then sit to supine. Pt sat at EOB improving to SBA for sit bal. Pt would not respond to questions or most directions through  during session. Pt was returned to bed after 2 sit to stand attempts by pt w/ no initiation noted. Pt began sitting back up to EOB req min A. Chair was pulled close and pt stood then transferred to chair w/ max A x 2/HHA x 2. Pt was UIC w/ RN in room at end of session. Per RN, pt is apparently more alert PM which PT will attempt in future sessions. PT will prog as pt mabel.      Anticipated Discharge Disposition (PT): skilled nursing facility

## 2024-07-30 NOTE — PLAN OF CARE
Goal Outcome Evaluation:                     Outcome Evaluation: Pt Alert to self. 1L NC. Pt lethargic in the am, more alert in the afternoon. Up in chair with PT this shift, Assist x2 with bed mobility. Q2 turn, Pillow support. Family present in room. Q6 Bladder scan. BP remain slighly high, medication given per MAR. Potassium replaced this shift. VSS

## 2024-07-30 NOTE — PLAN OF CARE
Goal Outcome Evaluation:           Progress: no change  Outcome Evaluation: Pt is oriented to self only. Responds to yes or no questions. Incontinent of bowel and bladder. VSS on O2 @ 1l per nc. Family at bedside. Interpeter used for all communication. Plan of care continues.

## 2024-07-30 NOTE — PROGRESS NOTES
[facility] Progress Note      Patient Name: Marcial Bernard  : 1955  MRN: [id number]  Primary Care Physician:  Justina Liriano APRN  Date of admission: 2024    Subjective     Interval History:   Follow-up acute kidney injury on chronic kidney disease and hyponatremia    The patient is feeling better, more responsive today he was transitioned to oral diuretics yesterday his potassium was found to be low and he was given potassium supplement.    Review of Systems:   As noted above    Objective     Vitals:   Temp:  [97.3 °F (36.3 °C)-98.6 °F (37 °C)] 98.4 °F (36.9 °C)  Heart Rate:  [54-67] 54  Resp:  [18] 18  BP: (154-184)/(65-83) 165/65  Flow (L/min):  [2] 2    Intake/Output Summary (Last 24 hours) at 2024 1100  Last data filed at 2024 0900  Gross per 24 hour   Intake 240 ml   Output 600 ml   Net -360 ml       Physical Exam:    General Appearance: More awake, chronically ill, no acute distress   Skin: warm and dry  HEENT: oral mucosa normal, nonicteric sclera  Neck: Increased JVD  Lungs: Bilateral rhonchi and crackles, breathing effort not labored  Heart: RRR, no S3, no rub  Abdomen: soft, nontender, nondistended  : no palpable bladder  Extremities: No ankle edema lower extremity edema  Neuro: Moving all extremities    Scheduled Meds:     amLODIPine, 10 mg, Oral, Q24H  carvedilol, 12.5 mg, Oral, BID With Meals  heparin (porcine), 5,000 Units, Subcutaneous, Q12H  hydrALAZINE, 50 mg, Oral, Q8H  insulin regular, 2-7 Units, Subcutaneous, 4x Daily AC & at Bedtime  levothyroxine, 100 mcg, Oral, Daily  potassium chloride, 20 mEq, Oral, Once  senna-docusate sodium, 2 tablet, Oral, BID  sodium chloride, 10 mL, Intravenous, Q12H  torsemide, 100 mg, Oral, BID      IV Meds:          Results Reviewed:   I have personally reviewed the results from the time of this admission to 2024 11:00 EDT     Results from last 7 days   Lab Units 24  0615 24  0526  Please return to the ED for any worsening symptoms. Please call your PCP tomorrow morning to schedule follow-up within this week. 07/28/24  0605 07/27/24  1825 07/27/24  0252   SODIUM mmol/L 138 137 130*   < > 130*  130*   POTASSIUM mmol/L 3.1* 3.5 4.1   < > 3.6  3.6   CHLORIDE mmol/L 95* 93* 92*   < > 90*  90*   CO2 mmol/L 30.0* 27.9 23.6   < > 25.2  25.2   BUN mg/dL 72* 68* 71*   < > 74*  74*   CREATININE mg/dL 4.44* 4.73* 4.50*   < > 4.51*  4.51*   CALCIUM mg/dL 8.5* 8.1* 8.0*   < > 7.5*  7.5*   BILIRUBIN mg/dL  --  <0.2 0.2  --  <0.2   ALK PHOS U/L  --  85 89  --  75   ALT (SGPT) U/L  --  16 17  --  19   AST (SGOT) U/L  --  18 22  --  18   GLUCOSE mg/dL 114* 115* 107*   < > 117*  117*    < > = values in this interval not displayed.       Estimated Creatinine Clearance: 17.9 mL/min (A) (by C-G formula based on SCr of 4.44 mg/dL (H)).    Results from last 7 days   Lab Units 07/30/24  0615 07/29/24  0526 07/28/24  0605   MAGNESIUM mg/dL 3.3* 3.7* 4.2*   PHOSPHORUS mg/dL 6.4* 6.1* 6.1*       Results from last 7 days   Lab Units 07/30/24  0615 07/29/24  0526 07/28/24  0605 07/27/24 0252 07/26/24  0415 07/25/24  0420 07/24/24  0324   URIC ACID mg/dL 9.8* 8.9* 8.9* 8.5* 8.7* 8.4* 8.0*       Results from last 7 days   Lab Units 07/30/24  0616 07/29/24  0526 07/28/24  0605 07/27/24  0252 07/26/24  0415   WBC 10*3/mm3 8.78 10.06 9.07 9.13 8.80   HEMOGLOBIN g/dL 9.7* 10.1* 10.5* 8.8* 9.4*   PLATELETS 10*3/mm3 366 373 383 395 067             Assessment / Plan     ASSESSMENT:  Acute kidney injury on chronic kidney disease stage IV creatinine was 4.93 on admission, creatinine today 4.44, very stable, potassium 3.1,and sodium up to 130, patient has cardiorenal syndrome diuresing quite well  Chronic kidney disease stage IV baseline creatinine about 2.4-second diabetic and hypertensive glomerulosclerosis  Acute on chronic diastolic congestive heart failure with cardiorenal syndrome, volume status improving with diuresis  Diabetes mellitus type 2 with renal complication and diabetic retinopathy  Hypertension with chronic kidney  disease  Hyponatremia associated with volume excess, sodium is up to 138  Hypokalemia associate with diuretic potassium 3 point    PLAN:  Continue torsemide 100 mg twice daily  No indication for dialysis today.  Replete potassium  Surveillance labs    I reviewed the chart and other providers notes, reviewed labs.  Copied text in this note has been reviewed and is accurate as of 07/30/24.       Thank you for involving us in the care of Marcial Bernard.  Please feel free to call with any questions.    Mike Flannery MD  07/30/24  11:00 EDT    [facility]  [phone]    Please note that portions of this note were completed with a voice recognition program.

## 2024-07-30 NOTE — THERAPY TREATMENT NOTE
Patient Name: Marcial Bernard  : 1955    MRN: [id number]                              Today's Date: 2024       Admit Date: 2024    Visit Dx:     ICD-10-CM ICD-9-CM   1. Chronic renal failure (CRF), stage 4 (severe)  N18.4 585.4   2. Hyponatremia  E87.1 276.1   3. Acute pulmonary edema with congestive heart failure  I50.1 428.1   4. Anemia due to stage 4 chronic kidney disease  N18.4 285.21    D63.1 585.4   5. Elevated troponin level not due myocardial infarction  R79.89 790.6     Patient Active Problem List   Diagnosis    Chronic renal failure (CRF), stage 4 (severe)    Hyponatremia    Acute pulmonary edema with congestive heart failure    Anemia due to stage 4 chronic kidney disease    Elevated troponin level not due myocardial infarction     Past Medical History:   Diagnosis Date    Asthma     Dementia     Renal disorder      History reviewed. No pertinent surgical history.   General Information       Row Name 24 0953          Physical Therapy Time and Intention    Document Type therapy note (daily note)  -     Mode of Treatment physical therapy  -       Row Name 24 0953          General Information    Existing Precautions/Restrictions fall  -     Barriers to Rehab medically complex  -       Row Name 24 0953          Cognition    Orientation Status (Cognition) oriented to;person;unable/difficult to assess  not responding to any questions through   -       Row Name 24 0953          Safety Issues, Functional Mobility    Impairments Affecting Function (Mobility) balance;cognition;coordination;endurance/activity tolerance;strength;postural/trunk control;shortness of breath  -PH     Cognitive Impairments, Mobility Safety/Performance problem-solving/reasoning;judgment;insight into deficits/self-awareness;sequencing abilities;awareness, need for assistance;attention  -     Comment, Safety Issues/Impairments (Mobility) gt belt and non skid socks donned  -                User Key  (r) = Recorded By, (t) = Taken By, (c) = Cosigned By      Initials Name Provider Type    PH Liliana Saravia PTA Physical Therapist Assistant                   Mobility       Row Name 07/30/24 0954          Bed Mobility    Bed Mobility supine-sit  -PH     Supine-Sit [place] (Bed Mobility) maximum assist (25% patient effort);2 person assist;verbal cues;nonverbal cues (demo/gesture);minimum assist (75% patient effort)  -PH     Sit-Supine [place] (Bed Mobility) maximum assist (25% patient effort);2 person assist;verbal cues;nonverbal cues (demo/gesture)  -PH     Assistive Device (Bed Mobility) head of bed elevated;bed rails  -PH     Comment, (Bed Mobility) cues for initiation and sequencing sequencing through ; pt returned to bed after not participating w/ sit to stand although then began sitting up to EOB w/ min A  -PH       Row Name 07/30/24 0954          Bed-Chair Transfer    Bed-Chair [place] (Transfers) maximum assist (25% patient effort);2 person assist;verbal cues;nonverbal cues (demo/gesture)  -PH     Assistive Device (Bed-Chair Transfers) other (see comments)  HHA x 2  -PH       Row Name 07/30/24 0954          Sit-Stand Transfer    Sit-Stand [place] (Transfers) maximum assist (25% patient effort);2 person assist;verbal cues;nonverbal cues (demo/gesture)  -PH     Assistive Device (Sit-Stand Transfers) walker, front-wheeled  -PH     Comment, (Sit-Stand Transfer) sit to stand 3x; no initiation for first 2 attempts; max A x 2 / HHA x 2 for 3rd attempt w/ pt's B knee remaining in flexion  -PH       Row Name 07/30/24 0954          Gait/Stairs (Locomotion)    [place] Level (Gait) maximum assist (25% patient effort);2 person assist;verbal cues;nonverbal cues (demo/gesture)  -PH     Assistive Device (Gait) other (see comments)  HHA x 2  -PH     Distance in Feet (Gait) --  a few unsteady steps to chair  -PH     [place] Level (Stairs) unable to assess   -PH               User Key  (r) = Recorded By, (t) = Taken By, (c) = Cosigned By      Initials Name Provider Type    PH Liliana Saravia PTA Physical Therapist Assistant                   Obj/Interventions       Row Name 07/30/24 0957          Balance    Balance Assessment sitting static balance;standing static balance  -PH     Static Sitting Balance standby assist  -PH     Static Standing Balance maximum assist;2-person assist  -PH     Position/Device Used, Standing Balance other (see comments)  HHA x 2  -PH     Comment, Balance B knees and hips in flexion during standing and transfer  -PH               User Key  (r) = Recorded By, (t) = Taken By, (c) = Cosigned By      Initials Name Provider Type    PH Liliana Saravia PTA Physical Therapist Assistant                   Goals/Plan    No documentation.                  Clinical Impression       Row Name 07/30/24 0957          Pain    Pre/Posttreatment Pain Comment pt was asked about pain several times but did not respond to questions today  -PH     Pain Intervention(s) Ambulation/increased activity;Repositioned  -PH     Additional Documentation Pain Scale: Numbers Pre/Post-Treatment (Group)  -PH       Row Name 07/30/24 0957          Plan of Care Review    Plan of Care Reviewed With patient;family  -PH     Progress declining  -PH     Outcome Evaluation Pt was seen by PT this AM for tx. RN in room and  used throughout session. Pt was very lethargic and req max A x 2 for supine to sit then sit to supine. Pt sat at EOB improving to SBA for sit bal. Pt would not respond to questions or most directions through  during session. Pt was returned to bed after 2 sit to stand attempts by pt w/ no initiation noted. Pt began sitting back up to EOB req min A. Chair was pulled close and pt stood then transferred to chair w/ max A x 2/HHA x 2. Pt was UIC w/ RN in room at end of session. Per RN, pt is apparently more alert PM which PT will attempt in  future sessions. PT will prog as pt mabel.  -PH       Row Name 07/30/24 0957          Vital Signs    O2 Delivery Pre Treatment nasal cannula  -PH     O2 Delivery Intra Treatment nasal cannula  -PH     O2 Delivery Post Treatment nasal cannula  -PH       Row Name 07/30/24 0957          Positioning and Restraints    Pre-Treatment Position in bed  -PH     Post Treatment Position chair  -PH     In Chair reclined;call light within reach;encouraged to call for assist;exit alarm on;with nsg  -PH               User Key  (r) = Recorded By, (t) = Taken By, (c) = Cosigned By      Initials Name Provider Type    PH Liliana Saravia PTA Physical Therapist Assistant                   Outcome Measures       Row Name 07/30/24 1001 07/30/24 0900       How much help from another person do you currently need...    Turning from your back to your side while in flat bed without using bedrails? 2  -PH 2  -ES    Moving from lying on back to sitting on the side of a flat bed without bedrails? 2  -PH 2  -ES    Moving to and from a bed to a chair (including a wheelchair)? 2  -PH 2  -ES    Standing up from a chair using your arms (e.g., wheelchair, bedside chair)? 2  -PH 2  -ES    Climbing 3-5 steps with a railing? 1  -PH 1  -ES    To walk in hospital room? 1  -PH 2  -ES    AM-PAC 6 Clicks Score (PT) 10  -PH 11  -ES    Highest Level of Mobility Goal 4 --> Transfer to chair/commode  -PH 4 --> Transfer to chair/commode  -ES      Row Name 07/30/24 0855          How much help from another person do you currently need...    Turning from your back to your side while in flat bed without using bedrails? 2  -ES     Moving from lying on back to sitting on the side of a flat bed without bedrails? 2  -ES     Moving to and from a bed to a chair (including a wheelchair)? 2  -ES     Standing up from a chair using your arms (e.g., wheelchair, bedside chair)? 2  -ES     Climbing 3-5 steps with a railing? 1  -ES     To walk in hospital room? 2  -ES      AM-PAC 6 Clicks Score (PT) 11  -ES     Highest Level of Mobility Goal 4 --> Transfer to chair/commode  -ES       Row Name 07/30/24 1001          Functional Assessment    Outcome Measure Options AM-PAC 6 Clicks Basic Mobility (PT)  -               User Key  (r) = Recorded By, (t) = Taken By, (c) = Cosigned By      Initials Name Provider Type     Liliana Saravia PTA Physical Therapist Assistant    Hilda Morrison RN Registered Nurse                                 Physical Therapy Education       Title: PT OT SLP Therapies (In Progress)       Topic: Physical Therapy (In Progress)       Point: Mobility training (In Progress)       Learning Progress Summary             Patient Acceptance, E,TB, NR by  at 7/30/2024 1002    Acceptance, E, NR by  at 7/29/2024 1539                         Point: Home exercise program (In Progress)       Learning Progress Summary             Patient Acceptance, E, NR by  at 7/29/2024 1539                         Point: Body mechanics (In Progress)       Learning Progress Summary             Patient Acceptance, E,TB, NR by  at 7/30/2024 1002    Acceptance, E, NR by  at 7/29/2024 1539                         Point: Precautions (In Progress)       Learning Progress Summary             Patient Acceptance, E,TB, NR by  at 7/30/2024 1002    Acceptance, E, NR by  at 7/29/2024 1539                                         User Key       Initials Effective Dates Name Provider Type Discipline     06/16/21 -  Liliana Saravia PTA Physical Therapist Assistant PT     05/02/22 -  Rama Peralta PT Physical Therapist PT                  PT Recommendation and Plan     Plan of Care Reviewed With: patient, family  Progress: declining  Outcome Evaluation: Pt was seen by PT this AM for tx. RN in room and  used throughout session. Pt was very lethargic and req max A x 2 for supine to sit then sit to supine. Pt sat at EOB improving to SBA for sit bal. Pt would  not respond to questions or most directions through  during session. Pt was returned to bed after 2 sit to stand attempts by pt w/ no initiation noted. Pt began sitting back up to EOB req min A. Chair was pulled close and pt stood then transferred to chair w/ max A x 2/HHA x 2. Pt was UIC w/ RN in room at end of session. Per RN, pt is apparently more alert PM which PT will attempt in future sessions. PT will prog as pt mabel.     Time Calculation:         PT Charges       Row Name 07/30/24 1002             Time Calculation    Start Time 0902  -PH      Stop Time 0922  -PH      Time Calculation (min) 20 min  -PH      PT Received On 07/30/24  -PH      PT - Next Appointment 07/31/24  -PH         Timed Charges    08017 - PT Therapeutic Activity Minutes 20  -PH         Total Minutes    Timed Charges Total Minutes 20  -PH       Total Minutes 20  -PH                User Key  (r) = Recorded By, (t) = Taken By, (c) = Cosigned By      Initials Name Provider Type    PH Liliana Saravia PTA Physical Therapist Assistant                  Therapy Charges for Today       Code Description Service Date Service Provider Modifiers Qty    19871188939 HC PT THERAPEUTIC ACT EA 15 MIN 7/30/2024 Liliana Saravia PTA GP 1    34252804626 HC PT THER SUPP EA 15 MIN 7/30/2024 Liliana Saravia PTA GP 1            PT G-Codes  Outcome Measure Options: AM-PAC 6 Clicks Basic Mobility (PT)  AM-PAC 6 Clicks Score (PT): 10  AM-PAC 6 Clicks Score (OT): 9  Modified [place] Scale: 4 - Moderately severe disability.  Unable to walk without assistance, and unable to attend to own bodily needs without assistance.  PT Discharge Summary  Anticipated Discharge Disposition (PT): skilled nursing facility    Liliana Saravia PTA  7/30/2024

## 2024-07-30 NOTE — PROGRESS NOTES
"DAILY PROGRESS NOTE  [facility]    Patient Identification:  Name: Marcial Bernard  Age: 69 y.o.  Sex: male  :  1955  MRN: [id number]         Primary Care Physician: Justina Liriano APRN    Subjective:  Interval History: He is sleepy and lethargic.  He was able to stand up with physical therapy requiring a lot of assistance.  Nursing staff reports that he did make it to the chair today with PT.    Objective:    Scheduled Meds:amLODIPine, 10 mg, Oral, Q24H  carvedilol, 12.5 mg, Oral, BID With Meals  heparin (porcine), 5,000 Units, Subcutaneous, Q12H  hydrALAZINE, 50 mg, Oral, Q8H  insulin regular, 2-7 Units, Subcutaneous, 4x Daily AC & at Bedtime  levothyroxine, 100 mcg, Oral, Daily  senna-docusate sodium, 2 tablet, Oral, BID  sodium chloride, 10 mL, Intravenous, Q12H  torsemide, 100 mg, Oral, BID      Continuous Infusions:       Vital signs in last 24 hours:  Temp:  [98.1 °F (36.7 °C)-98.6 °F (37 °C)] 98.4 °F (36.9 °C)  Heart Rate:  [54-67] 58  Resp:  [18] 18  BP: (160-184)/(63-83) 163/63    Intake/Output:    Intake/Output Summary (Last 24 hours) at 2024 1511  Last data filed at 2024 1350  Gross per 24 hour   Intake 450 ml   Output 600 ml   Net -150 ml       Exam:  /63 (BP Location: Right arm, Patient Position: Sitting)   Pulse 58   Temp 98.4 °F (36.9 °C) (Oral)   Resp 18   Ht 175.3 cm (69\")   Wt 81.7 kg (180 lb 1.9 oz)   SpO2 100%   BMI 26.60 kg/m²     General Appearance:  Sleepy and lethargic, no distress   Head:    Normocephalic, without obvious abnormality, atraumatic   Eyes:       Throat:   Lips, tongue, gums normal   Neck:   Supple, symmetrical, trachea midline, no JVD   Lungs:     Clear to auscultation bilaterally, respirations unlabored   Chest Wall:    No tenderness or deformity    Heart:    Regular rate and rhythm, S1 and S2 normal, no murmur,no  rub or gallop   Abdomen:     Soft, nontender, bowel sounds active, no masses, no organomegaly    Extremities: "   Extremities normal, atraumatic, no cyanosis or edema   Pulses:      Skin:   Skin is warm and dry,  no rashes or palpable lesions   Neurologic:     Sleepy and lethargic      Lab Results (last 72 hours)       Procedure Component Value Units Date/Time    POC Glucose Once [[id number]]  (Abnormal) Collected: 07/28/24 1102    Specimen: Blood Updated: 07/28/24 1104     Glucose 136 mg/dL     Comprehensive Metabolic Panel [[id number]]  (Abnormal) Collected: 07/28/24 0605    Specimen: Blood Updated: 07/28/24 0723     Glucose 107 mg/dL      BUN 71 mg/dL      Creatinine 4.50 mg/dL      Sodium 130 mmol/L      Potassium 4.1 mmol/L      Comment: Slight hemolysis detected by analyzer. Result may be falsely elevated.        Chloride 92 mmol/L      CO2 23.6 mmol/L      Calcium 8.0 mg/dL      Total Protein 6.4 g/dL      Albumin 2.9 g/dL      ALT (SGPT) 17 U/L      AST (SGOT) 22 U/L      Alkaline Phosphatase 89 U/L      Total Bilirubin 0.2 mg/dL      Globulin 3.5 gm/dL      A/G Ratio 0.8 g/dL      BUN/Creatinine Ratio 15.8     Anion Gap 14.4 mmol/L      eGFR 13.3 mL/min/1.73      Comment: <15 Indicative of kidney failure       Narrative:      GFR Normal >60  Chronic Kidney Disease <60  Kidney Failure <15      Magnesium [[id number]]  (Abnormal) Collected: 07/28/24 0605    Specimen: Blood Updated: 07/28/24 0723     Magnesium 4.2 mg/dL     Uric Acid [[id number]]  (Abnormal) Collected: 07/28/24 0605    Specimen: Blood Updated: 07/28/24 0712     Uric Acid 8.9 mg/dL     Phosphorus [[id number]]  (Abnormal) Collected: 07/28/24 0605    Specimen: Blood Updated: 07/28/24 0712     Phosphorus 6.1 mg/dL     CBC & Differential [[id number]]  (Abnormal) Collected: 07/28/24 0605    Specimen: Blood Updated: 07/28/24 0655    Narrative:      The following orders were created for panel order CBC & Differential.  Procedure                               Abnormality         Status                     ---------                               -----------          ------                     CBC Auto Differential[[id number]]        Abnormal            Final result                 Please view results for these tests on the individual orders.    CBC Auto Differential [[id number]]  (Abnormal) Collected: 07/28/24 0605    Specimen: Blood Updated: 07/28/24 0655     WBC 9.07 10*3/mm3      RBC 3.59 10*6/mm3      Hemoglobin 10.5 g/dL      Hematocrit 31.3 %      MCV 87.2 fL      MCH 29.2 pg      MCHC 33.5 g/dL      RDW 14.0 %      RDW-SD 44.2 fl      MPV 9.9 fL      Platelets 383 10*3/mm3      Neutrophil % 69.9 %      Lymphocyte % 11.5 %      Monocyte % 9.9 %      Eosinophil % 7.2 %      Basophil % 0.6 %      Immature Grans % 0.9 %      Neutrophils, Absolute 6.35 10*3/mm3      Lymphocytes, Absolute 1.04 10*3/mm3      Monocytes, Absolute 0.90 10*3/mm3      Eosinophils, Absolute 0.65 10*3/mm3      Basophils, Absolute 0.05 10*3/mm3      Immature Grans, Absolute 0.08 10*3/mm3      nRBC 0.0 /100 WBC     POC Glucose Once [[id number]]  (Normal) Collected: 07/28/24 0606    Specimen: Blood Updated: 07/28/24 0611     Glucose 118 mg/dL     POC Glucose Once [[id number]]  (Normal) Collected: 07/27/24 2102    Specimen: Blood Updated: 07/27/24 2103     Glucose 112 mg/dL     Basic Metabolic Panel [[id number]]  (Abnormal) Collected: 07/27/24 1825    Specimen: Blood Updated: 07/27/24 1855     Glucose 154 mg/dL      BUN 73 mg/dL      Creatinine 4.76 mg/dL      Sodium 130 mmol/L      Potassium 3.3 mmol/L      Chloride 92 mmol/L      CO2 25.9 mmol/L      Calcium 7.5 mg/dL      BUN/Creatinine Ratio 15.3     Anion Gap 12.1 mmol/L      eGFR 12.5 mL/min/1.73      Comment: <15 Indicative of kidney failure       Narrative:      GFR Normal >60  Chronic Kidney Disease <60  Kidney Failure <15      POC Glucose Once [[id number]]  (Abnormal) Collected: 07/27/24 1817    Specimen: Blood Updated: 07/27/24 1819     Glucose 175 mg/dL     POC Glucose Once [[id number]]  (Abnormal) Collected: 07/27/24 1647    Specimen: Blood Updated:  07/27/24 1652     Glucose 193 mg/dL     POC Glucose Once [[id number]]  (Abnormal) Collected: 07/27/24 1238    Specimen: Blood Updated: 07/27/24 1239     Glucose 135 mg/dL     POC Glucose Once [[id number]]  (Normal) Collected: 07/27/24 0634    Specimen: Blood Updated: 07/27/24 0635     Glucose 125 mg/dL     Basic Metabolic Panel [[id number]]  (Abnormal) Collected: 07/27/24 0252    Specimen: Blood Updated: 07/27/24 0338     Glucose 117 mg/dL      BUN 74 mg/dL      Creatinine 4.51 mg/dL      Sodium 130 mmol/L      Potassium 3.6 mmol/L      Chloride 90 mmol/L      CO2 25.2 mmol/L      Calcium 7.5 mg/dL      BUN/Creatinine Ratio 16.4     Anion Gap 14.8 mmol/L      eGFR 13.3 mL/min/1.73      Comment: <15 Indicative of kidney failure       Narrative:      GFR Normal >60  Chronic Kidney Disease <60  Kidney Failure <15      Comprehensive Metabolic Panel [[id number]]  (Abnormal) Collected: 07/27/24 0252    Specimen: Blood Updated: 07/27/24 0338     Glucose 117 mg/dL      BUN 74 mg/dL      Creatinine 4.51 mg/dL      Sodium 130 mmol/L      Potassium 3.6 mmol/L      Chloride 90 mmol/L      CO2 25.2 mmol/L      Calcium 7.5 mg/dL      Total Protein 5.4 g/dL      Albumin 2.6 g/dL      ALT (SGPT) 19 U/L      AST (SGOT) 18 U/L      Alkaline Phosphatase 75 U/L      Total Bilirubin <0.2 mg/dL      Globulin 2.8 gm/dL      A/G Ratio 0.9 g/dL      BUN/Creatinine Ratio 16.4     Anion Gap 14.8 mmol/L      eGFR 13.3 mL/min/1.73      Comment: <15 Indicative of kidney failure       Narrative:      GFR Normal >60  Chronic Kidney Disease <60  Kidney Failure <15      Phosphorus [[id number]]  (Abnormal) Collected: 07/27/24 0252    Specimen: Blood Updated: 07/27/24 0338     Phosphorus 6.5 mg/dL     Magnesium [[id number]]  (Abnormal) Collected: 07/27/24 0252    Specimen: Blood Updated: 07/27/24 0338     Magnesium 4.2 mg/dL     Uric Acid [[id number]]  (Abnormal) Collected: 07/27/24 0252    Specimen: Blood Updated: 07/27/24 0338     Uric Acid 8.5 mg/dL      CBC & Differential [[id number]]  (Abnormal) Collected: 07/27/24 0252    Specimen: Blood Updated: 07/27/24 0320    Narrative:      The following orders were created for panel order CBC & Differential.  Procedure                               Abnormality         Status                     ---------                               -----------         ------                     CBC Auto Differential[[id number]]        Abnormal            Final result                 Please view results for these tests on the individual orders.    CBC Auto Differential [[id number]]  (Abnormal) Collected: 07/27/24 0252    Specimen: Blood Updated: 07/27/24 0320     WBC 9.13 10*3/mm3      RBC 3.03 10*6/mm3      Hemoglobin 8.8 g/dL      Hematocrit 26.5 %      MCV 87.5 fL      MCH 29.0 pg      MCHC 33.2 g/dL      RDW 14.4 %      RDW-SD 45.6 fl      MPV 9.5 fL      Platelets 395 10*3/mm3      Neutrophil % 73.2 %      Lymphocyte % 8.5 %      Monocyte % 11.0 %      Eosinophil % 6.1 %      Basophil % 0.4 %      Immature Grans % 0.8 %      Neutrophils, Absolute 6.68 10*3/mm3      Lymphocytes, Absolute 0.78 10*3/mm3      Monocytes, Absolute 1.00 10*3/mm3      Eosinophils, Absolute 0.56 10*3/mm3      Basophils, Absolute 0.04 10*3/mm3      Immature Grans, Absolute 0.07 10*3/mm3      nRBC 0.0 /100 WBC     Basic Metabolic Panel [[id number]]  (Abnormal) Collected: 07/27/24 0056    Specimen: Blood Updated: 07/27/24 0134     Glucose 122 mg/dL      BUN 74 mg/dL      Creatinine 4.77 mg/dL      Sodium 130 mmol/L      Potassium 3.9 mmol/L      Chloride 90 mmol/L      CO2 26.8 mmol/L      Calcium 7.5 mg/dL      BUN/Creatinine Ratio 15.5     Anion Gap 13.2 mmol/L      eGFR 12.4 mL/min/1.73      Comment: <15 Indicative of kidney failure       Narrative:      GFR Normal >60  Chronic Kidney Disease <60  Kidney Failure <15      POC Glucose Once [[id number]]  (Normal) Collected: 07/27/24 0034    Specimen: Blood Updated: 07/27/24 0035     Glucose 128 mg/dL     POC Glucose  Once [[id number]]  (Abnormal) Collected: 07/26/24 1843    Specimen: Blood Updated: 07/26/24 1855     Glucose 136 mg/dL     Basic Metabolic Panel [[id number]]  (Abnormal) Collected: 07/26/24 1451    Specimen: Blood Updated: 07/26/24 1524     Glucose 153 mg/dL      BUN 75 mg/dL      Creatinine 4.94 mg/dL      Sodium 127 mmol/L      Potassium 3.3 mmol/L      Chloride 88 mmol/L      CO2 25.8 mmol/L      Calcium 7.6 mg/dL      BUN/Creatinine Ratio 15.2     Anion Gap 13.2 mmol/L      eGFR 11.9 mL/min/1.73      Comment: <15 Indicative of kidney failure       Narrative:      GFR Normal >60  Chronic Kidney Disease <60  Kidney Failure <15      POC Glucose Once [[id number]]  (Normal) Collected: 07/26/24 1211    Specimen: Blood Updated: 07/26/24 1230     Glucose 128 mg/dL     POC Glucose Once [[id number]]  (Normal) Collected: 07/26/24 0604    Specimen: Blood Updated: 07/26/24 0605     Glucose 104 mg/dL     Renal Function Panel [[id number]]  (Abnormal) Collected: 07/26/24 0415    Specimen: Blood Updated: 07/26/24 0533     Glucose 106 mg/dL      BUN 73 mg/dL      Creatinine 4.50 mg/dL      Sodium 126 mmol/L      Potassium 3.4 mmol/L      Comment: Slight hemolysis detected by analyzer. Result may be falsely elevated.        Chloride 87 mmol/L      CO2 24.6 mmol/L      Calcium 7.7 mg/dL      Albumin 2.8 g/dL      Phosphorus 7.0 mg/dL      Anion Gap 14.4 mmol/L      BUN/Creatinine Ratio 16.2     eGFR 13.3 mL/min/1.73      Comment: <15 Indicative of kidney failure       Narrative:      GFR Normal >60  Chronic Kidney Disease <60  Kidney Failure <15      Magnesium [[id number]]  (Abnormal) Collected: 07/26/24 0415    Specimen: Blood Updated: 07/26/24 0533     Magnesium 4.6 mg/dL     Uric Acid [[id number]]  (Abnormal) Collected: 07/26/24 0415    Specimen: Blood Updated: 07/26/24 0533     Uric Acid 8.7 mg/dL     CBC & Differential [[id number]]  (Abnormal) Collected: 07/26/24 0415    Specimen: Blood Updated: 07/26/24 0512    Narrative:      The  following orders were created for panel order CBC & Differential.  Procedure                               Abnormality         Status                     ---------                               -----------         ------                     CBC Auto Differential[[id number]]        Abnormal            Final result                 Please view results for these tests on the individual orders.    CBC Auto Differential [[id number]]  (Abnormal) Collected: 07/26/24 0415    Specimen: Blood Updated: 07/26/24 0512     WBC 8.80 10*3/mm3      RBC 3.19 10*6/mm3      Hemoglobin 9.4 g/dL      Hematocrit 28.0 %      MCV 87.8 fL      MCH 29.5 pg      MCHC 33.6 g/dL      RDW 14.6 %      RDW-SD 46.9 fl      MPV 10.2 fL      Platelets 446 10*3/mm3      Neutrophil % 71.7 %      Lymphocyte % 9.5 %      Monocyte % 12.3 %      Eosinophil % 5.1 %      Basophil % 0.5 %      Immature Grans % 0.9 %      Neutrophils, Absolute 6.31 10*3/mm3      Lymphocytes, Absolute 0.84 10*3/mm3      Monocytes, Absolute 1.08 10*3/mm3      Eosinophils, Absolute 0.45 10*3/mm3      Basophils, Absolute 0.04 10*3/mm3      Immature Grans, Absolute 0.08 10*3/mm3      nRBC 0.0 /100 WBC     Basic Metabolic Panel [[id number]]  (Abnormal) Collected: 07/26/24 0002    Specimen: Blood Updated: 07/26/24 0045     Glucose 105 mg/dL      BUN 78 mg/dL      Creatinine 4.80 mg/dL      Sodium 124 mmol/L      Potassium 3.7 mmol/L      Comment: Slight hemolysis detected by analyzer. Result may be falsely elevated.        Chloride 86 mmol/L      CO2 24.0 mmol/L      Calcium 7.4 mg/dL      BUN/Creatinine Ratio 16.3     Anion Gap 14.0 mmol/L      eGFR 12.3 mL/min/1.73      Comment: <15 Indicative of kidney failure       Narrative:      GFR Normal >60  Chronic Kidney Disease <60  Kidney Failure <15      POC Glucose Once [[id number]]  (Normal) Collected: 07/26/24 0011    Specimen: Blood Updated: 07/26/24 0013     Glucose 112 mg/dL     POC Glucose Once [[id number]]  (Abnormal) Collected:  07/25/24 1800    Specimen: Blood Updated: 07/25/24 1829     Glucose 167 mg/dL     Basic Metabolic Panel [[id number]]  (Abnormal) Collected: 07/25/24 1658    Specimen: Blood Updated: 07/25/24 1733     Glucose 108 mg/dL      BUN 80 mg/dL      Creatinine 4.53 mg/dL      Sodium 123 mmol/L      Potassium 3.9 mmol/L      Chloride 84 mmol/L      CO2 21.6 mmol/L      Calcium 7.4 mg/dL      BUN/Creatinine Ratio 17.7     Anion Gap 17.4 mmol/L      eGFR 13.2 mL/min/1.73      Comment: <15 Indicative of kidney failure       Narrative:      GFR Normal >60  Chronic Kidney Disease <60  Kidney Failure <15            Data Review:  Results from last 7 days   Lab Units 07/30/24  0615 07/29/24  0526 07/28/24  0605   SODIUM mmol/L 138 137 130*   POTASSIUM mmol/L 3.1* 3.5 4.1   CHLORIDE mmol/L 95* 93* 92*   CO2 mmol/L 30.0* 27.9 23.6   BUN mg/dL 72* 68* 71*   CREATININE mg/dL 4.44* 4.73* 4.50*   GLUCOSE mg/dL 114* 115* 107*   CALCIUM mg/dL 8.5* 8.1* 8.0*     Results from last 7 days   Lab Units 07/30/24  0616 07/29/24  0526 07/28/24  0605   WBC 10*3/mm3 8.78 10.06 9.07   HEMOGLOBIN g/dL 9.7* 10.1* 10.5*   HEMATOCRIT % 30.0* 30.5* 31.3*   PLATELETS 10*3/mm3 366 373 383                 Lab Results   Lab Value Date/Time    TROPONINT 78 (C) 07/23/2024 0251    TROPONINT 87 (C) 07/23/2024 0110         Results from last 7 days   Lab Units 07/29/24  0526 07/28/24  0605 07/27/24  0252   ALK PHOS U/L 85 89 75   BILIRUBIN mg/dL <0.2 0.2 <0.2   ALT (SGPT) U/L 16 17 19   AST (SGOT) U/L 18 22 18                 Glucose   Date/Time Value Ref Range Status   07/30/2024 1102 174 (H) 70 - 130 mg/dL Final   07/30/2024 0623 117 70 - 130 mg/dL Final   07/29/2024 2116 135 (H) 70 - 130 mg/dL Final   07/29/2024 1600 200 (H) 70 - 130 mg/dL Final   07/29/2024 1105 147 (H) 70 - 130 mg/dL Final   07/29/2024 0609 133 (H) 70 - 130 mg/dL Final   07/28/2024 2059 150 (H) 70 - 130 mg/dL Final   07/28/2024 1624 155 (H) 70 - 130 mg/dL Final           Past Medical History:    Diagnosis Date    Asthma     Dementia     Renal disorder        Assessment:  Active Hospital Problems    Diagnosis  POA    **Chronic renal failure (CRF), stage 4 (severe) [N18.4]  Unknown    Hyponatremia [E87.1]  Unknown    Acute pulmonary edema with congestive heart failure [I50.1]  Unknown    Anemia due to stage 4 chronic kidney disease [N18.4, D63.1]  Unknown    Elevated troponin level not due myocardial infarction [R79.89]  Unknown      Resolved Hospital Problems   No resolved problems to display.       Plan:  Continue current medication.  Plans as per cardiology and nephrology.  Follow-up labs.  DC planning.  Hopefully he can get well enough to go home.  Medicine can take over for primary care.  PT and OT ordered.  DC planning.  He has no insurance and family is hoping he will get well enough that they can take care of him at home.  To be determined.    Chavo Martins MD  7/30/2024  15:11 EDT

## 2024-07-31 ENCOUNTER — APPOINTMENT (OUTPATIENT)
Dept: CT IMAGING | Facility: HOSPITAL | Age: 69
DRG: 682 | End: 2024-07-31

## 2024-07-31 PROBLEM — F03.90 DEMENTIA: Status: ACTIVE | Noted: 2024-07-31

## 2024-07-31 LAB
ALBUMIN SERPL-MCNC: 3.1 G/DL (ref 3.5–5.2)
ANION GAP SERPL CALCULATED.3IONS-SCNC: 16 MMOL/L (ref 5–15)
BASOPHILS # BLD AUTO: 0.05 10*3/MM3 (ref 0–0.2)
BASOPHILS NFR BLD AUTO: 0.5 % (ref 0–1.5)
BUN SERPL-MCNC: 75 MG/DL (ref 8–23)
BUN/CREAT SERPL: 17.4 (ref 7–25)
CALCIUM SPEC-SCNC: 8.8 MG/DL (ref 8.6–10.5)
CHLORIDE SERPL-SCNC: 95 MMOL/L (ref 98–107)
CO2 SERPL-SCNC: 28 MMOL/L (ref 22–29)
CREAT SERPL-MCNC: 4.32 MG/DL (ref 0.76–1.27)
DEPRECATED RDW RBC AUTO: 46.6 FL (ref 37–54)
EGFRCR SERPLBLD CKD-EPI 2021: 14 ML/MIN/1.73
EOSINOPHIL # BLD AUTO: 0.63 10*3/MM3 (ref 0–0.4)
EOSINOPHIL NFR BLD AUTO: 6.8 % (ref 0.3–6.2)
ERYTHROCYTE [DISTWIDTH] IN BLOOD BY AUTOMATED COUNT: 14.5 % (ref 12.3–15.4)
GLUCOSE BLDC GLUCOMTR-MCNC: 114 MG/DL (ref 70–130)
GLUCOSE BLDC GLUCOMTR-MCNC: 159 MG/DL (ref 70–130)
GLUCOSE BLDC GLUCOMTR-MCNC: 161 MG/DL (ref 70–130)
GLUCOSE BLDC GLUCOMTR-MCNC: 206 MG/DL (ref 70–130)
GLUCOSE SERPL-MCNC: 124 MG/DL (ref 65–99)
HCT VFR BLD AUTO: 31 % (ref 37.5–51)
HGB BLD-MCNC: 10.2 G/DL (ref 13–17.7)
IMM GRANULOCYTES # BLD AUTO: 0.06 10*3/MM3 (ref 0–0.05)
IMM GRANULOCYTES NFR BLD AUTO: 0.6 % (ref 0–0.5)
LYMPHOCYTES # BLD AUTO: 1.12 10*3/MM3 (ref 0.7–3.1)
LYMPHOCYTES NFR BLD AUTO: 12 % (ref 19.6–45.3)
MAGNESIUM SERPL-MCNC: 3.6 MG/DL (ref 1.6–2.4)
MCH RBC QN AUTO: 29.4 PG (ref 26.6–33)
MCHC RBC AUTO-ENTMCNC: 32.9 G/DL (ref 31.5–35.7)
MCV RBC AUTO: 89.3 FL (ref 79–97)
MONOCYTES # BLD AUTO: 0.78 10*3/MM3 (ref 0.1–0.9)
MONOCYTES NFR BLD AUTO: 8.4 % (ref 5–12)
NEUTROPHILS NFR BLD AUTO: 6.67 10*3/MM3 (ref 1.7–7)
NEUTROPHILS NFR BLD AUTO: 71.7 % (ref 42.7–76)
NRBC BLD AUTO-RTO: 0 /100 WBC (ref 0–0.2)
PHOSPHATE SERPL-MCNC: 5.2 MG/DL (ref 2.5–4.5)
PLATELET # BLD AUTO: 375 10*3/MM3 (ref 140–450)
PMV BLD AUTO: 10.1 FL (ref 6–12)
POTASSIUM SERPL-SCNC: 3.2 MMOL/L (ref 3.5–5.2)
RBC # BLD AUTO: 3.47 10*6/MM3 (ref 4.14–5.8)
SODIUM SERPL-SCNC: 139 MMOL/L (ref 136–145)
URATE SERPL-MCNC: 9.9 MG/DL (ref 3.4–7)
WBC NRBC COR # BLD AUTO: 9.31 10*3/MM3 (ref 3.4–10.8)

## 2024-07-31 PROCEDURE — 84550 ASSAY OF BLOOD/URIC ACID: CPT | Performed by: INTERNAL MEDICINE

## 2024-07-31 PROCEDURE — 99232 SBSQ HOSP IP/OBS MODERATE 35: CPT | Performed by: INTERNAL MEDICINE

## 2024-07-31 PROCEDURE — 85025 COMPLETE CBC W/AUTO DIFF WBC: CPT | Performed by: INTERNAL MEDICINE

## 2024-07-31 PROCEDURE — 83735 ASSAY OF MAGNESIUM: CPT | Performed by: INTERNAL MEDICINE

## 2024-07-31 PROCEDURE — 25010000002 HYDRALAZINE PER 20 MG: Performed by: INTERNAL MEDICINE

## 2024-07-31 PROCEDURE — 97530 THERAPEUTIC ACTIVITIES: CPT

## 2024-07-31 PROCEDURE — 80069 RENAL FUNCTION PANEL: CPT | Performed by: INTERNAL MEDICINE

## 2024-07-31 PROCEDURE — 82948 REAGENT STRIP/BLOOD GLUCOSE: CPT

## 2024-07-31 PROCEDURE — 70450 CT HEAD/BRAIN W/O DYE: CPT

## 2024-07-31 PROCEDURE — 63710000001 INSULIN REGULAR HUMAN PER 5 UNITS: Performed by: INTERNAL MEDICINE

## 2024-07-31 PROCEDURE — 25010000002 HEPARIN (PORCINE) PER 1000 UNITS: Performed by: INTERNAL MEDICINE

## 2024-07-31 RX ORDER — POTASSIUM CHLORIDE 750 MG/1
40 TABLET, FILM COATED, EXTENDED RELEASE ORAL ONCE
Status: DISCONTINUED | OUTPATIENT
Start: 2024-07-31 | End: 2024-07-31

## 2024-07-31 RX ORDER — POTASSIUM CHLORIDE 750 MG/1
40 TABLET, FILM COATED, EXTENDED RELEASE ORAL
Status: COMPLETED | OUTPATIENT
Start: 2024-07-31 | End: 2024-07-31

## 2024-07-31 RX ORDER — HYDRALAZINE HYDROCHLORIDE 50 MG/1
100 TABLET, FILM COATED ORAL EVERY 8 HOURS SCHEDULED
Status: DISCONTINUED | OUTPATIENT
Start: 2024-07-31 | End: 2024-08-13 | Stop reason: HOSPADM

## 2024-07-31 RX ADMIN — TORSEMIDE 100 MG: 100 TABLET ORAL at 09:03

## 2024-07-31 RX ADMIN — INSULIN HUMAN 2 UNITS: 100 INJECTION, SOLUTION PARENTERAL at 13:32

## 2024-07-31 RX ADMIN — POTASSIUM CHLORIDE 40 MEQ: 750 TABLET, EXTENDED RELEASE ORAL at 09:03

## 2024-07-31 RX ADMIN — HEPARIN SODIUM 5000 UNITS: 5000 INJECTION INTRAVENOUS; SUBCUTANEOUS at 20:08

## 2024-07-31 RX ADMIN — Medication 10 ML: at 20:08

## 2024-07-31 RX ADMIN — CARVEDILOL 12.5 MG: 12.5 TABLET, FILM COATED ORAL at 17:41

## 2024-07-31 RX ADMIN — TORSEMIDE 100 MG: 100 TABLET ORAL at 17:40

## 2024-07-31 RX ADMIN — CARVEDILOL 12.5 MG: 12.5 TABLET, FILM COATED ORAL at 09:03

## 2024-07-31 RX ADMIN — HEPARIN SODIUM 5000 UNITS: 5000 INJECTION INTRAVENOUS; SUBCUTANEOUS at 09:04

## 2024-07-31 RX ADMIN — INSULIN HUMAN 2 UNITS: 100 INJECTION, SOLUTION PARENTERAL at 17:41

## 2024-07-31 RX ADMIN — HYDRALAZINE HYDROCHLORIDE 20 MG: 20 INJECTION INTRAMUSCULAR; INTRAVENOUS at 00:09

## 2024-07-31 RX ADMIN — HYDRALAZINE HYDROCHLORIDE 100 MG: 50 TABLET ORAL at 09:02

## 2024-07-31 RX ADMIN — ACETAMINOPHEN 325MG 650 MG: 325 TABLET ORAL at 09:03

## 2024-07-31 RX ADMIN — Medication 10 ML: at 09:04

## 2024-07-31 RX ADMIN — AMLODIPINE BESYLATE 10 MG: 10 TABLET ORAL at 09:02

## 2024-07-31 RX ADMIN — HYDRALAZINE HYDROCHLORIDE 50 MG: 50 TABLET ORAL at 00:47

## 2024-07-31 RX ADMIN — LEVOTHYROXINE SODIUM 100 MCG: 100 TABLET ORAL at 09:02

## 2024-07-31 RX ADMIN — HYDRALAZINE HYDROCHLORIDE 20 MG: 20 INJECTION INTRAMUSCULAR; INTRAVENOUS at 04:24

## 2024-07-31 RX ADMIN — INSULIN HUMAN 3 UNITS: 100 INJECTION, SOLUTION PARENTERAL at 20:07

## 2024-07-31 RX ADMIN — POTASSIUM CHLORIDE 40 MEQ: 750 TABLET, EXTENDED RELEASE ORAL at 13:31

## 2024-07-31 RX ADMIN — HYDRALAZINE HYDROCHLORIDE 100 MG: 50 TABLET ORAL at 17:40

## 2024-07-31 NOTE — THERAPY TREATMENT NOTE
Patient Name: Marcial Bernard  : 1955    MRN: [id number]                              Today's Date: 2024       Admit Date: 2024    Visit Dx:     ICD-10-CM ICD-9-CM   1. Chronic renal failure (CRF), stage 4 (severe)  N18.4 585.4   2. Hyponatremia  E87.1 276.1   3. Acute pulmonary edema with congestive heart failure  I50.1 428.1   4. Anemia due to stage 4 chronic kidney disease  N18.4 285.21    D63.1 585.4   5. Elevated troponin level not due myocardial infarction  R79.89 790.6     Patient Active Problem List   Diagnosis    Chronic renal failure (CRF), stage 4 (severe)    Hyponatremia    Acute pulmonary edema with congestive heart failure    Anemia due to stage 4 chronic kidney disease    Elevated troponin level not due myocardial infarction     Past Medical History:   Diagnosis Date    Asthma     Dementia     Renal disorder      History reviewed. No pertinent surgical history.   General Information       Row Name 24 1527          Physical Therapy Time and Intention    Document Type therapy note (daily note)  -DJ     Mode of Treatment co-treatment;physical therapy;occupational therapy  cotx appropriate due to level of assist required and decreased activity tolerance  -DJ       Row Name 24 1527          General Information    Patient Profile Reviewed yes  -DJ     Existing Precautions/Restrictions fall  -DJ       Row Name 24 1527          Cognition    Orientation Status (Cognition) unable/difficult to assess;other (see comments)  language barrier, slow to respond, wife and carol present to help translate  -DJ       Row Name 24 1527          Safety Issues, Functional Mobility    Comment, Safety Issues/Impairments (Mobility) gt belt, nonskid socks  -DJ               User Key  (r) = Recorded By, (t) = Taken By, (c) = Cosigned By      Initials Name Provider Type    Leti Cedeño, PT Physical Therapist                   Mobility       Row Name 24 1531          Bed Mobility    Bed  Mobility supine-sit;sit-supine  -DJ     Supine-Sit [place] (Bed Mobility) maximum assist (25% patient effort);2 person assist;verbal cues;nonverbal cues (demo/gesture)  -DJ     Sit-Supine [place] (Bed Mobility) maximum assist (25% patient effort);2 person assist;verbal cues;nonverbal cues (demo/gesture)  -DJ     Assistive Device (Bed Mobility) head of bed elevated;bed rails  -DJ     Comment, (Bed Mobility) slow to respond and move, cues for sequencing  -DJ       Row Name 07/31/24 1531          Transfers    Comment, (Transfers) sit/stand from EOB x 3 attempts  -DJ       Row Name 07/31/24 1531          Bed-Chair Transfer    Bed-Chair [place] (Transfers) unable to assess  -DJ       Row Name 07/31/24 1531          Sit-Stand Transfer    Sit-Stand [place] (Transfers) minimum assist (75% patient effort);moderate assist (50% patient effort);2 person assist;verbal cues;other (see comments)  no AD  -DJ     Assistive Device (Sit-Stand Transfers) other (see comments)  no AD  -DJ       Row Name 07/31/24 1531          Gait/Stairs (Locomotion)    [place] Level (Gait) moderate assist (50% patient effort);maximum assist (25% patient effort);2 person assist;verbal cues  -DJ     Assistive Device (Gait) other (see comments)  no AD  -DJ     Distance in Feet (Gait) 3  small shuffled sidesteps  -DJ     Deviations/Abnormal Patterns (Gait) festinating/shuffling;stride length decreased  -DJ     Bilateral Gait Deviations forward flexed posture  -DJ     [place] Level (Stairs) not tested  -DJ     Comment, (Gait/Stairs) Pt took 3 small shuffled sidesteps with mod-max A Of 2 without AD; unsteady balance with legs buckling; poor endurance  -DJ               User Key  (r) = Recorded By, (t) = Taken By, (c) = Cosigned By      Initials Name Provider Type    Leti Cedeño PT Physical Therapist                   Obj/Interventions       Row Name 07/31/24 1536          Motor Skills    Motor Skills functional endurance   -DJ     Functional Endurance poor  -DJ     Therapeutic Exercise other (see comments)  pt unable to follow commdands for ther ex  -DJ       Row Name 07/31/24 1536          Balance    Balance Assessment standing static balance;standing dynamic balance;sitting dynamic balance  -DJ     Dynamic Sitting Balance minimal assist;verbal cues  -DJ     Position, Sitting Balance supported;sitting edge of bed  -DJ     Static Standing Balance minimal assist;2-person assist;verbal cues  -DJ     Dynamic Standing Balance moderate assist;maximum assist;2-person assist;verbal cues  -DJ     Position/Device Used, Standing Balance walker, front-wheeled  -DJ     Balance Interventions sitting;standing;sit to stand;supported;weight shifting activity  -DJ     Comment, Balance poor standing balance  -DJ               User Key  (r) = Recorded By, (t) = Taken By, (c) = Cosigned By      Initials Name Provider Type    Leti Cedeño, RAYSHAWN Physical Therapist                   Goals/Plan    No documentation.                  Clinical Impression       Row Name 07/31/24 1537          Pain    Pretreatment Pain Rating 0/10 - no pain  -DJ     Pre/Posttreatment Pain Comment Pt nonverbal today, flat affect  -DJ       Row Name 07/31/24 1537          Plan of Care Review    Plan of Care Reviewed With patient;spouse;daughter  -DJ     Progress no change  -DJ     Outcome Evaluation Pt resting in bed in NAD, awakened with assist from carol. Pt nonverbal today and presents with flat affect. He req max A of 2 to sit EOB with verbal and tactile cues for sequencing. Pt stood from EOB x 3 attempts with min-mod A Of 2 without AD. Pt took 3 small shuffled sidesteps with mod-max A Of 2 without AD; unsteady balance with legs buckling; poor endurance. Pt's wife attempted to encourage pt with vc as well as pulling his arms towards her which was unsafe due to pt's poor balance and legs buckling. Pt returned supine with max A Of 2. Overall, poor progress with mobility, flat  affect. Cont PT to address functional deficits and prepare for d/c as appropriate  -DJ       Row Name 07/31/24 1537          Therapy Assessment/Plan (PT)    Criteria for Skilled Interventions Met (PT) skilled treatment is necessary  -DJ       Row Name 07/31/24 1537          Vital Signs    O2 Delivery Pre Treatment room air  -DJ     O2 Delivery Intra Treatment room air  -DJ     O2 Delivery Post Treatment room air  -DJ     Pre Patient Position Supine  -DJ     Intra Patient Position Standing  -DJ     Post Patient Position Supine  -DJ       Row Name 07/31/24 1537          Positioning and Restraints    Pre-Treatment Position in bed  -DJ     Post Treatment Position bed  -DJ     In Bed notified nsg;supine;call light within reach;encouraged to call for assist;exit alarm on;with family/caregiver  -DJ               User Key  (r) = Recorded By, (t) = Taken By, (c) = Cosigned By      Initials Name Provider Type    Leti Cedeño, PT Physical Therapist                   Outcome Measures       Row Name 07/31/24 1543 07/31/24 1040       How much help from another person do you currently need...    Turning from your back to your side while in flat bed without using bedrails? 2  -DJ 2  -ES    Moving from lying on back to sitting on the side of a flat bed without bedrails? 2  -DJ 2  -ES    Moving to and from a bed to a chair (including a wheelchair)? 2  -DJ 2  -ES    Standing up from a chair using your arms (e.g., wheelchair, bedside chair)? 2  -DJ 2  -ES    Climbing 3-5 steps with a railing? 1  -DJ 1  -ES    To walk in hospital room? 1  -DJ 1  -ES    AM-PAC 6 Clicks Score (PT) 10  -DJ 10  -ES    Highest Level of Mobility Goal 4 --> Transfer to chair/commode  -DJ 4 --> Transfer to chair/commode  -ES      Row Name 07/31/24 0855          How much help from another person do you currently need...    Turning from your back to your side while in flat bed without using bedrails? 2  -ES     Moving from lying on back to sitting on the side  of a flat bed without bedrails? 2  -ES     Moving to and from a bed to a chair (including a wheelchair)? 2  -ES     Standing up from a chair using your arms (e.g., wheelchair, bedside chair)? 2  -ES     Climbing 3-5 steps with a railing? 1  -ES     To walk in hospital room? 1  -ES     AM-PAC 6 Clicks Score (PT) 10  -ES     Highest Level of Mobility Goal 4 --> Transfer to chair/commode  -ES       Row Name 07/31/24 1543 07/31/24 1136       Functional Assessment    Outcome Measure Options AM-PAC 6 Clicks Basic Mobility (PT)  -DJ AM-PAC 6 Clicks Daily Activity (OT)  -MM              User Key  (r) = Recorded By, (t) = Taken By, (c) = Cosigned By      Initials Name Provider Type    Leti Cedeño, PT Physical Therapist    Zehra Uribe OT Occupational Therapist    Hilda Morrison, RN Registered Nurse                                 Physical Therapy Education       Title: PT OT SLP Therapies (In Progress)       Topic: Physical Therapy (In Progress)       Point: Mobility training (In Progress)       Learning Progress Summary             Patient Acceptance, E, NL by MAYELIN at 7/31/2024 1544    Acceptance, E,TB, NR by  at 7/30/2024 1002    Acceptance, E, NR by  at 7/29/2024 1539   Family Acceptance, E, NL by  at 7/31/2024 1544                         Point: Home exercise program (In Progress)       Learning Progress Summary             Patient Acceptance, E, NL by MAYELIN at 7/31/2024 1544    Acceptance, E, NR by  at 7/29/2024 1539   Family Acceptance, E, NL by  at 7/31/2024 1544                         Point: Body mechanics (In Progress)       Learning Progress Summary             Patient Acceptance, E, NL by MAYELIN at 7/31/2024 1544    Acceptance, E,TB, NR by  at 7/30/2024 1002    Acceptance, E, NR by  at 7/29/2024 1539   Family Acceptance, E, NL by  at 7/31/2024 1544                         Point: Precautions (In Progress)       Learning Progress Summary             Patient Acceptance, E, NL by  at 7/31/2024  1544    Acceptance, E,TB, NR by  at 7/30/2024 1002    Acceptance, E, NR by  at 7/29/2024 1539   Family Acceptance, E, NL by  at 7/31/2024 1544                                         User Key       Initials Effective Dates Name Provider Type Discipline     06/16/21 -  Liliana Saravia, PTA Physical Therapist Assistant PT    MAYELIN 10/25/19 -  Leti Hopkins, RAYSHAWN Physical Therapist PT    JUAN F 05/02/22 -  Rama Peralta PT Physical Therapist PT                  PT Recommendation and Plan     Plan of Care Reviewed With: patient, spouse, daughter  Progress: no change  Outcome Evaluation: Pt resting in bed in NAD, awakened with assist from carol. Pt nonverbal today and presents with flat affect. He req max A of 2 to sit EOB with verbal and tactile cues for sequencing. Pt stood from EOB x 3 attempts with min-mod A Of 2 without AD. Pt took 3 small shuffled sidesteps with mod-max A Of 2 without AD; unsteady balance with legs buckling; poor endurance. Pt's wife attempted to encourage pt with vc as well as pulling his arms towards her which was unsafe due to pt's poor balance and legs buckling. Pt returned supine with max A Of 2. Overall, poor progress with mobility, flat affect. Cont PT to address functional deficits and prepare for d/c as appropriate     Time Calculation:         PT Charges       Row Name 07/31/24 1546             Time Calculation    Start Time 1054  -DJ      Stop Time 1112  -DJ      Time Calculation (min) 18 min  -DJ      PT Non-Billable Time (min) 10 min  -DJ      PT Received On 07/31/24  -DJ      PT - Next Appointment 08/01/24  -DJ                User Key  (r) = Recorded By, (t) = Taken By, (c) = Cosigned By      Initials Name Provider Type    Leti Cedeño, PT Physical Therapist                  Therapy Charges for Today       Code Description Service Date Service Provider Modifiers Qty    64346830003 HC PT THERAPEUTIC ACT EA 15 MIN 7/31/2024 Leti Hopkins, PT GP 1            PT G-Codes  Outcome  Measure Options: AM-PAC 6 Clicks Basic Mobility (PT)  AM-PAC 6 Clicks Score (PT): 10  AM-PAC 6 Clicks Score (OT): 9  Modified [place] Scale: 4 - Moderately severe disability.  Unable to walk without assistance, and unable to attend to own bodily needs without assistance.  PT Discharge Summary  Anticipated Discharge Disposition (PT): skilled nursing facility    Leti Hopkins, PT  7/31/2024

## 2024-07-31 NOTE — PLAN OF CARE
Goal Outcome Evaluation:  Plan of Care Reviewed With: patient, spouse, daughter        Progress: no change  Outcome Evaluation: Pt resting in bed in NAD, awakened with assist from carol. Pt nonverbal today and presents with flat affect. He req max A of 2 to sit EOB with verbal and tactile cues for sequencing. Pt stood from EOB x 3 attempts with min-mod A Of 2 without AD. Pt took 3 small shuffled sidesteps with mod-max A Of 2 without AD; unsteady balance with legs buckling; poor endurance. Pt's wife attempted to encourage pt with vc as well as pulling his arms towards her which was unsafe due to pt's poor balance and legs buckling. Pt returned supine with max A Of 2. Overall, poor progress with mobility, flat affect. Cont PT to address functional deficits and prepare for d/c as appropriate      Anticipated Discharge Disposition (PT): skilled nursing facility

## 2024-07-31 NOTE — PROGRESS NOTES
[facility] Progress Note      Patient Name: Marcial Bernard  : 1955  MRN: [id number]  Primary Care Physician:  Justina Liriano APRN  Date of admission: 2024    Subjective     Interval History:   Follow-up acute kidney injury on chronic kidney disease and hyponatremia    The patient is feeling better, more responsive, no chest pain or shortness of breath, no nausea or vomiting, edema resolved..    Review of Systems:   As noted above    Objective     Vitals:   Temp:  [97.7 °F (36.5 °C)-99 °F (37.2 °C)] 99 °F (37.2 °C)  Heart Rate:  [58-73] 73  Resp:  [16-18] 16  BP: (159-194)/(63-95) 181/76  Flow (L/min):  [2] 2    Intake/Output Summary (Last 24 hours) at 2024 0851  Last data filed at 2024 0843  Gross per 24 hour   Intake 1050 ml   Output 1450 ml   Net -400 ml       Physical Exam:    General Appearance: More awake, chronically ill, no acute distress   Skin: warm and dry  HEENT: oral mucosa normal, nonicteric sclera  Neck: Increased JVD  Lungs: Bilateral rhonchi and crackles, breathing effort not labored  Heart: RRR, no S3, no rub  Abdomen: soft, nontender, nondistended  : no palpable bladder  Extremities: No ankle edema lower extremity edema  Neuro: Moving all extremities    Scheduled Meds:     amLODIPine, 10 mg, Oral, Q24H  carvedilol, 12.5 mg, Oral, BID With Meals  heparin (porcine), 5,000 Units, Subcutaneous, Q12H  hydrALAZINE, 100 mg, Oral, Q8H  insulin regular, 2-7 Units, Subcutaneous, 4x Daily AC & at Bedtime  levothyroxine, 100 mcg, Oral, Daily  potassium chloride, 40 mEq, Oral, Q2H  senna-docusate sodium, 2 tablet, Oral, BID  sodium chloride, 10 mL, Intravenous, Q12H  torsemide, 100 mg, Oral, BID      IV Meds:          Results Reviewed:   I have personally reviewed the results from the time of this admission to 2024 08:51 EDT     Results from last 7 days   Lab Units 24  0432 24  0615 24  0526 24  0605 24  1825  07/27/24  0252   SODIUM mmol/L 139 138 137 130*   < > 130*  130*   POTASSIUM mmol/L 3.2* 3.1* 3.5 4.1   < > 3.6  3.6   CHLORIDE mmol/L 95* 95* 93* 92*   < > 90*  90*   CO2 mmol/L 28.0 30.0* 27.9 23.6   < > 25.2  25.2   BUN mg/dL 75* 72* 68* 71*   < > 74*  74*   CREATININE mg/dL 4.32* 4.44* 4.73* 4.50*   < > 4.51*  4.51*   CALCIUM mg/dL 8.8 8.5* 8.1* 8.0*   < > 7.5*  7.5*   BILIRUBIN mg/dL  --   --  <0.2 0.2  --  <0.2   ALK PHOS U/L  --   --  85 89  --  75   ALT (SGPT) U/L  --   --  16 17  --  19   AST (SGOT) U/L  --   --  18 22  --  18   GLUCOSE mg/dL 124* 114* 115* 107*   < > 117*  117*    < > = values in this interval not displayed.       Estimated Creatinine Clearance: 18.4 mL/min (A) (by C-G formula based on SCr of 4.32 mg/dL (H)).    Results from last 7 days   Lab Units 07/31/24 0432 07/30/24 0615 07/29/24  0526   MAGNESIUM mg/dL 3.6* 3.3* 3.7*   PHOSPHORUS mg/dL 5.2* 6.4* 6.1*       Results from last 7 days   Lab Units 07/31/24 0432 07/30/24  0615 07/29/24  0526 07/28/24  0605 07/27/24  0252 07/26/24  0415 07/25/24  0420   URIC ACID mg/dL 9.9* 9.8* 8.9* 8.9* 8.5* 8.7* 8.4*       Results from last 7 days   Lab Units 07/31/24  0432 07/30/24  0616 07/29/24  0526 07/28/24  0605 07/27/24  0252   WBC 10*3/mm3 9.31 8.78 10.06 9.07 9.13   HEMOGLOBIN g/dL 10.2* 9.7* 10.1* 10.5* 8.8*   PLATELETS 10*3/mm3 375 366 373 383 395             Assessment / Plan     ASSESSMENT:  Acute kidney injury on chronic kidney disease stage IV creatinine was 4.93 on admission, creatinine today 4.32, very stable, potassium 3.2,and sodium up to 139, patient has cardiorenal syndrome diuresing quite well  Chronic kidney disease stage IV baseline creatinine about 2.4-second diabetic and hypertensive glomerulosclerosis  Acute on chronic diastolic congestive heart failure with cardiorenal syndrome, volume status improving with diuresis  Diabetes mellitus type 2 with renal complication and diabetic retinopathy  Hypertension with chronic  kidney disease  Hypokalemia associate with diuretic potassium 3.2    PLAN:  Continue torsemide 100 mg twice daily  Replete potassium  No indication for dialysis at this time  Surveillance labs    I reviewed the chart and other providers notes, reviewed labs.  I discussed the case with the patient's son  Copied text in this note has been reviewed and is accurate as of 07/31/24.       Thank you for involving us in the care of Marcial Bernard.  Please feel free to call with any questions.    Mike Flannery MD  07/31/24  08:51 EDT    [facility]  [phone]    Please note that portions of this note were completed with a voice recognition program.

## 2024-07-31 NOTE — PROGRESS NOTES
LOS: 8 days   Patient Care Team:  Justina Liriano APRN as PCP - General (Nurse Practitioner)    Chief Complaint: Follow-up acute on chronic diastolic CHF, chronic kidney disease, hypertension.    Interval History: His volume status is better in general per report.  The patient really did not answer questions for me even with an  via tablet.  Blood pressure remains an issue and has been high.    Vital Signs:  Temp:  [97.7 °F (36.5 °C)-99 °F (37.2 °C)] 98.6 °F (37 °C)  Heart Rate:  [61-73] 73  Resp:  [16-18] 18  BP: (152-194)/(64-95) 152/84    Intake/Output Summary (Last 24 hours) at 7/31/2024 1505  Last data filed at 7/31/2024 1350  Gross per 24 hour   Intake 600 ml   Output 2025 ml   Net -1425 ml       Physical Exam:   General Appearance:    No acute distress, does not answer questions even with the    Lungs:     Bilateral rhonchi.    Heart:    Regular rhythm and normal rate.  No murmurs, gallops, or   rubs.   Abdomen:     Soft, nontender, nondistended.    Extremities:   No clubbing, cyanosis, or edema.     Results Review:    Results from last 7 days   Lab Units 07/31/24  0432   SODIUM mmol/L 139   POTASSIUM mmol/L 3.2*   CHLORIDE mmol/L 95*   CO2 mmol/L 28.0   BUN mg/dL 75*   CREATININE mg/dL 4.32*   GLUCOSE mg/dL 124*   CALCIUM mg/dL 8.8         Results from last 7 days   Lab Units 07/31/24  0432   WBC 10*3/mm3 9.31   HEMOGLOBIN g/dL 10.2*   HEMATOCRIT % 31.0*   PLATELETS 10*3/mm3 375             Results from last 7 days   Lab Units 07/31/24  0432   MAGNESIUM mg/dL 3.6*           I reviewed the patient's new clinical results.        Assessment:  1.  Acute on chronic diastolic CHF (secondary to hypertension and kidney disease)  2.  Multifactorial volume overload  3.  Acute kidney injury with stage IV chronic kidney disease  4.  Uncontrolled hypertension  5.  Sinus bradycardia  6.  Hyponatremia, resolved  7.  Hypoalbuminemia  8.  Anemia of chronic disease  9.  Dementia    Plan:  -Blood  pressure remains elevated for the most part.  He was in the 180s this morning when I saw him.  I increased his hydralazine to 100 mg 3 times a day from 50 mg 3 times a day this morning.  Continue Coreg 12.5 mg twice daily and amlodipine 10 mg/day.    -Coreg had to be decreased because of bradycardia.  Clonidine was also stopped on admission because of bradycardia.  Heart rate is improved.    -Continue torsemide 100 mg p.o. twice daily per Dr. Flannery.  Nephrology is mainly dosing diuretics currently.    -Hyponatremia has essentially resolved.  Hypokalemia is being addressed.    -Discussed with his family member at bedside today via the  on portable tablet.    Neymar Barriga MD  07/31/24  15:05 EDT

## 2024-07-31 NOTE — PLAN OF CARE
Goal Outcome Evaluation:  Plan of Care Reviewed With: patient, family        Progress: no change  Outcome Evaluation: Pt seen for OT/PT this date to maximize therapeutic benefit. Pt's spouse and daughter present on arrival and able to assist with translation throughout session. He requires max A x2 for bed mobility due to poor initiation of functional activity this date, poor processing and command following. Dep for LBD and toileting remains. He completed x3 STS with min-mod A x2 and HHAx2, last stand improved and able to demo lateral steps to HOB with mod A x2, increased assist required due to bilat knee weakness. He will continue to benefit from skilled OT to adddress overall weakness and impairements impacting overall (I) with ADLs. rec SNF      Anticipated Discharge Disposition (OT): skilled nursing facility

## 2024-07-31 NOTE — PROGRESS NOTES
"DAILY PROGRESS NOTE  [facility]    Patient Identification:  Name: Marcial Bernard  Age: 69 y.o.  Sex: male  :  1955  MRN: [id number]         Primary Care Physician: Justina Liriano APRN    Subjective:  Interval History: He is sleepy and lethargic.  He was able to stand up with physical therapy requiring a lot of assistance.  Nursing staff reports that he did make it to the chair today with PT.    Objective:    Scheduled Meds:amLODIPine, 10 mg, Oral, Q24H  carvedilol, 12.5 mg, Oral, BID With Meals  heparin (porcine), 5,000 Units, Subcutaneous, Q12H  hydrALAZINE, 100 mg, Oral, Q8H  insulin regular, 2-7 Units, Subcutaneous, 4x Daily AC & at Bedtime  levothyroxine, 100 mcg, Oral, Daily  senna-docusate sodium, 2 tablet, Oral, BID  sodium chloride, 10 mL, Intravenous, Q12H  torsemide, 100 mg, Oral, BID      Continuous Infusions:       Vital signs in last 24 hours:  Temp:  [97.7 °F (36.5 °C)-99 °F (37.2 °C)] 98.6 °F (37 °C)  Heart Rate:  [61-73] 73  Resp:  [16-18] 18  BP: (152-194)/(64-95) 152/84    Intake/Output:    Intake/Output Summary (Last 24 hours) at 2024 1627  Last data filed at 2024 1350  Gross per 24 hour   Intake 600 ml   Output 2025 ml   Net -1425 ml       Exam:  /84 (BP Location: Right arm, Patient Position: Lying)   Pulse 73   Temp 98.6 °F (37 °C) (Oral)   Resp 18   Ht 175.3 cm (69\")   Wt 81.7 kg (180 lb 1.9 oz)   SpO2 98%   BMI 26.60 kg/m²     General Appearance:  Sleepy and lethargic, no distress   Head:    Normocephalic, without obvious abnormality, atraumatic   Eyes:       Throat:   Lips, tongue, gums normal   Neck:   Supple, symmetrical, trachea midline, no JVD   Lungs:     Clear to auscultation bilaterally, respirations unlabored   Chest Wall:    No tenderness or deformity    Heart:    Regular rate and rhythm, S1 and S2 normal, no murmur,no  rub or gallop   Abdomen:     Soft, nontender, bowel sounds active, no masses, no organomegaly    Extremities:  "  Extremities normal, atraumatic, no cyanosis or edema   Pulses:      Skin:   Skin is warm and dry,  no rashes or palpable lesions   Neurologic:     Sleepy and lethargic      Lab Results (last 72 hours)       Procedure Component Value Units Date/Time    POC Glucose Once [[id number]]  (Abnormal) Collected: 07/28/24 1102    Specimen: Blood Updated: 07/28/24 1104     Glucose 136 mg/dL     Comprehensive Metabolic Panel [[id number]]  (Abnormal) Collected: 07/28/24 0605    Specimen: Blood Updated: 07/28/24 0723     Glucose 107 mg/dL      BUN 71 mg/dL      Creatinine 4.50 mg/dL      Sodium 130 mmol/L      Potassium 4.1 mmol/L      Comment: Slight hemolysis detected by analyzer. Result may be falsely elevated.        Chloride 92 mmol/L      CO2 23.6 mmol/L      Calcium 8.0 mg/dL      Total Protein 6.4 g/dL      Albumin 2.9 g/dL      ALT (SGPT) 17 U/L      AST (SGOT) 22 U/L      Alkaline Phosphatase 89 U/L      Total Bilirubin 0.2 mg/dL      Globulin 3.5 gm/dL      A/G Ratio 0.8 g/dL      BUN/Creatinine Ratio 15.8     Anion Gap 14.4 mmol/L      eGFR 13.3 mL/min/1.73      Comment: <15 Indicative of kidney failure       Narrative:      GFR Normal >60  Chronic Kidney Disease <60  Kidney Failure <15      Magnesium [[id number]]  (Abnormal) Collected: 07/28/24 0605    Specimen: Blood Updated: 07/28/24 0723     Magnesium 4.2 mg/dL     Uric Acid [[id number]]  (Abnormal) Collected: 07/28/24 0605    Specimen: Blood Updated: 07/28/24 0712     Uric Acid 8.9 mg/dL     Phosphorus [[id number]]  (Abnormal) Collected: 07/28/24 0605    Specimen: Blood Updated: 07/28/24 0712     Phosphorus 6.1 mg/dL     CBC & Differential [[id number]]  (Abnormal) Collected: 07/28/24 0605    Specimen: Blood Updated: 07/28/24 0655    Narrative:      The following orders were created for panel order CBC & Differential.  Procedure                               Abnormality         Status                     ---------                               -----------          ------                     CBC Auto Differential[[id number]]        Abnormal            Final result                 Please view results for these tests on the individual orders.    CBC Auto Differential [[id number]]  (Abnormal) Collected: 07/28/24 0605    Specimen: Blood Updated: 07/28/24 0655     WBC 9.07 10*3/mm3      RBC 3.59 10*6/mm3      Hemoglobin 10.5 g/dL      Hematocrit 31.3 %      MCV 87.2 fL      MCH 29.2 pg      MCHC 33.5 g/dL      RDW 14.0 %      RDW-SD 44.2 fl      MPV 9.9 fL      Platelets 383 10*3/mm3      Neutrophil % 69.9 %      Lymphocyte % 11.5 %      Monocyte % 9.9 %      Eosinophil % 7.2 %      Basophil % 0.6 %      Immature Grans % 0.9 %      Neutrophils, Absolute 6.35 10*3/mm3      Lymphocytes, Absolute 1.04 10*3/mm3      Monocytes, Absolute 0.90 10*3/mm3      Eosinophils, Absolute 0.65 10*3/mm3      Basophils, Absolute 0.05 10*3/mm3      Immature Grans, Absolute 0.08 10*3/mm3      nRBC 0.0 /100 WBC     POC Glucose Once [[id number]]  (Normal) Collected: 07/28/24 0606    Specimen: Blood Updated: 07/28/24 0611     Glucose 118 mg/dL     POC Glucose Once [[id number]]  (Normal) Collected: 07/27/24 2102    Specimen: Blood Updated: 07/27/24 2103     Glucose 112 mg/dL     Basic Metabolic Panel [[id number]]  (Abnormal) Collected: 07/27/24 1825    Specimen: Blood Updated: 07/27/24 1855     Glucose 154 mg/dL      BUN 73 mg/dL      Creatinine 4.76 mg/dL      Sodium 130 mmol/L      Potassium 3.3 mmol/L      Chloride 92 mmol/L      CO2 25.9 mmol/L      Calcium 7.5 mg/dL      BUN/Creatinine Ratio 15.3     Anion Gap 12.1 mmol/L      eGFR 12.5 mL/min/1.73      Comment: <15 Indicative of kidney failure       Narrative:      GFR Normal >60  Chronic Kidney Disease <60  Kidney Failure <15      POC Glucose Once [[id number]]  (Abnormal) Collected: 07/27/24 1817    Specimen: Blood Updated: 07/27/24 1819     Glucose 175 mg/dL     POC Glucose Once [[id number]]  (Abnormal) Collected: 07/27/24 1647    Specimen: Blood Updated:  07/27/24 1652     Glucose 193 mg/dL     POC Glucose Once [[id number]]  (Abnormal) Collected: 07/27/24 1238    Specimen: Blood Updated: 07/27/24 1239     Glucose 135 mg/dL     POC Glucose Once [[id number]]  (Normal) Collected: 07/27/24 0634    Specimen: Blood Updated: 07/27/24 0635     Glucose 125 mg/dL     Basic Metabolic Panel [[id number]]  (Abnormal) Collected: 07/27/24 0252    Specimen: Blood Updated: 07/27/24 0338     Glucose 117 mg/dL      BUN 74 mg/dL      Creatinine 4.51 mg/dL      Sodium 130 mmol/L      Potassium 3.6 mmol/L      Chloride 90 mmol/L      CO2 25.2 mmol/L      Calcium 7.5 mg/dL      BUN/Creatinine Ratio 16.4     Anion Gap 14.8 mmol/L      eGFR 13.3 mL/min/1.73      Comment: <15 Indicative of kidney failure       Narrative:      GFR Normal >60  Chronic Kidney Disease <60  Kidney Failure <15      Comprehensive Metabolic Panel [[id number]]  (Abnormal) Collected: 07/27/24 0252    Specimen: Blood Updated: 07/27/24 0338     Glucose 117 mg/dL      BUN 74 mg/dL      Creatinine 4.51 mg/dL      Sodium 130 mmol/L      Potassium 3.6 mmol/L      Chloride 90 mmol/L      CO2 25.2 mmol/L      Calcium 7.5 mg/dL      Total Protein 5.4 g/dL      Albumin 2.6 g/dL      ALT (SGPT) 19 U/L      AST (SGOT) 18 U/L      Alkaline Phosphatase 75 U/L      Total Bilirubin <0.2 mg/dL      Globulin 2.8 gm/dL      A/G Ratio 0.9 g/dL      BUN/Creatinine Ratio 16.4     Anion Gap 14.8 mmol/L      eGFR 13.3 mL/min/1.73      Comment: <15 Indicative of kidney failure       Narrative:      GFR Normal >60  Chronic Kidney Disease <60  Kidney Failure <15      Phosphorus [[id number]]  (Abnormal) Collected: 07/27/24 0252    Specimen: Blood Updated: 07/27/24 0338     Phosphorus 6.5 mg/dL     Magnesium [[id number]]  (Abnormal) Collected: 07/27/24 0252    Specimen: Blood Updated: 07/27/24 0338     Magnesium 4.2 mg/dL     Uric Acid [[id number]]  (Abnormal) Collected: 07/27/24 0252    Specimen: Blood Updated: 07/27/24 0338     Uric Acid 8.5 mg/dL      CBC & Differential [[id number]]  (Abnormal) Collected: 07/27/24 0252    Specimen: Blood Updated: 07/27/24 0320    Narrative:      The following orders were created for panel order CBC & Differential.  Procedure                               Abnormality         Status                     ---------                               -----------         ------                     CBC Auto Differential[[id number]]        Abnormal            Final result                 Please view results for these tests on the individual orders.    CBC Auto Differential [[id number]]  (Abnormal) Collected: 07/27/24 0252    Specimen: Blood Updated: 07/27/24 0320     WBC 9.13 10*3/mm3      RBC 3.03 10*6/mm3      Hemoglobin 8.8 g/dL      Hematocrit 26.5 %      MCV 87.5 fL      MCH 29.0 pg      MCHC 33.2 g/dL      RDW 14.4 %      RDW-SD 45.6 fl      MPV 9.5 fL      Platelets 395 10*3/mm3      Neutrophil % 73.2 %      Lymphocyte % 8.5 %      Monocyte % 11.0 %      Eosinophil % 6.1 %      Basophil % 0.4 %      Immature Grans % 0.8 %      Neutrophils, Absolute 6.68 10*3/mm3      Lymphocytes, Absolute 0.78 10*3/mm3      Monocytes, Absolute 1.00 10*3/mm3      Eosinophils, Absolute 0.56 10*3/mm3      Basophils, Absolute 0.04 10*3/mm3      Immature Grans, Absolute 0.07 10*3/mm3      nRBC 0.0 /100 WBC     Basic Metabolic Panel [[id number]]  (Abnormal) Collected: 07/27/24 0056    Specimen: Blood Updated: 07/27/24 0134     Glucose 122 mg/dL      BUN 74 mg/dL      Creatinine 4.77 mg/dL      Sodium 130 mmol/L      Potassium 3.9 mmol/L      Chloride 90 mmol/L      CO2 26.8 mmol/L      Calcium 7.5 mg/dL      BUN/Creatinine Ratio 15.5     Anion Gap 13.2 mmol/L      eGFR 12.4 mL/min/1.73      Comment: <15 Indicative of kidney failure       Narrative:      GFR Normal >60  Chronic Kidney Disease <60  Kidney Failure <15      POC Glucose Once [[id number]]  (Normal) Collected: 07/27/24 0034    Specimen: Blood Updated: 07/27/24 0035     Glucose 128 mg/dL     POC Glucose  Once [[id number]]  (Abnormal) Collected: 07/26/24 1843    Specimen: Blood Updated: 07/26/24 1855     Glucose 136 mg/dL     Basic Metabolic Panel [[id number]]  (Abnormal) Collected: 07/26/24 1451    Specimen: Blood Updated: 07/26/24 1524     Glucose 153 mg/dL      BUN 75 mg/dL      Creatinine 4.94 mg/dL      Sodium 127 mmol/L      Potassium 3.3 mmol/L      Chloride 88 mmol/L      CO2 25.8 mmol/L      Calcium 7.6 mg/dL      BUN/Creatinine Ratio 15.2     Anion Gap 13.2 mmol/L      eGFR 11.9 mL/min/1.73      Comment: <15 Indicative of kidney failure       Narrative:      GFR Normal >60  Chronic Kidney Disease <60  Kidney Failure <15      POC Glucose Once [[id number]]  (Normal) Collected: 07/26/24 1211    Specimen: Blood Updated: 07/26/24 1230     Glucose 128 mg/dL     POC Glucose Once [[id number]]  (Normal) Collected: 07/26/24 0604    Specimen: Blood Updated: 07/26/24 0605     Glucose 104 mg/dL     Renal Function Panel [[id number]]  (Abnormal) Collected: 07/26/24 0415    Specimen: Blood Updated: 07/26/24 0533     Glucose 106 mg/dL      BUN 73 mg/dL      Creatinine 4.50 mg/dL      Sodium 126 mmol/L      Potassium 3.4 mmol/L      Comment: Slight hemolysis detected by analyzer. Result may be falsely elevated.        Chloride 87 mmol/L      CO2 24.6 mmol/L      Calcium 7.7 mg/dL      Albumin 2.8 g/dL      Phosphorus 7.0 mg/dL      Anion Gap 14.4 mmol/L      BUN/Creatinine Ratio 16.2     eGFR 13.3 mL/min/1.73      Comment: <15 Indicative of kidney failure       Narrative:      GFR Normal >60  Chronic Kidney Disease <60  Kidney Failure <15      Magnesium [[id number]]  (Abnormal) Collected: 07/26/24 0415    Specimen: Blood Updated: 07/26/24 0533     Magnesium 4.6 mg/dL     Uric Acid [[id number]]  (Abnormal) Collected: 07/26/24 0415    Specimen: Blood Updated: 07/26/24 0533     Uric Acid 8.7 mg/dL     CBC & Differential [[id number]]  (Abnormal) Collected: 07/26/24 0415    Specimen: Blood Updated: 07/26/24 0512    Narrative:      The  following orders were created for panel order CBC & Differential.  Procedure                               Abnormality         Status                     ---------                               -----------         ------                     CBC Auto Differential[[id number]]        Abnormal            Final result                 Please view results for these tests on the individual orders.    CBC Auto Differential [[id number]]  (Abnormal) Collected: 07/26/24 0415    Specimen: Blood Updated: 07/26/24 0512     WBC 8.80 10*3/mm3      RBC 3.19 10*6/mm3      Hemoglobin 9.4 g/dL      Hematocrit 28.0 %      MCV 87.8 fL      MCH 29.5 pg      MCHC 33.6 g/dL      RDW 14.6 %      RDW-SD 46.9 fl      MPV 10.2 fL      Platelets 446 10*3/mm3      Neutrophil % 71.7 %      Lymphocyte % 9.5 %      Monocyte % 12.3 %      Eosinophil % 5.1 %      Basophil % 0.5 %      Immature Grans % 0.9 %      Neutrophils, Absolute 6.31 10*3/mm3      Lymphocytes, Absolute 0.84 10*3/mm3      Monocytes, Absolute 1.08 10*3/mm3      Eosinophils, Absolute 0.45 10*3/mm3      Basophils, Absolute 0.04 10*3/mm3      Immature Grans, Absolute 0.08 10*3/mm3      nRBC 0.0 /100 WBC     Basic Metabolic Panel [[id number]]  (Abnormal) Collected: 07/26/24 0002    Specimen: Blood Updated: 07/26/24 0045     Glucose 105 mg/dL      BUN 78 mg/dL      Creatinine 4.80 mg/dL      Sodium 124 mmol/L      Potassium 3.7 mmol/L      Comment: Slight hemolysis detected by analyzer. Result may be falsely elevated.        Chloride 86 mmol/L      CO2 24.0 mmol/L      Calcium 7.4 mg/dL      BUN/Creatinine Ratio 16.3     Anion Gap 14.0 mmol/L      eGFR 12.3 mL/min/1.73      Comment: <15 Indicative of kidney failure       Narrative:      GFR Normal >60  Chronic Kidney Disease <60  Kidney Failure <15      POC Glucose Once [[id number]]  (Normal) Collected: 07/26/24 0011    Specimen: Blood Updated: 07/26/24 0013     Glucose 112 mg/dL     POC Glucose Once [[id number]]  (Abnormal) Collected:  07/25/24 1800    Specimen: Blood Updated: 07/25/24 1829     Glucose 167 mg/dL     Basic Metabolic Panel [[id number]]  (Abnormal) Collected: 07/25/24 1658    Specimen: Blood Updated: 07/25/24 1733     Glucose 108 mg/dL      BUN 80 mg/dL      Creatinine 4.53 mg/dL      Sodium 123 mmol/L      Potassium 3.9 mmol/L      Chloride 84 mmol/L      CO2 21.6 mmol/L      Calcium 7.4 mg/dL      BUN/Creatinine Ratio 17.7     Anion Gap 17.4 mmol/L      eGFR 13.2 mL/min/1.73      Comment: <15 Indicative of kidney failure       Narrative:      GFR Normal >60  Chronic Kidney Disease <60  Kidney Failure <15            Data Review:  Results from last 7 days   Lab Units 07/31/24  0432 07/30/24  0615 07/29/24  0526   SODIUM mmol/L 139 138 137   POTASSIUM mmol/L 3.2* 3.1* 3.5   CHLORIDE mmol/L 95* 95* 93*   CO2 mmol/L 28.0 30.0* 27.9   BUN mg/dL 75* 72* 68*   CREATININE mg/dL 4.32* 4.44* 4.73*   GLUCOSE mg/dL 124* 114* 115*   CALCIUM mg/dL 8.8 8.5* 8.1*     Results from last 7 days   Lab Units 07/31/24  0432 07/30/24  0616 07/29/24  0526   WBC 10*3/mm3 9.31 8.78 10.06   HEMOGLOBIN g/dL 10.2* 9.7* 10.1*   HEMATOCRIT % 31.0* 30.0* 30.5*   PLATELETS 10*3/mm3 375 366 373                 Lab Results   Lab Value Date/Time    TROPONINT 78 (C) 07/23/2024 0251    TROPONINT 87 (C) 07/23/2024 0110         Results from last 7 days   Lab Units 07/29/24  0526 07/28/24  0605 07/27/24  0252   ALK PHOS U/L 85 89 75   BILIRUBIN mg/dL <0.2 0.2 <0.2   ALT (SGPT) U/L 16 17 19   AST (SGOT) U/L 18 22 18                 Glucose   Date/Time Value Ref Range Status   07/31/2024 1115 159 (H) 70 - 130 mg/dL Final   07/31/2024 0602 114 70 - 130 mg/dL Final   07/30/2024 2049 150 (H) 70 - 130 mg/dL Final   07/30/2024 1613 155 (H) 70 - 130 mg/dL Final   07/30/2024 1102 174 (H) 70 - 130 mg/dL Final   07/30/2024 0623 117 70 - 130 mg/dL Final   07/29/2024 2116 135 (H) 70 - 130 mg/dL Final   07/29/2024 1600 200 (H) 70 - 130 mg/dL Final           Past Medical History:    Diagnosis Date    Asthma     Dementia     Renal disorder        Assessment:  Active Hospital Problems    Diagnosis  POA    **Chronic renal failure (CRF), stage 4 (severe) [N18.4]  Unknown    Dementia [F03.90]  Unknown    Hyponatremia [E87.1]  Unknown    Acute pulmonary edema with congestive heart failure [I50.1]  Unknown    Anemia due to stage 4 chronic kidney disease [N18.4, D63.1]  Unknown    Elevated troponin level not due myocardial infarction [R79.89]  Unknown      Resolved Hospital Problems   No resolved problems to display.       Plan:  Continue current medication.  Plans as per cardiology and nephrology.  Follow-up labs.  DC planning.  Hopefully he can get well enough to go home.  Medicine can take over for primary care.  PT and OT ordered.  DC planning.  He has no insurance and family is hoping he will get well enough that they can take care of him at home.  To be determined.  Family concerned about his mental state and will get CT of head.    Chavo Martins MD  7/31/2024  16:27 EDT

## 2024-07-31 NOTE — THERAPY TREATMENT NOTE
Patient Name: Marcial Bernard  : 1955    MRN: [id number]                              Today's Date: 2024       Admit Date: 2024    Visit Dx:     ICD-10-CM ICD-9-CM   1. Chronic renal failure (CRF), stage 4 (severe)  N18.4 585.4   2. Hyponatremia  E87.1 276.1   3. Acute pulmonary edema with congestive heart failure  I50.1 428.1   4. Anemia due to stage 4 chronic kidney disease  N18.4 285.21    D63.1 585.4   5. Elevated troponin level not due myocardial infarction  R79.89 790.6     Patient Active Problem List   Diagnosis    Chronic renal failure (CRF), stage 4 (severe)    Hyponatremia    Acute pulmonary edema with congestive heart failure    Anemia due to stage 4 chronic kidney disease    Elevated troponin level not due myocardial infarction     Past Medical History:   Diagnosis Date    Asthma     Dementia     Renal disorder      History reviewed. No pertinent surgical history.   General Information       Row Name 24 1130          OT Time and Intention    Document Type therapy note (daily note)  -MM     Mode of Treatment co-treatment;physical therapy;occupational therapy  -MM       Row Name 24 1130          General Information    Patient Profile Reviewed yes  -MM     Existing Precautions/Restrictions fall  -MM     Barriers to Rehab medically complex  -MM       Row Name 24 1130          Cognition    Orientation Status (Cognition) oriented to;person;unable/difficult to assess  lethargic, increased processing, spouse and daughter at bedside to assist with translation  -MM       Row Name 24 1130          Safety Issues, Functional Mobility    Safety Issues Affecting Function (Mobility) safety precaution awareness;safety precautions follow-through/compliance;ability to follow commands  -MM     Impairments Affecting Function (Mobility) balance;cognition;coordination;endurance/activity tolerance;strength;postural/trunk control;shortness of breath  -MM     Cognitive Impairments, Mobility  Safety/Performance attention;problem-solving/reasoning;judgment;safety precaution awareness;sequencing abilities;safety precaution follow-through  -MM     Comment, Safety Issues/Impairments (Mobility) Education + cues to maintain sternal precautions  -MM               User Key  (r) = Recorded By, (t) = Taken By, (c) = Cosigned By      Initials Name Provider Type    MM Zehra Adams OT Occupational Therapist                     Mobility/ADL's       Row Name 07/31/24 1131          Bed Mobility    Bed Mobility supine-sit;sit-supine  -MM     Supine-Sit [place] (Bed Mobility) maximum assist (25% patient effort);2 person assist;verbal cues;nonverbal cues (demo/gesture);minimum assist (75% patient effort)  -MM     Sit-Supine [place] (Bed Mobility) maximum assist (25% patient effort);2 person assist;verbal cues;nonverbal cues (demo/gesture)  -MM     Assistive Device (Bed Mobility) head of bed elevated;bed rails  -MM     Comment, (Bed Mobility) poor initiation with bed mobility, R lateral leaning noted, improved with time as therapist trying to initiate reaching across midline  -MM       Row Name 07/31/24 1131          Transfers    Transfers sit-stand transfer  -MM       Row Name 07/31/24 1131          Sit-Stand Transfer    Sit-Stand [place] (Transfers) 2 person assist;verbal cues;nonverbal cues (demo/gesture);minimum assist (75% patient effort);moderate assist (50% patient effort)  -MM     Assistive Device (Sit-Stand Transfers) other (see comments)  -MM     Comment, (Sit-Stand Transfer) HHAx2, x3 STS, min-mod A x2 with last stand improved initiation  -MM       Row Name 07/31/24 1131          Functional Mobility    Functional Mobility- Ind. Level moderate assist (50% patient effort);2 person assist required  -MM     Functional Mobility- Comment lateral steps, max cues required  -MM       Row Name 07/31/24 1131          Activities of Daily Living    BADL Assessment/Intervention lower body dressing;toileting   -MM       Row Name 07/31/24 1131          Lower Body Dressing Assessment/Training    [place] Level (Lower Body Dressing) socks;don;dependent (less than 25% patient effort)  -MM       Row Name 07/31/24 1131          Toileting Assessment/Training    [place] Level (Toileting) toileting skills;dependent (less than 25% patient effort)  -MM     Comment, (Toileting) purewick, bilat knees buckling at times, not approp to get to [facility] this date  -MM               User Key  (r) = Recorded By, (t) = Taken By, (c) = Cosigned By      Initials Name Provider Type    Zehra Uribe OT Occupational Therapist                   Obj/Interventions       Row Name 07/31/24 1133          Motor Skills    Motor Skills functional endurance  -MM     Functional Endurance poor  -MM       Row Name 07/31/24 1133          Balance    Balance Assessment sitting static balance;sitting dynamic balance;sit to stand dynamic balance;standing static balance;standing dynamic balance  -MM     Static Sitting Balance contact guard;minimal assist  -MM     Dynamic Sitting Balance minimal assist  -MM     Position, Sitting Balance sitting edge of bed;supported  -MM     Sit to Stand Dynamic Balance minimal assist;moderate assist;2-person assist  -MM     Static Standing Balance minimal assist;2-person assist  -MM     Dynamic Standing Balance moderate assist;2-person assist;verbal cues  -MM     Position/Device Used, Standing Balance supported;other (see comments)  HHAx2  -MM     Balance Interventions sitting;standing;sit to stand;supported;static;dynamic;highly challenging;weight shifting activity  -MM               User Key  (r) = Recorded By, (t) = Taken By, (c) = Cosigned By      Initials Name Provider Type    Zehra Uribe OT Occupational Therapist                   Goals/Plan    No documentation.                  Clinical Impression       Row Name 07/31/24 1134          Pain Assessment    Pre/Posttreatment Pain Comment no facial grimacing noted   -       Row Name 07/31/24 1134          Plan of Care Review    Plan of Care Reviewed With patient;family  -     Progress no change  -     Outcome Evaluation Pt seen for OT/PT this date to maximize therapeutic benefit. Pt's spouse and daughter present on arrival and able to assist with translation throughout session. He requires max A x2 for bed mobility due to poor initiation of functional activity this date, poor processing and command following. Dep for LBD and toileting remains. He completed x3 STS with min-mod A x2 and HHAx2, last stand improved and able to demo lateral steps to HOB with mod A x2, increased assist required due to bilat knee weakness. He will continue to benefit from skilled OT to adddress overall weakness and impairements impacting overall (I) with ADLs. rec SNF  -       Row Name 07/31/24 1134          Therapy Plan Review/Discharge Plan (OT)    Anticipated Discharge Disposition (OT) skilled nursing facility  -       Row Name 07/31/24 1134          Vital Signs    O2 Delivery Pre Treatment room air  -       Row Name 07/31/24 1134          Positioning and Restraints    Pre-Treatment Position in bed  -MM     Post Treatment Position bed  -MM     In Bed notified nsg;fowlers;call light within reach;encouraged to call for assist;exit alarm on;with family/caregiver;side rails up x3  -MM               User Key  (r) = Recorded By, (t) = Taken By, (c) = Cosigned By      Initials Name Provider Type    Zehra Uribe OT Occupational Therapist                   Outcome Measures       Row Name 07/31/24 1136          How much help from another is currently needed...    Putting on and taking off regular lower body clothing? 1  -MM     Bathing (including washing, rinsing, and drying) 1  -MM     Toileting (which includes using toilet bed pan or urinal) 1  -MM     Putting on and taking off regular upper body clothing 2  -MM     Taking care of personal grooming (such as brushing teeth) 2  -MM      Eating meals 2  -MM     AM-PAC 6 Clicks Score (OT) 9  -MM       Row Name 07/31/24 0855          How much help from another person do you currently need...    Turning from your back to your side while in flat bed without using bedrails? 2  -ES     Moving from lying on back to sitting on the side of a flat bed without bedrails? 2  -ES     Moving to and from a bed to a chair (including a wheelchair)? 2  -ES     Standing up from a chair using your arms (e.g., wheelchair, bedside chair)? 2  -ES     Climbing 3-5 steps with a railing? 1  -ES     To walk in hospital room? 1  -ES     AM-PAC 6 Clicks Score (PT) 10  -ES     Highest Level of Mobility Goal 4 --> Transfer to chair/commode  -ES       Row Name 07/31/24 1136          Functional Assessment    Outcome Measure Options AM-PAC 6 Clicks Daily Activity (OT)  -MM               User Key  (r) = Recorded By, (t) = Taken By, (c) = Cosigned By      Initials Name Provider Type    Zehra Uribe OT Occupational Therapist    Hilda Morrison RN Registered Nurse                    Occupational Therapy Education       Title: PT OT SLP Therapies (In Progress)       Topic: Occupational Therapy (Done)       Point: ADL training (Done)       Description:   Instruct learner(s) on proper safety adaptation and remediation techniques during self care or transfers.   Instruct in proper use of assistive devices.                  Learning Progress Summary             Patient Acceptance, E, VU,NR by MM at 7/29/2024 1546    Comment: role of OT, d/c rec   Family Acceptance, E, VU,NR by MM at 7/29/2024 1546    Comment: role of OT, d/c rec                         Point: Precautions (Done)       Description:   Instruct learner(s) on prescribed precautions during self-care and functional transfers.                  Learning Progress Summary             Patient Acceptance, E, VU,NR by MM at 7/29/2024 1546    Comment: role of OT, d/c rec   Family Acceptance, E, VU,NR by MM at 7/29/2024 1546     Comment: role of OT, d/c rec                         Point: Body mechanics (Done)       Description:   Instruct learner(s) on proper positioning and spine alignment during self-care, functional mobility activities and/or exercises.                  Learning Progress Summary             Patient Acceptance, E, VU,NR by MM at 7/29/2024 1546    Comment: role of OT, d/c rec   Family Acceptance, E, VU,NR by MM at 7/29/2024 1546    Comment: role of OT, d/c rec                                         User Key       Initials Effective Dates Name Provider Type Discipline     05/31/24 -  Zehra Adams OT Occupational Therapist OT                  OT Recommendation and Plan  Planned Therapy Interventions (OT): activity tolerance training, BADL retraining, neuromuscular control/coordination retraining, patient/caregiver education/training, transfer/mobility retraining, cognitive/visual perception retraining, strengthening exercise, ROM/therapeutic exercise, occupation/activity based interventions, functional balance retraining  Therapy Frequency (OT): 5 times/wk  Plan of Care Review  Plan of Care Reviewed With: patient, family  Progress: no change  Outcome Evaluation: Pt seen for OT/PT this date to maximize therapeutic benefit. Pt's spouse and daughter present on arrival and able to assist with translation throughout session. He requires max A x2 for bed mobility due to poor initiation of functional activity this date, poor processing and command following. Dep for LBD and toileting remains. He completed x3 STS with min-mod A x2 and HHAx2, last stand improved and able to demo lateral steps to HOB with mod A x2, increased assist required due to bilat knee weakness. He will continue to benefit from skilled OT to adddress overall weakness and impairements impacting overall (I) with ADLs. rec SNF     Time Calculation:   Evaluation Complexity (OT)  Review Occupational Profile/Medical/Therapy History Complexity: expanded/moderate  complexity  Assessment, Occupational Performance/Identification of Deficit Complexity: 5 or more performance deficits  Clinical Decision Making Complexity (OT): detailed assessment/moderate complexity  Overall Complexity of Evaluation (OT): moderate complexity     Time Calculation- OT       Row Name 07/31/24 1136             Time Calculation- OT    OT Start Time 1054  -MM      OT Stop Time 1112  -MM      OT Time Calculation (min) 18 min  -MM      Total Timed Code Minutes- OT 18 minute(s)  -MM      OT Received On 07/31/24  -MM      OT - Next Appointment 08/01/24  -MM         Timed Charges    72273 - OT Therapeutic Activity Minutes 18  -MM         Total Minutes    Timed Charges Total Minutes 18  -MM       Total Minutes 18  -MM                User Key  (r) = Recorded By, (t) = Taken By, (c) = Cosigned By      Initials Name Provider Type    MM Zehra Adams OT Occupational Therapist                  Therapy Charges for Today       Code Description Service Date Service Provider Modifiers Qty    65057880759 HC OT THERAPEUTIC ACT EA 15 MIN 7/31/2024 Zehra Adams OT GO 1                 Zehra Adams OT  7/31/2024

## 2024-07-31 NOTE — PLAN OF CARE
Goal Outcome Evaluation:           Progress: improving  Outcome Evaluation: Pt alert to self.  PRN hydralazine given x1. SR to SB. O2 at 1L nc. Assist x 2 with bed mobility.  Pt able to answer yes or no questions. Plan of care continues.

## 2024-07-31 NOTE — PROGRESS NOTES
Continued Stay Note  [facility]     Patient Name: Marcial Bernard  MRN: [id number]  Today's Date: 7/31/2024    Admit Date: 7/23/2024    Plan: Return home with family   Discharge Plan       Row Name 07/31/24 1450       Plan    Plan Return home with family    Patient/Family in Agreement with Plan yes    Plan Comments Spoke with daughter at bedside.  Plan remains for him to return home with family help at [place].  They have applied for insurance and awaiting decision.  He has a walker, W/C.  [facility] continues to follow.  Darlene ADORNO                    Expected Discharge Date and Time       Expected Discharge Date Expected Discharge Time    Aug 5, 2024               Becky S. [place], RN

## 2024-08-01 LAB
ALBUMIN SERPL-MCNC: 3.1 G/DL (ref 3.5–5.2)
ANION GAP SERPL CALCULATED.3IONS-SCNC: 11 MMOL/L (ref 5–15)
BASOPHILS # BLD AUTO: 0.05 10*3/MM3 (ref 0–0.2)
BASOPHILS NFR BLD AUTO: 0.6 % (ref 0–1.5)
BUN SERPL-MCNC: 76 MG/DL (ref 8–23)
BUN/CREAT SERPL: 16.4 (ref 7–25)
CALCIUM SPEC-SCNC: 8.9 MG/DL (ref 8.6–10.5)
CHLORIDE SERPL-SCNC: 100 MMOL/L (ref 98–107)
CO2 SERPL-SCNC: 29 MMOL/L (ref 22–29)
CREAT SERPL-MCNC: 4.64 MG/DL (ref 0.76–1.27)
DEPRECATED RDW RBC AUTO: 48 FL (ref 37–54)
EGFRCR SERPLBLD CKD-EPI 2021: 12.8 ML/MIN/1.73
EOSINOPHIL # BLD AUTO: 0.6 10*3/MM3 (ref 0–0.4)
EOSINOPHIL NFR BLD AUTO: 7.3 % (ref 0.3–6.2)
ERYTHROCYTE [DISTWIDTH] IN BLOOD BY AUTOMATED COUNT: 14.5 % (ref 12.3–15.4)
GLUCOSE BLDC GLUCOMTR-MCNC: 120 MG/DL (ref 70–130)
GLUCOSE BLDC GLUCOMTR-MCNC: 142 MG/DL (ref 70–130)
GLUCOSE BLDC GLUCOMTR-MCNC: 163 MG/DL (ref 70–130)
GLUCOSE BLDC GLUCOMTR-MCNC: 204 MG/DL (ref 70–130)
GLUCOSE SERPL-MCNC: 119 MG/DL (ref 65–99)
HCT VFR BLD AUTO: 31 % (ref 37.5–51)
HGB BLD-MCNC: 10 G/DL (ref 13–17.7)
IMM GRANULOCYTES # BLD AUTO: 0.04 10*3/MM3 (ref 0–0.05)
IMM GRANULOCYTES NFR BLD AUTO: 0.5 % (ref 0–0.5)
LYMPHOCYTES # BLD AUTO: 1.11 10*3/MM3 (ref 0.7–3.1)
LYMPHOCYTES NFR BLD AUTO: 13.5 % (ref 19.6–45.3)
MAGNESIUM SERPL-MCNC: 2.8 MG/DL (ref 1.6–2.4)
MCH RBC QN AUTO: 29.2 PG (ref 26.6–33)
MCHC RBC AUTO-ENTMCNC: 32.3 G/DL (ref 31.5–35.7)
MCV RBC AUTO: 90.6 FL (ref 79–97)
MONOCYTES # BLD AUTO: 0.65 10*3/MM3 (ref 0.1–0.9)
MONOCYTES NFR BLD AUTO: 7.9 % (ref 5–12)
NEUTROPHILS NFR BLD AUTO: 5.79 10*3/MM3 (ref 1.7–7)
NEUTROPHILS NFR BLD AUTO: 70.2 % (ref 42.7–76)
NRBC BLD AUTO-RTO: 0 /100 WBC (ref 0–0.2)
PHOSPHATE SERPL-MCNC: 5.4 MG/DL (ref 2.5–4.5)
PLATELET # BLD AUTO: 336 10*3/MM3 (ref 140–450)
PMV BLD AUTO: 9.8 FL (ref 6–12)
POTASSIUM SERPL-SCNC: 3.2 MMOL/L (ref 3.5–5.2)
RBC # BLD AUTO: 3.42 10*6/MM3 (ref 4.14–5.8)
SODIUM SERPL-SCNC: 140 MMOL/L (ref 136–145)
URATE SERPL-MCNC: 10.3 MG/DL (ref 3.4–7)
WBC NRBC COR # BLD AUTO: 8.24 10*3/MM3 (ref 3.4–10.8)

## 2024-08-01 PROCEDURE — 80069 RENAL FUNCTION PANEL: CPT | Performed by: INTERNAL MEDICINE

## 2024-08-01 PROCEDURE — 99232 SBSQ HOSP IP/OBS MODERATE 35: CPT | Performed by: NURSE PRACTITIONER

## 2024-08-01 PROCEDURE — 25010000002 HYDRALAZINE PER 20 MG: Performed by: INTERNAL MEDICINE

## 2024-08-01 PROCEDURE — 82948 REAGENT STRIP/BLOOD GLUCOSE: CPT

## 2024-08-01 PROCEDURE — 25010000002 HEPARIN (PORCINE) PER 1000 UNITS: Performed by: INTERNAL MEDICINE

## 2024-08-01 PROCEDURE — 83735 ASSAY OF MAGNESIUM: CPT | Performed by: INTERNAL MEDICINE

## 2024-08-01 PROCEDURE — 63710000001 INSULIN REGULAR HUMAN PER 5 UNITS: Performed by: INTERNAL MEDICINE

## 2024-08-01 PROCEDURE — 85025 COMPLETE CBC W/AUTO DIFF WBC: CPT | Performed by: INTERNAL MEDICINE

## 2024-08-01 PROCEDURE — 84550 ASSAY OF BLOOD/URIC ACID: CPT | Performed by: INTERNAL MEDICINE

## 2024-08-01 RX ORDER — CLONIDINE 0.1 MG/24H
1 PATCH, EXTENDED RELEASE TRANSDERMAL WEEKLY
Status: DISCONTINUED | OUTPATIENT
Start: 2024-08-01 | End: 2024-08-11

## 2024-08-01 RX ORDER — UREA 10 %
3 LOTION (ML) TOPICAL NIGHTLY
Status: DISCONTINUED | OUTPATIENT
Start: 2024-08-01 | End: 2024-08-05

## 2024-08-01 RX ORDER — POTASSIUM CHLORIDE 750 MG/1
40 TABLET, FILM COATED, EXTENDED RELEASE ORAL
Status: COMPLETED | OUTPATIENT
Start: 2024-08-01 | End: 2024-08-01

## 2024-08-01 RX ADMIN — HYDRALAZINE HYDROCHLORIDE 100 MG: 50 TABLET ORAL at 08:17

## 2024-08-01 RX ADMIN — HYDRALAZINE HYDROCHLORIDE 100 MG: 50 TABLET ORAL at 17:27

## 2024-08-01 RX ADMIN — AMLODIPINE BESYLATE 10 MG: 10 TABLET ORAL at 08:17

## 2024-08-01 RX ADMIN — HEPARIN SODIUM 5000 UNITS: 5000 INJECTION INTRAVENOUS; SUBCUTANEOUS at 22:11

## 2024-08-01 RX ADMIN — TORSEMIDE 100 MG: 100 TABLET ORAL at 08:19

## 2024-08-01 RX ADMIN — POTASSIUM CHLORIDE 40 MEQ: 750 TABLET, EXTENDED RELEASE ORAL at 10:16

## 2024-08-01 RX ADMIN — Medication 3 MG: at 22:11

## 2024-08-01 RX ADMIN — POTASSIUM CHLORIDE 40 MEQ: 750 TABLET, EXTENDED RELEASE ORAL at 11:47

## 2024-08-01 RX ADMIN — HEPARIN SODIUM 5000 UNITS: 5000 INJECTION INTRAVENOUS; SUBCUTANEOUS at 08:19

## 2024-08-01 RX ADMIN — HYDRALAZINE HYDROCHLORIDE 20 MG: 20 INJECTION INTRAMUSCULAR; INTRAVENOUS at 06:50

## 2024-08-01 RX ADMIN — HYDRALAZINE HYDROCHLORIDE 20 MG: 20 INJECTION INTRAMUSCULAR; INTRAVENOUS at 18:55

## 2024-08-01 RX ADMIN — ACETAMINOPHEN 325MG 650 MG: 325 TABLET ORAL at 22:11

## 2024-08-01 RX ADMIN — CARVEDILOL 12.5 MG: 12.5 TABLET, FILM COATED ORAL at 08:18

## 2024-08-01 RX ADMIN — CARVEDILOL 12.5 MG: 12.5 TABLET, FILM COATED ORAL at 17:27

## 2024-08-01 RX ADMIN — ACETAMINOPHEN 325MG 650 MG: 325 TABLET ORAL at 01:08

## 2024-08-01 RX ADMIN — Medication 10 ML: at 08:19

## 2024-08-01 RX ADMIN — Medication 10 ML: at 22:11

## 2024-08-01 RX ADMIN — INSULIN HUMAN 2 UNITS: 100 INJECTION, SOLUTION PARENTERAL at 17:27

## 2024-08-01 RX ADMIN — TORSEMIDE 100 MG: 100 TABLET ORAL at 17:27

## 2024-08-01 RX ADMIN — CLONIDINE 1 PATCH: 0.1 PATCH, EXTENDED RELEASE TRANSDERMAL at 11:53

## 2024-08-01 RX ADMIN — HYDRALAZINE HYDROCHLORIDE 20 MG: 20 INJECTION INTRAMUSCULAR; INTRAVENOUS at 01:16

## 2024-08-01 RX ADMIN — LEVOTHYROXINE SODIUM 100 MCG: 100 TABLET ORAL at 08:19

## 2024-08-01 NOTE — PROGRESS NOTES
"DAILY PROGRESS NOTE  [facility]    Patient Identification:  Name: Marcial Bernard  Age: 69 y.o.  Sex: male  :  1955  MRN: [id number]         Primary Care Physician: Justina Liriano APRN    Subjective:  Interval History: He is sleepy and lethargic.  He was able to stand up with physical therapy requiring a lot of assistance.  Nursing staff reports that he did make it to the chair today with PT.    Objective:    Scheduled Meds:amLODIPine, 10 mg, Oral, Q24H  carvedilol, 12.5 mg, Oral, BID With Meals  cloNIDine, 1 patch, Transdermal, Weekly  heparin (porcine), 5,000 Units, Subcutaneous, Q12H  hydrALAZINE, 100 mg, Oral, Q8H  insulin regular, 2-7 Units, Subcutaneous, 4x Daily AC & at Bedtime  levothyroxine, 100 mcg, Oral, Daily  melatonin, 3 mg, Oral, Nightly  senna-docusate sodium, 2 tablet, Oral, BID  sodium chloride, 10 mL, Intravenous, Q12H  torsemide, 100 mg, Oral, BID      Continuous Infusions:       Vital signs in last 24 hours:  Temp:  [98.2 °F (36.8 °C)-100.8 °F (38.2 °C)] 98.2 °F (36.8 °C)  Heart Rate:  [58-91] 91  Resp:  [16-18] 16  BP: (148-201)/(72-97) 180/95    Intake/Output:    Intake/Output Summary (Last 24 hours) at 2024 1245  Last data filed at 2024 0636  Gross per 24 hour   Intake 720 ml   Output 3000 ml   Net -2280 ml       Exam:  /95 (BP Location: Right arm, Patient Position: Sitting)   Pulse 91   Temp 98.2 °F (36.8 °C) (Oral)   Resp 16   Ht 175.3 cm (69\")   Wt 75.3 kg (166 lb 0.1 oz)   SpO2 100%   BMI 24.51 kg/m²     General Appearance:  Sleepy and lethargic, no distress   Head:    Normocephalic, without obvious abnormality, atraumatic   Eyes:       Throat:   Lips, tongue, gums normal   Neck:   Supple, symmetrical, trachea midline, no JVD   Lungs:     Clear to auscultation bilaterally, respirations unlabored   Chest Wall:    No tenderness or deformity    Heart:    Regular rate and rhythm, S1 and S2 normal, no murmur,no  rub or gallop   Abdomen:     " Soft, nontender, bowel sounds active, no masses, no organomegaly    Extremities:   Extremities normal, atraumatic, no cyanosis or edema   Pulses:      Skin:   Skin is warm and dry,  no rashes or palpable lesions   Neurologic:     Sleepy and lethargic      Lab Results (last 72 hours)       Procedure Component Value Units Date/Time    POC Glucose Once [[id number]]  (Abnormal) Collected: 07/28/24 1102    Specimen: Blood Updated: 07/28/24 1104     Glucose 136 mg/dL     Comprehensive Metabolic Panel [[id number]]  (Abnormal) Collected: 07/28/24 0605    Specimen: Blood Updated: 07/28/24 0723     Glucose 107 mg/dL      BUN 71 mg/dL      Creatinine 4.50 mg/dL      Sodium 130 mmol/L      Potassium 4.1 mmol/L      Comment: Slight hemolysis detected by analyzer. Result may be falsely elevated.        Chloride 92 mmol/L      CO2 23.6 mmol/L      Calcium 8.0 mg/dL      Total Protein 6.4 g/dL      Albumin 2.9 g/dL      ALT (SGPT) 17 U/L      AST (SGOT) 22 U/L      Alkaline Phosphatase 89 U/L      Total Bilirubin 0.2 mg/dL      Globulin 3.5 gm/dL      A/G Ratio 0.8 g/dL      BUN/Creatinine Ratio 15.8     Anion Gap 14.4 mmol/L      eGFR 13.3 mL/min/1.73      Comment: <15 Indicative of kidney failure       Narrative:      GFR Normal >60  Chronic Kidney Disease <60  Kidney Failure <15      Magnesium [[id number]]  (Abnormal) Collected: 07/28/24 0605    Specimen: Blood Updated: 07/28/24 0723     Magnesium 4.2 mg/dL     Uric Acid [[id number]]  (Abnormal) Collected: 07/28/24 0605    Specimen: Blood Updated: 07/28/24 0712     Uric Acid 8.9 mg/dL     Phosphorus [[id number]]  (Abnormal) Collected: 07/28/24 0605    Specimen: Blood Updated: 07/28/24 0712     Phosphorus 6.1 mg/dL     CBC & Differential [[id number]]  (Abnormal) Collected: 07/28/24 0605    Specimen: Blood Updated: 07/28/24 0655    Narrative:      The following orders were created for panel order CBC & Differential.  Procedure                               Abnormality         Status                      ---------                               -----------         ------                     CBC Auto Differential[[id number]]        Abnormal            Final result                 Please view results for these tests on the individual orders.    CBC Auto Differential [[id number]]  (Abnormal) Collected: 07/28/24 0605    Specimen: Blood Updated: 07/28/24 0655     WBC 9.07 10*3/mm3      RBC 3.59 10*6/mm3      Hemoglobin 10.5 g/dL      Hematocrit 31.3 %      MCV 87.2 fL      MCH 29.2 pg      MCHC 33.5 g/dL      RDW 14.0 %      RDW-SD 44.2 fl      MPV 9.9 fL      Platelets 383 10*3/mm3      Neutrophil % 69.9 %      Lymphocyte % 11.5 %      Monocyte % 9.9 %      Eosinophil % 7.2 %      Basophil % 0.6 %      Immature Grans % 0.9 %      Neutrophils, Absolute 6.35 10*3/mm3      Lymphocytes, Absolute 1.04 10*3/mm3      Monocytes, Absolute 0.90 10*3/mm3      Eosinophils, Absolute 0.65 10*3/mm3      Basophils, Absolute 0.05 10*3/mm3      Immature Grans, Absolute 0.08 10*3/mm3      nRBC 0.0 /100 WBC     POC Glucose Once [[id number]]  (Normal) Collected: 07/28/24 0606    Specimen: Blood Updated: 07/28/24 0611     Glucose 118 mg/dL     POC Glucose Once [[id number]]  (Normal) Collected: 07/27/24 2102    Specimen: Blood Updated: 07/27/24 2103     Glucose 112 mg/dL     Basic Metabolic Panel [[id number]]  (Abnormal) Collected: 07/27/24 1825    Specimen: Blood Updated: 07/27/24 1855     Glucose 154 mg/dL      BUN 73 mg/dL      Creatinine 4.76 mg/dL      Sodium 130 mmol/L      Potassium 3.3 mmol/L      Chloride 92 mmol/L      CO2 25.9 mmol/L      Calcium 7.5 mg/dL      BUN/Creatinine Ratio 15.3     Anion Gap 12.1 mmol/L      eGFR 12.5 mL/min/1.73      Comment: <15 Indicative of kidney failure       Narrative:      GFR Normal >60  Chronic Kidney Disease <60  Kidney Failure <15      POC Glucose Once [[id number]]  (Abnormal) Collected: 07/27/24 1817    Specimen: Blood Updated: 07/27/24 1819     Glucose 175 mg/dL     POC Glucose Once  [[id number]]  (Abnormal) Collected: 07/27/24 1647    Specimen: Blood Updated: 07/27/24 1652     Glucose 193 mg/dL     POC Glucose Once [[id number]]  (Abnormal) Collected: 07/27/24 1238    Specimen: Blood Updated: 07/27/24 1239     Glucose 135 mg/dL     POC Glucose Once [[id number]]  (Normal) Collected: 07/27/24 0634    Specimen: Blood Updated: 07/27/24 0635     Glucose 125 mg/dL     Basic Metabolic Panel [[id number]]  (Abnormal) Collected: 07/27/24 0252    Specimen: Blood Updated: 07/27/24 0338     Glucose 117 mg/dL      BUN 74 mg/dL      Creatinine 4.51 mg/dL      Sodium 130 mmol/L      Potassium 3.6 mmol/L      Chloride 90 mmol/L      CO2 25.2 mmol/L      Calcium 7.5 mg/dL      BUN/Creatinine Ratio 16.4     Anion Gap 14.8 mmol/L      eGFR 13.3 mL/min/1.73      Comment: <15 Indicative of kidney failure       Narrative:      GFR Normal >60  Chronic Kidney Disease <60  Kidney Failure <15      Comprehensive Metabolic Panel [[id number]]  (Abnormal) Collected: 07/27/24 0252    Specimen: Blood Updated: 07/27/24 0338     Glucose 117 mg/dL      BUN 74 mg/dL      Creatinine 4.51 mg/dL      Sodium 130 mmol/L      Potassium 3.6 mmol/L      Chloride 90 mmol/L      CO2 25.2 mmol/L      Calcium 7.5 mg/dL      Total Protein 5.4 g/dL      Albumin 2.6 g/dL      ALT (SGPT) 19 U/L      AST (SGOT) 18 U/L      Alkaline Phosphatase 75 U/L      Total Bilirubin <0.2 mg/dL      Globulin 2.8 gm/dL      A/G Ratio 0.9 g/dL      BUN/Creatinine Ratio 16.4     Anion Gap 14.8 mmol/L      eGFR 13.3 mL/min/1.73      Comment: <15 Indicative of kidney failure       Narrative:      GFR Normal >60  Chronic Kidney Disease <60  Kidney Failure <15      Phosphorus [[id number]]  (Abnormal) Collected: 07/27/24 0252    Specimen: Blood Updated: 07/27/24 0338     Phosphorus 6.5 mg/dL     Magnesium [[id number]]  (Abnormal) Collected: 07/27/24 0252    Specimen: Blood Updated: 07/27/24 0338     Magnesium 4.2 mg/dL     Uric Acid [[id number]]  (Abnormal) Collected: 07/27/24  0252    Specimen: Blood Updated: 07/27/24 0338     Uric Acid 8.5 mg/dL     CBC & Differential [[id number]]  (Abnormal) Collected: 07/27/24 0252    Specimen: Blood Updated: 07/27/24 0320    Narrative:      The following orders were created for panel order CBC & Differential.  Procedure                               Abnormality         Status                     ---------                               -----------         ------                     CBC Auto Differential[[id number]]        Abnormal            Final result                 Please view results for these tests on the individual orders.    CBC Auto Differential [[id number]]  (Abnormal) Collected: 07/27/24 0252    Specimen: Blood Updated: 07/27/24 0320     WBC 9.13 10*3/mm3      RBC 3.03 10*6/mm3      Hemoglobin 8.8 g/dL      Hematocrit 26.5 %      MCV 87.5 fL      MCH 29.0 pg      MCHC 33.2 g/dL      RDW 14.4 %      RDW-SD 45.6 fl      MPV 9.5 fL      Platelets 395 10*3/mm3      Neutrophil % 73.2 %      Lymphocyte % 8.5 %      Monocyte % 11.0 %      Eosinophil % 6.1 %      Basophil % 0.4 %      Immature Grans % 0.8 %      Neutrophils, Absolute 6.68 10*3/mm3      Lymphocytes, Absolute 0.78 10*3/mm3      Monocytes, Absolute 1.00 10*3/mm3      Eosinophils, Absolute 0.56 10*3/mm3      Basophils, Absolute 0.04 10*3/mm3      Immature Grans, Absolute 0.07 10*3/mm3      nRBC 0.0 /100 WBC     Basic Metabolic Panel [[id number]]  (Abnormal) Collected: 07/27/24 0056    Specimen: Blood Updated: 07/27/24 0134     Glucose 122 mg/dL      BUN 74 mg/dL      Creatinine 4.77 mg/dL      Sodium 130 mmol/L      Potassium 3.9 mmol/L      Chloride 90 mmol/L      CO2 26.8 mmol/L      Calcium 7.5 mg/dL      BUN/Creatinine Ratio 15.5     Anion Gap 13.2 mmol/L      eGFR 12.4 mL/min/1.73      Comment: <15 Indicative of kidney failure       Narrative:      GFR Normal >60  Chronic Kidney Disease <60  Kidney Failure <15      POC Glucose Once [[id number]]  (Normal) Collected: 07/27/24 0034     Specimen: Blood Updated: 07/27/24 0035     Glucose 128 mg/dL     POC Glucose Once [[id number]]  (Abnormal) Collected: 07/26/24 1843    Specimen: Blood Updated: 07/26/24 1855     Glucose 136 mg/dL     Basic Metabolic Panel [[id number]]  (Abnormal) Collected: 07/26/24 1451    Specimen: Blood Updated: 07/26/24 1524     Glucose 153 mg/dL      BUN 75 mg/dL      Creatinine 4.94 mg/dL      Sodium 127 mmol/L      Potassium 3.3 mmol/L      Chloride 88 mmol/L      CO2 25.8 mmol/L      Calcium 7.6 mg/dL      BUN/Creatinine Ratio 15.2     Anion Gap 13.2 mmol/L      eGFR 11.9 mL/min/1.73      Comment: <15 Indicative of kidney failure       Narrative:      GFR Normal >60  Chronic Kidney Disease <60  Kidney Failure <15      POC Glucose Once [[id number]]  (Normal) Collected: 07/26/24 1211    Specimen: Blood Updated: 07/26/24 1230     Glucose 128 mg/dL     POC Glucose Once [[id number]]  (Normal) Collected: 07/26/24 0604    Specimen: Blood Updated: 07/26/24 0605     Glucose 104 mg/dL     Renal Function Panel [[id number]]  (Abnormal) Collected: 07/26/24 0415    Specimen: Blood Updated: 07/26/24 0533     Glucose 106 mg/dL      BUN 73 mg/dL      Creatinine 4.50 mg/dL      Sodium 126 mmol/L      Potassium 3.4 mmol/L      Comment: Slight hemolysis detected by analyzer. Result may be falsely elevated.        Chloride 87 mmol/L      CO2 24.6 mmol/L      Calcium 7.7 mg/dL      Albumin 2.8 g/dL      Phosphorus 7.0 mg/dL      Anion Gap 14.4 mmol/L      BUN/Creatinine Ratio 16.2     eGFR 13.3 mL/min/1.73      Comment: <15 Indicative of kidney failure       Narrative:      GFR Normal >60  Chronic Kidney Disease <60  Kidney Failure <15      Magnesium [[id number]]  (Abnormal) Collected: 07/26/24 0415    Specimen: Blood Updated: 07/26/24 0533     Magnesium 4.6 mg/dL     Uric Acid [[id number]]  (Abnormal) Collected: 07/26/24 0415    Specimen: Blood Updated: 07/26/24 0533     Uric Acid 8.7 mg/dL     CBC & Differential [[id number]]  (Abnormal) Collected:  07/26/24 0415    Specimen: Blood Updated: 07/26/24 0512    Narrative:      The following orders were created for panel order CBC & Differential.  Procedure                               Abnormality         Status                     ---------                               -----------         ------                     CBC Auto Differential[[id number]]        Abnormal            Final result                 Please view results for these tests on the individual orders.    CBC Auto Differential [[id number]]  (Abnormal) Collected: 07/26/24 0415    Specimen: Blood Updated: 07/26/24 0512     WBC 8.80 10*3/mm3      RBC 3.19 10*6/mm3      Hemoglobin 9.4 g/dL      Hematocrit 28.0 %      MCV 87.8 fL      MCH 29.5 pg      MCHC 33.6 g/dL      RDW 14.6 %      RDW-SD 46.9 fl      MPV 10.2 fL      Platelets 446 10*3/mm3      Neutrophil % 71.7 %      Lymphocyte % 9.5 %      Monocyte % 12.3 %      Eosinophil % 5.1 %      Basophil % 0.5 %      Immature Grans % 0.9 %      Neutrophils, Absolute 6.31 10*3/mm3      Lymphocytes, Absolute 0.84 10*3/mm3      Monocytes, Absolute 1.08 10*3/mm3      Eosinophils, Absolute 0.45 10*3/mm3      Basophils, Absolute 0.04 10*3/mm3      Immature Grans, Absolute 0.08 10*3/mm3      nRBC 0.0 /100 WBC     Basic Metabolic Panel [[id number]]  (Abnormal) Collected: 07/26/24 0002    Specimen: Blood Updated: 07/26/24 0045     Glucose 105 mg/dL      BUN 78 mg/dL      Creatinine 4.80 mg/dL      Sodium 124 mmol/L      Potassium 3.7 mmol/L      Comment: Slight hemolysis detected by analyzer. Result may be falsely elevated.        Chloride 86 mmol/L      CO2 24.0 mmol/L      Calcium 7.4 mg/dL      BUN/Creatinine Ratio 16.3     Anion Gap 14.0 mmol/L      eGFR 12.3 mL/min/1.73      Comment: <15 Indicative of kidney failure       Narrative:      GFR Normal >60  Chronic Kidney Disease <60  Kidney Failure <15      POC Glucose Once [[id number]]  (Normal) Collected: 07/26/24 0011    Specimen: Blood Updated: 07/26/24 0013      Glucose 112 mg/dL     POC Glucose Once [[id number]]  (Abnormal) Collected: 07/25/24 1800    Specimen: Blood Updated: 07/25/24 1829     Glucose 167 mg/dL     Basic Metabolic Panel [[id number]]  (Abnormal) Collected: 07/25/24 1658    Specimen: Blood Updated: 07/25/24 1733     Glucose 108 mg/dL      BUN 80 mg/dL      Creatinine 4.53 mg/dL      Sodium 123 mmol/L      Potassium 3.9 mmol/L      Chloride 84 mmol/L      CO2 21.6 mmol/L      Calcium 7.4 mg/dL      BUN/Creatinine Ratio 17.7     Anion Gap 17.4 mmol/L      eGFR 13.2 mL/min/1.73      Comment: <15 Indicative of kidney failure       Narrative:      GFR Normal >60  Chronic Kidney Disease <60  Kidney Failure <15            Data Review:  Results from last 7 days   Lab Units 08/01/24  0538 07/31/24  0432 07/30/24  0615   SODIUM mmol/L 140 139 138   POTASSIUM mmol/L 3.2* 3.2* 3.1*   CHLORIDE mmol/L 100 95* 95*   CO2 mmol/L 29.0 28.0 30.0*   BUN mg/dL 76* 75* 72*   CREATININE mg/dL 4.64* 4.32* 4.44*   GLUCOSE mg/dL 119* 124* 114*   CALCIUM mg/dL 8.9 8.8 8.5*     Results from last 7 days   Lab Units 08/01/24  0538 07/31/24  0432 07/30/24  0616   WBC 10*3/mm3 8.24 9.31 8.78   HEMOGLOBIN g/dL 10.0* 10.2* 9.7*   HEMATOCRIT % 31.0* 31.0* 30.0*   PLATELETS 10*3/mm3 336 375 366                 Lab Results   Lab Value Date/Time    TROPONINT 78 (C) 07/23/2024 0251    TROPONINT 87 (C) 07/23/2024 0110         Results from last 7 days   Lab Units 07/29/24  0526 07/28/24  0605 07/27/24  0252   ALK PHOS U/L 85 89 75   BILIRUBIN mg/dL <0.2 0.2 <0.2   ALT (SGPT) U/L 16 17 19   AST (SGOT) U/L 18 22 18                 Glucose   Date/Time Value Ref Range Status   08/01/2024 1123 142 (H) 70 - 130 mg/dL Final   08/01/2024 0636 120 70 - 130 mg/dL Final   07/31/2024 1949 206 (H) 70 - 130 mg/dL Final   07/31/2024 1623 161 (H) 70 - 130 mg/dL Final   07/31/2024 1115 159 (H) 70 - 130 mg/dL Final   07/31/2024 0602 114 70 - 130 mg/dL Final   07/30/2024 2049 150 (H) 70 - 130 mg/dL Final   07/30/2024  1613 155 (H) 70 - 130 mg/dL Final           Past Medical History:   Diagnosis Date    Asthma     Dementia     Renal disorder        Assessment:  Active Hospital Problems    Diagnosis  POA    **Chronic renal failure (CRF), stage 4 (severe) [N18.4]  Unknown    Dementia [F03.90]  Unknown    Hyponatremia [E87.1]  Unknown    Acute pulmonary edema with congestive heart failure [I50.1]  Unknown    Anemia due to stage 4 chronic kidney disease [N18.4, D63.1]  Unknown    Elevated troponin level not due myocardial infarction [R79.89]  Unknown      Resolved Hospital Problems   No resolved problems to display.       Plan:  Continue current medication.  Plans as per cardiology and nephrology.  Follow-up labs.  DC planning.  Hopefully he can get well enough to go home.  Medicine can take over for primary care.  PT and OT ordered.  DC planning.  He has no insurance and family is hoping he will get well enough that they can take care of him at home.  To be determined.  CT of head did not show any acute findings.  Chavo Martins MD  8/1/2024  12:45 EDT

## 2024-08-01 NOTE — PROGRESS NOTES
[facility] Progress Note      Patient Name: Marcial Bernard  : 1955  MRN: [id number]  Primary Care Physician:  Justina Liriano APRN  Date of admission: 2024    Subjective     Interval History:   Follow-up acute kidney injury on chronic kidney disease and hyponatremia    Patient is feeling the same, no chest pain or shortness of air, no orthopnea or PND, no nausea or vomiting his edema has resolved I discussed the case with the patient and his daughter at the bedside with the help of an  over the phone    Review of Systems:   As noted above    Objective     Vitals:   Temp:  [98.2 °F (36.8 °C)-100.8 °F (38.2 °C)] 98.2 °F (36.8 °C)  Heart Rate:  [58-91] 91  Resp:  [16-18] 16  BP: (148-201)/(72-97) 180/95  Flow (L/min):  [1-2] 1    Intake/Output Summary (Last 24 hours) at 2024 0905  Last data filed at 2024 0636  Gross per 24 hour   Intake 720 ml   Output 3000 ml   Net -2280 ml       Physical Exam:    General Appearance: More awake, chronically ill, no acute distress   Skin: warm and dry  HEENT: oral mucosa normal, nonicteric sclera  Neck: Increased JVD  Lungs: Bilateral rhonchi and crackles, breathing effort not labored  Heart: RRR, no S3, no rub  Abdomen: soft, nontender, nondistended  : no palpable bladder  Extremities: No ankle edema lower extremity edema  Neuro: Moving all extremities    Scheduled Meds:     amLODIPine, 10 mg, Oral, Q24H  carvedilol, 12.5 mg, Oral, BID With Meals  cloNIDine, 1 patch, Transdermal, Weekly  heparin (porcine), 5,000 Units, Subcutaneous, Q12H  hydrALAZINE, 100 mg, Oral, Q8H  insulin regular, 2-7 Units, Subcutaneous, 4x Daily AC & at Bedtime  levothyroxine, 100 mcg, Oral, Daily  senna-docusate sodium, 2 tablet, Oral, BID  sodium chloride, 10 mL, Intravenous, Q12H  torsemide, 100 mg, Oral, BID      IV Meds:          Results Reviewed:   I have personally reviewed the results from the time of this admission to 2024  09:05 EDT     Results from last 7 days   Lab Units 08/01/24  0538 07/31/24  0432 07/30/24  0615 07/29/24  0526 07/28/24  0605 07/27/24  1825 07/27/24  0252   SODIUM mmol/L 140 139 138 137 130*   < > 130*  130*   POTASSIUM mmol/L 3.2* 3.2* 3.1* 3.5 4.1   < > 3.6  3.6   CHLORIDE mmol/L 100 95* 95* 93* 92*   < > 90*  90*   CO2 mmol/L 29.0 28.0 30.0* 27.9 23.6   < > 25.2  25.2   BUN mg/dL 76* 75* 72* 68* 71*   < > 74*  74*   CREATININE mg/dL 4.64* 4.32* 4.44* 4.73* 4.50*   < > 4.51*  4.51*   CALCIUM mg/dL 8.9 8.8 8.5* 8.1* 8.0*   < > 7.5*  7.5*   BILIRUBIN mg/dL  --   --   --  <0.2 0.2  --  <0.2   ALK PHOS U/L  --   --   --  85 89  --  75   ALT (SGPT) U/L  --   --   --  16 17  --  19   AST (SGOT) U/L  --   --   --  18 22  --  18   GLUCOSE mg/dL 119* 124* 114* 115* 107*   < > 117*  117*    < > = values in this interval not displayed.       Estimated Creatinine Clearance: 15.8 mL/min (A) (by C-G formula based on SCr of 4.64 mg/dL (H)).    Results from last 7 days   Lab Units 08/01/24  0538 07/31/24 0432 07/30/24  0615   MAGNESIUM mg/dL 2.8* 3.6* 3.3*   PHOSPHORUS mg/dL 5.4* 5.2* 6.4*       Results from last 7 days   Lab Units 08/01/24  0538 07/31/24  0432 07/30/24  0615 07/29/24  0526 07/28/24  0605 07/27/24  0252 07/26/24  0415   URIC ACID mg/dL 10.3* 9.9* 9.8* 8.9* 8.9* 8.5* 8.7*       Results from last 7 days   Lab Units 08/01/24  0538 07/31/24  0432 07/30/24  0616 07/29/24  0526 07/28/24  0605   WBC 10*3/mm3 8.24 9.31 8.78 10.06 9.07   HEMOGLOBIN g/dL 10.0* 10.2* 9.7* 10.1* 10.5*   PLATELETS 10*3/mm3 336 375 366 373 383             Assessment / Plan     ASSESSMENT:  Acute kidney injury on chronic kidney disease stage IV creatinine was 4.93 on admission, creatinine today 4.64, relatively speaking stable, potassium 3.2,and sodium up to 139, patient has cardiorenal syndrome diuresing quite well  Chronic kidney disease stage IV baseline creatinine about 2.4-second diabetic and hypertensive  glomerulosclerosis  Acute on chronic diastolic congestive heart failure with cardiorenal syndrome, volume status improving with diuresis  Diabetes mellitus type 2 with renal complication and diabetic retinopathy  Hypertension with chronic kidney disease  Hypokalemia associate with diuretic potassium 3.2    PLAN:  Continue torsemide 100 mg twice daily  Replete potassium  No indication for dialysis at this time  Surveillance labs    I reviewed the chart and other providers notes, reviewed labs.  I discussed the case with the patient's daughter via an  over the phone  Copied text in this note has been reviewed and is accurate as of 08/01/24.       Thank you for involving us in the care of Marcial Bernard.  Please feel free to call with any questions.    Mike Flannery MD  08/01/24  09:05 EDT    [facility]  [phone]    Please note that portions of this note were completed with a voice recognition program.

## 2024-08-01 NOTE — PROGRESS NOTES
"    Patient Name: Marcial Bernard  :1955  69 y.o.      Patient Care Team:  Justina Liriano APRN as PCP - General (Nurse Practitioner)    Chief Complaint: follow up HFpEF    Interval History:good UOP . BP still high and requiring as needed IV hydralazine. Sleeping soundly. Has not had any complaints per son. Son also reported he has had difficulty sleeping and is up late.        Objective   Vital Signs  Temp:  [98.2 °F (36.8 °C)-100.8 °F (38.2 °C)] 98.2 °F (36.8 °C)  Heart Rate:  [58-91] 91  Resp:  [16-18] 16  BP: (148-201)/(72-97) 180/95    Intake/Output Summary (Last 24 hours) at 2024 0853  Last data filed at 2024 0636  Gross per 24 hour   Intake 720 ml   Output 3575 ml   Net -2855 ml     Flowsheet Rows      Flowsheet Row First Filed Value   Admission Height 175.3 cm (69\") Documented at 2024 0044   Admission Weight 90.7 kg (200 lb) Documented at 2024 0044            Physical Exam:   General Appearance:    Alert, cooperative, in no acute distress   Lungs:     Clear to auscultation.  Normal respiratory effort and rate.      Heart:    Regular rhythm and normal rate, normal S1 and S2, no murmurs, gallops or rubs.     Chest Wall:    No abnormalities observed   Abdomen:     Soft, nontender, positive bowel sounds.     Extremities:   no cyanosis, clubbing or edema.  No marked joint deformities.  Adequate musculoskeletal strength.       Results Review:    Results from last 7 days   Lab Units 24  0538   SODIUM mmol/L 140   POTASSIUM mmol/L 3.2*   CHLORIDE mmol/L 100   CO2 mmol/L 29.0   BUN mg/dL 76*   CREATININE mg/dL 4.64*   GLUCOSE mg/dL 119*   CALCIUM mg/dL 8.9           Results from last 7 days   Lab Units 24  0538   WBC 10*3/mm3 8.24   HEMOGLOBIN g/dL 10.0*   HEMATOCRIT % 31.0*   PLATELETS 10*3/mm3 336         Results from last 7 days   Lab Units 24  0538   MAGNESIUM mg/dL 2.8*                   Medication Review:   amLODIPine, 10 mg, Oral, Q24H  carvedilol, 12.5 mg, " Oral, BID With Meals  heparin (porcine), 5,000 Units, Subcutaneous, Q12H  hydrALAZINE, 100 mg, Oral, Q8H  insulin regular, 2-7 Units, Subcutaneous, 4x Daily AC & at Bedtime  levothyroxine, 100 mcg, Oral, Daily  senna-docusate sodium, 2 tablet, Oral, BID  sodium chloride, 10 mL, Intravenous, Q12H  torsemide, 100 mg, Oral, BID                Assessment & Plan   Acute on chronic heart failure with preserved left ventricular systolic function. EF 60-65% due to hypertensive heart disease. His volume status is multifactorial.   Acute on chronic kidney disease, cardiorenal . Creatinine stable. Tolerating IV bumetanide drip. Nephrology is following.   Diabetes mellitus type II  Hypertension , uncontrolled. Has been getting IV hydralazine as needed pretty regularly. Has gotten 3 doses already today.   Sinus bradycardia - beta locker decreased and clonidine stopped.     HR is stable.     Increased oral hydralazine yesterday. BP still high and requiring as needed hydralazine. Add clonidine patch. His HR is stable.     GIULIANO Dominguez  [facility]  08/01/24  08:53 EDT

## 2024-08-02 LAB
ALBUMIN SERPL-MCNC: 3.1 G/DL (ref 3.5–5.2)
ANION GAP SERPL CALCULATED.3IONS-SCNC: ABNORMAL MMOL/L
BASOPHILS # BLD AUTO: 0.04 10*3/MM3 (ref 0–0.2)
BASOPHILS NFR BLD AUTO: 0.6 % (ref 0–1.5)
BUN SERPL-MCNC: 79 MG/DL (ref 8–23)
BUN/CREAT SERPL: 17.4 (ref 7–25)
CALCIUM SPEC-SCNC: 9.2 MG/DL (ref 8.6–10.5)
CHLORIDE SERPL-SCNC: 100 MMOL/L (ref 98–107)
CO2 SERPL-SCNC: ABNORMAL MMOL/L
CREAT SERPL-MCNC: 4.53 MG/DL (ref 0.76–1.27)
DEPRECATED RDW RBC AUTO: 46.5 FL (ref 37–54)
EGFRCR SERPLBLD CKD-EPI 2021: 13.2 ML/MIN/1.73
EOSINOPHIL # BLD AUTO: 0.51 10*3/MM3 (ref 0–0.4)
EOSINOPHIL NFR BLD AUTO: 7.7 % (ref 0.3–6.2)
ERYTHROCYTE [DISTWIDTH] IN BLOOD BY AUTOMATED COUNT: 14.1 % (ref 12.3–15.4)
GLUCOSE BLDC GLUCOMTR-MCNC: 132 MG/DL (ref 70–130)
GLUCOSE BLDC GLUCOMTR-MCNC: 133 MG/DL (ref 70–130)
GLUCOSE BLDC GLUCOMTR-MCNC: 172 MG/DL (ref 70–130)
GLUCOSE BLDC GLUCOMTR-MCNC: 184 MG/DL (ref 70–130)
GLUCOSE SERPL-MCNC: 111 MG/DL (ref 65–99)
HCT VFR BLD AUTO: 32.9 % (ref 37.5–51)
HGB BLD-MCNC: 10.4 G/DL (ref 13–17.7)
IMM GRANULOCYTES # BLD AUTO: 0.03 10*3/MM3 (ref 0–0.05)
IMM GRANULOCYTES NFR BLD AUTO: 0.5 % (ref 0–0.5)
LYMPHOCYTES # BLD AUTO: 1.06 10*3/MM3 (ref 0.7–3.1)
LYMPHOCYTES NFR BLD AUTO: 15.9 % (ref 19.6–45.3)
MAGNESIUM SERPL-MCNC: 2.8 MG/DL (ref 1.6–2.4)
MCH RBC QN AUTO: 28.7 PG (ref 26.6–33)
MCHC RBC AUTO-ENTMCNC: 31.6 G/DL (ref 31.5–35.7)
MCV RBC AUTO: 90.9 FL (ref 79–97)
MONOCYTES # BLD AUTO: 0.62 10*3/MM3 (ref 0.1–0.9)
MONOCYTES NFR BLD AUTO: 9.3 % (ref 5–12)
NEUTROPHILS NFR BLD AUTO: 4.39 10*3/MM3 (ref 1.7–7)
NEUTROPHILS NFR BLD AUTO: 66 % (ref 42.7–76)
NRBC BLD AUTO-RTO: 0 /100 WBC (ref 0–0.2)
PHOSPHATE SERPL-MCNC: 4.9 MG/DL (ref 2.5–4.5)
PLATELET # BLD AUTO: 348 10*3/MM3 (ref 140–450)
PMV BLD AUTO: 10.5 FL (ref 6–12)
POTASSIUM SERPL-SCNC: 3.7 MMOL/L (ref 3.5–5.2)
RBC # BLD AUTO: 3.62 10*6/MM3 (ref 4.14–5.8)
SODIUM SERPL-SCNC: 142 MMOL/L (ref 136–145)
URATE SERPL-MCNC: 10.4 MG/DL (ref 3.4–7)
WBC NRBC COR # BLD AUTO: 6.65 10*3/MM3 (ref 3.4–10.8)

## 2024-08-02 PROCEDURE — 97530 THERAPEUTIC ACTIVITIES: CPT

## 2024-08-02 PROCEDURE — 25010000002 HEPARIN (PORCINE) PER 1000 UNITS: Performed by: INTERNAL MEDICINE

## 2024-08-02 PROCEDURE — 85025 COMPLETE CBC W/AUTO DIFF WBC: CPT | Performed by: INTERNAL MEDICINE

## 2024-08-02 PROCEDURE — 63710000001 INSULIN REGULAR HUMAN PER 5 UNITS: Performed by: INTERNAL MEDICINE

## 2024-08-02 PROCEDURE — 82948 REAGENT STRIP/BLOOD GLUCOSE: CPT

## 2024-08-02 PROCEDURE — 83735 ASSAY OF MAGNESIUM: CPT | Performed by: INTERNAL MEDICINE

## 2024-08-02 PROCEDURE — 99232 SBSQ HOSP IP/OBS MODERATE 35: CPT | Performed by: NURSE PRACTITIONER

## 2024-08-02 PROCEDURE — 84550 ASSAY OF BLOOD/URIC ACID: CPT | Performed by: INTERNAL MEDICINE

## 2024-08-02 PROCEDURE — 80069 RENAL FUNCTION PANEL: CPT | Performed by: INTERNAL MEDICINE

## 2024-08-02 RX ADMIN — INSULIN HUMAN 2 UNITS: 100 INJECTION, SOLUTION PARENTERAL at 21:33

## 2024-08-02 RX ADMIN — HYDRALAZINE HYDROCHLORIDE 100 MG: 50 TABLET ORAL at 00:26

## 2024-08-02 RX ADMIN — HEPARIN SODIUM 5000 UNITS: 5000 INJECTION INTRAVENOUS; SUBCUTANEOUS at 08:15

## 2024-08-02 RX ADMIN — SENNOSIDES AND DOCUSATE SODIUM 2 TABLET: 50; 8.6 TABLET ORAL at 08:14

## 2024-08-02 RX ADMIN — HYDRALAZINE HYDROCHLORIDE 100 MG: 50 TABLET ORAL at 23:43

## 2024-08-02 RX ADMIN — AMLODIPINE BESYLATE 10 MG: 10 TABLET ORAL at 08:14

## 2024-08-02 RX ADMIN — CARVEDILOL 12.5 MG: 12.5 TABLET, FILM COATED ORAL at 17:26

## 2024-08-02 RX ADMIN — TORSEMIDE 100 MG: 100 TABLET ORAL at 17:25

## 2024-08-02 RX ADMIN — TORSEMIDE 100 MG: 100 TABLET ORAL at 08:14

## 2024-08-02 RX ADMIN — INSULIN HUMAN 2 UNITS: 100 INJECTION, SOLUTION PARENTERAL at 13:06

## 2024-08-02 RX ADMIN — HYDRALAZINE HYDROCHLORIDE 100 MG: 50 TABLET ORAL at 08:14

## 2024-08-02 RX ADMIN — Medication 10 ML: at 08:15

## 2024-08-02 RX ADMIN — LEVOTHYROXINE SODIUM 100 MCG: 100 TABLET ORAL at 08:14

## 2024-08-02 RX ADMIN — HYDRALAZINE HYDROCHLORIDE 100 MG: 50 TABLET ORAL at 17:25

## 2024-08-02 RX ADMIN — HEPARIN SODIUM 5000 UNITS: 5000 INJECTION INTRAVENOUS; SUBCUTANEOUS at 21:32

## 2024-08-02 RX ADMIN — Medication 10 ML: at 21:33

## 2024-08-02 RX ADMIN — Medication 3 MG: at 23:43

## 2024-08-02 RX ADMIN — SENNOSIDES AND DOCUSATE SODIUM 2 TABLET: 50; 8.6 TABLET ORAL at 21:33

## 2024-08-02 RX ADMIN — CARVEDILOL 12.5 MG: 12.5 TABLET, FILM COATED ORAL at 08:14

## 2024-08-02 NOTE — THERAPY TREATMENT NOTE
Patient Name: Marcial Bernard  : 1955    MRN: [id number]                              Today's Date: 2024       Admit Date: 2024    Visit Dx:     ICD-10-CM ICD-9-CM   1. Chronic renal failure (CRF), stage 4 (severe)  N18.4 585.4   2. Hyponatremia  E87.1 276.1   3. Acute pulmonary edema with congestive heart failure  I50.1 428.1   4. Anemia due to stage 4 chronic kidney disease  N18.4 285.21    D63.1 585.4   5. Elevated troponin level not due myocardial infarction  R79.89 790.6     Patient Active Problem List   Diagnosis    Chronic renal failure (CRF), stage 4 (severe)    Hyponatremia    Acute pulmonary edema with congestive heart failure    Anemia due to stage 4 chronic kidney disease    Elevated troponin level not due myocardial infarction    Dementia     Past Medical History:   Diagnosis Date    Asthma     Dementia     Renal disorder      History reviewed. No pertinent surgical history.   General Information       Row Name 24 1453          OT Time and Intention    Document Type therapy note (daily note)  -     Mode of Treatment co-treatment;physical therapy;occupational therapy  -       Row Name 24 1453          General Information    Patient Profile Reviewed yes  -JR     Existing Precautions/Restrictions fall  -       Row Name 24 1453          Cognition    Orientation Status (Cognition) unable/difficult to assess;other (see comments)  -       Row Name 24 1453          Safety Issues, Functional Mobility    Impairments Affecting Function (Mobility) balance;cognition;coordination;endurance/activity tolerance;strength;postural/trunk control;shortness of breath  -JR               User Key  (r) = Recorded By, (t) = Taken By, (c) = Cosigned By      Initials Name Provider Type    Neptali Lopez OT Occupational Therapist                     Mobility/ADL's       Row Name 24 1459          Bed Mobility    Bed Mobility supine-sit;sit-supine  -JR     Supine-Sit [place]  (Bed Mobility) maximum assist (25% patient effort);2 person assist;verbal cues  -     Sit-Supine [place] (Bed Mobility) dependent (less than 25% patient effort);2 person assist;verbal cues  -     Assistive Device (Bed Mobility) head of bed elevated;bed rails;draw sheet  -[facility] Name 08/02/24 1459          Transfers    Transfers sit-stand transfer  -[facility] Name 08/02/24 1459          Sit-Stand Transfer    Sit-Stand [place] (Transfers) moderate assist (50% patient effort);2 person assist;verbal cues  -     Assistive Device (Sit-Stand Transfers) walker, front-wheeled;other (see comments)  -[facility] Name 08/02/24 1459          Functional Mobility    Patient was able to Ambulate yes  -               User Key  (r) = Recorded By, (t) = Taken By, (c) = Cosigned By      Initials Name Provider Type    Neptali Lopez OT Occupational Therapist                   Obj/Interventions       [facility] Name 08/02/24 1500          Sensory Assessment (Somatosensory)    Sensory Assessment (Somatosensory) sensation intact  -JR       Row Name 08/02/24 1500          Vision Assessment/Intervention    Visual Impairment/Limitations unable/difficult to assess  Patient does not make or maintain eye contact.  -[facility] Name 08/02/24 1500          Range of Motion Comprehensive    General Range of Motion bilateral upper extremity ROM WNL  -[facility] Name 08/02/24 1500          Strength Comprehensive (MMT)    General Manual Muscle Testing (MMT) Assessment upper extremity strength deficits identified  -     Comment, General Manual Muscle Testing (MMT) Assessment BUE 3+/5  -[facility] Name 08/02/24 1500          Balance    Balance Assessment sitting static balance;sitting dynamic balance;sit to stand dynamic balance;standing static balance;standing dynamic balance  -     Static Sitting Balance minimal assist;2-person assist  -     Dynamic Sitting Balance minimal assist;2-person assist  -     Position,  Sitting Balance supported  -JR     Sit to Stand Dynamic Balance moderate assist;2-person assist  -JR     Static Standing Balance moderate assist;2-person assist  -JR     Dynamic Standing Balance moderate assist;2-person assist  -JR     Position/Device Used, Standing Balance supported;walker, front-wheeled  -JR     Comment, Balance Unsteady throughout treatment session with standing.  -JR               User Key  (r) = Recorded By, (t) = Taken By, (c) = Cosigned By      Initials Name Provider Type    Neptali Lopez OT Occupational Therapist                   Goals/Plan    No documentation.                  Clinical Impression       Row Name 08/02/24 1507          Pain Assessment    Pretreatment Pain Rating 0/10 - no pain  -JR     Posttreatment Pain Rating 0/10 - no pain  -JR     Pre/Posttreatment Pain Comment Patient continues to be nonverbal with flat affect.  -JR       Row Name 08/02/24 1503          Plan of Care Review    Plan of Care Reviewed With patient  -JR     Progress improving  -     Outcome Evaluation Patient resting in bed upon arrival. Wife present and helping with translation.  Patient required MAX x 2 with supine to sit on EOB.  sitting balance required Min X 2 and then progressed to CGA.  Patient demo severe stiffness throughout trunk and BLEs.  Patient stood with Mod X 2 from EOB with extensive cuing from wife.  Patient ambulated to sink with Min X 2 with rolling wlalker.  Ambulated back to bed with Min A X2, without walker, to return to bed.  Patient required MAX X 2 with sit to supine, secondary to stiffness throughout trunk/BLEs. Dependent with positioning in bed. Patient demo generalized weakness, poor eye contact, non-verbal, non-responce to commands throughout treatment session.  Continue OT POC to increase strength,activity tolerance, coordination and balance to achieve OT goals.  Continue to anticipate patinet d/c to SNF. Family continue to want to take patient home when appropriate.  -JR        Row Name 08/02/24 1504          Therapy Assessment/Plan (OT)    Rehab Potential (OT) good, to achieve stated therapy goals  -     Criteria for Skilled Therapeutic Interventions Met (OT) yes;meets criteria;skilled treatment is necessary  -     Therapy Frequency (OT) 5 times/wk  -       Row Name 08/02/24 1504          Therapy Plan Review/Discharge Plan (OT)    Anticipated Discharge Disposition (OT) [facility] nursing [facility]  -       Row Name 08/02/24 1504          Vital Signs    O2 Delivery Pre Treatment room air  -JR     Pre Patient Position Supine  -JR     Intra Patient Position Standing  -JR     Post Patient Position Supine  -JR       Row Name 08/02/24 1504          Positioning and Restraints    Pre-Treatment Position in bed  -JR     Post Treatment Position bed  -JR     In Bed notified nsg;call light within reach;encouraged to call for assist;exit alarm on  -JR               User Key  (r) = Recorded By, (t) = Taken By, (c) = Cosigned By      Initials Name Provider Type    JR Neptali Briseno, OT Occupational Therapist                   Outcome Measures       Row Name 08/02/24 1516          How much help from another is currently needed...    Putting on and taking off regular lower body clothing? 1  -JR     Bathing (including washing, rinsing, and drying) 1  -JR     Toileting (which includes using toilet bed pan or urinal) 1  -JR     Putting on and taking off regular upper body clothing 1  -JR     Taking care of personal grooming (such as brushing teeth) 1  -JR     Eating meals 2  -JR     AM-PAC 6 Clicks Score (OT) 7  -       Row Name 08/02/24 1425 08/02/24 0754       How much help from another person do you currently need...    Turning from your back to your side while in flat bed without using bedrails? 2  -DJ 2  -RW    Moving from lying on back to sitting on the side of a flat bed without bedrails? 2  -DJ 2  -RW    Moving to and from a bed to a chair (including a wheelchair)? 2  -DJ 2  -RW    Standing up  from a chair using your arms (e.g., wheelchair, bedside chair)? 2  -DJ 2  -RW    Climbing 3-5 steps with a railing? 2  -DJ 1  -RW    To walk in hospital room? 2  -DJ 1  -RW    AM-PAC 6 Clicks Score (PT) 12  -DJ 10  -RW    Highest Level of Mobility Goal 4 --> Transfer to chair/commode  -DJ 4 --> Transfer to chair/commode  -RW      Row Name 08/02/24 1516 08/02/24 1425       Functional Assessment    Outcome Measure Options AM-PAC 6 Clicks Daily Activity (OT)  -JR AM-PAC 6 Clicks Basic Mobility (PT)  -DJ              User Key  (r) = Recorded By, (t) = Taken By, (c) = Cosigned By      Initials Name Provider Type    RW Anthony Verde, RN Registered Nurse    Leti Cedeño, PT Physical Therapist    Neptali Lopez, OT Occupational Therapist                    Occupational Therapy Education       Title: PT OT SLP Therapies (In Progress)       Topic: Occupational Therapy (Done)       Point: ADL training (Done)       Description:   Instruct learner(s) on proper safety adaptation and remediation techniques during self care or transfers.   Instruct in proper use of assistive devices.                  Learning Progress Summary             Patient Acceptance, E,TB, VU by RW at 8/2/2024 1343    Acceptance, E, VU,NR by MM at 7/29/2024 1546    Comment: role of OT, d/c rec   Family Acceptance, E,TB, VU by RW at 8/2/2024 1343    Acceptance, E, VU,NR by MM at 7/29/2024 1546    Comment: role of OT, d/c rec                         Point: Precautions (Done)       Description:   Instruct learner(s) on prescribed precautions during self-care and functional transfers.                  Learning Progress Summary             Patient Acceptance, E,TB, VU by RW at 8/2/2024 1343    Acceptance, E, VU,NR by MM at 7/29/2024 1546    Comment: role of OT, d/c rec   Family Acceptance, E,TB, VU by RW at 8/2/2024 1343    Acceptance, E, VU,NR by MM at 7/29/2024 1546    Comment: role of OT, d/c rec                         Point: Body mechanics (Done)        Description:   Instruct learner(s) on proper positioning and spine alignment during self-care, functional mobility activities and/or exercises.                  Learning Progress Summary             Patient Acceptance, E,TB, VU by  at 8/2/2024 1343    Acceptance, E, VU,NR by MM at 7/29/2024 1546    Comment: role of OT, d/c rec   Family Acceptance, E,TB, VU by RW at 8/2/2024 1343    Acceptance, E, VU,NR by MM at 7/29/2024 1546    Comment: role of OT, d/c rec                                         User Key       Initials Effective Dates Name Provider Type Discipline     06/16/21 -  Anthony Verde RN Registered Nurse Nurse     05/31/24 -  Zehra Adams OT Occupational Therapist OT                  OT Recommendation and Plan  Therapy Frequency (OT): 5 times/wk  Plan of Care Review  Plan of Care Reviewed With: patient  Progress: improving  Outcome Evaluation: Patient resting in bed upon arrival. Wife present and helping with translation.  Patient required MAX x 2 with supine to sit on EOB.  sitting balance required Min X 2 and then progressed to CGA.  Patient demo severe stiffness throughout trunk and BLEs.  Patient stood with Mod X 2 from EOB with extensive cuing from wife.  Patient ambulated to sink with Min X 2 with rolling wlalker.  Ambulated back to bed with Min A X2, without walker, to return to bed.  Patient required MAX X 2 with sit to supine, secondary to stiffness throughout trunk/BLEs. Dependent with positioning in bed. Patient demo generalized weakness, poor eye contact, non-verbal, non-responce to commands throughout treatment session.  Continue OT POC to increase strength,activity tolerance, coordination and balance to achieve OT goals.  Continue to anticipate patinet d/c to SNF. Family continue to want to take patient home when appropriate.     Time Calculation:         Time Calculation- OT       Row Name 08/02/24 4597             Time Calculation- OT    OT Start Time 1327  -JR      OT Stop Time 1343   -      OT Time Calculation (min) 16 min  -      Total Timed Code Minutes- OT 16 minute(s)  -JR      OT Received On 08/02/24  -JR      OT - Next Appointment 08/05/24  -         Timed Charges    52426 - OT Therapeutic Activity Minutes 16  -JR         Total Minutes    Timed Charges Total Minutes 16  -JR       Total Minutes 16  -JR                User Key  (r) = Recorded By, (t) = Taken By, (c) = Cosigned By      Initials Name Provider Type     Neptali Briseno OT Occupational Therapist                  Therapy Charges for Today       Code Description Service Date Service Provider Modifiers Qty    [id number]  OT THERAPEUTIC ACT EA 15 MIN 8/2/2024 Neptali Briseno OT GO 1                 Neptali Briseno OT  8/2/2024

## 2024-08-02 NOTE — PLAN OF CARE
Goal Outcome Evaluation:  Plan of Care Reviewed With: patient, spouse        Progress: improving  Outcome Evaluation: Pt resting in bed in NAD, wife present and helpful with translation. Pt's eyes are open but he does not track. He req max A of 2 for bed mobility to sit EOB. HE initially req min A of2 to sit EOb adn was very stiff. he progressed to req CGA to sit. He stood from EOB with modA of 2. Pt amb 20' with 1 turn with r wx and mod A of 2; he dem shuffled steps and narrow MAGGY, poor endurance. He amb 10' with r wx and then 10' without AD. He had difficulty maneuvering r wx virginie with turning. He fatigues quickly and returned to bed. He again had difficulty sitting EOB due to rigid posture and stiffness virginie in hips. He was dependent to return supine and reposition in bed. Pt progressed today with amb but is very unsteady and continues to be unresponsive to commands. Cont PT to address functional deficits adn prepare for d/c as appropriate      Anticipated Discharge Disposition (PT): skilled nursing facility, home with assist, home with home health (family would like to take pt home)

## 2024-08-02 NOTE — CASE MANAGEMENT/SOCIAL WORK
Continued Stay Note  [facility]     Patient Name: Marcial Bernard  MRN: [id number]  Today's Date: 8/2/2024    Admit Date: 7/23/2024    Plan: Return home with family   Discharge Plan       Row Name 08/02/24 4829       Plan    Plan Return home with family    Patient/Family in Agreement with Plan yes    Plan Comments Patient's discharge plan remains the same; home with family. Still waiting for insurance decision at this time. CCP to follow. CD, CSW.                   Discharge Codes    No documentation.                 Expected Discharge Date and Time       Expected Discharge Date Expected Discharge Time    Aug 5, 2024

## 2024-08-02 NOTE — PLAN OF CARE
Goal Outcome Evaluation:  Plan of Care Reviewed With: patient, spouse           Outcome Evaluation: Pt resting in bed and has been awake for most of the shift with some sleeping.  Pt took his pills crushed in applesauce without a problem.  BP is better today.  Wife currently at bedside and fed him 100% of his lunch, 2 unit of insulin given.  VSS, RA while awake, 2 lpn via n/c while asleep.  SNR.  IV S/L.  Pt urinating well with purewick in place.

## 2024-08-02 NOTE — PROGRESS NOTES
"DAILY PROGRESS NOTE  Psychiatric    Patient Identification:  Name: Marcial Bernard  Age: 69 y.o.  Sex: male  :  1955  MRN: [id number]         Primary Care Physician: Jutsina Liriano APRN    Subjective:  Interval History: He is sleepy and lethargic.  He was able to stand up with physical therapy requiring a lot of assistance.      Objective:    Scheduled Meds:amLODIPine, 10 mg, Oral, Q24H  carvedilol, 12.5 mg, Oral, BID With Meals  cloNIDine, 1 patch, Transdermal, Weekly  heparin (porcine), 5,000 Units, Subcutaneous, Q12H  hydrALAZINE, 100 mg, Oral, Q8H  insulin regular, 2-7 Units, Subcutaneous, 4x Daily AC & at Bedtime  levothyroxine, 100 mcg, Oral, Daily  melatonin, 3 mg, Oral, Nightly  senna-docusate sodium, 2 tablet, Oral, BID  sodium chloride, 10 mL, Intravenous, Q12H  torsemide, 100 mg, Oral, BID      Continuous Infusions:       Vital signs in last 24 hours:  Temp:  [98.2 °F (36.8 °C)-99 °F (37.2 °C)] 98.8 °F (37.1 °C)  Heart Rate:  [57-81] 66  Resp:  [18] 18  BP: (115-179)/(48-93) 153/74    Intake/Output:    Intake/Output Summary (Last 24 hours) at 2024 1346  Last data filed at 2024 1300  Gross per 24 hour   Intake 520 ml   Output 1550 ml   Net -1030 ml       Exam:  /74 (BP Location: Right arm, Patient Position: Lying)   Pulse 66   Temp 98.8 °F (37.1 °C) (Oral)   Resp 18   Ht 175.3 cm (69\")   Wt 75.7 kg (166 lb 14.2 oz)   SpO2 96%   BMI 24.65 kg/m²     General Appearance:  Sleepy and lethargic, no distress   Head:    Normocephalic, without obvious abnormality, atraumatic   Eyes:       Throat:   Lips, tongue, gums normal   Neck:   Supple, symmetrical, trachea midline, no JVD   Lungs:     Clear to auscultation bilaterally, respirations unlabored   Chest Wall:    No tenderness or deformity    Heart:    Regular rate and rhythm, S1 and S2 normal, no murmur,no  rub or gallop   Abdomen:     Soft, nontender, bowel sounds active, no masses, no organomegaly  "   Extremities:   Extremities normal, atraumatic, no cyanosis or edema   Pulses:      Skin:   Skin is warm and dry,  no rashes or palpable lesions   Neurologic:     Sleepy and lethargic      Lab Results (last 72 hours)       Procedure Component Value Units Date/Time    POC Glucose Once [[id number]]  (Abnormal) Collected: 07/28/24 1102    Specimen: Blood Updated: 07/28/24 1104     Glucose 136 mg/dL     Comprehensive Metabolic Panel [[id number]]  (Abnormal) Collected: 07/28/24 0605    Specimen: Blood Updated: 07/28/24 0723     Glucose 107 mg/dL      BUN 71 mg/dL      Creatinine 4.50 mg/dL      Sodium 130 mmol/L      Potassium 4.1 mmol/L      Comment: Slight hemolysis detected by analyzer. Result may be falsely elevated.        Chloride 92 mmol/L      CO2 23.6 mmol/L      Calcium 8.0 mg/dL      Total Protein 6.4 g/dL      Albumin 2.9 g/dL      ALT (SGPT) 17 U/L      AST (SGOT) 22 U/L      Alkaline Phosphatase 89 U/L      Total Bilirubin 0.2 mg/dL      Globulin 3.5 gm/dL      A/G Ratio 0.8 g/dL      BUN/Creatinine Ratio 15.8     Anion Gap 14.4 mmol/L      eGFR 13.3 mL/min/1.73      Comment: <15 Indicative of kidney failure       Narrative:      GFR Normal >60  Chronic Kidney Disease <60  Kidney Failure <15      Magnesium [[id number]]  (Abnormal) Collected: 07/28/24 0605    Specimen: Blood Updated: 07/28/24 0723     Magnesium 4.2 mg/dL     Uric Acid [[id number]]  (Abnormal) Collected: 07/28/24 0605    Specimen: Blood Updated: 07/28/24 0712     Uric Acid 8.9 mg/dL     Phosphorus [[id number]]  (Abnormal) Collected: 07/28/24 0605    Specimen: Blood Updated: 07/28/24 0712     Phosphorus 6.1 mg/dL     CBC & Differential [[id number]]  (Abnormal) Collected: 07/28/24 0605    Specimen: Blood Updated: 07/28/24 0655    Narrative:      The following orders were created for panel order CBC & Differential.  Procedure                               Abnormality         Status                     ---------                                -----------         ------                     CBC Auto Differential[[id number]]        Abnormal            Final result                 Please view results for these tests on the individual orders.    CBC Auto Differential [[id number]]  (Abnormal) Collected: 07/28/24 0605    Specimen: Blood Updated: 07/28/24 0655     WBC 9.07 10*3/mm3      RBC 3.59 10*6/mm3      Hemoglobin 10.5 g/dL      Hematocrit 31.3 %      MCV 87.2 fL      MCH 29.2 pg      MCHC 33.5 g/dL      RDW 14.0 %      RDW-SD 44.2 fl      MPV 9.9 fL      Platelets 383 10*3/mm3      Neutrophil % 69.9 %      Lymphocyte % 11.5 %      Monocyte % 9.9 %      Eosinophil % 7.2 %      Basophil % 0.6 %      Immature Grans % 0.9 %      Neutrophils, Absolute 6.35 10*3/mm3      Lymphocytes, Absolute 1.04 10*3/mm3      Monocytes, Absolute 0.90 10*3/mm3      Eosinophils, Absolute 0.65 10*3/mm3      Basophils, Absolute 0.05 10*3/mm3      Immature Grans, Absolute 0.08 10*3/mm3      nRBC 0.0 /100 WBC     POC Glucose Once [[id number]]  (Normal) Collected: 07/28/24 0606    Specimen: Blood Updated: 07/28/24 0611     Glucose 118 mg/dL     POC Glucose Once [[id number]]  (Normal) Collected: 07/27/24 2102    Specimen: Blood Updated: 07/27/24 2103     Glucose 112 mg/dL     Basic Metabolic Panel [[id number]]  (Abnormal) Collected: 07/27/24 1825    Specimen: Blood Updated: 07/27/24 1855     Glucose 154 mg/dL      BUN 73 mg/dL      Creatinine 4.76 mg/dL      Sodium 130 mmol/L      Potassium 3.3 mmol/L      Chloride 92 mmol/L      CO2 25.9 mmol/L      Calcium 7.5 mg/dL      BUN/Creatinine Ratio 15.3     Anion Gap 12.1 mmol/L      eGFR 12.5 mL/min/1.73      Comment: <15 Indicative of kidney failure       Narrative:      GFR Normal >60  Chronic Kidney Disease <60  Kidney Failure <15      POC Glucose Once [[id number]]  (Abnormal) Collected: 07/27/24 1817    Specimen: Blood Updated: 07/27/24 1819     Glucose 175 mg/dL     POC Glucose Once [[id number]]  (Abnormal) Collected: 07/27/24 4513     Specimen: Blood Updated: 07/27/24 1652     Glucose 193 mg/dL     POC Glucose Once [[id number]]  (Abnormal) Collected: 07/27/24 1238    Specimen: Blood Updated: 07/27/24 1239     Glucose 135 mg/dL     POC Glucose Once [[id number]]  (Normal) Collected: 07/27/24 0634    Specimen: Blood Updated: 07/27/24 0635     Glucose 125 mg/dL     Basic Metabolic Panel [[id number]]  (Abnormal) Collected: 07/27/24 0252    Specimen: Blood Updated: 07/27/24 0338     Glucose 117 mg/dL      BUN 74 mg/dL      Creatinine 4.51 mg/dL      Sodium 130 mmol/L      Potassium 3.6 mmol/L      Chloride 90 mmol/L      CO2 25.2 mmol/L      Calcium 7.5 mg/dL      BUN/Creatinine Ratio 16.4     Anion Gap 14.8 mmol/L      eGFR 13.3 mL/min/1.73      Comment: <15 Indicative of kidney failure       Narrative:      GFR Normal >60  Chronic Kidney Disease <60  Kidney Failure <15      Comprehensive Metabolic Panel [[id number]]  (Abnormal) Collected: 07/27/24 0252    Specimen: Blood Updated: 07/27/24 0338     Glucose 117 mg/dL      BUN 74 mg/dL      Creatinine 4.51 mg/dL      Sodium 130 mmol/L      Potassium 3.6 mmol/L      Chloride 90 mmol/L      CO2 25.2 mmol/L      Calcium 7.5 mg/dL      Total Protein 5.4 g/dL      Albumin 2.6 g/dL      ALT (SGPT) 19 U/L      AST (SGOT) 18 U/L      Alkaline Phosphatase 75 U/L      Total Bilirubin <0.2 mg/dL      Globulin 2.8 gm/dL      A/G Ratio 0.9 g/dL      BUN/Creatinine Ratio 16.4     Anion Gap 14.8 mmol/L      eGFR 13.3 mL/min/1.73      Comment: <15 Indicative of kidney failure       Narrative:      GFR Normal >60  Chronic Kidney Disease <60  Kidney Failure <15      Phosphorus [[id number]]  (Abnormal) Collected: 07/27/24 0252    Specimen: Blood Updated: 07/27/24 0338     Phosphorus 6.5 mg/dL     Magnesium [[id number]]  (Abnormal) Collected: 07/27/24 0252    Specimen: Blood Updated: 07/27/24 0338     Magnesium 4.2 mg/dL     Uric Acid [[id number]]  (Abnormal) Collected: 07/27/24 0252    Specimen: Blood Updated: 07/27/24 0339      Uric Acid 8.5 mg/dL     CBC & Differential [[id number]]  (Abnormal) Collected: 07/27/24 0252    Specimen: Blood Updated: 07/27/24 0320    Narrative:      The following orders were created for panel order CBC & Differential.  Procedure                               Abnormality         Status                     ---------                               -----------         ------                     CBC Auto Differential[[id number]]        Abnormal            Final result                 Please view results for these tests on the individual orders.    CBC Auto Differential [[id number]]  (Abnormal) Collected: 07/27/24 0252    Specimen: Blood Updated: 07/27/24 0320     WBC 9.13 10*3/mm3      RBC 3.03 10*6/mm3      Hemoglobin 8.8 g/dL      Hematocrit 26.5 %      MCV 87.5 fL      MCH 29.0 pg      MCHC 33.2 g/dL      RDW 14.4 %      RDW-SD 45.6 fl      MPV 9.5 fL      Platelets 395 10*3/mm3      Neutrophil % 73.2 %      Lymphocyte % 8.5 %      Monocyte % 11.0 %      Eosinophil % 6.1 %      Basophil % 0.4 %      Immature Grans % 0.8 %      Neutrophils, Absolute 6.68 10*3/mm3      Lymphocytes, Absolute 0.78 10*3/mm3      Monocytes, Absolute 1.00 10*3/mm3      Eosinophils, Absolute 0.56 10*3/mm3      Basophils, Absolute 0.04 10*3/mm3      Immature Grans, Absolute 0.07 10*3/mm3      nRBC 0.0 /100 WBC     Basic Metabolic Panel [[id number]]  (Abnormal) Collected: 07/27/24 0056    Specimen: Blood Updated: 07/27/24 0134     Glucose 122 mg/dL      BUN 74 mg/dL      Creatinine 4.77 mg/dL      Sodium 130 mmol/L      Potassium 3.9 mmol/L      Chloride 90 mmol/L      CO2 26.8 mmol/L      Calcium 7.5 mg/dL      BUN/Creatinine Ratio 15.5     Anion Gap 13.2 mmol/L      eGFR 12.4 mL/min/1.73      Comment: <15 Indicative of kidney failure       Narrative:      GFR Normal >60  Chronic Kidney Disease <60  Kidney Failure <15      POC Glucose Once [[id number]]  (Normal) Collected: 07/27/24 0034    Specimen: Blood Updated: 07/27/24 0035     Glucose 128  mg/dL     POC Glucose Once [[id number]]  (Abnormal) Collected: 07/26/24 1843    Specimen: Blood Updated: 07/26/24 1855     Glucose 136 mg/dL     Basic Metabolic Panel [[id number]]  (Abnormal) Collected: 07/26/24 1451    Specimen: Blood Updated: 07/26/24 1524     Glucose 153 mg/dL      BUN 75 mg/dL      Creatinine 4.94 mg/dL      Sodium 127 mmol/L      Potassium 3.3 mmol/L      Chloride 88 mmol/L      CO2 25.8 mmol/L      Calcium 7.6 mg/dL      BUN/Creatinine Ratio 15.2     Anion Gap 13.2 mmol/L      eGFR 11.9 mL/min/1.73      Comment: <15 Indicative of kidney failure       Narrative:      GFR Normal >60  Chronic Kidney Disease <60  Kidney Failure <15      POC Glucose Once [[id number]]  (Normal) Collected: 07/26/24 1211    Specimen: Blood Updated: 07/26/24 1230     Glucose 128 mg/dL     POC Glucose Once [[id number]]  (Normal) Collected: 07/26/24 0604    Specimen: Blood Updated: 07/26/24 0605     Glucose 104 mg/dL     Renal Function Panel [[id number]]  (Abnormal) Collected: 07/26/24 0415    Specimen: Blood Updated: 07/26/24 0533     Glucose 106 mg/dL      BUN 73 mg/dL      Creatinine 4.50 mg/dL      Sodium 126 mmol/L      Potassium 3.4 mmol/L      Comment: Slight hemolysis detected by analyzer. Result may be falsely elevated.        Chloride 87 mmol/L      CO2 24.6 mmol/L      Calcium 7.7 mg/dL      Albumin 2.8 g/dL      Phosphorus 7.0 mg/dL      Anion Gap 14.4 mmol/L      BUN/Creatinine Ratio 16.2     eGFR 13.3 mL/min/1.73      Comment: <15 Indicative of kidney failure       Narrative:      GFR Normal >60  Chronic Kidney Disease <60  Kidney Failure <15      Magnesium [[id number]]  (Abnormal) Collected: 07/26/24 0415    Specimen: Blood Updated: 07/26/24 0533     Magnesium 4.6 mg/dL     Uric Acid [[id number]]  (Abnormal) Collected: 07/26/24 0415    Specimen: Blood Updated: 07/26/24 0533     Uric Acid 8.7 mg/dL     CBC & Differential [[id number]]  (Abnormal) Collected: 07/26/24 0415    Specimen: Blood Updated: 07/26/24 0512     Narrative:      The following orders were created for panel order CBC & Differential.  Procedure                               Abnormality         Status                     ---------                               -----------         ------                     CBC Auto Differential[[id number]]        Abnormal            Final result                 Please view results for these tests on the individual orders.    CBC Auto Differential [[id number]]  (Abnormal) Collected: 07/26/24 0415    Specimen: Blood Updated: 07/26/24 0512     WBC 8.80 10*3/mm3      RBC 3.19 10*6/mm3      Hemoglobin 9.4 g/dL      Hematocrit 28.0 %      MCV 87.8 fL      MCH 29.5 pg      MCHC 33.6 g/dL      RDW 14.6 %      RDW-SD 46.9 fl      MPV 10.2 fL      Platelets 446 10*3/mm3      Neutrophil % 71.7 %      Lymphocyte % 9.5 %      Monocyte % 12.3 %      Eosinophil % 5.1 %      Basophil % 0.5 %      Immature Grans % 0.9 %      Neutrophils, Absolute 6.31 10*3/mm3      Lymphocytes, Absolute 0.84 10*3/mm3      Monocytes, Absolute 1.08 10*3/mm3      Eosinophils, Absolute 0.45 10*3/mm3      Basophils, Absolute 0.04 10*3/mm3      Immature Grans, Absolute 0.08 10*3/mm3      nRBC 0.0 /100 WBC     Basic Metabolic Panel [[id number]]  (Abnormal) Collected: 07/26/24 0002    Specimen: Blood Updated: 07/26/24 0045     Glucose 105 mg/dL      BUN 78 mg/dL      Creatinine 4.80 mg/dL      Sodium 124 mmol/L      Potassium 3.7 mmol/L      Comment: Slight hemolysis detected by analyzer. Result may be falsely elevated.        Chloride 86 mmol/L      CO2 24.0 mmol/L      Calcium 7.4 mg/dL      BUN/Creatinine Ratio 16.3     Anion Gap 14.0 mmol/L      eGFR 12.3 mL/min/1.73      Comment: <15 Indicative of kidney failure       Narrative:      GFR Normal >60  Chronic Kidney Disease <60  Kidney Failure <15      POC Glucose Once [[id number]]  (Normal) Collected: 07/26/24 0011    Specimen: Blood Updated: 07/26/24 0013     Glucose 112 mg/dL     POC Glucose Once [[id number]]   (Abnormal) Collected: 07/25/24 1800    Specimen: Blood Updated: 07/25/24 1829     Glucose 167 mg/dL     Basic Metabolic Panel [[id number]]  (Abnormal) Collected: 07/25/24 1658    Specimen: Blood Updated: 07/25/24 1733     Glucose 108 mg/dL      BUN 80 mg/dL      Creatinine 4.53 mg/dL      Sodium 123 mmol/L      Potassium 3.9 mmol/L      Chloride 84 mmol/L      CO2 21.6 mmol/L      Calcium 7.4 mg/dL      BUN/Creatinine Ratio 17.7     Anion Gap 17.4 mmol/L      eGFR 13.2 mL/min/1.73      Comment: <15 Indicative of kidney failure       Narrative:      GFR Normal >60  Chronic Kidney Disease <60  Kidney Failure <15            Data Review:  Results from last 7 days   Lab Units 08/01/24  0538 07/31/24  0432 07/30/24  0615   SODIUM mmol/L 140 139 138   POTASSIUM mmol/L 3.2* 3.2* 3.1*   CHLORIDE mmol/L 100 95* 95*   CO2 mmol/L 29.0 28.0 30.0*   BUN mg/dL 76* 75* 72*   CREATININE mg/dL 4.64* 4.32* 4.44*   GLUCOSE mg/dL 119* 124* 114*   CALCIUM mg/dL 8.9 8.8 8.5*     Results from last 7 days   Lab Units 08/02/24  0458 08/01/24  0538 07/31/24  0432   WBC 10*3/mm3 6.65 8.24 9.31   HEMOGLOBIN g/dL 10.4* 10.0* 10.2*   HEMATOCRIT % 32.9* 31.0* 31.0*   PLATELETS 10*3/mm3 348 336 375                 Lab Results   Lab Value Date/Time    TROPONINT 78 (C) 07/23/2024 0251    TROPONINT 87 (C) 07/23/2024 0110         Results from last 7 days   Lab Units 07/29/24  0526 07/28/24  0605 07/27/24  0252   ALK PHOS U/L 85 89 75   BILIRUBIN mg/dL <0.2 0.2 <0.2   ALT (SGPT) U/L 16 17 19   AST (SGOT) U/L 18 22 18                 Glucose   Date/Time Value Ref Range Status   08/02/2024 1118 172 (H) 70 - 130 mg/dL Final   08/02/2024 0623 133 (H) 70 - 130 mg/dL Final   08/01/2024 1941 204 (H) 70 - 130 mg/dL Final   08/01/2024 1705 163 (H) 70 - 130 mg/dL Final   08/01/2024 1123 142 (H) 70 - 130 mg/dL Final   08/01/2024 0636 120 70 - 130 mg/dL Final   07/31/2024 1949 206 (H) 70 - 130 mg/dL Final   07/31/2024 1623 161 (H) 70 - 130 mg/dL Final           Past  Medical History:   Diagnosis Date    Asthma     Dementia     Renal disorder        Assessment:  Active Hospital Problems    Diagnosis  POA    **Chronic renal failure (CRF), stage 4 (severe) [N18.4]  Unknown    Dementia [F03.90]  Unknown    Hyponatremia [E87.1]  Unknown    Acute pulmonary edema with congestive heart failure [I50.1]  Unknown    Anemia due to stage 4 chronic kidney disease [N18.4, D63.1]  Unknown    Elevated troponin level not due myocardial infarction [R79.89]  Unknown      Resolved Hospital Problems   No resolved problems to display.       Plan:  Continue current medication.  Plans as per cardiology and nephrology.  Follow-up labs.  DC planning.  Hopefully he can get well enough to go home.  Medicine can take over for primary care.  PT and OT ordered.  DC planning.  He has no insurance and family is hoping he will get well enough that they can take care of him at home.  To be determined.  Continue support.      Chavo Martins MD  8/2/2024  13:46 EDT

## 2024-08-02 NOTE — PROGRESS NOTES
"    Patient Name: Marcial Bernard  :1955  69 y.o.      Patient Care Team:  Justina Liriano APRN as PCP - General (Nurse Practitioner)    Chief Complaint: follow up HFpEF    Interval History:alert, not totally responsive. Stares into space. Family at bedside.        Objective   Vital Signs  Temp:  [98.2 °F (36.8 °C)-99 °F (37.2 °C)] 98.2 °F (36.8 °C)  Heart Rate:  [57-81] 65  Resp:  [16-18] 18  BP: (115-179)/(48-93) 169/83    Intake/Output Summary (Last 24 hours) at 2024 1125  Last data filed at 2024 1020  Gross per 24 hour   Intake 280 ml   Output 1550 ml   Net -1270 ml     Flowsheet Rows      Flowsheet Row First Filed Value   Admission Height 175.3 cm (69\") Documented at 2024   Admission Weight 90.7 kg (200 lb) Documented at 2024            Physical Exam:   General Appearance:    Alert, cooperative, in no acute distress   Lungs:     Clear to auscultation.  Normal respiratory effort and rate.      Heart:    Regular rhythm and normal rate, normal S1 and S2, no murmurs, gallops or rubs.     Chest Wall:    No abnormalities observed   Abdomen:     Soft, nontender, positive bowel sounds.     Extremities:   no cyanosis, clubbing or edema.  No marked joint deformities.  Adequate musculoskeletal strength.       Results Review:    Results from last 7 days   Lab Units 24  0538   SODIUM mmol/L 140   POTASSIUM mmol/L 3.2*   CHLORIDE mmol/L 100   CO2 mmol/L 29.0   BUN mg/dL 76*   CREATININE mg/dL 4.64*   GLUCOSE mg/dL 119*   CALCIUM mg/dL 8.9           Results from last 7 days   Lab Units 24  0458   WBC 10*3/mm3 6.65   HEMOGLOBIN g/dL 10.4*   HEMATOCRIT % 32.9*   PLATELETS 10*3/mm3 348         Results from last 7 days   Lab Units 24  0538   MAGNESIUM mg/dL 2.8*                   Medication Review:   amLODIPine, 10 mg, Oral, Q24H  carvedilol, 12.5 mg, Oral, BID With Meals  cloNIDine, 1 patch, Transdermal, Weekly  heparin (porcine), 5,000 Units, Subcutaneous, " Q12H  hydrALAZINE, 100 mg, Oral, Q8H  insulin regular, 2-7 Units, Subcutaneous, 4x Daily AC & at Bedtime  levothyroxine, 100 mcg, Oral, Daily  melatonin, 3 mg, Oral, Nightly  senna-docusate sodium, 2 tablet, Oral, BID  sodium chloride, 10 mL, Intravenous, Q12H  torsemide, 100 mg, Oral, BID                Assessment & Plan   Acute on chronic heart failure with preserved left ventricular systolic function. EF 60-65% due to hypertensive heart disease. His volume status is multifactorial.   Acute on chronic kidney disease, cardiorenal . Creatinine stable. Tolerating IV bumetanide drip. Nephrology is following.   Diabetes mellitus type II  Hypertension , uncontrolled. Has been getting IV hydralazine as needed pretty regularly. Has gotten 3 doses already today.   Sinus bradycardia - beta locker decreased and clonidine stopped.     HR is stable.     Continue current antiHTN regimen. Seems to be improving. Has not required as needed IV hydralazine since yesterday.     Nothing further to add from a cardiac standpoint. Will see as needed.     GIULIANO Dominguez  [facility]  08/02/24  11:25 EDT

## 2024-08-02 NOTE — PLAN OF CARE
Goal Outcome Evaluation:  Plan of Care Reviewed With: patient        Progress: improving  Outcome Evaluation: Patient resting in bed upon arrival. Wife present and helping with translation.  Patient required MAX x 2 with supine to sit on EOB.  sitting balance required Min X 2 and then progressed to CGA.  Patient demo severe stiffness throughout trunk and BLEs.  Patient stood with Mod X 2 from EOB with extensive cuing from wife.  Patient ambulated to sink with Min X 2 with rolling wlalker.  Ambulated back to bed with Min A X2, without walker, to return to bed.  Patient required MAX X 2 with sit to supine, secondary to stiffness throughout trunk/BLEs. Dependent with positioning in bed. Patient demo generalized weakness, poor eye contact, non-verbal, non-responce to commands throughout treatment session.  Continue OT POC to increase strength,activity tolerance, coordination and balance to achieve OT goals.  Continue to anticipate patinet d/c to SNF. Family continue to want to take patient home when appropriate.      Anticipated Discharge Disposition (OT): skilled nursing facility

## 2024-08-02 NOTE — PLAN OF CARE
Goal Outcome Evaluation:      Pt resting in bed quietly. Oriented to self. Follows commands. Reoriented as needed. Flat. Turned. Incontinence care. Daughter at bedside.

## 2024-08-02 NOTE — THERAPY TREATMENT NOTE
Patient Name: Marcial Bernard  : 1955    MRN: [id number]                              Today's Date: 2024       Admit Date: 2024    Visit Dx:     ICD-10-CM ICD-9-CM   1. Chronic renal failure (CRF), stage 4 (severe)  N18.4 585.4   2. Hyponatremia  E87.1 276.1   3. Acute pulmonary edema with congestive heart failure  I50.1 428.1   4. Anemia due to stage 4 chronic kidney disease  N18.4 285.21    D63.1 585.4   5. Elevated troponin level not due myocardial infarction  R79.89 790.6     Patient Active Problem List   Diagnosis    Chronic renal failure (CRF), stage 4 (severe)    Hyponatremia    Acute pulmonary edema with congestive heart failure    Anemia due to stage 4 chronic kidney disease    Elevated troponin level not due myocardial infarction    Dementia     Past Medical History:   Diagnosis Date    Asthma     Dementia     Renal disorder      History reviewed. No pertinent surgical history.   General Information       Row Name 24 1410          Physical Therapy Time and Intention    Document Type therapy note (daily note)  -DJ     Mode of Treatment co-treatment;physical therapy;occupational therapy  cotx appropriate due to level of assist and poor activity tolerance  -DJ       Row Name 24 1410          General Information    Patient Profile Reviewed yes  -DJ     Existing Precautions/Restrictions fall  -DJ     Barriers to Rehab language barrier  -DJ       Row Name 24 1410          Cognition    Orientation Status (Cognition) unable/difficult to assess;other (see comments)  Pt does not follow commands, nonverbal, eyes open but he does not track  -DJ       Row Name 24 1410          Safety Issues, Functional Mobility    Comment, Safety Issues/Impairments (Mobility) gt belt, nonskid socks  -DJ               User Key  (r) = Recorded By, (t) = Taken By, (c) = Cosigned By      Initials Name Provider Type    Leti Cedeño, PT Physical Therapist                   Mobility       Row Name  08/02/24 1413          Bed Mobility    Bed Mobility supine-sit;sit-supine  -DJ     Supine-Sit [place] (Bed Mobility) maximum assist (25% patient effort);2 person assist;verbal cues  -DJ     Sit-Supine [place] (Bed Mobility) dependent (less than 25% patient effort);2 person assist;verbal cues  -DJ     Assistive Device (Bed Mobility) head of bed elevated;bed rails;draw sheet  -DJ     Comment, (Bed Mobility) very stiff, difficulty achieving sitting EOB posture due to difficulty bending at hips  -DJ       Row Name 08/02/24 1413          Transfers    Comment, (Transfers) sit/stand from EOB  -DJ       Row Name 08/02/24 1413          Bed-Chair Transfer    Bed-Chair [place] (Transfers) not tested  -DJ       Row Name 08/02/24 1413          Sit-Stand Transfer    Sit-Stand [place] (Transfers) moderate assist (50% patient effort);2 person assist;verbal cues  -DJ     Assistive Device (Sit-Stand Transfers) walker, front-wheeled;other (see comments)  half with r wx, half without AD  -DJ       Row Name 08/02/24 1413          Gait/Stairs (Locomotion)    [place] Level (Gait) moderate assist (50% patient effort);2 person assist;verbal cues  -DJ     Assistive Device (Gait) other (see comments);walker, front-wheeled  10' with r wx, 10' without AD  -DJ     Distance in Feet (Gait) 20  -DJ     Deviations/Abnormal Patterns (Gait) festinating/shuffling;stride length decreased;gait speed decreased  -DJ     Bilateral Gait Deviations forward flexed posture;heel strike decreased  -DJ     [place] Level (Stairs) not tested  -DJ     Comment, (Gait/Stairs) Pt amb 20' with 1 turn with r wx and mod A of 2; he dem shuffled steps and narrow MAGGY, poor endurance. He amb 10' with r wx and then 10' without AD. He had difficulty maneuvering r wx virginie with turning  -DJ               User Key  (r) = Recorded By, (t) = Taken By, (c) = Cosigned By      Initials Name Provider Type    Leti Cedeño, PT Physical Therapist                    Obj/Interventions       Row Name 08/02/24 1418          Motor Skills    Motor Skills functional endurance  -DJ     Functional Endurance poor  -DJ     Therapeutic Exercise other (see comments)  pt unable to follow commands for ther ex  -DJ       Row Name 08/02/24 1418          Balance    Balance Assessment standing static balance;standing dynamic balance  -DJ     Static Standing Balance minimal assist;2-person assist;verbal cues  -DJ     Dynamic Standing Balance moderate assist;2-person assist;verbal cues  -DJ     Position/Device Used, Standing Balance supported;walker, 4-wheeled  -DJ     Balance Interventions sitting;standing;sit to stand;supported;weight shifting activity  -DJ     Comment, Balance unsteady virgiine with turning  -DJ               User Key  (r) = Recorded By, (t) = Taken By, (c) = Cosigned By      Initials Name Provider Type    Leti Cedeño PT Physical Therapist                   Goals/Plan    No documentation.                  Clinical Impression       Row Name 08/02/24 1419          Pain    Pretreatment Pain Rating 0/10 - no pain  -DJ       Row Name 08/02/24 1419          Plan of Care Review    Plan of Care Reviewed With patient;spouse  -DJ     Progress improving  -DJ     Outcome Evaluation Pt resting in bed in NAD, wife present and helpful with translation. Pt's eyes are open but he does not track. He req max A of 2 for bed mobility to sit EOB. HE initially req min A of2 to sit EOb adn was very stiff. he progressed to req CGA to sit. He stood from EOB with modA of 2. Pt amb 20' with 1 turn with r wx and mod A of 2; he dem shuffled steps and narrow MAGGY, poor endurance. He amb 10' with r wx and then 10' without AD. He had difficulty maneuvering r wx virginie with turning. He fatigues quickly and returned to bed. He again had difficulty sitting EOB due to rigid posture and stiffness virginie in hips. He was dependent to return supine and reposition in bed. Pt progressed today with amb but is very unsteady  and continues to be unresponsive to commands. Cont PT to address functional deficits adn prepare for d/c as appropriate  -DJ       Row Name 08/02/24 1419          Therapy Assessment/Plan (PT)    Patient/Family Therapy Goals Statement (PT) home  -DJ     Criteria for Skilled Interventions Met (PT) skilled treatment is necessary  -DJ     Therapy Frequency (PT) 5 times/wk  -DJ       Row Name 08/02/24 1419          Vital Signs    Pre Patient Position Supine  -DJ     Intra Patient Position Standing  -DJ     Post Patient Position Supine  -DJ       Row Name 08/02/24 1419          Positioning and Restraints    Pre-Treatment Position in bed  -DJ     Post Treatment Position bed  -DJ     In Bed notified nsg;supine;call light within reach;encouraged to call for assist;exit alarm on;with family/caregiver;heels elevated  -DJ               User Key  (r) = Recorded By, (t) = Taken By, (c) = Cosigned By      Initials Name Provider Type    Leti Cedeño, PT Physical Therapist                   Outcome Measures       Row Name 08/02/24 1425 08/02/24 0754       How much help from another person do you currently need...    Turning from your back to your side while in flat bed without using bedrails? 2  -DJ 2  -RW    Moving from lying on back to sitting on the side of a flat bed without bedrails? 2  -DJ 2  -RW    Moving to and from a bed to a chair (including a wheelchair)? 2  -DJ 2  -RW    Standing up from a chair using your arms (e.g., wheelchair, bedside chair)? 2  -DJ 2  -RW    Climbing 3-5 steps with a railing? 2  -DJ 1  -RW    To walk in hospital room? 2  -DJ 1  -RW    AM-PAC 6 Clicks Score (PT) 12  -DJ 10  -RW    Highest Level of Mobility Goal 4 --> Transfer to chair/commode  -DJ 4 --> Transfer to chair/commode  -RW      Row Name 08/02/24 1425          Functional Assessment    Outcome Measure Options AM-PAC 6 Clicks Basic Mobility (PT)  -DJ               User Key  (r) = Recorded By, (t) = Taken By, (c) = Cosigned By      Initials  Name Provider Type    Anthony Millan, RN Registered Nurse    Leti Cedeño, PT Physical Therapist                                 Physical Therapy Education       Title: PT OT SLP Therapies (In Progress)       Topic: Physical Therapy (In Progress)       Point: Mobility training (In Progress)       Learning Progress Summary             Patient Nonacceptance, E, NL by DJ at 8/2/2024 1425    Acceptance, E,TB, VU by RW at 8/2/2024 1343    Acceptance, E, NL by DJ at 7/31/2024 1544    Acceptance, E,TB, NR by  at 7/30/2024 1002    Acceptance, E, NR by  at 7/29/2024 1539   Family Nonacceptance, E, NL by DJ at 8/2/2024 1425    Acceptance, E,TB, VU by RW at 8/2/2024 1343    Acceptance, E, NL by DJ at 7/31/2024 1544                         Point: Home exercise program (Done)       Learning Progress Summary             Patient Acceptance, E,TB, VU by RW at 8/2/2024 1343    Acceptance, E, NL by MAYELIN at 7/31/2024 1544    Acceptance, E, NR by  at 7/29/2024 1539   Family Acceptance, E,TB, VU by RW at 8/2/2024 1343    Acceptance, E, NL by  at 7/31/2024 1544                         Point: Body mechanics (In Progress)       Learning Progress Summary             Patient Nonacceptance, E, NL by DJ at 8/2/2024 1425    Acceptance, E,TB, VU by RW at 8/2/2024 1343    Acceptance, E, NL by  at 7/31/2024 1544    Acceptance, E,TB, NR by  at 7/30/2024 1002    Acceptance, E, NR by  at 7/29/2024 1539   Family Nonacceptance, E, NL by DJ at 8/2/2024 1425    Acceptance, E,TB, VU by RW at 8/2/2024 1343    Acceptance, E, NL by  at 7/31/2024 1544                         Point: Precautions (In Progress)       Learning Progress Summary             Patient Nonacceptance, E, NL by MAYELIN at 8/2/2024 1425    Acceptance, E,TB, VU by RW at 8/2/2024 1343    Acceptance, E, NL by  at 7/31/2024 1544    Acceptance, E,TB, NR by PH at 7/30/2024 1002    Acceptance, E, NR by SM at 7/29/2024 1539   Family Nonacceptance, E, NL by MAYELIN at 8/2/2024 1429     Acceptance, E,TB, VU by  at 8/2/2024 1343    Acceptance, E, NL by  at 7/31/2024 1544                                         User Key       Initials Effective Dates Name Provider Type Discipline     06/16/21 -  Anthony Verde, RN Registered Nurse Nurse     06/16/21 -  Liliana Saravia PTA Physical Therapist Assistant PT     10/25/19 -  Leti Hopkins PT Physical Therapist PT     05/02/22 -  Rama Peralta PT Physical Therapist PT                  PT Recommendation and Plan     Plan of Care Reviewed With: patient, spouse  Progress: improving  Outcome Evaluation: Pt resting in bed in NAD, wife present and helpful with translation. Pt's eyes are open but he does not track. He req max A of 2 for bed mobility to sit EOB. HE initially req min A of2 to sit EOb adn was very stiff. he progressed to req CGA to sit. He stood from EOB with modA of 2. Pt amb 20' with 1 turn with r wx and mod A of 2; he dem shuffled steps and narrow MAGGY, poor endurance. He amb 10' with r wx and then 10' without AD. He had difficulty maneuvering r wx virginie with turning. He fatigues quickly and returned to bed. He again had difficulty sitting EOB due to rigid posture and stiffness virginie in hips. He was dependent to return supine and reposition in bed. Pt progressed today with amb but is very unsteady and continues to be unresponsive to commands. Cont PT to address functional deficits adn prepare for d/c as appropriate     Time Calculation:         PT Charges       Row Name 08/02/24 1427             Time Calculation    Start Time 1326  -DJ      Stop Time 1349  -DJ      Time Calculation (min) 23 min  -DJ      PT Non-Billable Time (min) 10 min  -DJ      PT Received On 08/02/24  -DJ      PT - Next Appointment 08/05/24  -DJ                User Key  (r) = Recorded By, (t) = Taken By, (c) = Cosigned By      Initials Name Provider Type    Leti Cedeño, PT Physical Therapist                  Therapy Charges for Today       Code Description  Service Date Service Provider Modifiers Qty    20960084739  PT THERAPEUTIC ACT EA 15 MIN 8/2/2024 Leti Hopkins, PT GP 1    [id number]  PT THERAPEUTIC ACT EA 15 MIN 8/2/2024 Leti Hopkins, PT GP 1            PT G-Codes  Outcome Measure Options: AM-PAC 6 Clicks Basic Mobility (PT)  AM-PAC 6 Clicks Score (PT): 12  AM-PAC 6 Clicks Score (OT): 9  Modified [place] Scale: 4 - Moderately severe disability.  Unable to walk without assistance, and unable to attend to own bodily needs without assistance.  PT Discharge Summary  Anticipated Discharge Disposition (PT): skilled nursing facility, home with assist, home with home health (family would like to take pt home)    Leti Hopkins PT  8/2/2024

## 2024-08-03 LAB
ALBUMIN SERPL-MCNC: 3.1 G/DL (ref 3.5–5.2)
ANION GAP SERPL CALCULATED.3IONS-SCNC: 17.5 MMOL/L (ref 5–15)
BASOPHILS # BLD AUTO: 0.03 10*3/MM3 (ref 0–0.2)
BASOPHILS NFR BLD AUTO: 0.4 % (ref 0–1.5)
BUN SERPL-MCNC: 82 MG/DL (ref 8–23)
BUN/CREAT SERPL: 16.1 (ref 7–25)
CALCIUM SPEC-SCNC: 9.5 MG/DL (ref 8.6–10.5)
CHLORIDE SERPL-SCNC: 99 MMOL/L (ref 98–107)
CO2 SERPL-SCNC: 26.5 MMOL/L (ref 22–29)
CREAT SERPL-MCNC: 5.09 MG/DL (ref 0.76–1.27)
DEPRECATED RDW RBC AUTO: 44.8 FL (ref 37–54)
EGFRCR SERPLBLD CKD-EPI 2021: 11.5 ML/MIN/1.73
EOSINOPHIL # BLD AUTO: 0.49 10*3/MM3 (ref 0–0.4)
EOSINOPHIL NFR BLD AUTO: 7 % (ref 0.3–6.2)
ERYTHROCYTE [DISTWIDTH] IN BLOOD BY AUTOMATED COUNT: 13.9 % (ref 12.3–15.4)
GLUCOSE BLDC GLUCOMTR-MCNC: 115 MG/DL (ref 70–130)
GLUCOSE BLDC GLUCOMTR-MCNC: 143 MG/DL (ref 70–130)
GLUCOSE BLDC GLUCOMTR-MCNC: 171 MG/DL (ref 70–130)
GLUCOSE BLDC GLUCOMTR-MCNC: 220 MG/DL (ref 70–130)
GLUCOSE SERPL-MCNC: 128 MG/DL (ref 65–99)
HCT VFR BLD AUTO: 31.9 % (ref 37.5–51)
HGB BLD-MCNC: 10.3 G/DL (ref 13–17.7)
IMM GRANULOCYTES # BLD AUTO: 0.02 10*3/MM3 (ref 0–0.05)
IMM GRANULOCYTES NFR BLD AUTO: 0.3 % (ref 0–0.5)
LYMPHOCYTES # BLD AUTO: 1.08 10*3/MM3 (ref 0.7–3.1)
LYMPHOCYTES NFR BLD AUTO: 15.5 % (ref 19.6–45.3)
MAGNESIUM SERPL-MCNC: 2.4 MG/DL (ref 1.6–2.4)
MCH RBC QN AUTO: 28.8 PG (ref 26.6–33)
MCHC RBC AUTO-ENTMCNC: 32.3 G/DL (ref 31.5–35.7)
MCV RBC AUTO: 89.1 FL (ref 79–97)
MONOCYTES # BLD AUTO: 0.68 10*3/MM3 (ref 0.1–0.9)
MONOCYTES NFR BLD AUTO: 9.8 % (ref 5–12)
NEUTROPHILS NFR BLD AUTO: 4.66 10*3/MM3 (ref 1.7–7)
NEUTROPHILS NFR BLD AUTO: 67 % (ref 42.7–76)
NRBC BLD AUTO-RTO: 0 /100 WBC (ref 0–0.2)
PHOSPHATE SERPL-MCNC: 6 MG/DL (ref 2.5–4.5)
PLATELET # BLD AUTO: 333 10*3/MM3 (ref 140–450)
PMV BLD AUTO: 10.1 FL (ref 6–12)
POTASSIUM SERPL-SCNC: 3.1 MMOL/L (ref 3.5–5.2)
RBC # BLD AUTO: 3.58 10*6/MM3 (ref 4.14–5.8)
SODIUM SERPL-SCNC: 143 MMOL/L (ref 136–145)
URATE SERPL-MCNC: 10 MG/DL (ref 3.4–7)
WBC NRBC COR # BLD AUTO: 6.96 10*3/MM3 (ref 3.4–10.8)

## 2024-08-03 PROCEDURE — 63710000001 INSULIN REGULAR HUMAN PER 5 UNITS: Performed by: INTERNAL MEDICINE

## 2024-08-03 PROCEDURE — 82948 REAGENT STRIP/BLOOD GLUCOSE: CPT

## 2024-08-03 PROCEDURE — 84550 ASSAY OF BLOOD/URIC ACID: CPT | Performed by: INTERNAL MEDICINE

## 2024-08-03 PROCEDURE — 83735 ASSAY OF MAGNESIUM: CPT | Performed by: INTERNAL MEDICINE

## 2024-08-03 PROCEDURE — 80069 RENAL FUNCTION PANEL: CPT | Performed by: INTERNAL MEDICINE

## 2024-08-03 PROCEDURE — 85025 COMPLETE CBC W/AUTO DIFF WBC: CPT | Performed by: INTERNAL MEDICINE

## 2024-08-03 PROCEDURE — 25010000002 HEPARIN (PORCINE) PER 1000 UNITS: Performed by: INTERNAL MEDICINE

## 2024-08-03 RX ORDER — CARVEDILOL 25 MG/1
25 TABLET ORAL 2 TIMES DAILY WITH MEALS
Status: DISCONTINUED | OUTPATIENT
Start: 2024-08-03 | End: 2024-08-13 | Stop reason: HOSPADM

## 2024-08-03 RX ORDER — TORSEMIDE 100 MG/1
100 TABLET ORAL DAILY
Status: DISCONTINUED | OUTPATIENT
Start: 2024-08-03 | End: 2024-08-04

## 2024-08-03 RX ADMIN — Medication 10 ML: at 21:32

## 2024-08-03 RX ADMIN — LEVOTHYROXINE SODIUM 100 MCG: 100 TABLET ORAL at 09:37

## 2024-08-03 RX ADMIN — TORSEMIDE 100 MG: 100 TABLET ORAL at 09:38

## 2024-08-03 RX ADMIN — Medication 3 MG: at 21:30

## 2024-08-03 RX ADMIN — HEPARIN SODIUM 5000 UNITS: 5000 INJECTION INTRAVENOUS; SUBCUTANEOUS at 09:37

## 2024-08-03 RX ADMIN — HYDRALAZINE HYDROCHLORIDE 100 MG: 50 TABLET ORAL at 16:41

## 2024-08-03 RX ADMIN — Medication 10 ML: at 09:38

## 2024-08-03 RX ADMIN — SENNOSIDES AND DOCUSATE SODIUM 2 TABLET: 50; 8.6 TABLET ORAL at 21:30

## 2024-08-03 RX ADMIN — INSULIN HUMAN 2 UNITS: 100 INJECTION, SOLUTION PARENTERAL at 12:52

## 2024-08-03 RX ADMIN — INSULIN HUMAN 3 UNITS: 100 INJECTION, SOLUTION PARENTERAL at 21:31

## 2024-08-03 RX ADMIN — CARVEDILOL 12.5 MG: 12.5 TABLET, FILM COATED ORAL at 09:38

## 2024-08-03 RX ADMIN — AMLODIPINE BESYLATE 10 MG: 10 TABLET ORAL at 09:38

## 2024-08-03 RX ADMIN — CARVEDILOL 25 MG: 25 TABLET, FILM COATED ORAL at 18:29

## 2024-08-03 RX ADMIN — HEPARIN SODIUM 5000 UNITS: 5000 INJECTION INTRAVENOUS; SUBCUTANEOUS at 21:32

## 2024-08-03 RX ADMIN — SENNOSIDES AND DOCUSATE SODIUM 2 TABLET: 50; 8.6 TABLET ORAL at 09:38

## 2024-08-03 RX ADMIN — HYDRALAZINE HYDROCHLORIDE 100 MG: 50 TABLET ORAL at 09:38

## 2024-08-03 NOTE — PROGRESS NOTES
[facility] Progress Note      Patient Name: Marcial Bernard  : 1955  MRN: [id number]  Primary Care Physician:  Justina Liriano APRN  Date of admission: 2024    Subjective     Interval History:   Follow-up acute kidney injury on chronic kidney disease and hyponatremia    Sleeping d/w family in rrom, remains weak and confused, no apparent dyspnea to them    Review of Systems:   As noted above    Objective     Vitals:   Temp:  [98.4 °F (36.9 °C)-100 °F (37.8 °C)] 98.4 °F (36.9 °C)  Heart Rate:  [66-81] 73  Resp:  [18] 18  BP: (153-190)/(74-88) 161/82  Flow (L/min):  [1] 1    Intake/Output Summary (Last 24 hours) at 8/3/2024 0807  Last data filed at 8/3/2024 0434  Gross per 24 hour   Intake 580 ml   Output 2200 ml   Net -1620 ml       Physical Exam:    General Appearance: arousable, disoriented, chronically ill, no acute distress   Skin: warm and dry  HEENT: oral mucosa normal, nonicteric sclera  Neck: no JVD today  Lungs: Bilateral rhonchi no crackles, breathing effort not labored  Heart: RRR, no S3, no rub  Abdomen: soft, nontender, nondistended  : no palpable bladder  Extremities: No ankle edema lower extremity edema  Neuro: Moving all extremities    Scheduled Meds:     amLODIPine, 10 mg, Oral, Q24H  carvedilol, 12.5 mg, Oral, BID With Meals  cloNIDine, 1 patch, Transdermal, Weekly  heparin (porcine), 5,000 Units, Subcutaneous, Q12H  hydrALAZINE, 100 mg, Oral, Q8H  insulin regular, 2-7 Units, Subcutaneous, 4x Daily AC & at Bedtime  levothyroxine, 100 mcg, Oral, Daily  melatonin, 3 mg, Oral, Nightly  senna-docusate sodium, 2 tablet, Oral, BID  sodium chloride, 10 mL, Intravenous, Q12H  torsemide, 100 mg, Oral, BID      IV Meds:          Results Reviewed:   I have personally reviewed the results from the time of this admission to 8/3/2024 08:07 EDT     Results from last 7 days   Lab Units 24  0458 24  0538 24  0432 24  0615 24  0526  07/28/24  0605   SODIUM mmol/L 142 140 139 138 137 130*   POTASSIUM mmol/L 3.7 3.2* 3.2* 3.1* 3.5 4.1   CHLORIDE mmol/L 100 100 95* 95* 93* 92*   CO2 mmol/L  --  29.0 28.0 30.0* 27.9 23.6   BUN mg/dL 79* 76* 75* 72* 68* 71*   CREATININE mg/dL 4.53* 4.64* 4.32* 4.44* 4.73* 4.50*   CALCIUM mg/dL 9.2 8.9 8.8 8.5* 8.1* 8.0*   BILIRUBIN mg/dL  --   --   --   --  <0.2 0.2   ALK PHOS U/L  --   --   --   --  85 89   ALT (SGPT) U/L  --   --   --   --  16 17   AST (SGOT) U/L  --   --   --   --  18 22   GLUCOSE mg/dL 111* 119* 124* 114* 115* 107*       Estimated Creatinine Clearance: 15.9 mL/min (A) (by C-G formula based on SCr of 4.53 mg/dL (H)).    Results from last 7 days   Lab Units 08/02/24 0458 08/01/24 0538 07/31/24  0432   MAGNESIUM mg/dL 2.8* 2.8* 3.6*   PHOSPHORUS mg/dL 4.9* 5.4* 5.2*       Results from last 7 days   Lab Units 08/02/24 0458 08/01/24 0538 07/31/24  0432 07/30/24  0615 07/29/24  0526 07/28/24  0605   URIC ACID mg/dL 10.4* 10.3* 9.9* 9.8* 8.9* 8.9*       Results from last 7 days   Lab Units 08/02/24 0458 08/01/24 0538 07/31/24  0432 07/30/24  0616 07/29/24  0526   WBC 10*3/mm3 6.65 8.24 9.31 8.78 10.06   HEMOGLOBIN g/dL 10.4* 10.0* 10.2* 9.7* 10.1*   PLATELETS 10*3/mm3 348 336 375 366 373             Assessment / Plan     ASSESSMENT:  Acute kidney injury on chronic kidney disease stage IV creatinine was 4.93 on admission, prerenal due to needed diuresis, appears to be euvolemic again today; creatinine is a bit better again  chronic kidney disease stage IV baseline creatinine about 2.4-second diabetic and hypertensive nephrosclerosis  Acute on chronic diastolic congestive heart failure with cardiorenal syndrome, volume status improving with diuresis  Diabetes mellitus type 2 with renal complication and diabetic retinopathy  Hypertension with chronic kidney disease      PLAN:  Reduce torsemide to 100 mg once daily  Reviewed labs today but clinically he does not appear to be needing dialysis at this  time  D/w family  Surveillance labs    I reviewed the chart and other providers notes, reviewed labs.  Copied text in this note has been reviewed and is accurate as of 08/03/24.       Thank you for involving us in the care of Marcial Bernard.  Please feel free to call with any questions.    Richard Lewis MD  08/03/24  08:07 EDT    [facility]  [phone]

## 2024-08-03 NOTE — PROGRESS NOTES
"DAILY PROGRESS NOTE  [facility]    Patient Identification:  Name: Marcial Bernard  Age: 69 y.o.  Sex: male  :  1955  MRN: [id number]         Primary Care Physician: Justina Liriano APRN    Subjective:  Interval History: He is sleepy and lethargic.  He is a little bit more alert today.    Objective:    Scheduled Meds:amLODIPine, 10 mg, Oral, Q24H  carvedilol, 25 mg, Oral, BID With Meals  cloNIDine, 1 patch, Transdermal, Weekly  heparin (porcine), 5,000 Units, Subcutaneous, Q12H  hydrALAZINE, 100 mg, Oral, Q8H  insulin regular, 2-7 Units, Subcutaneous, 4x Daily AC & at Bedtime  levothyroxine, 100 mcg, Oral, Daily  melatonin, 3 mg, Oral, Nightly  senna-docusate sodium, 2 tablet, Oral, BID  sodium chloride, 10 mL, Intravenous, Q12H  torsemide, 100 mg, Oral, Daily      Continuous Infusions:       Vital signs in last 24 hours:  Temp:  [98.4 °F (36.9 °C)-100 °F (37.8 °C)] 98.4 °F (36.9 °C)  Heart Rate:  [69-81] 73  Resp:  [18] 18  BP: (161-190)/(79-88) 161/82    Intake/Output:    Intake/Output Summary (Last 24 hours) at 8/3/2024 1251  Last data filed at 8/3/2024 1037  Gross per 24 hour   Intake 820 ml   Output 2000 ml   Net -1180 ml       Exam:  /82 (BP Location: Right arm, Patient Position: Lying)   Pulse 73   Temp 98.4 °F (36.9 °C) (Oral)   Resp 18   Ht 175.3 cm (69\")   Wt 74.3 kg (163 lb 12.8 oz)   SpO2 99%   BMI 24.19 kg/m²     General Appearance:  Sleepy and lethargic, no distress   Head:    Normocephalic, without obvious abnormality, atraumatic   Eyes:       Throat:   Lips, tongue, gums normal   Neck:   Supple, symmetrical, trachea midline, no JVD   Lungs:     Clear to auscultation bilaterally, respirations unlabored   Chest Wall:    No tenderness or deformity    Heart:    Regular rate and rhythm, S1 and S2 normal, no murmur,no  rub or gallop   Abdomen:     Soft, nontender, bowel sounds active, no masses, no organomegaly    Extremities:   Extremities normal, atraumatic, no " cyanosis or edema   Pulses:      Skin:   Skin is warm and dry,  no rashes or palpable lesions   Neurologic:     Sleepy and lethargic      Lab Results (last 72 hours)       Procedure Component Value Units Date/Time    POC Glucose Once [[id number]]  (Abnormal) Collected: 07/28/24 1102    Specimen: Blood Updated: 07/28/24 1104     Glucose 136 mg/dL     Comprehensive Metabolic Panel [[id number]]  (Abnormal) Collected: 07/28/24 0605    Specimen: Blood Updated: 07/28/24 0723     Glucose 107 mg/dL      BUN 71 mg/dL      Creatinine 4.50 mg/dL      Sodium 130 mmol/L      Potassium 4.1 mmol/L      Comment: Slight hemolysis detected by analyzer. Result may be falsely elevated.        Chloride 92 mmol/L      CO2 23.6 mmol/L      Calcium 8.0 mg/dL      Total Protein 6.4 g/dL      Albumin 2.9 g/dL      ALT (SGPT) 17 U/L      AST (SGOT) 22 U/L      Alkaline Phosphatase 89 U/L      Total Bilirubin 0.2 mg/dL      Globulin 3.5 gm/dL      A/G Ratio 0.8 g/dL      BUN/Creatinine Ratio 15.8     Anion Gap 14.4 mmol/L      eGFR 13.3 mL/min/1.73      Comment: <15 Indicative of kidney failure       Narrative:      GFR Normal >60  Chronic Kidney Disease <60  Kidney Failure <15      Magnesium [[id number]]  (Abnormal) Collected: 07/28/24 0605    Specimen: Blood Updated: 07/28/24 0723     Magnesium 4.2 mg/dL     Uric Acid [[id number]]  (Abnormal) Collected: 07/28/24 0605    Specimen: Blood Updated: 07/28/24 0712     Uric Acid 8.9 mg/dL     Phosphorus [[id number]]  (Abnormal) Collected: 07/28/24 0605    Specimen: Blood Updated: 07/28/24 0712     Phosphorus 6.1 mg/dL     CBC & Differential [[id number]]  (Abnormal) Collected: 07/28/24 0605    Specimen: Blood Updated: 07/28/24 0655    Narrative:      The following orders were created for panel order CBC & Differential.  Procedure                               Abnormality         Status                     ---------                               -----------         ------                     CBC Auto  Differential[[id number]]        Abnormal            Final result                 Please view results for these tests on the individual orders.    CBC Auto Differential [[id number]]  (Abnormal) Collected: 07/28/24 0605    Specimen: Blood Updated: 07/28/24 0655     WBC 9.07 10*3/mm3      RBC 3.59 10*6/mm3      Hemoglobin 10.5 g/dL      Hematocrit 31.3 %      MCV 87.2 fL      MCH 29.2 pg      MCHC 33.5 g/dL      RDW 14.0 %      RDW-SD 44.2 fl      MPV 9.9 fL      Platelets 383 10*3/mm3      Neutrophil % 69.9 %      Lymphocyte % 11.5 %      Monocyte % 9.9 %      Eosinophil % 7.2 %      Basophil % 0.6 %      Immature Grans % 0.9 %      Neutrophils, Absolute 6.35 10*3/mm3      Lymphocytes, Absolute 1.04 10*3/mm3      Monocytes, Absolute 0.90 10*3/mm3      Eosinophils, Absolute 0.65 10*3/mm3      Basophils, Absolute 0.05 10*3/mm3      Immature Grans, Absolute 0.08 10*3/mm3      nRBC 0.0 /100 WBC     POC Glucose Once [[id number]]  (Normal) Collected: 07/28/24 0606    Specimen: Blood Updated: 07/28/24 0611     Glucose 118 mg/dL     POC Glucose Once [[id number]]  (Normal) Collected: 07/27/24 2102    Specimen: Blood Updated: 07/27/24 2103     Glucose 112 mg/dL     Basic Metabolic Panel [[id number]]  (Abnormal) Collected: 07/27/24 1825    Specimen: Blood Updated: 07/27/24 1855     Glucose 154 mg/dL      BUN 73 mg/dL      Creatinine 4.76 mg/dL      Sodium 130 mmol/L      Potassium 3.3 mmol/L      Chloride 92 mmol/L      CO2 25.9 mmol/L      Calcium 7.5 mg/dL      BUN/Creatinine Ratio 15.3     Anion Gap 12.1 mmol/L      eGFR 12.5 mL/min/1.73      Comment: <15 Indicative of kidney failure       Narrative:      GFR Normal >60  Chronic Kidney Disease <60  Kidney Failure <15      POC Glucose Once [[id number]]  (Abnormal) Collected: 07/27/24 1817    Specimen: Blood Updated: 07/27/24 1819     Glucose 175 mg/dL     POC Glucose Once [[id number]]  (Abnormal) Collected: 07/27/24 1647    Specimen: Blood Updated: 07/27/24 1652     Glucose 193 mg/dL      POC Glucose Once [[id number]]  (Abnormal) Collected: 07/27/24 1238    Specimen: Blood Updated: 07/27/24 1239     Glucose 135 mg/dL     POC Glucose Once [[id number]]  (Normal) Collected: 07/27/24 0634    Specimen: Blood Updated: 07/27/24 0635     Glucose 125 mg/dL     Basic Metabolic Panel [[id number]]  (Abnormal) Collected: 07/27/24 0252    Specimen: Blood Updated: 07/27/24 0338     Glucose 117 mg/dL      BUN 74 mg/dL      Creatinine 4.51 mg/dL      Sodium 130 mmol/L      Potassium 3.6 mmol/L      Chloride 90 mmol/L      CO2 25.2 mmol/L      Calcium 7.5 mg/dL      BUN/Creatinine Ratio 16.4     Anion Gap 14.8 mmol/L      eGFR 13.3 mL/min/1.73      Comment: <15 Indicative of kidney failure       Narrative:      GFR Normal >60  Chronic Kidney Disease <60  Kidney Failure <15      Comprehensive Metabolic Panel [[id number]]  (Abnormal) Collected: 07/27/24 0252    Specimen: Blood Updated: 07/27/24 0338     Glucose 117 mg/dL      BUN 74 mg/dL      Creatinine 4.51 mg/dL      Sodium 130 mmol/L      Potassium 3.6 mmol/L      Chloride 90 mmol/L      CO2 25.2 mmol/L      Calcium 7.5 mg/dL      Total Protein 5.4 g/dL      Albumin 2.6 g/dL      ALT (SGPT) 19 U/L      AST (SGOT) 18 U/L      Alkaline Phosphatase 75 U/L      Total Bilirubin <0.2 mg/dL      Globulin 2.8 gm/dL      A/G Ratio 0.9 g/dL      BUN/Creatinine Ratio 16.4     Anion Gap 14.8 mmol/L      eGFR 13.3 mL/min/1.73      Comment: <15 Indicative of kidney failure       Narrative:      GFR Normal >60  Chronic Kidney Disease <60  Kidney Failure <15      Phosphorus [[id number]]  (Abnormal) Collected: 07/27/24 0252    Specimen: Blood Updated: 07/27/24 0338     Phosphorus 6.5 mg/dL     Magnesium [[id number]]  (Abnormal) Collected: 07/27/24 0252    Specimen: Blood Updated: 07/27/24 0338     Magnesium 4.2 mg/dL     Uric Acid [[id number]]  (Abnormal) Collected: 07/27/24 0252    Specimen: Blood Updated: 07/27/24 0338     Uric Acid 8.5 mg/dL     CBC & Differential [[id number]]   (Abnormal) Collected: 07/27/24 0252    Specimen: Blood Updated: 07/27/24 0320    Narrative:      The following orders were created for panel order CBC & Differential.  Procedure                               Abnormality         Status                     ---------                               -----------         ------                     CBC Auto Differential[[id number]]        Abnormal            Final result                 Please view results for these tests on the individual orders.    CBC Auto Differential [[id number]]  (Abnormal) Collected: 07/27/24 0252    Specimen: Blood Updated: 07/27/24 0320     WBC 9.13 10*3/mm3      RBC 3.03 10*6/mm3      Hemoglobin 8.8 g/dL      Hematocrit 26.5 %      MCV 87.5 fL      MCH 29.0 pg      MCHC 33.2 g/dL      RDW 14.4 %      RDW-SD 45.6 fl      MPV 9.5 fL      Platelets 395 10*3/mm3      Neutrophil % 73.2 %      Lymphocyte % 8.5 %      Monocyte % 11.0 %      Eosinophil % 6.1 %      Basophil % 0.4 %      Immature Grans % 0.8 %      Neutrophils, Absolute 6.68 10*3/mm3      Lymphocytes, Absolute 0.78 10*3/mm3      Monocytes, Absolute 1.00 10*3/mm3      Eosinophils, Absolute 0.56 10*3/mm3      Basophils, Absolute 0.04 10*3/mm3      Immature Grans, Absolute 0.07 10*3/mm3      nRBC 0.0 /100 WBC     Basic Metabolic Panel [[id number]]  (Abnormal) Collected: 07/27/24 0056    Specimen: Blood Updated: 07/27/24 0134     Glucose 122 mg/dL      BUN 74 mg/dL      Creatinine 4.77 mg/dL      Sodium 130 mmol/L      Potassium 3.9 mmol/L      Chloride 90 mmol/L      CO2 26.8 mmol/L      Calcium 7.5 mg/dL      BUN/Creatinine Ratio 15.5     Anion Gap 13.2 mmol/L      eGFR 12.4 mL/min/1.73      Comment: <15 Indicative of kidney failure       Narrative:      GFR Normal >60  Chronic Kidney Disease <60  Kidney Failure <15      POC Glucose Once [[id number]]  (Normal) Collected: 07/27/24 0034    Specimen: Blood Updated: 07/27/24 0035     Glucose 128 mg/dL     POC Glucose Once [[id number]]  (Abnormal)  Collected: 07/26/24 1843    Specimen: Blood Updated: 07/26/24 1855     Glucose 136 mg/dL     Basic Metabolic Panel [[id number]]  (Abnormal) Collected: 07/26/24 1451    Specimen: Blood Updated: 07/26/24 1524     Glucose 153 mg/dL      BUN 75 mg/dL      Creatinine 4.94 mg/dL      Sodium 127 mmol/L      Potassium 3.3 mmol/L      Chloride 88 mmol/L      CO2 25.8 mmol/L      Calcium 7.6 mg/dL      BUN/Creatinine Ratio 15.2     Anion Gap 13.2 mmol/L      eGFR 11.9 mL/min/1.73      Comment: <15 Indicative of kidney failure       Narrative:      GFR Normal >60  Chronic Kidney Disease <60  Kidney Failure <15      POC Glucose Once [[id number]]  (Normal) Collected: 07/26/24 1211    Specimen: Blood Updated: 07/26/24 1230     Glucose 128 mg/dL     POC Glucose Once [[id number]]  (Normal) Collected: 07/26/24 0604    Specimen: Blood Updated: 07/26/24 0605     Glucose 104 mg/dL     Renal Function Panel [[id number]]  (Abnormal) Collected: 07/26/24 0415    Specimen: Blood Updated: 07/26/24 0533     Glucose 106 mg/dL      BUN 73 mg/dL      Creatinine 4.50 mg/dL      Sodium 126 mmol/L      Potassium 3.4 mmol/L      Comment: Slight hemolysis detected by analyzer. Result may be falsely elevated.        Chloride 87 mmol/L      CO2 24.6 mmol/L      Calcium 7.7 mg/dL      Albumin 2.8 g/dL      Phosphorus 7.0 mg/dL      Anion Gap 14.4 mmol/L      BUN/Creatinine Ratio 16.2     eGFR 13.3 mL/min/1.73      Comment: <15 Indicative of kidney failure       Narrative:      GFR Normal >60  Chronic Kidney Disease <60  Kidney Failure <15      Magnesium [[id number]]  (Abnormal) Collected: 07/26/24 0415    Specimen: Blood Updated: 07/26/24 0533     Magnesium 4.6 mg/dL     Uric Acid [[id number]]  (Abnormal) Collected: 07/26/24 0415    Specimen: Blood Updated: 07/26/24 0533     Uric Acid 8.7 mg/dL     CBC & Differential [[id number]]  (Abnormal) Collected: 07/26/24 0415    Specimen: Blood Updated: 07/26/24 0512    Narrative:      The following orders were created  for panel order CBC & Differential.  Procedure                               Abnormality         Status                     ---------                               -----------         ------                     CBC Auto Differential[[id number]]        Abnormal            Final result                 Please view results for these tests on the individual orders.    CBC Auto Differential [[id number]]  (Abnormal) Collected: 07/26/24 0415    Specimen: Blood Updated: 07/26/24 0512     WBC 8.80 10*3/mm3      RBC 3.19 10*6/mm3      Hemoglobin 9.4 g/dL      Hematocrit 28.0 %      MCV 87.8 fL      MCH 29.5 pg      MCHC 33.6 g/dL      RDW 14.6 %      RDW-SD 46.9 fl      MPV 10.2 fL      Platelets 446 10*3/mm3      Neutrophil % 71.7 %      Lymphocyte % 9.5 %      Monocyte % 12.3 %      Eosinophil % 5.1 %      Basophil % 0.5 %      Immature Grans % 0.9 %      Neutrophils, Absolute 6.31 10*3/mm3      Lymphocytes, Absolute 0.84 10*3/mm3      Monocytes, Absolute 1.08 10*3/mm3      Eosinophils, Absolute 0.45 10*3/mm3      Basophils, Absolute 0.04 10*3/mm3      Immature Grans, Absolute 0.08 10*3/mm3      nRBC 0.0 /100 WBC     Basic Metabolic Panel [[id number]]  (Abnormal) Collected: 07/26/24 0002    Specimen: Blood Updated: 07/26/24 0045     Glucose 105 mg/dL      BUN 78 mg/dL      Creatinine 4.80 mg/dL      Sodium 124 mmol/L      Potassium 3.7 mmol/L      Comment: Slight hemolysis detected by analyzer. Result may be falsely elevated.        Chloride 86 mmol/L      CO2 24.0 mmol/L      Calcium 7.4 mg/dL      BUN/Creatinine Ratio 16.3     Anion Gap 14.0 mmol/L      eGFR 12.3 mL/min/1.73      Comment: <15 Indicative of kidney failure       Narrative:      GFR Normal >60  Chronic Kidney Disease <60  Kidney Failure <15      POC Glucose Once [[id number]]  (Normal) Collected: 07/26/24 0011    Specimen: Blood Updated: 07/26/24 0013     Glucose 112 mg/dL     POC Glucose Once [[id number]]  (Abnormal) Collected: 07/25/24 1800    Specimen: Blood  Updated: 07/25/24 1829     Glucose 167 mg/dL     Basic Metabolic Panel [[id number]]  (Abnormal) Collected: 07/25/24 1658    Specimen: Blood Updated: 07/25/24 1733     Glucose 108 mg/dL      BUN 80 mg/dL      Creatinine 4.53 mg/dL      Sodium 123 mmol/L      Potassium 3.9 mmol/L      Chloride 84 mmol/L      CO2 21.6 mmol/L      Calcium 7.4 mg/dL      BUN/Creatinine Ratio 17.7     Anion Gap 17.4 mmol/L      eGFR 13.2 mL/min/1.73      Comment: <15 Indicative of kidney failure       Narrative:      GFR Normal >60  Chronic Kidney Disease <60  Kidney Failure <15            Data Review:  Results from last 7 days   Lab Units 08/03/24  0835 08/02/24 0458 08/01/24  0538 07/31/24  0432   SODIUM mmol/L 143 142 140 139   POTASSIUM mmol/L 3.1* 3.7 3.2* 3.2*   CHLORIDE mmol/L 99 100 100 95*   CO2 mmol/L 26.5  --  29.0 28.0   BUN mg/dL 82* 79* 76* 75*   CREATININE mg/dL 5.09* 4.53* 4.64* 4.32*   GLUCOSE mg/dL 128* 111* 119* 124*   CALCIUM mg/dL 9.5 9.2 8.9 8.8     Results from last 7 days   Lab Units 08/03/24  0835 08/02/24  0458 08/01/24  0538   WBC 10*3/mm3 6.96 6.65 8.24   HEMOGLOBIN g/dL 10.3* 10.4* 10.0*   HEMATOCRIT % 31.9* 32.9* 31.0*   PLATELETS 10*3/mm3 333 348 336                 Lab Results   Lab Value Date/Time    TROPONINT 78 (C) 07/23/2024 0251    TROPONINT 87 (C) 07/23/2024 0110         Results from last 7 days   Lab Units 07/29/24  0526 07/28/24  0605   ALK PHOS U/L 85 89   BILIRUBIN mg/dL <0.2 0.2   ALT (SGPT) U/L 16 17   AST (SGOT) U/L 18 22                 Glucose   Date/Time Value Ref Range Status   08/03/2024 1116 171 (H) 70 - 130 mg/dL Final   08/03/2024 0601 143 (H) 70 - 130 mg/dL Final   08/02/2024 2008 184 (H) 70 - 130 mg/dL Final   08/02/2024 1621 132 (H) 70 - 130 mg/dL Final   08/02/2024 1118 172 (H) 70 - 130 mg/dL Final   08/02/2024 0623 133 (H) 70 - 130 mg/dL Final   08/01/2024 1941 204 (H) 70 - 130 mg/dL Final   08/01/2024 1705 163 (H) 70 - 130 mg/dL Final           Past Medical History:   Diagnosis  Date    Asthma     Dementia     Renal disorder        Assessment:  Active Hospital Problems    Diagnosis  POA    **Chronic renal failure (CRF), stage 4 (severe) [N18.4]  Unknown    Dementia [F03.90]  Unknown    Hyponatremia [E87.1]  Unknown    Acute pulmonary edema with congestive heart failure [I50.1]  Unknown    Anemia due to stage 4 chronic kidney disease [N18.4, D63.1]  Unknown    Elevated troponin level not due myocardial infarction [R79.89]  Unknown      Resolved Hospital Problems   No resolved problems to display.       Plan:  Continue current medication.  Plans as per cardiology and nephrology.  Follow-up labs.  DC planning.  Hopefully he can get well enough to go home.  Medicine can take over for primary care.  PT and OT ordered.  DC planning.  He has no insurance and family is hoping he will get well enough that they can take care of him at home.  To be determined.  Continue support.      Chavo Martins MD  8/3/2024  12:51 EDT

## 2024-08-03 NOTE — PLAN OF CARE
Goal Outcome Evaluation:      Pt resting in bed, Turn q 2 , 1-2L NC , SR 1ST * on tele. AO to self, Flat.  Awake most of shift. Family at bed side participating in care. Plan of care is ongoing

## 2024-08-04 LAB
ALBUMIN SERPL-MCNC: 3.2 G/DL (ref 3.5–5.2)
ANION GAP SERPL CALCULATED.3IONS-SCNC: 14 MMOL/L (ref 5–15)
BASOPHILS # BLD AUTO: 0.03 10*3/MM3 (ref 0–0.2)
BASOPHILS NFR BLD AUTO: 0.4 % (ref 0–1.5)
BUN SERPL-MCNC: 101 MG/DL (ref 8–23)
BUN/CREAT SERPL: 18.8 (ref 7–25)
CALCIUM SPEC-SCNC: 9.4 MG/DL (ref 8.6–10.5)
CHLORIDE SERPL-SCNC: 100 MMOL/L (ref 98–107)
CO2 SERPL-SCNC: 28 MMOL/L (ref 22–29)
CREAT SERPL-MCNC: 5.37 MG/DL (ref 0.76–1.27)
DEPRECATED RDW RBC AUTO: 47.9 FL (ref 37–54)
EGFRCR SERPLBLD CKD-EPI 2021: 10.8 ML/MIN/1.73
EOSINOPHIL # BLD AUTO: 0.37 10*3/MM3 (ref 0–0.4)
EOSINOPHIL NFR BLD AUTO: 5.5 % (ref 0.3–6.2)
ERYTHROCYTE [DISTWIDTH] IN BLOOD BY AUTOMATED COUNT: 14.3 % (ref 12.3–15.4)
GLUCOSE BLDC GLUCOMTR-MCNC: 134 MG/DL (ref 70–130)
GLUCOSE BLDC GLUCOMTR-MCNC: 142 MG/DL (ref 70–130)
GLUCOSE BLDC GLUCOMTR-MCNC: 152 MG/DL (ref 70–130)
GLUCOSE BLDC GLUCOMTR-MCNC: 158 MG/DL (ref 70–130)
GLUCOSE SERPL-MCNC: 134 MG/DL (ref 65–99)
HCT VFR BLD AUTO: 31 % (ref 37.5–51)
HGB BLD-MCNC: 9.7 G/DL (ref 13–17.7)
IMM GRANULOCYTES # BLD AUTO: 0.02 10*3/MM3 (ref 0–0.05)
IMM GRANULOCYTES NFR BLD AUTO: 0.3 % (ref 0–0.5)
LYMPHOCYTES # BLD AUTO: 1.08 10*3/MM3 (ref 0.7–3.1)
LYMPHOCYTES NFR BLD AUTO: 16.1 % (ref 19.6–45.3)
MCH RBC QN AUTO: 28.6 PG (ref 26.6–33)
MCHC RBC AUTO-ENTMCNC: 31.3 G/DL (ref 31.5–35.7)
MCV RBC AUTO: 91.4 FL (ref 79–97)
MONOCYTES # BLD AUTO: 0.6 10*3/MM3 (ref 0.1–0.9)
MONOCYTES NFR BLD AUTO: 9 % (ref 5–12)
NEUTROPHILS NFR BLD AUTO: 4.59 10*3/MM3 (ref 1.7–7)
NEUTROPHILS NFR BLD AUTO: 68.7 % (ref 42.7–76)
NRBC BLD AUTO-RTO: 0 /100 WBC (ref 0–0.2)
PHOSPHATE SERPL-MCNC: 6.4 MG/DL (ref 2.5–4.5)
PLATELET # BLD AUTO: 320 10*3/MM3 (ref 140–450)
PMV BLD AUTO: 10.7 FL (ref 6–12)
POTASSIUM SERPL-SCNC: 3.5 MMOL/L (ref 3.5–5.2)
RBC # BLD AUTO: 3.39 10*6/MM3 (ref 4.14–5.8)
SODIUM SERPL-SCNC: 142 MMOL/L (ref 136–145)
WBC NRBC COR # BLD AUTO: 6.69 10*3/MM3 (ref 3.4–10.8)

## 2024-08-04 PROCEDURE — 82948 REAGENT STRIP/BLOOD GLUCOSE: CPT

## 2024-08-04 PROCEDURE — 85025 COMPLETE CBC W/AUTO DIFF WBC: CPT | Performed by: HOSPITALIST

## 2024-08-04 PROCEDURE — 25010000002 HEPARIN (PORCINE) PER 1000 UNITS: Performed by: INTERNAL MEDICINE

## 2024-08-04 PROCEDURE — 25010000002 HYDRALAZINE PER 20 MG: Performed by: INTERNAL MEDICINE

## 2024-08-04 PROCEDURE — 63710000001 INSULIN REGULAR HUMAN PER 5 UNITS: Performed by: INTERNAL MEDICINE

## 2024-08-04 PROCEDURE — 80069 RENAL FUNCTION PANEL: CPT | Performed by: HOSPITALIST

## 2024-08-04 RX ADMIN — CARVEDILOL 25 MG: 25 TABLET, FILM COATED ORAL at 21:31

## 2024-08-04 RX ADMIN — Medication 3 MG: at 21:30

## 2024-08-04 RX ADMIN — SENNOSIDES AND DOCUSATE SODIUM 2 TABLET: 50; 8.6 TABLET ORAL at 09:16

## 2024-08-04 RX ADMIN — HYDRALAZINE HYDROCHLORIDE 100 MG: 50 TABLET ORAL at 09:16

## 2024-08-04 RX ADMIN — CARVEDILOL 25 MG: 25 TABLET, FILM COATED ORAL at 09:16

## 2024-08-04 RX ADMIN — Medication 10 ML: at 09:17

## 2024-08-04 RX ADMIN — HYDRALAZINE HYDROCHLORIDE 100 MG: 50 TABLET ORAL at 00:45

## 2024-08-04 RX ADMIN — LEVOTHYROXINE SODIUM 100 MCG: 100 TABLET ORAL at 09:16

## 2024-08-04 RX ADMIN — INSULIN HUMAN 2 UNITS: 100 INJECTION, SOLUTION PARENTERAL at 21:30

## 2024-08-04 RX ADMIN — AMLODIPINE BESYLATE 10 MG: 10 TABLET ORAL at 09:16

## 2024-08-04 RX ADMIN — Medication 10 ML: at 21:32

## 2024-08-04 RX ADMIN — SENNOSIDES AND DOCUSATE SODIUM 2 TABLET: 50; 8.6 TABLET ORAL at 21:30

## 2024-08-04 RX ADMIN — HYDRALAZINE HYDROCHLORIDE 20 MG: 20 INJECTION INTRAMUSCULAR; INTRAVENOUS at 14:02

## 2024-08-04 RX ADMIN — HEPARIN SODIUM 5000 UNITS: 5000 INJECTION INTRAVENOUS; SUBCUTANEOUS at 21:31

## 2024-08-04 RX ADMIN — HEPARIN SODIUM 5000 UNITS: 5000 INJECTION INTRAVENOUS; SUBCUTANEOUS at 09:16

## 2024-08-04 RX ADMIN — HYDRALAZINE HYDROCHLORIDE 100 MG: 50 TABLET ORAL at 16:21

## 2024-08-04 NOTE — PROGRESS NOTES
"DAILY PROGRESS NOTE  [facility]    Patient Identification:  Name: Marcial Bernard  Age: 69 y.o.  Sex: male  :  1955  MRN: [id number]         Primary Care Physician: Justina Liriano APRN    Subjective:  Interval History: He is sleepy and lethargic.      Objective:    Scheduled Meds:amLODIPine, 10 mg, Oral, Q24H  carvedilol, 25 mg, Oral, BID With Meals  cloNIDine, 1 patch, Transdermal, Weekly  heparin (porcine), 5,000 Units, Subcutaneous, Q12H  hydrALAZINE, 100 mg, Oral, Q8H  insulin regular, 2-7 Units, Subcutaneous, 4x Daily AC & at Bedtime  levothyroxine, 100 mcg, Oral, Daily  melatonin, 3 mg, Oral, Nightly  senna-docusate sodium, 2 tablet, Oral, BID  sodium chloride, 10 mL, Intravenous, Q12H      Continuous Infusions:       Vital signs in last 24 hours:  Temp:  [97.9 °F (36.6 °C)-99.9 °F (37.7 °C)] 99 °F (37.2 °C)  Heart Rate:  [58-80] 58  Resp:  [18] 18  BP: (124-167)/(64-92) 166/92    Intake/Output:    Intake/Output Summary (Last 24 hours) at 2024 1254  Last data filed at 2024 1119  Gross per 24 hour   Intake 630 ml   Output 1500 ml   Net -870 ml       Exam:  /92 (BP Location: Right arm, Patient Position: Lying)   Pulse 58   Temp 99 °F (37.2 °C) (Oral)   Resp 18   Ht 175.3 cm (69\")   Wt 74.4 kg (164 lb 0.4 oz)   SpO2 98%   BMI 24.22 kg/m²     General Appearance:  Sleepy and lethargic, no distress   Head:    Normocephalic, without obvious abnormality, atraumatic   Eyes:       Throat:   Lips, tongue, gums normal   Neck:   Supple, symmetrical, trachea midline, no JVD   Lungs:     Clear to auscultation bilaterally, respirations unlabored   Chest Wall:    No tenderness or deformity    Heart:    Regular rate and rhythm, S1 and S2 normal, no murmur,no  rub or gallop   Abdomen:     Soft, nontender, bowel sounds active, no masses, no organomegaly    Extremities:   Extremities normal, atraumatic, no cyanosis or edema   Pulses:      Skin:   Skin is warm and dry,  no rashes " or palpable lesions   Neurologic:     Sleepy and lethargic      Lab Results (last 72 hours)       Procedure Component Value Units Date/Time    POC Glucose Once [[id number]]  (Abnormal) Collected: 07/28/24 1102    Specimen: Blood Updated: 07/28/24 1104     Glucose 136 mg/dL     Comprehensive Metabolic Panel [[id number]]  (Abnormal) Collected: 07/28/24 0605    Specimen: Blood Updated: 07/28/24 0723     Glucose 107 mg/dL      BUN 71 mg/dL      Creatinine 4.50 mg/dL      Sodium 130 mmol/L      Potassium 4.1 mmol/L      Comment: Slight hemolysis detected by analyzer. Result may be falsely elevated.        Chloride 92 mmol/L      CO2 23.6 mmol/L      Calcium 8.0 mg/dL      Total Protein 6.4 g/dL      Albumin 2.9 g/dL      ALT (SGPT) 17 U/L      AST (SGOT) 22 U/L      Alkaline Phosphatase 89 U/L      Total Bilirubin 0.2 mg/dL      Globulin 3.5 gm/dL      A/G Ratio 0.8 g/dL      BUN/Creatinine Ratio 15.8     Anion Gap 14.4 mmol/L      eGFR 13.3 mL/min/1.73      Comment: <15 Indicative of kidney failure       Narrative:      GFR Normal >60  Chronic Kidney Disease <60  Kidney Failure <15      Magnesium [[id number]]  (Abnormal) Collected: 07/28/24 0605    Specimen: Blood Updated: 07/28/24 0723     Magnesium 4.2 mg/dL     Uric Acid [[id number]]  (Abnormal) Collected: 07/28/24 0605    Specimen: Blood Updated: 07/28/24 0712     Uric Acid 8.9 mg/dL     Phosphorus [[id number]]  (Abnormal) Collected: 07/28/24 0605    Specimen: Blood Updated: 07/28/24 0712     Phosphorus 6.1 mg/dL     CBC & Differential [[id number]]  (Abnormal) Collected: 07/28/24 0605    Specimen: Blood Updated: 07/28/24 0655    Narrative:      The following orders were created for panel order CBC & Differential.  Procedure                               Abnormality         Status                     ---------                               -----------         ------                     CBC Auto Differential[[id number]]        Abnormal            Final result                  Please view results for these tests on the individual orders.    CBC Auto Differential [[id number]]  (Abnormal) Collected: 07/28/24 0605    Specimen: Blood Updated: 07/28/24 0655     WBC 9.07 10*3/mm3      RBC 3.59 10*6/mm3      Hemoglobin 10.5 g/dL      Hematocrit 31.3 %      MCV 87.2 fL      MCH 29.2 pg      MCHC 33.5 g/dL      RDW 14.0 %      RDW-SD 44.2 fl      MPV 9.9 fL      Platelets 383 10*3/mm3      Neutrophil % 69.9 %      Lymphocyte % 11.5 %      Monocyte % 9.9 %      Eosinophil % 7.2 %      Basophil % 0.6 %      Immature Grans % 0.9 %      Neutrophils, Absolute 6.35 10*3/mm3      Lymphocytes, Absolute 1.04 10*3/mm3      Monocytes, Absolute 0.90 10*3/mm3      Eosinophils, Absolute 0.65 10*3/mm3      Basophils, Absolute 0.05 10*3/mm3      Immature Grans, Absolute 0.08 10*3/mm3      nRBC 0.0 /100 WBC     POC Glucose Once [[id number]]  (Normal) Collected: 07/28/24 0606    Specimen: Blood Updated: 07/28/24 0611     Glucose 118 mg/dL     POC Glucose Once [[id number]]  (Normal) Collected: 07/27/24 2102    Specimen: Blood Updated: 07/27/24 2103     Glucose 112 mg/dL     Basic Metabolic Panel [[id number]]  (Abnormal) Collected: 07/27/24 1825    Specimen: Blood Updated: 07/27/24 1855     Glucose 154 mg/dL      BUN 73 mg/dL      Creatinine 4.76 mg/dL      Sodium 130 mmol/L      Potassium 3.3 mmol/L      Chloride 92 mmol/L      CO2 25.9 mmol/L      Calcium 7.5 mg/dL      BUN/Creatinine Ratio 15.3     Anion Gap 12.1 mmol/L      eGFR 12.5 mL/min/1.73      Comment: <15 Indicative of kidney failure       Narrative:      GFR Normal >60  Chronic Kidney Disease <60  Kidney Failure <15      POC Glucose Once [[id number]]  (Abnormal) Collected: 07/27/24 1817    Specimen: Blood Updated: 07/27/24 1819     Glucose 175 mg/dL     POC Glucose Once [[id number]]  (Abnormal) Collected: 07/27/24 1647    Specimen: Blood Updated: 07/27/24 1652     Glucose 193 mg/dL     POC Glucose Once [[id number]]  (Abnormal) Collected: 07/27/24 1238     Specimen: Blood Updated: 07/27/24 1239     Glucose 135 mg/dL     POC Glucose Once [[id number]]  (Normal) Collected: 07/27/24 0634    Specimen: Blood Updated: 07/27/24 0635     Glucose 125 mg/dL     Basic Metabolic Panel [[id number]]  (Abnormal) Collected: 07/27/24 0252    Specimen: Blood Updated: 07/27/24 0338     Glucose 117 mg/dL      BUN 74 mg/dL      Creatinine 4.51 mg/dL      Sodium 130 mmol/L      Potassium 3.6 mmol/L      Chloride 90 mmol/L      CO2 25.2 mmol/L      Calcium 7.5 mg/dL      BUN/Creatinine Ratio 16.4     Anion Gap 14.8 mmol/L      eGFR 13.3 mL/min/1.73      Comment: <15 Indicative of kidney failure       Narrative:      GFR Normal >60  Chronic Kidney Disease <60  Kidney Failure <15      Comprehensive Metabolic Panel [[id number]]  (Abnormal) Collected: 07/27/24 0252    Specimen: Blood Updated: 07/27/24 0338     Glucose 117 mg/dL      BUN 74 mg/dL      Creatinine 4.51 mg/dL      Sodium 130 mmol/L      Potassium 3.6 mmol/L      Chloride 90 mmol/L      CO2 25.2 mmol/L      Calcium 7.5 mg/dL      Total Protein 5.4 g/dL      Albumin 2.6 g/dL      ALT (SGPT) 19 U/L      AST (SGOT) 18 U/L      Alkaline Phosphatase 75 U/L      Total Bilirubin <0.2 mg/dL      Globulin 2.8 gm/dL      A/G Ratio 0.9 g/dL      BUN/Creatinine Ratio 16.4     Anion Gap 14.8 mmol/L      eGFR 13.3 mL/min/1.73      Comment: <15 Indicative of kidney failure       Narrative:      GFR Normal >60  Chronic Kidney Disease <60  Kidney Failure <15      Phosphorus [[id number]]  (Abnormal) Collected: 07/27/24 0252    Specimen: Blood Updated: 07/27/24 0338     Phosphorus 6.5 mg/dL     Magnesium [[id number]]  (Abnormal) Collected: 07/27/24 0252    Specimen: Blood Updated: 07/27/24 0338     Magnesium 4.2 mg/dL     Uric Acid [[id number]]  (Abnormal) Collected: 07/27/24 0252    Specimen: Blood Updated: 07/27/24 0338     Uric Acid 8.5 mg/dL     CBC & Differential [[id number]]  (Abnormal) Collected: 07/27/24 0252    Specimen: Blood Updated: 07/27/24 0322     Narrative:      The following orders were created for panel order CBC & Differential.  Procedure                               Abnormality         Status                     ---------                               -----------         ------                     CBC Auto Differential[[id number]]        Abnormal            Final result                 Please view results for these tests on the individual orders.    CBC Auto Differential [[id number]]  (Abnormal) Collected: 07/27/24 0252    Specimen: Blood Updated: 07/27/24 0320     WBC 9.13 10*3/mm3      RBC 3.03 10*6/mm3      Hemoglobin 8.8 g/dL      Hematocrit 26.5 %      MCV 87.5 fL      MCH 29.0 pg      MCHC 33.2 g/dL      RDW 14.4 %      RDW-SD 45.6 fl      MPV 9.5 fL      Platelets 395 10*3/mm3      Neutrophil % 73.2 %      Lymphocyte % 8.5 %      Monocyte % 11.0 %      Eosinophil % 6.1 %      Basophil % 0.4 %      Immature Grans % 0.8 %      Neutrophils, Absolute 6.68 10*3/mm3      Lymphocytes, Absolute 0.78 10*3/mm3      Monocytes, Absolute 1.00 10*3/mm3      Eosinophils, Absolute 0.56 10*3/mm3      Basophils, Absolute 0.04 10*3/mm3      Immature Grans, Absolute 0.07 10*3/mm3      nRBC 0.0 /100 WBC     Basic Metabolic Panel [[id number]]  (Abnormal) Collected: 07/27/24 0056    Specimen: Blood Updated: 07/27/24 0134     Glucose 122 mg/dL      BUN 74 mg/dL      Creatinine 4.77 mg/dL      Sodium 130 mmol/L      Potassium 3.9 mmol/L      Chloride 90 mmol/L      CO2 26.8 mmol/L      Calcium 7.5 mg/dL      BUN/Creatinine Ratio 15.5     Anion Gap 13.2 mmol/L      eGFR 12.4 mL/min/1.73      Comment: <15 Indicative of kidney failure       Narrative:      GFR Normal >60  Chronic Kidney Disease <60  Kidney Failure <15      POC Glucose Once [[id number]]  (Normal) Collected: 07/27/24 0034    Specimen: Blood Updated: 07/27/24 0035     Glucose 128 mg/dL     POC Glucose Once [[id number]]  (Abnormal) Collected: 07/26/24 1843    Specimen: Blood Updated: 07/26/24 1855     Glucose 136  mg/dL     Basic Metabolic Panel [[id number]]  (Abnormal) Collected: 07/26/24 1451    Specimen: Blood Updated: 07/26/24 1524     Glucose 153 mg/dL      BUN 75 mg/dL      Creatinine 4.94 mg/dL      Sodium 127 mmol/L      Potassium 3.3 mmol/L      Chloride 88 mmol/L      CO2 25.8 mmol/L      Calcium 7.6 mg/dL      BUN/Creatinine Ratio 15.2     Anion Gap 13.2 mmol/L      eGFR 11.9 mL/min/1.73      Comment: <15 Indicative of kidney failure       Narrative:      GFR Normal >60  Chronic Kidney Disease <60  Kidney Failure <15      POC Glucose Once [[id number]]  (Normal) Collected: 07/26/24 1211    Specimen: Blood Updated: 07/26/24 1230     Glucose 128 mg/dL     POC Glucose Once [[id number]]  (Normal) Collected: 07/26/24 0604    Specimen: Blood Updated: 07/26/24 0605     Glucose 104 mg/dL     Renal Function Panel [[id number]]  (Abnormal) Collected: 07/26/24 0415    Specimen: Blood Updated: 07/26/24 0533     Glucose 106 mg/dL      BUN 73 mg/dL      Creatinine 4.50 mg/dL      Sodium 126 mmol/L      Potassium 3.4 mmol/L      Comment: Slight hemolysis detected by analyzer. Result may be falsely elevated.        Chloride 87 mmol/L      CO2 24.6 mmol/L      Calcium 7.7 mg/dL      Albumin 2.8 g/dL      Phosphorus 7.0 mg/dL      Anion Gap 14.4 mmol/L      BUN/Creatinine Ratio 16.2     eGFR 13.3 mL/min/1.73      Comment: <15 Indicative of kidney failure       Narrative:      GFR Normal >60  Chronic Kidney Disease <60  Kidney Failure <15      Magnesium [[id number]]  (Abnormal) Collected: 07/26/24 0415    Specimen: Blood Updated: 07/26/24 0533     Magnesium 4.6 mg/dL     Uric Acid [[id number]]  (Abnormal) Collected: 07/26/24 0415    Specimen: Blood Updated: 07/26/24 0533     Uric Acid 8.7 mg/dL     CBC & Differential [[id number]]  (Abnormal) Collected: 07/26/24 0415    Specimen: Blood Updated: 07/26/24 0512    Narrative:      The following orders were created for panel order CBC & Differential.  Procedure                                Abnormality         Status                     ---------                               -----------         ------                     CBC Auto Differential[[id number]]        Abnormal            Final result                 Please view results for these tests on the individual orders.    CBC Auto Differential [[id number]]  (Abnormal) Collected: 07/26/24 0415    Specimen: Blood Updated: 07/26/24 0512     WBC 8.80 10*3/mm3      RBC 3.19 10*6/mm3      Hemoglobin 9.4 g/dL      Hematocrit 28.0 %      MCV 87.8 fL      MCH 29.5 pg      MCHC 33.6 g/dL      RDW 14.6 %      RDW-SD 46.9 fl      MPV 10.2 fL      Platelets 446 10*3/mm3      Neutrophil % 71.7 %      Lymphocyte % 9.5 %      Monocyte % 12.3 %      Eosinophil % 5.1 %      Basophil % 0.5 %      Immature Grans % 0.9 %      Neutrophils, Absolute 6.31 10*3/mm3      Lymphocytes, Absolute 0.84 10*3/mm3      Monocytes, Absolute 1.08 10*3/mm3      Eosinophils, Absolute 0.45 10*3/mm3      Basophils, Absolute 0.04 10*3/mm3      Immature Grans, Absolute 0.08 10*3/mm3      nRBC 0.0 /100 WBC     Basic Metabolic Panel [[id number]]  (Abnormal) Collected: 07/26/24 0002    Specimen: Blood Updated: 07/26/24 0045     Glucose 105 mg/dL      BUN 78 mg/dL      Creatinine 4.80 mg/dL      Sodium 124 mmol/L      Potassium 3.7 mmol/L      Comment: Slight hemolysis detected by analyzer. Result may be falsely elevated.        Chloride 86 mmol/L      CO2 24.0 mmol/L      Calcium 7.4 mg/dL      BUN/Creatinine Ratio 16.3     Anion Gap 14.0 mmol/L      eGFR 12.3 mL/min/1.73      Comment: <15 Indicative of kidney failure       Narrative:      GFR Normal >60  Chronic Kidney Disease <60  Kidney Failure <15      POC Glucose Once [[id number]]  (Normal) Collected: 07/26/24 0011    Specimen: Blood Updated: 07/26/24 0013     Glucose 112 mg/dL     POC Glucose Once [[id number]]  (Abnormal) Collected: 07/25/24 1800    Specimen: Blood Updated: 07/25/24 1829     Glucose 167 mg/dL     Basic Metabolic Panel  [[id number]]  (Abnormal) Collected: 07/25/24 1658    Specimen: Blood Updated: 07/25/24 1733     Glucose 108 mg/dL      BUN 80 mg/dL      Creatinine 4.53 mg/dL      Sodium 123 mmol/L      Potassium 3.9 mmol/L      Chloride 84 mmol/L      CO2 21.6 mmol/L      Calcium 7.4 mg/dL      BUN/Creatinine Ratio 17.7     Anion Gap 17.4 mmol/L      eGFR 13.2 mL/min/1.73      Comment: <15 Indicative of kidney failure       Narrative:      GFR Normal >60  Chronic Kidney Disease <60  Kidney Failure <15            Data Review:  Results from last 7 days   Lab Units 08/04/24  0455 08/03/24  0835 08/02/24  0458 08/01/24  0538   SODIUM mmol/L 142 143 142 140   POTASSIUM mmol/L 3.5 3.1* 3.7 3.2*   CHLORIDE mmol/L 100 99 100 100   CO2 mmol/L 28.0 26.5  --  29.0   BUN mg/dL 101* 82* 79* 76*   CREATININE mg/dL 5.37* 5.09* 4.53* 4.64*   GLUCOSE mg/dL 134* 128* 111* 119*   CALCIUM mg/dL 9.4 9.5 9.2 8.9     Results from last 7 days   Lab Units 08/04/24  0455 08/03/24  0835 08/02/24  0458   WBC 10*3/mm3 6.69 6.96 6.65   HEMOGLOBIN g/dL 9.7* 10.3* 10.4*   HEMATOCRIT % 31.0* 31.9* 32.9*   PLATELETS 10*3/mm3 320 333 348                 Lab Results   Lab Value Date/Time    TROPONINT 78 (C) 07/23/2024 0251    TROPONINT 87 (C) 07/23/2024 0110         Results from last 7 days   Lab Units 07/29/24  0526   ALK PHOS U/L 85   BILIRUBIN mg/dL <0.2   ALT (SGPT) U/L 16   AST (SGOT) U/L 18                 Glucose   Date/Time Value Ref Range Status   08/04/2024 1110 142 (H) 70 - 130 mg/dL Final   08/04/2024 0612 134 (H) 70 - 130 mg/dL Final   08/03/2024 1957 220 (H) 70 - 130 mg/dL Final   08/03/2024 1601 115 70 - 130 mg/dL Final   08/03/2024 1116 171 (H) 70 - 130 mg/dL Final   08/03/2024 0601 143 (H) 70 - 130 mg/dL Final   08/02/2024 2008 184 (H) 70 - 130 mg/dL Final   08/02/2024 1621 132 (H) 70 - 130 mg/dL Final           Past Medical History:   Diagnosis Date    Asthma     Dementia     Renal disorder        Assessment:  Active Hospital Problems    Diagnosis   POA    **Chronic renal failure (CRF), stage 4 (severe) [N18.4]  Unknown    Dementia [F03.90]  Unknown    Hyponatremia [E87.1]  Unknown    Acute pulmonary edema with congestive heart failure [I50.1]  Unknown    Anemia due to stage 4 chronic kidney disease [N18.4, D63.1]  Unknown    Elevated troponin level not due myocardial infarction [R79.89]  Unknown      Resolved Hospital Problems   No resolved problems to display.       Plan:  Continue current medication.  Plans as per cardiology and nephrology.  Follow-up labs.  DC planning.  Hopefully he can get well enough to go home.  Medicine can take over for primary care.  PT and OT ordered.  DC planning.  He has no insurance and family is hoping he will get well enough that they can take care of him at home.  To be determined.  Continue support.      Chavo Martins MD  8/4/2024  12:54 EDT

## 2024-08-04 NOTE — PLAN OF CARE
Goal Outcome Evaluation:  Plan of Care Reviewed With: patient           Outcome Evaluation: Pt.potassium level 3.5, this morning, need to replace k according to the protocol. serum creatine is 5.37. call made to A ,want to call nephrology.Call made to nephrology and mention not to replace k at this moment.

## 2024-08-04 NOTE — PLAN OF CARE
Goal Outcome Evaluation:  Plan of Care Reviewed With: patient, family           Outcome Evaluation: Nepali speaking. No verbal response to questions. Family states patient has dementia with good days and bad days. Eats very well without difficulty swallowing. Takes meds crushed in applesauce. 02 1L NC. Telemetry SR.

## 2024-08-04 NOTE — PROGRESS NOTES
[facility] Progress Note      Patient Name: Marcial Bernard  : 1955  MRN: [id number]  Primary Care Physician:  Justina Liriano APRN  Date of admission: 2024    Subjective     Interval History:   Follow-up acute kidney injury on chronic kidney disease and hyponatremia    Sleeping, opens eyes but doesn't answer questions; d/w daughter in room, remains weak and confused, no apparent discomfort or dyspnea to them    Review of Systems:   As noted above    Objective     Vitals:   Temp:  [97.9 °F (36.6 °C)-99.9 °F (37.7 °C)] 99 °F (37.2 °C)  Heart Rate:  [58-80] 58  Resp:  [18] 18  BP: (124-167)/(64-92) 166/92  Flow (L/min):  [1] 1    Intake/Output Summary (Last 24 hours) at 2024 0819  Last data filed at 2024 0600  Gross per 24 hour   Intake 660 ml   Output 1600 ml   Net -940 ml       Physical Exam:    General Appearance: arousable, disoriented, chronically ill, no acute distress   Skin: warm and dry  HEENT: oral mucosa normal, nonicteric sclera  Neck: no JVD again today  Lungs: Bilateral rhonchi no crackles, breathing effort not labored  Heart: RRR, no S3, no rub  Abdomen: soft, nontender, nondistended  : no palpable bladder  Extremities: No ankle edema lower extremity edema  Neuro: Moving all extremities    Scheduled Meds:     amLODIPine, 10 mg, Oral, Q24H  carvedilol, 25 mg, Oral, BID With Meals  cloNIDine, 1 patch, Transdermal, Weekly  heparin (porcine), 5,000 Units, Subcutaneous, Q12H  hydrALAZINE, 100 mg, Oral, Q8H  insulin regular, 2-7 Units, Subcutaneous, 4x Daily AC & at Bedtime  levothyroxine, 100 mcg, Oral, Daily  melatonin, 3 mg, Oral, Nightly  senna-docusate sodium, 2 tablet, Oral, BID  sodium chloride, 10 mL, Intravenous, Q12H      IV Meds:          Results Reviewed:   I have personally reviewed the results from the time of this admission to 2024 08:19 EDT     Results from last 7 days   Lab Units 24  0455 24  0835 24  0458  08/01/24  0538 07/30/24  0615 07/29/24  0526   SODIUM mmol/L 142 143 142 140   < > 137   POTASSIUM mmol/L 3.5 3.1* 3.7 3.2*   < > 3.5   CHLORIDE mmol/L 100 99 100 100   < > 93*   CO2 mmol/L 28.0 26.5  --  29.0   < > 27.9   BUN mg/dL 101* 82* 79* 76*   < > 68*   CREATININE mg/dL 5.37* 5.09* 4.53* 4.64*   < > 4.73*   CALCIUM mg/dL 9.4 9.5 9.2 8.9   < > 8.1*   BILIRUBIN mg/dL  --   --   --   --   --  <0.2   ALK PHOS U/L  --   --   --   --   --  85   ALT (SGPT) U/L  --   --   --   --   --  16   AST (SGOT) U/L  --   --   --   --   --  18   GLUCOSE mg/dL 134* 128* 111* 119*   < > 115*    < > = values in this interval not displayed.       Estimated Creatinine Clearance: 13.5 mL/min (A) (by C-G formula based on SCr of 5.37 mg/dL (H)).    Results from last 7 days   Lab Units 08/04/24 0455 08/03/24 0835 08/02/24 0458 08/01/24 0538   MAGNESIUM mg/dL  --  2.4 2.8* 2.8*   PHOSPHORUS mg/dL 6.4* 6.0* 4.9* 5.4*       Results from last 7 days   Lab Units 08/03/24 0835 08/02/24 0458 08/01/24 0538 07/31/24  0432 07/30/24  0615 07/29/24  0526   URIC ACID mg/dL 10.0* 10.4* 10.3* 9.9* 9.8* 8.9*       Results from last 7 days   Lab Units 08/04/24 0455 08/03/24 0835 08/02/24 0458 08/01/24  0538 07/31/24  0432   WBC 10*3/mm3 6.69 6.96 6.65 8.24 9.31   HEMOGLOBIN g/dL 9.7* 10.3* 10.4* 10.0* 10.2*   PLATELETS 10*3/mm3 320 333 348 336 209             Assessment / Plan     ASSESSMENT:  Acute kidney injury on chronic kidney disease stage IV, worse, creatinine was 4.93 on admission, prerenal due to needed diuresis, appears to be euvolemic again today  chronic kidney disease stage IV baseline creatinine about 2.4-second diabetic and hypertensive nephrosclerosis  Acute on chronic diastolic congestive heart failure with cardiorenal syndrome, volume status improving with diuresis  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  Copied text in this note has been reviewed and is accurate as of 08/04/24.       Thank you for involving us in the care of Marcial Bernard.  Please feel free to call with any questions.    Richard Lewis MD  08/04/24  08:19 EDT    [facility]  [phone]

## 2024-08-04 NOTE — PLAN OF CARE
Problem: Adult Inpatient Plan of Care  Goal: Plan of Care Review  Flowsheets (Taken 8/4/2024 1506)  Outcome Evaluation: Fluid Restriction 1500ml. 1L NC, Q2 turn, Daughter @ bedside. No c/o pain . Bed alarm on, Call light with in reach,  Goal: Absence of Hospital-Acquired Illness or Injury  Intervention: Identify and Manage Fall Risk  Recent Flowsheet Documentation  Taken 8/4/2024 1400 by Comfort Jimenez RN  Safety Promotion/Fall Prevention:   safety round/check completed   nonskid shoes/slippers when out of bed   fall prevention program maintained  Taken 8/4/2024 1200 by Comfort Jimenez RN  Safety Promotion/Fall Prevention:   safety round/check completed   nonskid shoes/slippers when out of bed  Taken 8/4/2024 1000 by Comfort Jimenez RN  Safety Promotion/Fall Prevention:   safety round/check completed   nonskid shoes/slippers when out of bed   fall prevention program maintained  Taken 8/4/2024 0800 by Comfort Jimenez RN  Safety Promotion/Fall Prevention:   safety round/check completed   nonskid shoes/slippers when out of bed   fall prevention program maintained  Intervention: Prevent Skin Injury  Recent Flowsheet Documentation  Taken 8/4/2024 1400 by Comfort Jimenez RN  Body Position:   turned   left  Taken 8/4/2024 1200 by Comfort Jimenez RN  Body Position:   turned   left  Taken 8/4/2024 1000 by Comfort Jimenez RN  Body Position:   turned   left  Taken 8/4/2024 0800 by Comfort Jimenez RN  Body Position:   right   turned  Intervention: Prevent and Manage VTE (Venous Thromboembolism) Risk  Recent Flowsheet Documentation  Taken 8/4/2024 1400 by Comfort Jimenez RN  Activity Management: activity encouraged  Taken 8/4/2024 1200 by Comfort Jimenez RN  Activity Management: activity encouraged  Taken 8/4/2024 1000 by Comfort Jimenez RN  Activity Management:   activity encouraged   bedrest  Taken 8/4/2024 0800 by Comfort Jimenez RN  Activity Management:   bedrest   activity encouraged  VTE  Prevention/Management: (Sub Q heparin) other (see comments)  Intervention: Prevent Infection  Recent Flowsheet Documentation  Taken 8/4/2024 0800 by Comfort Jimenez RN  Infection Prevention: hand hygiene promoted  Goal: Optimal Comfort and Wellbeing  Intervention: Provide Person-Centered Care  Recent Flowsheet Documentation  Taken 8/4/2024 0800 by Comfort Jimenez RN  Trust Relationship/Rapport:   choices provided   care explained  Goal: Plan of Care Review  Recent Flowsheet Documentation  Taken 8/4/2024 1506 by Comfort Jimenez, RN  Outcome Evaluation: Fluid Restriction 1500ml. 1L NC, Q2 turn, Daughter @ bedside. No c/o pain . Bed alarm on, Call light with in reach,   Goal Outcome Evaluation:              Outcome Evaluation: Fluid Restriction 1500ml. 1L NC, Q2 turn, Daughter @ bedside. No c/o pain . Bed alarm on, Call light with in reach,

## 2024-08-04 NOTE — PLAN OF CARE
Goal Outcome Evaluation:  Plan of Care Reviewed With: patient           Outcome Evaluation: Pt. is Armenian speaking , family on the bed side,1l O2, take pills crust in apple sauces.medication administered per ordered. Vital sign, lab and med reviewed.plan of care continue.

## 2024-08-05 LAB
ANION GAP SERPL CALCULATED.3IONS-SCNC: 16.9 MMOL/L (ref 5–15)
BASOPHILS # BLD AUTO: 0.03 10*3/MM3 (ref 0–0.2)
BASOPHILS NFR BLD AUTO: 0.5 % (ref 0–1.5)
BUN SERPL-MCNC: 101 MG/DL (ref 8–23)
BUN/CREAT SERPL: 21 (ref 7–25)
CALCIUM SPEC-SCNC: 9.5 MG/DL (ref 8.6–10.5)
CHLORIDE SERPL-SCNC: 103 MMOL/L (ref 98–107)
CO2 SERPL-SCNC: 27.1 MMOL/L (ref 22–29)
CREAT SERPL-MCNC: 4.8 MG/DL (ref 0.76–1.27)
DEPRECATED RDW RBC AUTO: 46.5 FL (ref 37–54)
EGFRCR SERPLBLD CKD-EPI 2021: 12.3 ML/MIN/1.73
EOSINOPHIL # BLD AUTO: 0.52 10*3/MM3 (ref 0–0.4)
EOSINOPHIL NFR BLD AUTO: 8.7 % (ref 0.3–6.2)
ERYTHROCYTE [DISTWIDTH] IN BLOOD BY AUTOMATED COUNT: 14.1 % (ref 12.3–15.4)
GLUCOSE BLDC GLUCOMTR-MCNC: 142 MG/DL (ref 70–130)
GLUCOSE BLDC GLUCOMTR-MCNC: 150 MG/DL (ref 70–130)
GLUCOSE BLDC GLUCOMTR-MCNC: 175 MG/DL (ref 70–130)
GLUCOSE BLDC GLUCOMTR-MCNC: 207 MG/DL (ref 70–130)
GLUCOSE SERPL-MCNC: 126 MG/DL (ref 65–99)
HCT VFR BLD AUTO: 30.5 % (ref 37.5–51)
HGB BLD-MCNC: 9.5 G/DL (ref 13–17.7)
IMM GRANULOCYTES # BLD AUTO: 0.01 10*3/MM3 (ref 0–0.05)
IMM GRANULOCYTES NFR BLD AUTO: 0.2 % (ref 0–0.5)
LYMPHOCYTES # BLD AUTO: 0.99 10*3/MM3 (ref 0.7–3.1)
LYMPHOCYTES NFR BLD AUTO: 16.6 % (ref 19.6–45.3)
MCH RBC QN AUTO: 28.2 PG (ref 26.6–33)
MCHC RBC AUTO-ENTMCNC: 31.1 G/DL (ref 31.5–35.7)
MCV RBC AUTO: 90.5 FL (ref 79–97)
MONOCYTES # BLD AUTO: 0.54 10*3/MM3 (ref 0.1–0.9)
MONOCYTES NFR BLD AUTO: 9 % (ref 5–12)
NEUTROPHILS NFR BLD AUTO: 3.88 10*3/MM3 (ref 1.7–7)
NEUTROPHILS NFR BLD AUTO: 65 % (ref 42.7–76)
NRBC BLD AUTO-RTO: 0 /100 WBC (ref 0–0.2)
PLATELET # BLD AUTO: 322 10*3/MM3 (ref 140–450)
PMV BLD AUTO: 11 FL (ref 6–12)
POTASSIUM SERPL-SCNC: 3.4 MMOL/L (ref 3.5–5.2)
RBC # BLD AUTO: 3.37 10*6/MM3 (ref 4.14–5.8)
SODIUM SERPL-SCNC: 147 MMOL/L (ref 136–145)
WBC NRBC COR # BLD AUTO: 5.97 10*3/MM3 (ref 3.4–10.8)

## 2024-08-05 PROCEDURE — 97530 THERAPEUTIC ACTIVITIES: CPT

## 2024-08-05 PROCEDURE — 80048 BASIC METABOLIC PNL TOTAL CA: CPT | Performed by: INTERNAL MEDICINE

## 2024-08-05 PROCEDURE — 63710000001 INSULIN REGULAR HUMAN PER 5 UNITS: Performed by: INTERNAL MEDICINE

## 2024-08-05 PROCEDURE — 85025 COMPLETE CBC W/AUTO DIFF WBC: CPT | Performed by: HOSPITALIST

## 2024-08-05 PROCEDURE — 25010000002 HEPARIN (PORCINE) PER 1000 UNITS: Performed by: INTERNAL MEDICINE

## 2024-08-05 PROCEDURE — 82948 REAGENT STRIP/BLOOD GLUCOSE: CPT

## 2024-08-05 RX ADMIN — HEPARIN SODIUM 5000 UNITS: 5000 INJECTION INTRAVENOUS; SUBCUTANEOUS at 09:11

## 2024-08-05 RX ADMIN — SENNOSIDES AND DOCUSATE SODIUM 2 TABLET: 50; 8.6 TABLET ORAL at 21:04

## 2024-08-05 RX ADMIN — INSULIN HUMAN 2 UNITS: 100 INJECTION, SOLUTION PARENTERAL at 21:06

## 2024-08-05 RX ADMIN — LEVOTHYROXINE SODIUM 100 MCG: 100 TABLET ORAL at 09:12

## 2024-08-05 RX ADMIN — HEPARIN SODIUM 5000 UNITS: 5000 INJECTION INTRAVENOUS; SUBCUTANEOUS at 21:04

## 2024-08-05 RX ADMIN — Medication 2.5 MG: at 21:04

## 2024-08-05 RX ADMIN — Medication 10 ML: at 09:12

## 2024-08-05 RX ADMIN — SENNOSIDES AND DOCUSATE SODIUM 2 TABLET: 50; 8.6 TABLET ORAL at 09:11

## 2024-08-05 RX ADMIN — INSULIN HUMAN 2 UNITS: 100 INJECTION, SOLUTION PARENTERAL at 17:40

## 2024-08-05 RX ADMIN — CARVEDILOL 25 MG: 25 TABLET, FILM COATED ORAL at 21:04

## 2024-08-05 RX ADMIN — CARVEDILOL 25 MG: 25 TABLET, FILM COATED ORAL at 09:11

## 2024-08-05 RX ADMIN — Medication 10 ML: at 21:05

## 2024-08-05 RX ADMIN — HYDRALAZINE HYDROCHLORIDE 100 MG: 50 TABLET ORAL at 17:39

## 2024-08-05 RX ADMIN — AMLODIPINE BESYLATE 10 MG: 10 TABLET ORAL at 09:11

## 2024-08-05 RX ADMIN — HYDRALAZINE HYDROCHLORIDE 100 MG: 50 TABLET ORAL at 09:12

## 2024-08-05 RX ADMIN — HYDRALAZINE HYDROCHLORIDE 100 MG: 50 TABLET ORAL at 00:34

## 2024-08-05 NOTE — PROGRESS NOTES
[facility] Progress Note      Patient Name: Marcial Bernard  : 1955  MRN: [id number]  Primary Care Physician:  Justina Liriano APRN  Date of admission: 2024    Subjective     Interval History:   Follow-up acute kidney injury on chronic kidney disease and hyponatremia  The patient is more awake more interactive but he remains confused he has dementia, he has no lower extremity edema, diuretics were stopped yesterday and the creatinine down to 4.8.    Review of Systems:   As noted above    Objective     Vitals:   Temp:  [97.7 °F (36.5 °C)-98.1 °F (36.7 °C)] 97.7 °F (36.5 °C)  Heart Rate:  [67-73] 69  Resp:  [18-20] 20  BP: (126-191)/() 144/82  Flow (L/min):  [1] 1    Intake/Output Summary (Last 24 hours) at 2024 1151  Last data filed at 2024 1113  Gross per 24 hour   Intake 478 ml   Output 950 ml   Net -472 ml       Physical Exam:    General Appearance: arousable, disoriented, chronically ill, no acute distress   Skin: warm and dry  HEENT: oral mucosa normal, nonicteric sclera  Neck: no JVD again today  Lungs: Bilateral rhonchi no crackles, breathing effort not labored  Heart: RRR, no S3, no rub  Abdomen: soft, nontender, nondistended  : no palpable bladder  Extremities: No ankle edema lower extremity edema  Neuro: Moving all extremities    Scheduled Meds:     amLODIPine, 10 mg, Oral, Q24H  carvedilol, 25 mg, Oral, BID With Meals  cloNIDine, 1 patch, Transdermal, Weekly  heparin (porcine), 5,000 Units, Subcutaneous, Q12H  hydrALAZINE, 100 mg, Oral, Q8H  insulin regular, 2-7 Units, Subcutaneous, 4x Daily AC & at Bedtime  levothyroxine, 100 mcg, Oral, Daily  melatonin, 2.5 mg, Oral, Nightly  senna-docusate sodium, 2 tablet, Oral, BID  sodium chloride, 10 mL, Intravenous, Q12H      IV Meds:          Results Reviewed:   I have personally reviewed the results from the time of this admission to 2024 11:51 EDT     Results from last 7 days   Lab Units  08/05/24  0647 08/04/24  0455 08/03/24  0835   SODIUM mmol/L 147* 142 143   POTASSIUM mmol/L 3.4* 3.5 3.1*   CHLORIDE mmol/L 103 100 99   CO2 mmol/L 27.1 28.0 26.5   BUN mg/dL 101* 101* 82*   CREATININE mg/dL 4.80* 5.37* 5.09*   CALCIUM mg/dL 9.5 9.4 9.5   GLUCOSE mg/dL 126* 134* 128*       Estimated Creatinine Clearance: 15.3 mL/min (A) (by C-G formula based on SCr of 4.8 mg/dL (H)).    Results from last 7 days   Lab Units 08/04/24  0455 08/03/24  0835 08/02/24  0458 08/01/24  0538   MAGNESIUM mg/dL  --  2.4 2.8* 2.8*   PHOSPHORUS mg/dL 6.4* 6.0* 4.9* 5.4*       Results from last 7 days   Lab Units 08/03/24  0835 08/02/24  0458 08/01/24  0538 07/31/24  0432 07/30/24  0615   URIC ACID mg/dL 10.0* 10.4* 10.3* 9.9* 9.8*       Results from last 7 days   Lab Units 08/05/24  0647 08/04/24  0455 08/03/24  0835 08/02/24  0458 08/01/24  0538   WBC 10*3/mm3 5.97 6.69 6.96 6.65 8.24   HEMOGLOBIN g/dL 9.5* 9.7* 10.3* 10.4* 10.0*   PLATELETS 10*3/mm3 322 320 333 348 336             Assessment / Plan     ASSESSMENT:  Acute kidney injury on chronic kidney disease stage IV, the diuretics were discontinued and the creatinine improved down to 4.8, sodium is 147 associated with aggressive diuresis   chronic kidney disease stage IV baseline creatinine about 2.4-second diabetic and hypertensive nephrosclerosis  Acute on chronic diastolic congestive heart failure with cardiorenal syndrome, volume status improving with diuresis  Diabetes mellitus type 2 with renal complication and diabetic retinopathy  Hypertension with chronic kidney disease  Dementia-discussed with his daughter that he is a marginal candidate at best for dialysis with his mental status, she understands but I dont think any firm decisions have been made      PLAN:  No diuretics today  Continue the same treatment  D/w family at length, all questions answered via  at length  Surveillance labs    I reviewed the chart and other providers notes, reviewed  labs.  Copied text in this note has been reviewed and is accurate as of 08/05/24.       Thank you for involving us in the care of Marcial Bernard.  Please feel free to call with any questions.    Mike Flannery MD  08/05/24  11:51 EDT    [facility]  [phone]

## 2024-08-05 NOTE — PLAN OF CARE
Goal Outcome Evaluation:  Plan of Care Reviewed With: patient        Progress: no change  Outcome Evaluation: pt seen for OT/PT this date to maximize therapeutic benefit. His son was present in room and assisted some with translation. He requires max/depx2 for bed mobility, very stiff and resistive at times, mod A x2 for first two stands, however progressed to min A x2 on third stand and able to demo short distance within room with HHAx2 and min Ax2. Very unsteady and remains high falls risk. AAROM required for bilat shoulder completed, pt resistive and demo poor command following with carryover. He will continue to benefit from skilled OT to address noted functional deficits, rec SNF.      Anticipated Discharge Disposition (OT): skilled nursing facility

## 2024-08-05 NOTE — THERAPY TREATMENT NOTE
Patient Name: Marcial Bernard  : 1955    MRN: [id number]                              Today's Date: 2024       Admit Date: 2024    Visit Dx:     ICD-10-CM ICD-9-CM   1. Chronic renal failure (CRF), stage 4 (severe)  N18.4 585.4   2. Hyponatremia  E87.1 276.1   3. Acute pulmonary edema with congestive heart failure  I50.1 428.1   4. Anemia due to stage 4 chronic kidney disease  N18.4 285.21    D63.1 585.4   5. Elevated troponin level not due myocardial infarction  R79.89 790.6     Patient Active Problem List   Diagnosis    Chronic renal failure (CRF), stage 4 (severe)    Hyponatremia    Acute pulmonary edema with congestive heart failure    Anemia due to stage 4 chronic kidney disease    Elevated troponin level not due myocardial infarction    Dementia     Past Medical History:   Diagnosis Date    Asthma     Dementia     Renal disorder      History reviewed. No pertinent surgical history.   General Information       Row Name 24 1219          OT Time and Intention    Document Type therapy note (daily note)  -MM     Mode of Treatment co-treatment;physical therapy;occupational therapy  -MM       Row Name 24 1219          General Information    Patient Profile Reviewed yes  -MM     Existing Precautions/Restrictions fall  -MM       Row Name 24 1219          Cognition    Orientation Status (Cognition) unable/difficult to assess;other (see comments)  pt with poor responses, poor eye contact, poor command following with son present to assist with translation  -MM       Row Name 24 1219          Safety Issues, Functional Mobility    Safety Issues Affecting Function (Mobility) ability to follow commands;safety precautions follow-through/compliance;safety precaution awareness;problem-solving;insight into deficits/self-awareness;sequencing abilities  -MM     Impairments Affecting Function (Mobility) balance;cognition;endurance/activity tolerance;strength;postural/trunk control;range of  motion (ROM)  -MM     Comment, Safety Issues/Impairments (Mobility) Co-treatment medically necessary and appropriate d/t pt's acuity level, decreased endurance and activity tolerance, and safety of patient and staff. Treatment focused on progression of care and goals established in POC. Gait belt and non-skid socks worn.  -MM               User Key  (r) = Recorded By, (t) = Taken By, (c) = Cosigned By      Initials Name Provider Type    MM Zehra Adams OT Occupational Therapist                     Mobility/ADL's       Row Name 08/05/24 1220          Bed Mobility    Bed Mobility supine-sit;sit-supine;scooting/bridging  -MM     Scooting/Bridging Verner (Bed Mobility) dependent (less than 25% patient effort);2 person assist;verbal cues;nonverbal cues (demo/gesture)  -MM     Supine-Sit Verner (Bed Mobility) dependent (less than 25% patient effort);2 person assist;verbal cues;nonverbal cues (demo/gesture)  -MM     Sit-Supine Verner (Bed Mobility) maximum assist (25% patient effort);2 person assist;verbal cues;nonverbal cues (demo/gesture)  -MM     Assistive Device (Bed Mobility) bed rails;head of bed elevated;draw sheet  -MM     Comment, (Bed Mobility) very resistive and stiffness noted, poor initiation and processing  -MM       Row Name 08/05/24 1220          Transfers    Transfers sit-stand transfer  -MM       Row Name 08/05/24 1220          Sit-Stand Transfer    Sit-Stand Verner (Transfers) moderate assist (50% patient effort);minimum assist (75% patient effort);2 person assist;verbal cues;nonverbal cues (demo/gesture)  -MM     Assistive Device (Sit-Stand Transfers) other (see comments)  -MM     Comment, (Sit-Stand Transfer) x3 STS completed, first two stands, pt with poor posture and unable to fully stand upright, thrid stand improved  -MM       Row Name 08/05/24 1220          Functional Mobility    Functional Mobility- Ind. Level minimum assist (75% patient effort);2 person assist required   -MM       Row Name 08/05/24 1220          Activities of Daily Living    BADL Assessment/Intervention lower body dressing  -MM       Row Name 08/05/24 1220          Lower Body Dressing Assessment/Training    [place] Level (Lower Body Dressing) socks;don;dependent (less than 25% patient effort)  -MM     Comment, (Lower Body Dressing) poor sequencing  -MM               User Key  (r) = Recorded By, (t) = Taken By, (c) = Cosigned By      Initials Name Provider Type    Zehra Uribe OT Occupational Therapist                   Obj/Interventions       Row Name 08/05/24 1221          Shoulder (Therapeutic Exercise)    Shoulder (Therapeutic Exercise) AAROM (active assistive range of motion)  -MM     Shoulder AAROM (Therapeutic Exercise) bilateral;flexion;extension  x5 reps, pt very resistive at times, poor carrryover despite max visual cues  -MM       Row Name 08/05/24 1221          Motor Skills    Therapeutic Exercise shoulder  -MM       Row Name 08/05/24 1221          Balance    Balance Assessment sitting static balance;sitting dynamic balance;sit to stand dynamic balance;standing static balance;standing dynamic balance  -MM     Static Sitting Balance contact guard;minimal assist  -MM     Dynamic Sitting Balance minimal assist  -MM     Position, Sitting Balance supported;sitting edge of bed  -MM     Sit to Stand Dynamic Balance minimal assist;moderate assist;2-person assist  -MM     Static Standing Balance minimal assist;moderate assist;2-person assist  -MM     Dynamic Standing Balance minimal assist;2-person assist  -MM     Position/Device Used, Standing Balance supported;other (see comments)  HHAx2  -MM     Balance Interventions sitting;supported;standing;sit to stand;dynamic;static;highly challenging;weight shifting activity  -MM               User Key  (r) = Recorded By, (t) = Taken By, (c) = Cosigned By      Initials Name Provider Type    Zehra Uribe OT Occupational Therapist                   Goals/Plan     No documentation.                  Clinical Impression       Row Name 08/05/24 1222          Pain Assessment    Pretreatment Pain Rating 0/10 - no pain  -MM     Posttreatment Pain Rating 0/10 - no pain  -MM       Row Name 08/05/24 1222          Plan of Care Review    Plan of Care Reviewed With patient  -MM     Progress no change  -MM     Outcome Evaluation pt seen for OT/PT this date to maximize therapeutic benefit. His son was present in room and assisted some with translation. He requires max/depx2 for bed mobility, very stiff and resistive at times, mod A x2 for first two stands, however progressed to min A x2 on third stand and able to demo short distance within room with HHAx2 and min Ax2. Very unsteady and remains high falls risk. AAROM required for bilat shoulder completed, pt resistive and demo poor command following with carryover. He will continue to benefit from skilled OT to address noted functional deficits, rec SNF.  -MM       Row Name 08/05/24 1222          Therapy Plan Review/Discharge Plan (OT)    Anticipated Discharge Disposition (OT) skilled nursing facility  -MM       Row Name 08/05/24 1222          Vital Signs    O2 Delivery Pre Treatment room air  -MM       Row Name 08/05/24 1222          Positioning and Restraints    Pre-Treatment Position in bed  -MM     Post Treatment Position bed  -MM     In Bed notified nsg;fowlers;call light within reach;encouraged to call for assist;exit alarm on;with family/caregiver;side rails up x3  -MM               User Key  (r) = Recorded By, (t) = Taken By, (c) = Cosigned By      Initials Name Provider Type    Zehra Uribe OT Occupational Therapist                   Outcome Measures       Row Name 08/05/24 1226          How much help from another is currently needed...    Putting on and taking off regular lower body clothing? 1  -MM     Bathing (including washing, rinsing, and drying) 1  -MM     Toileting (which includes using toilet bed pan or urinal) 1   -MM     Putting on and taking off regular upper body clothing 1  -MM     Taking care of personal grooming (such as brushing teeth) 1  -MM     Eating meals 2  -MM     AM-PAC 6 Clicks Score (OT) 7  -MM       Row Name 08/05/24 0936 08/05/24 0034       How much help from another person do you currently need...    Turning from your back to your side while in flat bed without using bedrails? 2  -PH 2  -TK    Moving from lying on back to sitting on the side of a flat bed without bedrails? 2  -PH 2  -TK    Moving to and from a bed to a chair (including a wheelchair)? 2  -PH 2  -TK    Standing up from a chair using your arms (e.g., wheelchair, bedside chair)? 2  -PH 1  -TK    Climbing 3-5 steps with a railing? 1  -PH 2  -TK    To walk in hospital room? 2  -PH 2  -TK    AM-PAC 6 Clicks Score (PT) 11  -PH 11  -TK    Highest Level of Mobility Goal 4 --> Transfer to chair/commode  -PH 4 --> Transfer to chair/commode  -TK      Row Name 08/05/24 1226 08/05/24 0936       Functional Assessment    Outcome Measure Options AM-PAC 6 Clicks Daily Activity (OT)  -MM AM-PAC 6 Clicks Basic Mobility (PT)  -PH              User Key  (r) = Recorded By, (t) = Taken By, (c) = Cosigned By      Initials Name Provider Type    PH Liliana Saravia, PTA Physical Therapist Assistant    Zehra Uribe OT Occupational Therapist    Rosario Centeno RN Registered Nurse                    Occupational Therapy Education       Title: PT OT SLP Therapies (In Progress)       Topic: Occupational Therapy (Done)       Point: ADL training (Done)       Description:   Instruct learner(s) on proper safety adaptation and remediation techniques during self care or transfers.   Instruct in proper use of assistive devices.                  Learning Progress Summary             Patient Acceptance, E,TB, VU by MYRTLE at 8/2/2024 1343    Acceptance, E, VU,NR by DARON at 7/29/2024 1546    Comment: role of OT, d/c rec   Family Acceptance, E,TB, VU by MYRTLE at 8/2/2024 1345     Acceptance, E, VU,NR by MM at 7/29/2024 1546    Comment: role of OT, d/c rec                         Point: Precautions (Done)       Description:   Instruct learner(s) on prescribed precautions during self-care and functional transfers.                  Learning Progress Summary             Patient Acceptance, E,TB, VU by RW at 8/2/2024 1343    Acceptance, E, VU,NR by MM at 7/29/2024 1546    Comment: role of OT, d/c rec   Family Acceptance, E,TB, VU by RW at 8/2/2024 1343    Acceptance, E, VU,NR by MM at 7/29/2024 1546    Comment: role of OT, d/c rec                         Point: Body mechanics (Done)       Description:   Instruct learner(s) on proper positioning and spine alignment during self-care, functional mobility activities and/or exercises.                  Learning Progress Summary             Patient Acceptance, E,TB, VU by RW at 8/2/2024 1343    Acceptance, E, VU,NR by MM at 7/29/2024 1546    Comment: role of OT, d/c rec   Family Acceptance, E,TB, VU by RW at 8/2/2024 1343    Acceptance, E, VU,NR by MM at 7/29/2024 1546    Comment: role of OT, d/c rec                                         User Key       Initials Effective Dates Name Provider Type Discipline     06/16/21 -  Anthony Verde RN Registered Nurse Nurse     05/31/24 -  Zehra Adams OT Occupational Therapist OT                  OT Recommendation and Plan  Planned Therapy Interventions (OT): activity tolerance training, BADL retraining, neuromuscular control/coordination retraining, patient/caregiver education/training, transfer/mobility retraining, cognitive/visual perception retraining, strengthening exercise, ROM/therapeutic exercise, occupation/activity based interventions, functional balance retraining  Therapy Frequency (OT): 5 times/wk  Plan of Care Review  Plan of Care Reviewed With: patient  Progress: no change  Outcome Evaluation: pt seen for OT/PT this date to maximize therapeutic benefit. His son was present in room and assisted  some with translation. He requires max/depx2 for bed mobility, very stiff and resistive at times, mod A x2 for first two stands, however progressed to min A x2 on third stand and able to demo short distance within room with HHAx2 and min Ax2. Very unsteady and remains high falls risk. AAROM required for bilat shoulder completed, pt resistive and demo poor command following with carryover. He will continue to benefit from skilled OT to address noted functional deficits, rec SNF.     Time Calculation:   Evaluation Complexity (OT)  Review Occupational Profile/Medical/Therapy History Complexity: expanded/moderate complexity  Assessment, Occupational Performance/Identification of Deficit Complexity: 5 or more performance deficits  Clinical Decision Making Complexity (OT): detailed assessment/moderate complexity  Overall Complexity of Evaluation (OT): moderate complexity     Time Calculation- OT       Row Name 08/05/24 1226             Time Calculation- OT    OT Start Time 0843  -MM      OT Stop Time 0901  -MM      OT Time Calculation (min) 18 min  -MM      Total Timed Code Minutes- OT 18 minute(s)  -MM      OT Received On 08/05/24  -MM      OT - Next Appointment 08/06/24  -MM         Timed Charges    58404 - OT Therapeutic Activity Minutes 18  -MM         Total Minutes    Timed Charges Total Minutes 18  -MM       Total Minutes 18  -MM                User Key  (r) = Recorded By, (t) = Taken By, (c) = Cosigned By      Initials Name Provider Type    Zehra Uribe OT Occupational Therapist                  Therapy Charges for Today       Code Description Service Date Service Provider Modifiers Qty    [id number]  OT THERAPEUTIC ACT EA 15 MIN 8/5/2024 Zehra Adams OT GO 1                 Zehra Adams OT  8/5/2024

## 2024-08-05 NOTE — PLAN OF CARE
Goal Outcome Evaluation:  Plan of Care Reviewed With: patient           Outcome Evaluation: Pt. vital sign ,lab and medication reviewed, med administered per ordered.No complain of pain during this shift,Daugher at bedside.call light within reached plan of care continue.

## 2024-08-05 NOTE — CASE MANAGEMENT/SOCIAL WORK
Continued Stay Note  [facility]     Patient Name: Marcial Bernard  MRN: [id number]  Today's Date: 8/5/2024    Admit Date: 7/23/2024    Plan: Home with family vs SNF   Discharge Plan       Row Name 08/05/24 1612       Plan    Plan Home with family vs SNF    Plan Comments Spoke with daughter Asia via phone, she is planning on visiting tomorrow to discuss discharge plans. Will need . Will continue to monitor for new or changing discharge needs. Griselda CAI RN CCP                   Discharge Codes    No documentation.                 Expected Discharge Date and Time       Expected Discharge Date Expected Discharge Time    Aug 6, 2024               Griselda Ozuna RN

## 2024-08-05 NOTE — THERAPY TREATMENT NOTE
Patient Name: Marcial Bernard  : 1955    MRN: [id number]                              Today's Date: 2024       Admit Date: 2024    Visit Dx:     ICD-10-CM ICD-9-CM   1. Chronic renal failure (CRF), stage 4 (severe)  N18.4 585.4   2. Hyponatremia  E87.1 276.1   3. Acute pulmonary edema with congestive heart failure  I50.1 428.1   4. Anemia due to stage 4 chronic kidney disease  N18.4 285.21    D63.1 585.4   5. Elevated troponin level not due myocardial infarction  R79.89 790.6     Patient Active Problem List   Diagnosis    Chronic renal failure (CRF), stage 4 (severe)    Hyponatremia    Acute pulmonary edema with congestive heart failure    Anemia due to stage 4 chronic kidney disease    Elevated troponin level not due myocardial infarction    Dementia     Past Medical History:   Diagnosis Date    Asthma     Dementia     Renal disorder      History reviewed. No pertinent surgical history.   General Information       Row Name 24          Physical Therapy Time and Intention    Document Type therapy note (daily note)  -     Mode of Treatment co-treatment;occupational therapy;physical therapy;other (see comments)  -       Row Name 24          General Information    Existing Precautions/Restrictions fall  -     Barriers to Rehab cognitive status;language barrier;medically complex  -       Row Name 24          Cognition    Orientation Status (Cognition) unable/difficult to assess;other (see comments)  Pt did not response to questions when posed through son  -       Row Name 24          Safety Issues, Functional Mobility    Impairments Affecting Function (Mobility) balance;cognition;endurance/activity tolerance;strength;postural/trunk control;range of motion (ROM)  -     Cognitive Impairments, Mobility Safety/Performance attention;awareness, need for assistance;insight into deficits/self-awareness;problem-solving/reasoning;safety precaution  awareness;safety precaution follow-through;sequencing abilities;judgment  -PH     Comment, Safety Issues/Impairments (Mobility) Co treatment medically appropriate and necessary due to patient acuity level, activity tolerance and safety of patient and staff. Treatment is focusing on progression of care and goals established in the POC. gt belt and non skid socks donned  -PH               User Key  (r) = Recorded By, (t) = Taken By, (c) = Cosigned By      Initials Name Provider Type    PH Liliana Saravia PTA Physical Therapist Assistant                   Mobility       Row Name 08/05/24 0928          Bed Mobility    Bed Mobility supine-sit;sit-supine;scooting/bridging  -PH     Scooting/Bridging [place] (Bed Mobility) dependent (less than 25% patient effort);2 person assist;verbal cues;nonverbal cues (demo/gesture)  -PH     Supine-Sit [place] (Bed Mobility) dependent (less than 25% patient effort);2 person assist;verbal cues;nonverbal cues (demo/gesture)  -PH     Sit-Supine [place] (Bed Mobility) maximum assist (25% patient effort);2 person assist;verbal cues;nonverbal cues (demo/gesture)  -     Assistive Device (Bed Mobility) bed rails;head of bed elevated;draw sheet  -PH     Comment, (Bed Mobility) stiff w/ no initiation noted as pt was cued for initiation and sequencing to EOB  -PH       Row Name 08/05/24 0928          Sit-Stand Transfer    Sit-Stand [place] (Transfers) moderate assist (50% patient effort);minimum assist (75% patient effort);2 person assist;verbal cues;nonverbal cues (demo/gesture)  -     Assistive Device (Sit-Stand Transfers) other (see comments)  HHA x 2  -PH     Comment, (Sit-Stand Transfer) sit to sugno6a from EOB; B knees and trunk in flexion w/ cues for correction; mod A x 2 for STS 1 and 2 then min A x 2 for sts 3; pt assisted to widen MAGGY; B knees blocked  -PH       Row Name 08/05/24 0928          Gait/Stairs (Locomotion)    [place] Level (Gait) minimum  assist (75% patient effort);2 person assist;verbal cues;nonverbal cues (demo/gesture)  -PH     Assistive Device (Gait) other (see comments)  HHA x 2  -PH     Distance in Feet (Gait) 30  -PH     Deviations/Abnormal Patterns (Gait) festinating/shuffling;base of support, narrow  -PH     Bilateral Gait Deviations forward flexed posture;heel strike decreased  -PH     Left Sided Gait Deviations decreased knee extension  -PH     Right Sided Gait Deviations decreased knee extension  -PH     Nicholas Level (Stairs) not tested  -PH     Comment, (Gait/Stairs) slow, shuffling steps ; no overt LOB; activity intolerance and confusion limiting  -PH               User Key  (r) = Recorded By, (t) = Taken By, (c) = Cosigned By      Initials Name Provider Type     Liliana Saravia PTA Physical Therapist Assistant                   Obj/Interventions       Row Name 08/05/24 0931          Motor Skills    Therapeutic Exercise other (see comments)  LAQ x 10 reps req PROM w/ stiffness noted  -PH       Row Name 08/05/24 0931          Balance    Balance Assessment sitting static balance;standing static balance  -PH     Static Sitting Balance contact guard;minimal assist;verbal cues  -PH     Dynamic Sitting Balance minimal assist;verbal cues;non-verbal cues (demo/gesture)  -PH     Position, Sitting Balance supported;sitting edge of bed  -PH     Static Standing Balance moderate assist;minimal assist;verbal cues;2-person assist  -PH     Position/Device Used, Standing Balance other (see comments)  HHA x 2  -PH     Comment, Balance B knees and hips in flexion when standing; poor endurance  -PH               User Key  (r) = Recorded By, (t) = Taken By, (c) = Cosigned By      Initials Name Provider Type     Liliana Saravia PTA Physical Therapist Assistant                   Goals/Plan    No documentation.                  Clinical Impression       Row Name 08/05/24 0932          Pain    Pre/Posttreatment Pain Comment non  verbal; no indications of pain noted  -PH     Additional Documentation Pain Scale: Numbers Pre/Post-Treatment (Group)  -PH       Row Name 08/05/24 0932          Plan of Care Review    Plan of Care Reviewed With patient;family  -PH     Progress no change  -PH     Outcome Evaluation Pt was seen for PT/OT co-tx this AM. Pt was in bed w/ eyes open at beg of session w/ son in room who translated. Pt show little initiation for performing supine to sit req dep A x2. Pt stood 3x from EOB improving from mod A x 2 for first 2 sit to stands to min A x 2 for 3rd STS. Pt's B knees and hips remained in flexion while standing and during gait. Pt amb approx 30' req min A x 2/ HHA x 2. Pt was slow, unsteady and shuffling. Pt returned to bed w/ max A x 2. PT will prog as pt mabel.  -PH       Row Name 08/05/24 0932          Vital Signs    Pre SpO2 (%) 100  -PH     O2 Delivery Pre Treatment nasal cannula  -PH     Intra SpO2 (%) 100  -PH     O2 Delivery Intra Treatment room air  -PH     O2 Delivery Post Treatment room air  -PH       Row Name 08/05/24 0932          Positioning and Restraints    Pre-Treatment Position in bed  -PH     Post Treatment Position bed  -PH     In Bed fowlers;call light within reach;encouraged to call for assist;exit alarm on;notified nsg;with family/caregiver  -PH               User Key  (r) = Recorded By, (t) = Taken By, (c) = Cosigned By      Initials Name Provider Type    PH Liliana Saravia PTA Physical Therapist Assistant                   Outcome Measures       Row Name 08/05/24 0936 08/05/24 0034       How much help from another person do you currently need...    Turning from your back to your side while in flat bed without using bedrails? 2  -PH 2  -TK    Moving from lying on back to sitting on the side of a flat bed without bedrails? 2  -PH 2  -TK    Moving to and from a bed to a chair (including a wheelchair)? 2  -PH 2  -TK    Standing up from a chair using your arms (e.g., wheelchair, bedside  chair)? 2  -PH 1  -TK    Climbing 3-5 steps with a railing? 1  -PH 2  -TK    To walk in hospital room? 2  -PH 2  -TK    AM-PAC 6 Clicks Score (PT) 11  -PH 11  -TK    Highest Level of Mobility Goal 4 --> Transfer to chair/commode  -PH 4 --> Transfer to chair/commode  -TK      Row Name 08/05/24 0936          Functional Assessment    Outcome Measure Options AM-PAC 6 Clicks Basic Mobility (PT)  -PH               User Key  (r) = Recorded By, (t) = Taken By, (c) = Cosigned By      Initials Name Provider Type    PH Liliana Saravia PTA Physical Therapist Assistant    Rosario Centeno RN Registered Nurse                                 Physical Therapy Education       Title: PT OT SLP Therapies (In Progress)       Topic: Physical Therapy (In Progress)       Point: Mobility training (In Progress)       Learning Progress Summary             Patient Acceptance, E,TB,D, NR,NL by  at 8/5/2024 0936    Nonacceptance, E, NL by  at 8/2/2024 1425    Acceptance, E,TB, VU by  at 8/2/2024 1343    Acceptance, E, NL by  at 7/31/2024 1544    Acceptance, E,TB, NR by  at 7/30/2024 1002    Acceptance, E, NR by  at 7/29/2024 1539   Family Nonacceptance, E, NL by  at 8/2/2024 1425    Acceptance, E,TB, VU by  at 8/2/2024 1343    Acceptance, E, NL by  at 7/31/2024 1544                         Point: Home exercise program (In Progress)       Learning Progress Summary             Patient Acceptance, E,TB,D, NR,NL by  at 8/5/2024 0936    Acceptance, E,TB, VU by  at 8/2/2024 1343    Acceptance, E, NL by  at 7/31/2024 1544    Acceptance, E, NR by  at 7/29/2024 1539   Family Acceptance, E,TB, VU by  at 8/2/2024 1343    Acceptance, E, NL by  at 7/31/2024 1544                         Point: Body mechanics (In Progress)       Learning Progress Summary             Patient Acceptance, E,TB,D, NR,NL by PH at 8/5/2024 0936    Nonacceptance, E, NL by MAYELIN at 8/2/2024 1425    Acceptance, E,TB, VU by RW at 8/2/2024 1343     Acceptance, E, NL by  at 7/31/2024 1544    Acceptance, E,TB, NR by  at 7/30/2024 1002    Acceptance, E, NR by  at 7/29/2024 1539   Family Nonacceptance, E, NL by DJ at 8/2/2024 1425    Acceptance, E,TB, VU by  at 8/2/2024 1343    Acceptance, E, NL by DJ at 7/31/2024 1544                         Point: Precautions (In Progress)       Learning Progress Summary             Patient Acceptance, E,TB,D, NR,NL by  at 8/5/2024 0936    Nonacceptance, E, NL by DJ at 8/2/2024 1425    Acceptance, E,TB, VU by  at 8/2/2024 1343    Acceptance, E, NL by DJ at 7/31/2024 1544    Acceptance, E,TB, NR by  at 7/30/2024 1002    Acceptance, E, NR by  at 7/29/2024 1539   Family Nonacceptance, E, NL by  at 8/2/2024 1425    Acceptance, E,TB, VU by  at 8/2/2024 1343    Acceptance, E, NL by  at 7/31/2024 1544                                         User Key       Initials Effective Dates Name Provider Type Discipline     06/16/21 -  Anthony Verde, KYREE Registered Nurse Nurse     06/16/21 -  Liliana Saravia PTA Physical Therapist Assistant PT     10/25/19 -  Leti Hopkins, RAYSHAWN Physical Therapist PT     05/02/22 -  Rama Peralta PT Physical Therapist PT                  PT Recommendation and Plan     Plan of Care Reviewed With: patient, family  Progress: no change  Outcome Evaluation: Pt was seen for PT/OT co-tx this AM. Pt was in bed w/ eyes open at beg of session w/ son in room who translated. Pt show little initiation for performing supine to sit req dep A x2. Pt stood 3x from EOB improving from mod A x 2 for first 2 sit to stands to min A x 2 for 3rd STS. Pt's B knees and hips remained in flexion while standing and during gait. Pt amb approx 30' req min A x 2/ HHA x 2. Pt was slow, unsteady and shuffling. Pt returned to bed w/ max A x 2. PT will prog as pt mabel.     Time Calculation:         PT Charges       Row Name 08/05/24 0965             Time Calculation    Start Time 0843  -PH      Stop Time 0901   -PH      Time Calculation (min) 18 min  -PH      PT Received On 08/05/24  -PH      PT - Next Appointment 08/06/24  -PH         Timed Charges    88172 - PT Therapeutic Activity Minutes 18  -PH         Total Minutes    Timed Charges Total Minutes 18  -PH       Total Minutes 18  -PH                User Key  (r) = Recorded By, (t) = Taken By, (c) = Cosigned By      Initials Name Provider Type    PH Liliana Saravia PTA Physical Therapist Assistant                  Therapy Charges for Today       Code Description Service Date Service Provider Modifiers Qty    [id number]  PT THERAPEUTIC ACT EA 15 MIN 8/5/2024 Liliana Saravia PTA GP 1            PT G-Codes  Outcome Measure Options: AM-PAC 6 Clicks Basic Mobility (PT)  AM-PAC 6 Clicks Score (PT): 11  AM-PAC 6 Clicks Score (OT): 7  Modified [place] Scale: 4 - Moderately severe disability.  Unable to walk without assistance, and unable to attend to own bodily needs without assistance.  PT Discharge Summary  Anticipated Discharge Disposition (PT): skilled nursing facility, home with home health, home with 24/7 care    Liliana Saravia PTA  8/5/2024

## 2024-08-05 NOTE — PLAN OF CARE
Goal Outcome Evaluation:  Plan of Care Reviewed With: patient, family        Progress: no change  Outcome Evaluation: Pt was seen for PT/OT co-tx this AM. Pt was in bed w/ eyes open at beg of session w/ son in room who translated. Pt show little initiation for performing supine to sit req dep A x2. Pt stood 3x from EOB improving from mod A x 2 for first 2 sit to stands to min A x 2 for 3rd STS. Pt's B knees and hips remained in flexion while standing and during gait. Pt amb approx 30' req min A x 2/ HHA x 2. Pt was slow, unsteady and shuffling. Pt returned to bed w/ max A x 2. PT will prog as pt mabel.      Anticipated Discharge Disposition (PT): skilled nursing facility, home with home health, home with 24/7 care

## 2024-08-05 NOTE — PROGRESS NOTES
"DAILY PROGRESS NOTE  [facility]    Patient Identification:  Name: Marcial Bernard  Age: 69 y.o.  Sex: male  :  1955  MRN: [id number]         Primary Care Physician: Justina Liriano APRN    Subjective:  Interval History: He is sleepy and lethargic.      Objective:    Scheduled Meds:amLODIPine, 10 mg, Oral, Q24H  carvedilol, 25 mg, Oral, BID With Meals  cloNIDine, 1 patch, Transdermal, Weekly  heparin (porcine), 5,000 Units, Subcutaneous, Q12H  hydrALAZINE, 100 mg, Oral, Q8H  insulin regular, 2-7 Units, Subcutaneous, 4x Daily AC & at Bedtime  levothyroxine, 100 mcg, Oral, Daily  melatonin, 2.5 mg, Oral, Nightly  senna-docusate sodium, 2 tablet, Oral, BID  sodium chloride, 10 mL, Intravenous, Q12H      Continuous Infusions:       Vital signs in last 24 hours:  Temp:  [97.7 °F (36.5 °C)-98.3 °F (36.8 °C)] 98.3 °F (36.8 °C)  Heart Rate:  [69-73] 69  Resp:  [18-20] 20  BP: (123-175)/(65-82) 123/65    Intake/Output:    Intake/Output Summary (Last 24 hours) at 2024 1419  Last data filed at 2024 1300  Gross per 24 hour   Intake 478 ml   Output 950 ml   Net -472 ml       Exam:  /65 (BP Location: Left arm, Patient Position: Lying)   Pulse 69   Temp 98.3 °F (36.8 °C) (Oral)   Resp 20   Ht 175.3 cm (69\")   Wt 75.4 kg (166 lb 3.6 oz)   SpO2 100%   BMI 24.55 kg/m²     General Appearance:  Sleepy and lethargic, no distress   Head:    Normocephalic, without obvious abnormality, atraumatic   Eyes:       Throat:   Lips, tongue, gums normal   Neck:   Supple, symmetrical, trachea midline, no JVD   Lungs:     Clear to auscultation bilaterally, respirations unlabored   Chest Wall:    No tenderness or deformity    Heart:    Regular rate and rhythm, S1 and S2 normal, no murmur,no  rub or gallop   Abdomen:     Soft, nontender, bowel sounds active, no masses, no organomegaly    Extremities:   Extremities normal, atraumatic, no cyanosis or edema   Pulses:      Skin:   Skin is warm and dry,  " no rashes or palpable lesions   Neurologic:     Sleepy and lethargic      Lab Results (last 72 hours)       Procedure Component Value Units Date/Time    POC Glucose Once [[id number]]  (Abnormal) Collected: 07/28/24 1102    Specimen: Blood Updated: 07/28/24 1104     Glucose 136 mg/dL     Comprehensive Metabolic Panel [[id number]]  (Abnormal) Collected: 07/28/24 0605    Specimen: Blood Updated: 07/28/24 0723     Glucose 107 mg/dL      BUN 71 mg/dL      Creatinine 4.50 mg/dL      Sodium 130 mmol/L      Potassium 4.1 mmol/L      Comment: Slight hemolysis detected by analyzer. Result may be falsely elevated.        Chloride 92 mmol/L      CO2 23.6 mmol/L      Calcium 8.0 mg/dL      Total Protein 6.4 g/dL      Albumin 2.9 g/dL      ALT (SGPT) 17 U/L      AST (SGOT) 22 U/L      Alkaline Phosphatase 89 U/L      Total Bilirubin 0.2 mg/dL      Globulin 3.5 gm/dL      A/G Ratio 0.8 g/dL      BUN/Creatinine Ratio 15.8     Anion Gap 14.4 mmol/L      eGFR 13.3 mL/min/1.73      Comment: <15 Indicative of kidney failure       Narrative:      GFR Normal >60  Chronic Kidney Disease <60  Kidney Failure <15      Magnesium [[id number]]  (Abnormal) Collected: 07/28/24 0605    Specimen: Blood Updated: 07/28/24 0723     Magnesium 4.2 mg/dL     Uric Acid [[id number]]  (Abnormal) Collected: 07/28/24 0605    Specimen: Blood Updated: 07/28/24 0712     Uric Acid 8.9 mg/dL     Phosphorus [[id number]]  (Abnormal) Collected: 07/28/24 0605    Specimen: Blood Updated: 07/28/24 0712     Phosphorus 6.1 mg/dL     CBC & Differential [[id number]]  (Abnormal) Collected: 07/28/24 0605    Specimen: Blood Updated: 07/28/24 0655    Narrative:      The following orders were created for panel order CBC & Differential.  Procedure                               Abnormality         Status                     ---------                               -----------         ------                     CBC Auto Differential[[id number]]        Abnormal            Final result                  Please view results for these tests on the individual orders.    CBC Auto Differential [[id number]]  (Abnormal) Collected: 07/28/24 0605    Specimen: Blood Updated: 07/28/24 0655     WBC 9.07 10*3/mm3      RBC 3.59 10*6/mm3      Hemoglobin 10.5 g/dL      Hematocrit 31.3 %      MCV 87.2 fL      MCH 29.2 pg      MCHC 33.5 g/dL      RDW 14.0 %      RDW-SD 44.2 fl      MPV 9.9 fL      Platelets 383 10*3/mm3      Neutrophil % 69.9 %      Lymphocyte % 11.5 %      Monocyte % 9.9 %      Eosinophil % 7.2 %      Basophil % 0.6 %      Immature Grans % 0.9 %      Neutrophils, Absolute 6.35 10*3/mm3      Lymphocytes, Absolute 1.04 10*3/mm3      Monocytes, Absolute 0.90 10*3/mm3      Eosinophils, Absolute 0.65 10*3/mm3      Basophils, Absolute 0.05 10*3/mm3      Immature Grans, Absolute 0.08 10*3/mm3      nRBC 0.0 /100 WBC     POC Glucose Once [[id number]]  (Normal) Collected: 07/28/24 0606    Specimen: Blood Updated: 07/28/24 0611     Glucose 118 mg/dL     POC Glucose Once [[id number]]  (Normal) Collected: 07/27/24 2102    Specimen: Blood Updated: 07/27/24 2103     Glucose 112 mg/dL     Basic Metabolic Panel [[id number]]  (Abnormal) Collected: 07/27/24 1825    Specimen: Blood Updated: 07/27/24 1855     Glucose 154 mg/dL      BUN 73 mg/dL      Creatinine 4.76 mg/dL      Sodium 130 mmol/L      Potassium 3.3 mmol/L      Chloride 92 mmol/L      CO2 25.9 mmol/L      Calcium 7.5 mg/dL      BUN/Creatinine Ratio 15.3     Anion Gap 12.1 mmol/L      eGFR 12.5 mL/min/1.73      Comment: <15 Indicative of kidney failure       Narrative:      GFR Normal >60  Chronic Kidney Disease <60  Kidney Failure <15      POC Glucose Once [[id number]]  (Abnormal) Collected: 07/27/24 1817    Specimen: Blood Updated: 07/27/24 1819     Glucose 175 mg/dL     POC Glucose Once [[id number]]  (Abnormal) Collected: 07/27/24 1647    Specimen: Blood Updated: 07/27/24 1652     Glucose 193 mg/dL     POC Glucose Once [[id number]]  (Abnormal) Collected: 07/27/24 1238     Specimen: Blood Updated: 07/27/24 1239     Glucose 135 mg/dL     POC Glucose Once [[id number]]  (Normal) Collected: 07/27/24 0634    Specimen: Blood Updated: 07/27/24 0635     Glucose 125 mg/dL     Basic Metabolic Panel [[id number]]  (Abnormal) Collected: 07/27/24 0252    Specimen: Blood Updated: 07/27/24 0338     Glucose 117 mg/dL      BUN 74 mg/dL      Creatinine 4.51 mg/dL      Sodium 130 mmol/L      Potassium 3.6 mmol/L      Chloride 90 mmol/L      CO2 25.2 mmol/L      Calcium 7.5 mg/dL      BUN/Creatinine Ratio 16.4     Anion Gap 14.8 mmol/L      eGFR 13.3 mL/min/1.73      Comment: <15 Indicative of kidney failure       Narrative:      GFR Normal >60  Chronic Kidney Disease <60  Kidney Failure <15      Comprehensive Metabolic Panel [[id number]]  (Abnormal) Collected: 07/27/24 0252    Specimen: Blood Updated: 07/27/24 0338     Glucose 117 mg/dL      BUN 74 mg/dL      Creatinine 4.51 mg/dL      Sodium 130 mmol/L      Potassium 3.6 mmol/L      Chloride 90 mmol/L      CO2 25.2 mmol/L      Calcium 7.5 mg/dL      Total Protein 5.4 g/dL      Albumin 2.6 g/dL      ALT (SGPT) 19 U/L      AST (SGOT) 18 U/L      Alkaline Phosphatase 75 U/L      Total Bilirubin <0.2 mg/dL      Globulin 2.8 gm/dL      A/G Ratio 0.9 g/dL      BUN/Creatinine Ratio 16.4     Anion Gap 14.8 mmol/L      eGFR 13.3 mL/min/1.73      Comment: <15 Indicative of kidney failure       Narrative:      GFR Normal >60  Chronic Kidney Disease <60  Kidney Failure <15      Phosphorus [[id number]]  (Abnormal) Collected: 07/27/24 0252    Specimen: Blood Updated: 07/27/24 0338     Phosphorus 6.5 mg/dL     Magnesium [[id number]]  (Abnormal) Collected: 07/27/24 0252    Specimen: Blood Updated: 07/27/24 0338     Magnesium 4.2 mg/dL     Uric Acid [[id number]]  (Abnormal) Collected: 07/27/24 0252    Specimen: Blood Updated: 07/27/24 0338     Uric Acid 8.5 mg/dL     CBC & Differential [[id number]]  (Abnormal) Collected: 07/27/24 0252    Specimen: Blood Updated: 07/27/24  0320    Narrative:      The following orders were created for panel order CBC & Differential.  Procedure                               Abnormality         Status                     ---------                               -----------         ------                     CBC Auto Differential[[id number]]        Abnormal            Final result                 Please view results for these tests on the individual orders.    CBC Auto Differential [[id number]]  (Abnormal) Collected: 07/27/24 0252    Specimen: Blood Updated: 07/27/24 0320     WBC 9.13 10*3/mm3      RBC 3.03 10*6/mm3      Hemoglobin 8.8 g/dL      Hematocrit 26.5 %      MCV 87.5 fL      MCH 29.0 pg      MCHC 33.2 g/dL      RDW 14.4 %      RDW-SD 45.6 fl      MPV 9.5 fL      Platelets 395 10*3/mm3      Neutrophil % 73.2 %      Lymphocyte % 8.5 %      Monocyte % 11.0 %      Eosinophil % 6.1 %      Basophil % 0.4 %      Immature Grans % 0.8 %      Neutrophils, Absolute 6.68 10*3/mm3      Lymphocytes, Absolute 0.78 10*3/mm3      Monocytes, Absolute 1.00 10*3/mm3      Eosinophils, Absolute 0.56 10*3/mm3      Basophils, Absolute 0.04 10*3/mm3      Immature Grans, Absolute 0.07 10*3/mm3      nRBC 0.0 /100 WBC     Basic Metabolic Panel [[id number]]  (Abnormal) Collected: 07/27/24 0056    Specimen: Blood Updated: 07/27/24 0134     Glucose 122 mg/dL      BUN 74 mg/dL      Creatinine 4.77 mg/dL      Sodium 130 mmol/L      Potassium 3.9 mmol/L      Chloride 90 mmol/L      CO2 26.8 mmol/L      Calcium 7.5 mg/dL      BUN/Creatinine Ratio 15.5     Anion Gap 13.2 mmol/L      eGFR 12.4 mL/min/1.73      Comment: <15 Indicative of kidney failure       Narrative:      GFR Normal >60  Chronic Kidney Disease <60  Kidney Failure <15      POC Glucose Once [[id number]]  (Normal) Collected: 07/27/24 0034    Specimen: Blood Updated: 07/27/24 0035     Glucose 128 mg/dL     POC Glucose Once [[id number]]  (Abnormal) Collected: 07/26/24 1843    Specimen: Blood Updated: 07/26/24 4195      Glucose 136 mg/dL     Basic Metabolic Panel [[id number]]  (Abnormal) Collected: 07/26/24 1451    Specimen: Blood Updated: 07/26/24 1524     Glucose 153 mg/dL      BUN 75 mg/dL      Creatinine 4.94 mg/dL      Sodium 127 mmol/L      Potassium 3.3 mmol/L      Chloride 88 mmol/L      CO2 25.8 mmol/L      Calcium 7.6 mg/dL      BUN/Creatinine Ratio 15.2     Anion Gap 13.2 mmol/L      eGFR 11.9 mL/min/1.73      Comment: <15 Indicative of kidney failure       Narrative:      GFR Normal >60  Chronic Kidney Disease <60  Kidney Failure <15      POC Glucose Once [[id number]]  (Normal) Collected: 07/26/24 1211    Specimen: Blood Updated: 07/26/24 1230     Glucose 128 mg/dL     POC Glucose Once [[id number]]  (Normal) Collected: 07/26/24 0604    Specimen: Blood Updated: 07/26/24 0605     Glucose 104 mg/dL     Renal Function Panel [[id number]]  (Abnormal) Collected: 07/26/24 0415    Specimen: Blood Updated: 07/26/24 0533     Glucose 106 mg/dL      BUN 73 mg/dL      Creatinine 4.50 mg/dL      Sodium 126 mmol/L      Potassium 3.4 mmol/L      Comment: Slight hemolysis detected by analyzer. Result may be falsely elevated.        Chloride 87 mmol/L      CO2 24.6 mmol/L      Calcium 7.7 mg/dL      Albumin 2.8 g/dL      Phosphorus 7.0 mg/dL      Anion Gap 14.4 mmol/L      BUN/Creatinine Ratio 16.2     eGFR 13.3 mL/min/1.73      Comment: <15 Indicative of kidney failure       Narrative:      GFR Normal >60  Chronic Kidney Disease <60  Kidney Failure <15      Magnesium [[id number]]  (Abnormal) Collected: 07/26/24 0415    Specimen: Blood Updated: 07/26/24 0533     Magnesium 4.6 mg/dL     Uric Acid [[id number]]  (Abnormal) Collected: 07/26/24 0415    Specimen: Blood Updated: 07/26/24 0533     Uric Acid 8.7 mg/dL     CBC & Differential [[id number]]  (Abnormal) Collected: 07/26/24 0415    Specimen: Blood Updated: 07/26/24 0512    Narrative:      The following orders were created for panel order CBC & Differential.  Procedure                                Abnormality         Status                     ---------                               -----------         ------                     CBC Auto Differential[[id number]]        Abnormal            Final result                 Please view results for these tests on the individual orders.    CBC Auto Differential [[id number]]  (Abnormal) Collected: 07/26/24 0415    Specimen: Blood Updated: 07/26/24 0512     WBC 8.80 10*3/mm3      RBC 3.19 10*6/mm3      Hemoglobin 9.4 g/dL      Hematocrit 28.0 %      MCV 87.8 fL      MCH 29.5 pg      MCHC 33.6 g/dL      RDW 14.6 %      RDW-SD 46.9 fl      MPV 10.2 fL      Platelets 446 10*3/mm3      Neutrophil % 71.7 %      Lymphocyte % 9.5 %      Monocyte % 12.3 %      Eosinophil % 5.1 %      Basophil % 0.5 %      Immature Grans % 0.9 %      Neutrophils, Absolute 6.31 10*3/mm3      Lymphocytes, Absolute 0.84 10*3/mm3      Monocytes, Absolute 1.08 10*3/mm3      Eosinophils, Absolute 0.45 10*3/mm3      Basophils, Absolute 0.04 10*3/mm3      Immature Grans, Absolute 0.08 10*3/mm3      nRBC 0.0 /100 WBC     Basic Metabolic Panel [[id number]]  (Abnormal) Collected: 07/26/24 0002    Specimen: Blood Updated: 07/26/24 0045     Glucose 105 mg/dL      BUN 78 mg/dL      Creatinine 4.80 mg/dL      Sodium 124 mmol/L      Potassium 3.7 mmol/L      Comment: Slight hemolysis detected by analyzer. Result may be falsely elevated.        Chloride 86 mmol/L      CO2 24.0 mmol/L      Calcium 7.4 mg/dL      BUN/Creatinine Ratio 16.3     Anion Gap 14.0 mmol/L      eGFR 12.3 mL/min/1.73      Comment: <15 Indicative of kidney failure       Narrative:      GFR Normal >60  Chronic Kidney Disease <60  Kidney Failure <15      POC Glucose Once [[id number]]  (Normal) Collected: 07/26/24 0011    Specimen: Blood Updated: 07/26/24 0013     Glucose 112 mg/dL     POC Glucose Once [[id number]]  (Abnormal) Collected: 07/25/24 1800    Specimen: Blood Updated: 07/25/24 1829     Glucose 167 mg/dL     Basic Metabolic Panel  "[[id number]]  (Abnormal) Collected: 07/25/24 1658    Specimen: Blood Updated: 07/25/24 1733     Glucose 108 mg/dL      BUN 80 mg/dL      Creatinine 4.53 mg/dL      Sodium 123 mmol/L      Potassium 3.9 mmol/L      Chloride 84 mmol/L      CO2 21.6 mmol/L      Calcium 7.4 mg/dL      BUN/Creatinine Ratio 17.7     Anion Gap 17.4 mmol/L      eGFR 13.2 mL/min/1.73      Comment: <15 Indicative of kidney failure       Narrative:      GFR Normal >60  Chronic Kidney Disease <60  Kidney Failure <15            Data Review:  Results from last 7 days   Lab Units 08/05/24  0647 08/04/24  0455 08/03/24  0835   SODIUM mmol/L 147* 142 143   POTASSIUM mmol/L 3.4* 3.5 3.1*   CHLORIDE mmol/L 103 100 99   CO2 mmol/L 27.1 28.0 26.5   BUN mg/dL 101* 101* 82*   CREATININE mg/dL 4.80* 5.37* 5.09*   GLUCOSE mg/dL 126* 134* 128*   CALCIUM mg/dL 9.5 9.4 9.5     Results from last 7 days   Lab Units 08/05/24  0647 08/04/24  0455 08/03/24  0835   WBC 10*3/mm3 5.97 6.69 6.96   HEMOGLOBIN g/dL 9.5* 9.7* 10.3*   HEMATOCRIT % 30.5* 31.0* 31.9*   PLATELETS 10*3/mm3 322 320 333                 Lab Results   Lab Value Date/Time    TROPONINT 78 (C) 07/23/2024 0251    TROPONINT 87 (C) 07/23/2024 0110               Invalid input(s): \"PROT\", \"LABALBU\"                Glucose   Date/Time Value Ref Range Status   08/05/2024 1125 207 (H) 70 - 130 mg/dL Final   08/05/2024 0606 142 (H) 70 - 130 mg/dL Final   08/04/2024 2019 152 (H) 70 - 130 mg/dL Final   08/04/2024 1659 158 (H) 70 - 130 mg/dL Final   08/04/2024 1110 142 (H) 70 - 130 mg/dL Final   08/04/2024 0612 134 (H) 70 - 130 mg/dL Final   08/03/2024 1957 220 (H) 70 - 130 mg/dL Final   08/03/2024 1601 115 70 - 130 mg/dL Final           Past Medical History:   Diagnosis Date    Asthma     Dementia     Renal disorder        Assessment:  Active Hospital Problems    Diagnosis  POA    **Chronic renal failure (CRF), stage 4 (severe) [N18.4]  Unknown    Dementia [F03.90]  Unknown    Hyponatremia [E87.1]  Unknown    " Acute pulmonary edema with congestive heart failure [I50.1]  Unknown    Anemia due to stage 4 chronic kidney disease [N18.4, D63.1]  Unknown    Elevated troponin level not due myocardial infarction [R79.89]  Unknown      Resolved Hospital Problems   No resolved problems to display.       Plan:  Continue current medication.  Plans as per cardiology and nephrology.  Follow-up labs.  DC planning.  Hopefully he can get well enough to go home.   PT and OT ordered.  DC planning.  He has no insurance and family is hoping he will get well enough that they can take care of him at home.  To be determined.  Continue support.      Chavo Martins MD  8/5/2024  14:19 EDT

## 2024-08-05 NOTE — PLAN OF CARE
Problem: Adult Inpatient Plan of Care  Goal: Plan of Care Review  Flowsheets (Taken 8/5/2024 1525)  Outcome Evaluation: VSS, Wife at bedside, Med given crushed in Applesauce, 1L NC, Worked w/ PT. [facility]  [employer] of [employer]. D/C to Home when medically stable.  Goal: Absence of Hospital-Acquired Illness or Injury  Intervention: Identify and Manage Fall Risk  Recent Flowsheet Documentation  Taken 8/5/2024 1400 by Comfort Jimenez RN  Safety Promotion/Fall Prevention:   fall prevention program maintained   nonskid shoes/slippers when out of bed  Taken 8/5/2024 1200 by Comfort Jimenez RN  Safety Promotion/Fall Prevention:   fall prevention program maintained   nonskid shoes/slippers when out of bed   safety round/check completed  Taken 8/5/2024 1000 by Comfort Jimenez RN  Safety Promotion/Fall Prevention:   safety round/check completed   nonskid shoes/slippers when out of bed   fall prevention program maintained  Intervention: Prevent Skin Injury  Recent Flowsheet Documentation  Taken 8/5/2024 1400 by Comfort Jimenez RN  Body Position:   turned   left  Taken 8/5/2024 1200 by Comfort Jimenez RN  Body Position:   turned   right  Taken 8/5/2024 1000 by Comfort Jimenez RN  Body Position:   turned   left  Taken 8/5/2024 0800 by Comfort Jimenez RN  Body Position:   turned   right  Intervention: Prevent and Manage VTE (Venous Thromboembolism) Risk  Recent Flowsheet Documentation  Taken 8/5/2024 1400 by Comfort Jimenez RN  Activity Management: activity encouraged  Taken 8/5/2024 1200 by Comfort Jimenez RN  Activity Management: activity minimized  Taken 8/5/2024 1000 by Comfort Jimenez RN  Activity Management: activity minimized  Taken 8/5/2024 0800 by Comfort Jimenez RN  Activity Management: activity minimized  VTE Prevention/Management: (Heparin Sub Q) other (see comments)  Goal: Optimal Comfort and Wellbeing  Intervention: Provide Person-Centered Care  Recent Flowsheet Documentation  Taken 8/5/2024 0800 by  Comfort Jimenez, RN  Trust Relationship/Rapport:   care explained   choices provided  Goal: Plan of Care Review  Recent Flowsheet Documentation  Taken 8/5/2024 1525 by Comfort Jimenez, RN  Outcome Evaluation: VSS, Wife at bedside, Med given crushed in Applesauce, 1L NC, Worked w/ PT. [employer],  [employer] of [employer]. D/C to Home when medically stable.   Goal Outcome Evaluation:              Outcome Evaluation: VSS, Wife at bedside, Med given crushed in Applesauce, 1L NC, Worked w/ PT. Q2Turn,  Inc of [employer]. D/C to Home when medically stable.

## 2024-08-06 LAB
ALBUMIN SERPL-MCNC: 3.2 G/DL (ref 3.5–5.2)
ANION GAP SERPL CALCULATED.3IONS-SCNC: 12 MMOL/L (ref 5–15)
BASOPHILS # BLD AUTO: 0.04 10*3/MM3 (ref 0–0.2)
BASOPHILS NFR BLD AUTO: 0.5 % (ref 0–1.5)
BUN SERPL-MCNC: 110 MG/DL (ref 8–23)
BUN/CREAT SERPL: 20.4 (ref 7–25)
CALCIUM SPEC-SCNC: 9.7 MG/DL (ref 8.6–10.5)
CHLORIDE SERPL-SCNC: 105 MMOL/L (ref 98–107)
CO2 SERPL-SCNC: 27 MMOL/L (ref 22–29)
CREAT SERPL-MCNC: 5.38 MG/DL (ref 0.76–1.27)
DEPRECATED RDW RBC AUTO: 46.4 FL (ref 37–54)
EGFRCR SERPLBLD CKD-EPI 2021: 10.7 ML/MIN/1.73
EOSINOPHIL # BLD AUTO: 0.4 10*3/MM3 (ref 0–0.4)
EOSINOPHIL NFR BLD AUTO: 5.4 % (ref 0.3–6.2)
ERYTHROCYTE [DISTWIDTH] IN BLOOD BY AUTOMATED COUNT: 14.2 % (ref 12.3–15.4)
GLUCOSE BLDC GLUCOMTR-MCNC: 148 MG/DL (ref 70–130)
GLUCOSE BLDC GLUCOMTR-MCNC: 157 MG/DL (ref 70–130)
GLUCOSE BLDC GLUCOMTR-MCNC: 166 MG/DL (ref 70–130)
GLUCOSE BLDC GLUCOMTR-MCNC: 189 MG/DL (ref 70–130)
GLUCOSE SERPL-MCNC: 142 MG/DL (ref 65–99)
HCT VFR BLD AUTO: 30 % (ref 37.5–51)
HGB BLD-MCNC: 9.5 G/DL (ref 13–17.7)
IMM GRANULOCYTES # BLD AUTO: 0.01 10*3/MM3 (ref 0–0.05)
IMM GRANULOCYTES NFR BLD AUTO: 0.1 % (ref 0–0.5)
LYMPHOCYTES # BLD AUTO: 1.22 10*3/MM3 (ref 0.7–3.1)
LYMPHOCYTES NFR BLD AUTO: 16.5 % (ref 19.6–45.3)
MAGNESIUM SERPL-MCNC: 2.7 MG/DL (ref 1.6–2.4)
MCH RBC QN AUTO: 28.5 PG (ref 26.6–33)
MCHC RBC AUTO-ENTMCNC: 31.7 G/DL (ref 31.5–35.7)
MCV RBC AUTO: 90.1 FL (ref 79–97)
MONOCYTES # BLD AUTO: 0.58 10*3/MM3 (ref 0.1–0.9)
MONOCYTES NFR BLD AUTO: 7.8 % (ref 5–12)
NEUTROPHILS NFR BLD AUTO: 5.16 10*3/MM3 (ref 1.7–7)
NEUTROPHILS NFR BLD AUTO: 69.7 % (ref 42.7–76)
NRBC BLD AUTO-RTO: 0 /100 WBC (ref 0–0.2)
PHOSPHATE SERPL-MCNC: 6.5 MG/DL (ref 2.5–4.5)
PLATELET # BLD AUTO: 321 10*3/MM3 (ref 140–450)
PMV BLD AUTO: 10.2 FL (ref 6–12)
POTASSIUM SERPL-SCNC: 3.3 MMOL/L (ref 3.5–5.2)
RBC # BLD AUTO: 3.33 10*6/MM3 (ref 4.14–5.8)
SODIUM SERPL-SCNC: 144 MMOL/L (ref 136–145)
URATE SERPL-MCNC: 11 MG/DL (ref 3.4–7)
WBC NRBC COR # BLD AUTO: 7.41 10*3/MM3 (ref 3.4–10.8)

## 2024-08-06 PROCEDURE — 25010000002 HEPARIN (PORCINE) PER 1000 UNITS: Performed by: INTERNAL MEDICINE

## 2024-08-06 PROCEDURE — 97530 THERAPEUTIC ACTIVITIES: CPT

## 2024-08-06 PROCEDURE — 80069 RENAL FUNCTION PANEL: CPT | Performed by: INTERNAL MEDICINE

## 2024-08-06 PROCEDURE — 83735 ASSAY OF MAGNESIUM: CPT | Performed by: INTERNAL MEDICINE

## 2024-08-06 PROCEDURE — 25010000002 HYDRALAZINE PER 20 MG: Performed by: INTERNAL MEDICINE

## 2024-08-06 PROCEDURE — 63710000001 INSULIN REGULAR HUMAN PER 5 UNITS: Performed by: INTERNAL MEDICINE

## 2024-08-06 PROCEDURE — 82948 REAGENT STRIP/BLOOD GLUCOSE: CPT

## 2024-08-06 PROCEDURE — 85025 COMPLETE CBC W/AUTO DIFF WBC: CPT | Performed by: INTERNAL MEDICINE

## 2024-08-06 PROCEDURE — 84550 ASSAY OF BLOOD/URIC ACID: CPT | Performed by: INTERNAL MEDICINE

## 2024-08-06 RX ORDER — POTASSIUM CHLORIDE 750 MG/1
40 TABLET, FILM COATED, EXTENDED RELEASE ORAL ONCE
Status: COMPLETED | OUTPATIENT
Start: 2024-08-06 | End: 2024-08-06

## 2024-08-06 RX ADMIN — LEVOTHYROXINE SODIUM 100 MCG: 100 TABLET ORAL at 08:09

## 2024-08-06 RX ADMIN — HYDRALAZINE HYDROCHLORIDE 100 MG: 50 TABLET ORAL at 08:09

## 2024-08-06 RX ADMIN — CARVEDILOL 25 MG: 25 TABLET, FILM COATED ORAL at 17:06

## 2024-08-06 RX ADMIN — Medication 10 ML: at 08:10

## 2024-08-06 RX ADMIN — INSULIN HUMAN 2 UNITS: 100 INJECTION, SOLUTION PARENTERAL at 17:06

## 2024-08-06 RX ADMIN — HYDRALAZINE HYDROCHLORIDE 100 MG: 50 TABLET ORAL at 00:01

## 2024-08-06 RX ADMIN — SENNOSIDES AND DOCUSATE SODIUM 2 TABLET: 50; 8.6 TABLET ORAL at 08:10

## 2024-08-06 RX ADMIN — INSULIN HUMAN 2 UNITS: 100 INJECTION, SOLUTION PARENTERAL at 22:08

## 2024-08-06 RX ADMIN — CARVEDILOL 25 MG: 25 TABLET, FILM COATED ORAL at 08:09

## 2024-08-06 RX ADMIN — AMLODIPINE BESYLATE 10 MG: 10 TABLET ORAL at 08:08

## 2024-08-06 RX ADMIN — Medication 2.5 MG: at 22:07

## 2024-08-06 RX ADMIN — POTASSIUM CHLORIDE 40 MEQ: 750 TABLET, EXTENDED RELEASE ORAL at 12:54

## 2024-08-06 RX ADMIN — HYDRALAZINE HYDROCHLORIDE 20 MG: 20 INJECTION INTRAMUSCULAR; INTRAVENOUS at 22:20

## 2024-08-06 RX ADMIN — INSULIN HUMAN 2 UNITS: 100 INJECTION, SOLUTION PARENTERAL at 11:39

## 2024-08-06 RX ADMIN — HYDRALAZINE HYDROCHLORIDE 100 MG: 50 TABLET ORAL at 17:05

## 2024-08-06 RX ADMIN — Medication 10 ML: at 22:08

## 2024-08-06 RX ADMIN — HEPARIN SODIUM 5000 UNITS: 5000 INJECTION INTRAVENOUS; SUBCUTANEOUS at 22:06

## 2024-08-06 RX ADMIN — SENNOSIDES AND DOCUSATE SODIUM 2 TABLET: 50; 8.6 TABLET ORAL at 22:08

## 2024-08-06 RX ADMIN — HEPARIN SODIUM 5000 UNITS: 5000 INJECTION INTRAVENOUS; SUBCUTANEOUS at 08:09

## 2024-08-06 RX ADMIN — HYDRALAZINE HYDROCHLORIDE 20 MG: 20 INJECTION INTRAMUSCULAR; INTRAVENOUS at 13:00

## 2024-08-06 NOTE — PLAN OF CARE
Goal Outcome Evaluation:               Family remained at bedside overnight. Pt alert but does not speak above a whisper. He is able to follow commands and has had no new concerns overnight. Family is [place] speaking and he is cooperative.

## 2024-08-06 NOTE — THERAPY TREATMENT NOTE
Patient Name: Marcial Bernard  : 1955    MRN: [id number]                              Today's Date: 2024       Admit Date: 2024    Visit Dx:     ICD-10-CM ICD-9-CM   1. Chronic renal failure (CRF), stage 4 (severe)  N18.4 585.4   2. Hyponatremia  E87.1 276.1   3. Acute pulmonary edema with congestive heart failure  I50.1 428.1   4. Anemia due to stage 4 chronic kidney disease  N18.4 285.21    D63.1 585.4   5. Elevated troponin level not due myocardial infarction  R79.89 790.6     Patient Active Problem List   Diagnosis    Chronic renal failure (CRF), stage 4 (severe)    Hyponatremia    Acute pulmonary edema with congestive heart failure    Anemia due to stage 4 chronic kidney disease    Elevated troponin level not due myocardial infarction    Dementia     Past Medical History:   Diagnosis Date    Asthma     Dementia     Renal disorder      History reviewed. No pertinent surgical history.   General Information       Row Name 24 1218          OT Time and Intention    Document Type therapy note (daily note)  -MM     Mode of Treatment co-treatment;physical therapy;occupational therapy  -MM       Row Name 24 1218          General Information    Patient Profile Reviewed yes  -MM     Existing Precautions/Restrictions fall  -MM       Row Name 24 1218          Cognition    Orientation Status (Cognition) unable/difficult to assess  -MM       Row Name 24 1218          Safety Issues, Functional Mobility    Impairments Affecting Function (Mobility) balance;cognition;endurance/activity tolerance;strength;range of motion (ROM)  -MM     Comment, Safety Issues/Impairments (Mobility) Co-treatment medically necessary and appropriate d/t pt's acuity level, decreased endurance and activity tolerance, and safety of patient and staff. Treatment focused on progression of care and goals established in POC. Gait belt and non-skid socks worn.  -MM               User Key  (r) = Recorded By, (t) = Taken  By, (c) = Cosigned By      Initials Name Provider Type    MM Zehra Adams OT Occupational Therapist                     Mobility/ADL's       Row Name 08/06/24 1219          Bed Mobility    Bed Mobility supine-sit;scooting/bridging;sit-supine  -MM     Scooting/Bridging [place] (Bed Mobility) dependent (less than 25% patient effort);2 person assist;verbal cues;nonverbal cues (demo/gesture)  -MM     Supine-Sit [place] (Bed Mobility) maximum assist (25% patient effort);verbal cues;nonverbal cues (demo/gesture);1 person assist  -MM     Sit-Supine [place] (Bed Mobility) maximum assist (25% patient effort);2 person assist;verbal cues;nonverbal cues (demo/gesture)  -MM     Assistive Device (Bed Mobility) bed rails;head of bed elevated  -MM     Comment, (Bed Mobility) improved initiation with bed mobility this date  -       Row Name 08/06/24 1219          Transfers    Transfers sit-stand transfer  -       Row Name 08/06/24 1219          Sit-Stand Transfer    Sit-Stand [place] (Transfers) moderate assist (50% patient effort);2 person assist;verbal cues;nonverbal cues (demo/gesture);minimum assist (75% patient effort)  -MM     Assistive Device (Sit-Stand Transfers) other (see comments)  -MM     Comment, (Sit-Stand Transfer) x2 STS from EOB, improved stand on second attempt  -       Row Name 08/06/24 1219          Functional Mobility    Functional Mobility- Ind. Level minimum assist (75% patient effort);2 person assist required  -     Functional Mobility- Comment HHA x1 throughout functional mob trial, slight improvement with activity tolerance overall  -       Row Name 08/06/24 1219          Activities of Daily Living    BADL Assessment/Intervention lower body dressing;toileting  -       Row Name 08/06/24 1219          Lower Body Dressing Assessment/Training    [place] Level (Lower Body Dressing) socks;don;dependent (less than 25% patient effort)  -       Row Name 08/06/24 1219           Toileting Assessment/Training    [place] Level (Toileting) toileting skills;dependent (less than 25% patient effort)  -MM               User Key  (r) = Recorded By, (t) = Taken By, (c) = Cosigned By      Initials Name Provider Type    Zehra Uribe OT Occupational Therapist                   Obj/Interventions       Row Name 08/06/24 1221          Motor Skills    Motor Skills functional endurance  -MM     Functional Endurance poor  -MM       Row Name 08/06/24 1221          Balance    Balance Assessment sitting static balance;sitting dynamic balance;sit to stand dynamic balance;standing static balance;standing dynamic balance  -MM     Static Sitting Balance minimal assist  -MM     Dynamic Sitting Balance moderate assist  -MM     Position, Sitting Balance sitting edge of bed;supported  -MM     Sit to Stand Dynamic Balance minimal assist;moderate assist;2-person assist  -MM     Static Standing Balance minimal assist;2-person assist  -MM     Dynamic Standing Balance minimal assist;2-person assist  -MM     Position/Device Used, Standing Balance supported;other (see comments)  HHA  -MM               User Key  (r) = Recorded By, (t) = Taken By, (c) = Cosigned By      Initials Name Provider Type    Zehra Uribe OT Occupational Therapist                   Goals/Plan    No documentation.                  Clinical Impression       Row Name 08/06/24 1224          Pain Assessment    Pretreatment Pain Rating 0/10 - no pain  -MM     Posttreatment Pain Rating 0/10 - no pain  -MM       Row Name 08/06/24 1224          Pain Scale: FACES Pre/Post-Treatment    Pain: FACES Scale, Pretreatment 0-->no hurt  -MM     Posttreatment Pain Rating 0-->no hurt  -MM       Row Name 08/06/24 1224          Plan of Care Review    Plan of Care Reviewed With patient;spouse;son  -MM     Progress no change  -MM     Outcome Evaluation pt seen for OT/PT this AM to maximize therapeutic benefit. He requires max A x1 for sup <>sit, noted some  improvement with initiationfor bed mobility. Max cues required for sequencing this date. He requires mod A x2 for first stand, min Ax2 for second stand with HHA. Min A x2 for slight improvement noted with overall activity tolerance with functional mobility. remains resistive with UE AAROM in shoulder planes with poor carryover. Continue to progress as able.  -MM       Row Name 08/06/24 1224          Therapy Plan Review/Discharge Plan (OT)    Anticipated Discharge Disposition (OT) skilled nursing facility  -MM       Row Name 08/06/24 1224          Vital Signs    O2 Delivery Pre Treatment room air  -MM       Row Name 08/06/24 1224          Positioning and Restraints    Pre-Treatment Position in bed  -MM     Post Treatment Position bed  -MM     In Bed notified nsg;fowlers;call light within reach;encouraged to call for assist;exit alarm on;side rails up x3;with family/caregiver  -MM               User Key  (r) = Recorded By, (t) = Taken By, (c) = Cosigned By      Initials Name Provider Type    Zehra Uribe, HARDEEP Occupational Therapist                   Outcome Measures       Row Name 08/06/24 1227          How much help from another is currently needed...    Putting on and taking off regular lower body clothing? 1  -MM     Bathing (including washing, rinsing, and drying) 1  -MM     Toileting (which includes using toilet bed pan or urinal) 1  -MM     Putting on and taking off regular upper body clothing 1  -MM     Taking care of personal grooming (such as brushing teeth) 1  -MM     Eating meals 2  -MM     AM-PAC 6 Clicks Score (OT) 7  -MM       Row Name 08/06/24 0800          How much help from another person do you currently need...    Turning from your back to your side while in flat bed without using bedrails? 2  -MR     Moving from lying on back to sitting on the side of a flat bed without bedrails? 2  -MR     Moving to and from a bed to a chair (including a wheelchair)? 2  -MR     Standing up from a chair using  your arms (e.g., wheelchair, bedside chair)? 2  -MR     Climbing 3-5 steps with a railing? 2  -MR     To walk in hospital room? 2  -MR     AM-PAC 6 Clicks Score (PT) 12  -MR     Highest Level of Mobility Goal 4 --> Transfer to chair/commode  -MR       Row Name 08/06/24 1227          Functional Assessment    Outcome Measure Options AM-PAC 6 Clicks Daily Activity (OT)  -MM               User Key  (r) = Recorded By, (t) = Taken By, (c) = Cosigned By      Initials Name Provider Type    Zehra Uribe OT Occupational Therapist     Asia Perez, RN Registered Nurse                    Occupational Therapy Education       Title: PT OT SLP Therapies (In Progress)       Topic: Occupational Therapy (Done)       Point: ADL training (Done)       Description:   Instruct learner(s) on proper safety adaptation and remediation techniques during self care or transfers.   Instruct in proper use of assistive devices.                  Learning Progress Summary             Patient Acceptance, E,TB, VU by RW at 8/2/2024 1343    Acceptance, E, VU,NR by MM at 7/29/2024 1546    Comment: role of OT, d/c rec   Family Acceptance, E,TB, VU by RW at 8/2/2024 1343    Acceptance, E, VU,NR by MM at 7/29/2024 1546    Comment: role of OT, d/c rec                         Point: Precautions (Done)       Description:   Instruct learner(s) on prescribed precautions during self-care and functional transfers.                  Learning Progress Summary             Patient Acceptance, E,TB, VU by RW at 8/2/2024 1343    Acceptance, E, VU,NR by MM at 7/29/2024 1546    Comment: role of OT, d/c rec   Family Acceptance, E,TB, VU by RW at 8/2/2024 1343    Acceptance, E, VU,NR by MM at 7/29/2024 1546    Comment: role of OT, d/c rec                         Point: Body mechanics (Done)       Description:   Instruct learner(s) on proper positioning and spine alignment during self-care, functional mobility activities and/or exercises.                  Learning  Progress Summary             Patient Acceptance, E,TB, VU by  at 8/2/2024 1343    Acceptance, E, VU,NR by MM at 7/29/2024 1546    Comment: role of OT, d/c rec   Family Acceptance, E,TB, VU by RW at 8/2/2024 1343    Acceptance, E, VU,NR by MM at 7/29/2024 1546    Comment: role of OT, d/c rec                                         User Key       Initials Effective Dates Name Provider Type Discipline    RW 06/16/21 -  Anthony Verde RN Registered Nurse Nurse     05/31/24 -  Zehra Adams OT Occupational Therapist OT                  OT Recommendation and Plan  Planned Therapy Interventions (OT): activity tolerance training, BADL retraining, neuromuscular control/coordination retraining, patient/caregiver education/training, transfer/mobility retraining, cognitive/visual perception retraining, strengthening exercise, ROM/therapeutic exercise, occupation/activity based interventions, functional balance retraining  Therapy Frequency (OT): 5 times/wk  Plan of Care Review  Plan of Care Reviewed With: patient, spouse, son  Progress: no change  Outcome Evaluation: pt seen for OT/PT this AM to maximize therapeutic benefit. He requires max A x1 for sup <>sit, noted some improvement with initiationfor bed mobility. Max cues required for sequencing this date. He requires mod A x2 for first stand, min Ax2 for second stand with HHA. Min A x2 for slight improvement noted with overall activity tolerance with functional mobility. remains resistive with UE AAROM in shoulder planes with poor carryover. Continue to progress as able.     Time Calculation:   Evaluation Complexity (OT)  Review Occupational Profile/Medical/Therapy History Complexity: expanded/moderate complexity  Assessment, Occupational Performance/Identification of Deficit Complexity: 5 or more performance deficits  Clinical Decision Making Complexity (OT): detailed assessment/moderate complexity  Overall Complexity of Evaluation (OT): moderate complexity     Time  Calculation- OT       Row Name 08/06/24 1227             Time Calculation- OT    OT Start Time 0935  -MM      OT Stop Time 0948  -MM      OT Time Calculation (min) 13 min  -MM      Total Timed Code Minutes- OT 13 minute(s)  -MM      OT Received On 08/06/24  -MM      OT - Next Appointment 08/07/24  -MM         Timed Charges    59215 - OT Therapeutic Activity Minutes 13  -MM         Total Minutes    Timed Charges Total Minutes 13  -MM       Total Minutes 13  -MM                User Key  (r) = Recorded By, (t) = Taken By, (c) = Cosigned By      Initials Name Provider Type    Zehra Uribe OT Occupational Therapist                  Therapy Charges for Today       Code Description Service Date Service Provider Modifiers Qty    17696820193 HC OT THERAPEUTIC ACT EA 15 MIN 8/5/2024 Zehra Adams OT GO 1    41337197252 HC OT THERAPEUTIC ACT EA 15 MIN 8/6/2024 Zehra Adams OT GO 1                 Zehra Adams OT  8/6/2024

## 2024-08-06 NOTE — PROGRESS NOTES
[facility] Progress Note      Patient Name: Marcial Bernard  : 1955  MRN: [id number]  Primary Care Physician:  Justina Liriano APRN  Date of admission: 2024    Subjective     Interval History:   Follow-up acute kidney injury on chronic kidney disease and hyponatremia  The patient remains confused disoriented, no chest pain or shortness of air, no nausea or vomiting.  I had a very long conversation with the patient's family at the bedside with the use of personal  present at the bedside.  Review of Systems:   As noted above    Objective     Vitals:   Temp:  [98.2 °F (36.8 °C)-98.4 °F (36.9 °C)] 98.2 °F (36.8 °C)  Heart Rate:  [68-73] 68  Resp:  [16-20] 18  BP: (123-193)/(65-89) 193/89    Intake/Output Summary (Last 24 hours) at 2024 1151  Last data filed at 2024 0850  Gross per 24 hour   Intake 537 ml   Output 200 ml   Net 337 ml       Physical Exam:    General Appearance: Awake, disoriented, chronically ill, no acute distress   Skin: warm and dry  HEENT: oral mucosa normal, nonicteric sclera  Neck: no JVD again today  Lungs: Bilateral rhonchi no crackles, breathing effort not labored  Heart: RRR, no S3, no rub  Abdomen: soft, nontender, nondistended  : no palpable bladder  Extremities: No ankle edema lower extremity edema  Neuro: Moving all extremities    Scheduled Meds:     amLODIPine, 10 mg, Oral, Q24H  carvedilol, 25 mg, Oral, BID With Meals  cloNIDine, 1 patch, Transdermal, Weekly  heparin (porcine), 5,000 Units, Subcutaneous, Q12H  hydrALAZINE, 100 mg, Oral, Q8H  insulin regular, 2-7 Units, Subcutaneous, 4x Daily AC & at Bedtime  levothyroxine, 100 mcg, Oral, Daily  melatonin, 2.5 mg, Oral, Nightly  senna-docusate sodium, 2 tablet, Oral, BID  sodium chloride, 10 mL, Intravenous, Q12H      IV Meds:          Results Reviewed:   I have personally reviewed the results from the time of this admission to 2024 11:51 EDT     Results from last 7  days   Lab Units 08/06/24 0612 08/05/24  0647 08/04/24  0455   SODIUM mmol/L 144 147* 142   POTASSIUM mmol/L 3.3* 3.4* 3.5   CHLORIDE mmol/L 105 103 100   CO2 mmol/L 27.0 27.1 28.0   BUN mg/dL 110* 101* 101*   CREATININE mg/dL 5.38* 4.80* 5.37*   CALCIUM mg/dL 9.7 9.5 9.4   GLUCOSE mg/dL 142* 126* 134*       Estimated Creatinine Clearance: 14.3 mL/min (A) (by C-G formula based on SCr of 5.38 mg/dL (H)).    Results from last 7 days   Lab Units 08/06/24 0612 08/04/24 0455 08/03/24 0835 08/02/24  0458   MAGNESIUM mg/dL 2.7*  --  2.4 2.8*   PHOSPHORUS mg/dL 6.5* 6.4* 6.0* 4.9*       Results from last 7 days   Lab Units 08/06/24  0612 08/03/24  0835 08/02/24  0458 08/01/24  0538 07/31/24  0432   URIC ACID mg/dL 11.0* 10.0* 10.4* 10.3* 9.9*       Results from last 7 days   Lab Units 08/06/24  0612 08/05/24  0647 08/04/24 0455 08/03/24  0835 08/02/24  0458   WBC 10*3/mm3 7.41 5.97 6.69 6.96 6.65   HEMOGLOBIN g/dL 9.5* 9.5* 9.7* 10.3* 10.4*   PLATELETS 10*3/mm3 321 322 320 333 348             Assessment / Plan     ASSESSMENT:  Acute kidney injury on chronic kidney disease stage IV, diuretics were discontinued 48 hours ago and the creatinine went down to 4.8 yesterday but today up to 5.38 potassium 3.3 and sodium 144.  Difficult to assess for uremic symptoms but the patient has dementia and there is no changes in his mental status  Chronic kidney disease stage IV baseline creatinine about 2.4-second diabetic and hypertensive nephrosclerosis  Acute on chronic diastolic congestive heart failure with cardiorenal syndrome, volume status improving with diuresis  Diabetes mellitus type 2 with renal complication and diabetic retinopathy  Hypertension with chronic kidney disease  Dementia.  Hypokalemia potassium is 3.3      PLAN:  No diuretics today  Continue the same treatment  After lengthy discussion with the family about the need for dialysis it may arise in the next 24 to 48 hours and explaining to them that his dementia  would not be impacted with the dialysis it may improve or it may not the wife insisted that if he needs dialysis she wanted labs to be done I will assess in the next 24 hours and decide if dialysis is needed and I will ask vascular surgery at that time to place a tunneled catheter  Replete potassium  Surveillance labs    I reviewed the chart and other providers notes, reviewed labs.  Copied text in this note has been reviewed and is accurate as of 08/06/24.       Thank you for involving us in the care of Marcial Bernard.  Please feel free to call with any questions.    Mike Flannery MD  08/06/24  11:51 EDT    [facility]  [phone]

## 2024-08-06 NOTE — THERAPY TREATMENT NOTE
Patient Name: Marcial Bernard  : 1955    MRN: [id number]                              Today's Date: 2024       Admit Date: 2024    Visit Dx:     ICD-10-CM ICD-9-CM   1. Chronic renal failure (CRF), stage 4 (severe)  N18.4 585.4   2. Hyponatremia  E87.1 276.1   3. Acute pulmonary edema with congestive heart failure  I50.1 428.1   4. Anemia due to stage 4 chronic kidney disease  N18.4 285.21    D63.1 585.4   5. Elevated troponin level not due myocardial infarction  R79.89 790.6     Patient Active Problem List   Diagnosis    Chronic renal failure (CRF), stage 4 (severe)    Hyponatremia    Acute pulmonary edema with congestive heart failure    Anemia due to stage 4 chronic kidney disease    Elevated troponin level not due myocardial infarction    Dementia     Past Medical History:   Diagnosis Date    Asthma     Dementia     Renal disorder      History reviewed. No pertinent surgical history.   General Information       Row Name 24 0959          Physical Therapy Time and Intention    Document Type therapy note (daily note)  -     Mode of Treatment co-treatment;occupational therapy;physical therapy  -       Row Name 24 0959          General Information    Existing Precautions/Restrictions fall  -     Barriers to Rehab cognitive status;language barrier;medically complex  -       Row Name 24 0959          Cognition    Orientation Status (Cognition) unable/difficult to assess  -       Row Name 24 0959          Safety Issues, Functional Mobility    Impairments Affecting Function (Mobility) balance;cognition;endurance/activity tolerance;strength;range of motion (ROM)  -     Cognitive Impairments, Mobility Safety/Performance attention;insight into deficits/self-awareness;judgment;problem-solving/reasoning;safety precaution awareness;safety precaution follow-through;sequencing abilities  -     Comment, Safety Issues/Impairments (Mobility) Co treatment medically appropriate and  necessary due to patient acuity level, activity tolerance and safety of patient and staff. Treatment is focusing on progression of care and goals established in the POC. gt belt and non skid socks donned.  -PH               User Key  (r) = Recorded By, (t) = Taken By, (c) = Cosigned By      Initials Name Provider Type    PH Liliana Saravia PTA Physical Therapist Assistant                   Mobility       Row Name 08/06/24 1000          Bed Mobility    Bed Mobility supine-sit  -PH     Supine-Sit [place] (Bed Mobility) maximum assist (25% patient effort);verbal cues;nonverbal cues (demo/gesture);1 person assist  -PH     Sit-Supine [place] (Bed Mobility) maximum assist (25% patient effort);2 person assist;verbal cues;nonverbal cues (demo/gesture)  -PH     Assistive Device (Bed Mobility) bed rails;head of bed elevated  -PH     Comment, (Bed Mobility) at family's prompting pt sat upright in bed; stiff as was assisted to EOB  -PH       Row Name 08/06/24 1000          Sit-Stand Transfer    Sit-Stand [place] (Transfers) moderate assist (50% patient effort);2 person assist;verbal cues;nonverbal cues (demo/gesture);minimum assist (75% patient effort)  -PH     Assistive Device (Sit-Stand Transfers) other (see comments)  HHA x 2  -PH     Comment, (Sit-Stand Transfer) 2x from EOB w/ pt unsteady w/ B knees and trunk in flexion; improved w/ second attempt although knees / trunk in flexion  -PH       Row Name 08/06/24 1000          Gait/Stairs (Locomotion)    [place] Level (Gait) minimum assist (75% patient effort);2 person assist;verbal cues;nonverbal cues (demo/gesture)  -PH     Assistive Device (Gait) other (see comments)  no AD  -PH     Distance in Feet (Gait) 45  -PH     Deviations/Abnormal Patterns (Gait) festinating/shuffling;base of support, narrow  -PH     Bilateral Gait Deviations forward flexed posture;heel strike decreased  -PH     Left Sided Gait Deviations decreased knee extension  -PH      Right Sided Gait Deviations decreased knee extension  -PH     [place] Level (Stairs) not tested  -PH     Comment, (Gait/Stairs) slow, shuffling and unsteady although did not req HHA today; activity intolerance and confusion still limiting  -PH               User Key  (r) = Recorded By, (t) = Taken By, (c) = Cosigned By      Initials Name Provider Type     Liliana Saravia PTA Physical Therapist Assistant                   Obj/Interventions       Row Name 08/06/24 1003          Motor Skills    Therapeutic Exercise other (see comments)  LAQ x 10 reps - resistive w/ PROM  -PH       Row Name 08/06/24 1003          Balance    Balance Assessment sitting static balance;sitting dynamic balance  -PH     Static Sitting Balance minimal assist;verbal cues;non-verbal cues (demo/gesture)  -PH     Dynamic Sitting Balance moderate assist;verbal cues;non-verbal cues (demo/gesture)  -PH               User Key  (r) = Recorded By, (t) = Taken By, (c) = Cosigned By      Initials Name Provider Type     Liliana Saravia PTA Physical Therapist Assistant                   Goals/Plan    No documentation.                  Clinical Impression       Row Name 08/06/24 1003          Pain    Pretreatment Pain Rating 0/10 - no pain  -PH     Posttreatment Pain Rating 0/10 - no pain  -PH     Pre/Posttreatment Pain Comment non verbal w/ no indications of pain  -PH     Additional Documentation Pain Scale: Numbers Pre/Post-Treatment (Group)  -PH       Row Name 08/06/24 1003          Plan of Care Review    Plan of Care Reviewed With patient;spouse;son  -PH     Progress no change  -PH     Outcome Evaluation Pt was seen for PT/OT co-tx. Pt had eyes open at beg of session although confusion limiting throughout session. Pt req max A x 1 for supine to sit. Pt stood w/ mod A x 2 for 1st attempt then improved to min A x 2 for second attempt w/ B knees and trunk in flexion. Pt amb further distance of approx 45' total req min A x 2 w/ no  AD. Pt was unsteady and shuffling. Pt performed PROM B LE ther ex w/ a little resistance then req max A x 2 for sit to supine. PT will prog as pt mabel.  -PH       Row Name 08/06/24 1003          Vital Signs    O2 Delivery Pre Treatment room air  -PH     O2 Delivery Intra Treatment room air  -PH     O2 Delivery Post Treatment room air  -PH       Row Name 08/06/24 1003          Positioning and Restraints    Pre-Treatment Position in bed  -PH     Post Treatment Position bed  -PH     In Bed fowlers;call light within reach;encouraged to call for assist;exit alarm on;with family/caregiver;notified [facility]  -PH               User Key  (r) = Recorded By, (t) = Taken By, (c) = Cosigned By      Initials Name Provider Type    Liliana Ladd PTA Physical Therapist Assistant                   Outcome Measures    No documentation.                                Physical Therapy Education       Title: PT OT SLP Therapies (In Progress)       Topic: Physical Therapy (In Progress)       Point: Mobility training (In Progress)       Learning Progress Summary             Patient Acceptance, E,TB,D, NR,NL by  at 8/6/2024 1007    Acceptance, E,TB,D, NR,NL by  at 8/5/2024 0936    Nonacceptance, E, NL by  at 8/2/2024 1425    Acceptance, E,TB, VU by  at 8/2/2024 1343    Acceptance, E, NL by  at 7/31/2024 1544    Acceptance, E,TB, NR by  at 7/30/2024 1002    Acceptance, E, NR by  at 7/29/2024 1539   Family Nonacceptance, E, NL by  at 8/2/2024 1425    Acceptance, E,TB, VU by  at 8/2/2024 1343    Acceptance, E, NL by  at 7/31/2024 1544                         Point: Home exercise program (In Progress)       Learning Progress Summary             Patient Acceptance, E,TB,D, NR,NL by  at 8/6/2024 1007    Acceptance, E,TB,D, NR,NL by  at 8/5/2024 0936    Acceptance, E,TB, VU by  at 8/2/2024 1343    Acceptance, E, NL by  at 7/31/2024 1544    Acceptance, E, NR by  at 7/29/2024 1539   Family Acceptance, E,TB, VU by  RW at 8/2/2024 1343    Acceptance, E, NL by  at 7/31/2024 1544                         Point: Body mechanics (In Progress)       Learning Progress Summary             Patient Acceptance, E,TB,D, NR,NL by  at 8/6/2024 1007    Acceptance, E,TB,D, NR,NL by  at 8/5/2024 0936    Nonacceptance, E, NL by DJ at 8/2/2024 1425    Acceptance, E,TB, VU by RW at 8/2/2024 1343    Acceptance, E, NL by DJ at 7/31/2024 1544    Acceptance, E,TB, NR by  at 7/30/2024 1002    Acceptance, E, NR by  at 7/29/2024 1539   Family Nonacceptance, E, NL by DJ at 8/2/2024 1425    Acceptance, E,TB, VU by RW at 8/2/2024 1343    Acceptance, E, NL by DJ at 7/31/2024 1544                         Point: Precautions (In Progress)       Learning Progress Summary             Patient Acceptance, E,TB,D, NR,NL by  at 8/6/2024 1007    Acceptance, E,TB,D, NR,NL by  at 8/5/2024 0936    Nonacceptance, E, NL by DJ at 8/2/2024 1425    Acceptance, E,TB, VU by  at 8/2/2024 1343    Acceptance, E, NL by  at 7/31/2024 1544    Acceptance, E,TB, NR by  at 7/30/2024 1002    Acceptance, E, NR by  at 7/29/2024 1539   Family Nonacceptance, E, NL by  at 8/2/2024 1425    Acceptance, E,TB, VU by  at 8/2/2024 1343    Acceptance, E, NL by  at 7/31/2024 1544                                         User Key       Initials Effective Dates Name Provider Type Discipline     06/16/21 -  Anthony Verde, RN Registered Nurse Nurse     06/16/21 -  Liliana Saravia, PTA Physical Therapist Assistant PT     10/25/19 -  Leti Hopkins, PT Physical Therapist PT     05/02/22 -  Rama Peralta, PT Physical Therapist PT                  PT Recommendation and Plan     Plan of Care Reviewed With: patient, spouse, son  Progress: no change  Outcome Evaluation: Pt was seen for PT/OT co-tx. Pt had eyes open at beg of session although confusion limiting throughout session. Pt req max A x 1 for supine to sit. Pt stood w/ mod A x 2 for 1st attempt then improved to  min A x 2 for second attempt w/ B knees and trunk in flexion. Pt amb further distance of approx 45' total req min A x 2 w/ no AD. Pt was unsteady and shuffling. Pt performed PROM B LE ther ex w/ a little resistance then req max A x 2 for sit to supine. PT will prog as pt mabel.     Time Calculation:         PT Charges       Row Name 08/06/24 1007             Time Calculation    Start Time 0936  -PH      Stop Time 0947  -PH      Time Calculation (min) 11 min  -PH      PT Received On 08/06/24  -PH      PT - Next Appointment 08/07/24  -PH         Timed Charges    62210 - PT Therapeutic Activity Minutes 11  -PH         Total Minutes    Timed Charges Total Minutes 11  -PH       Total Minutes 11  -PH                User Key  (r) = Recorded By, (t) = Taken By, (c) = Cosigned By      Initials Name Provider Type    PH Liliana Saravia PTA Physical Therapist Assistant                  Therapy Charges for Today       Code Description Service Date Service Provider Modifiers Qty    08038896802  PT THERAPEUTIC ACT EA 15 MIN 8/5/2024 Liliana Saravia PTA GP 1    [id number]  PT THERAPEUTIC ACT EA 15 MIN 8/6/2024 Liliana Saravia, MANPREET GP 1            PT G-Codes  Outcome Measure Options: AM-PAC 6 Clicks Daily Activity (OT)  AM-PAC 6 Clicks Score (PT): 9  AM-PAC 6 Clicks Score (OT): 7  Modified Ross Scale: 4 - Moderately severe disability.  Unable to walk without assistance, and unable to attend to own bodily needs without assistance.  PT Discharge Summary  Anticipated Discharge Disposition (PT): home with 24/7 care, home with home health, skilled nursing facility    Liliana aSravia PTA  8/6/2024

## 2024-08-06 NOTE — PROGRESS NOTES
Name: Marcial Bernard ADMIT: 2024   : 1955  PCP: Justina Liriano APRN    MRN: [id number] LOS: 14 days   AGE/SEX: 69 y.o. male  ROOM: [facility]     Subjective   Subjective   Patient very hard of hearing and not participating in conversation.  Follows some basic commands.  Worked okay with physical therapy.         Objective   Objective   Vital Signs  Temp:  [97.7 °F (36.5 °C)-98.4 °F (36.9 °C)] 97.7 °F (36.5 °C)  Heart Rate:  [68-73] 68  Resp:  [16-19] 18  BP: (142-193)/(59-89) 147/59  SpO2:  [97 %-98 %] 97 %  on   ;   Device (Oxygen Therapy): room air  Body mass index is 25.72 kg/m².  Physical Exam  Vitals and nursing note reviewed.   Constitutional:       General: He is not in acute distress.     Comments: Elderly, frail   Cardiovascular:      Rate and Rhythm: Normal rate and regular rhythm.   Pulmonary:      Effort: Pulmonary effort is normal. No respiratory distress.   Abdominal:      General: Abdomen is flat. There is no distension.      Tenderness: There is no abdominal tenderness.   Musculoskeletal:         General: No swelling or deformity.   Skin:     General: Skin is warm and dry.   Neurological:      Mental Status: He is alert. Mental status is at baseline. He is disoriented.         Results Review     I reviewed the patient's new clinical results.  Results from last 7 days   Lab Units 24  0612 24  0647 24  0835   WBC 10*3/mm3 7.41 5.97 6.69 6.96   HEMOGLOBIN g/dL 9.5* 9.5* 9.7* 10.3*   PLATELETS 10*3/mm3 321 322 320 333     Results from last 7 days   Lab Units 24  0612 24  0647 24  0455 24  0835   SODIUM mmol/L 144 147* 142 143   POTASSIUM mmol/L 3.3* 3.4* 3.5 3.1*   CHLORIDE mmol/L 105 103 100 99   CO2 mmol/L 27.0 27.1 28.0 26.5   BUN mg/dL 110* 101* 101* 82*   CREATININE mg/dL 5.38* 4.80* 5.37* 5.09*   GLUCOSE mg/dL 142* 126* 134* 128*   Estimated Creatinine Clearance: 14.3 mL/min (A) (by C-G formula based on SCr of 5.38 mg/dL  (H)).  Results from last 7 days   Lab Units 08/06/24  0612 08/04/24  0455 08/03/24  0835 08/02/24  0458   ALBUMIN g/dL 3.2* 3.2* 3.1* 3.1*     Results from last 7 days   Lab Units 08/06/24  0612 08/05/24  0647 08/04/24  0455 08/03/24  0835 08/02/24  0458 08/01/24  0538   CALCIUM mg/dL 9.7 9.5 9.4 9.5 9.2 8.9   ALBUMIN g/dL 3.2*  --  3.2* 3.1* 3.1* 3.1*   MAGNESIUM mg/dL 2.7*  --   --  2.4 2.8* 2.8*   PHOSPHORUS mg/dL 6.5*  --  6.4* 6.0* 4.9* 5.4*       COVID19   Date Value Ref Range Status   07/23/2024 Not Detected Not Detected - Ref. Range Final     Glucose   Date/Time Value Ref Range Status   08/06/2024 1102 189 (H) 70 - 130 mg/dL Final   08/06/2024 0630 148 (H) 70 - 130 mg/dL Final   08/05/2024 2023 175 (H) 70 - 130 mg/dL Final   08/05/2024 1630 150 (H) 70 - 130 mg/dL Final   08/05/2024 1125 207 (H) 70 - 130 mg/dL Final   08/05/2024 0606 142 (H) 70 - 130 mg/dL Final   08/04/2024 2019 152 (H) 70 - 130 mg/dL Final       CT Head Without Contrast  CT HEAD WO CONTRAST-     HISTORY:  dementia and altered mental status; N18.4-Chronic kidney  disease, stage 4 (severe); E87.9-Fxjl-hbduyarwty and hyponatremia;  I50.1-Left ventricular failure, unspecified; N18.4-Chronic kidney  disease, stage 4 (severe); D63.1-Anemia in chronic kidney disease;  R79.89-Other specified abnormal findings of blood chemistry     COMPARISON: None     FINDINGS: The brain and ventricles are symmetrical. There is mild to  moderate diffuse atrophy with secondary ventriculomegaly. Extensive  vascular calcification is noted. No focal area of decreased attenuation  to suggest acute infarction is identified. Areas of decreased  attenuation are appreciated involving the white matter of cerebral  hemispheres bilaterally consistent with moderate to severe small vessel  ischemic disease. Further evaluation could be performed with a MRI  examination of the brain as indicated.     This report was finalized on 7/31/2024 2:18 PM by Dr. Michel Patiño M.D  on  Workstation: [id number]       I reviewed the patient's daily medications.  Scheduled Medications  amLODIPine, 10 mg, Oral, Q24H  carvedilol, 25 mg, Oral, BID With Meals  cloNIDine, 1 patch, Transdermal, Weekly  heparin (porcine), 5,000 Units, Subcutaneous, Q12H  hydrALAZINE, 100 mg, Oral, Q8H  insulin regular, 2-7 Units, Subcutaneous, 4x Daily AC & at Bedtime  levothyroxine, 100 mcg, Oral, Daily  melatonin, 2.5 mg, Oral, Nightly  senna-docusate sodium, 2 tablet, Oral, BID  sodium chloride, 10 mL, Intravenous, Q12H    Infusions   Diet  Diet: Regular/House, Cardiac, Fluid Restriction (240 mL/tray), Diabetic; Low Sodium (2g); Consistent Carbohydrate; 1500 mL/day; Texture: Regular (IDDSI 7); Fluid Consistency: Thin (IDDSI 0)         I have personally reviewed:  [x]  Laboratory   []  Microbiology   [x]  Radiology   []  EKG/Telemetry   []  Cardiology/Vascular   []  Pathology   [x]  Records     Assessment/Plan     Active Hospital Problems    Diagnosis  POA    **Chronic renal failure (CRF), stage 4 (severe) [N18.4]  Unknown    Dementia [F03.90]  Unknown    Hyponatremia [E87.1]  Unknown    Acute pulmonary edema with congestive heart failure [I50.1]  Unknown    Anemia due to stage 4 chronic kidney disease [N18.4, D63.1]  Unknown    Elevated troponin level not due myocardial infarction [R79.89]  Unknown      Resolved Hospital Problems   No resolved problems to display.       69 y.o. male admitted with Chronic renal failure (CRF), stage 4 (severe).      LIDIA on CKD 4  -Renal consulted. Holding diuretic  -likely will trial HD in the next 24-48 hours if no improvement  -Discussed with renal and patient is not a great candidate for dialysis this dementia    Acute on chronic diastolic heart failure  -Improved with HD    Diabetes type 2, A1c 5.3%  -continue SSI    Hypertension  Sinus bradycardia  -Continue clonidine patch, Coreg, amlodipine, hydralazine    Dementia  -Mentation has been stable but worse overall since admission.   "Suspect component of delirium and possibly uremia    Goals of care-discussed CODE STATUS with family using .  Wife would like patient to be DNR/DNI and understands that God will take him when it's time.   I discussed with wife and at this time she would like to trial HD if indicated so that she can feel like she \"tried everything.\"  She is talking with her daughters this evening for further goals of care discussion.  Discussed that this dementia puts him at high risk for pulling out any kind of line and possibly needing restraints to tolerate HD.  Will ask palliative care to help with goals of care discussion.    SCDs for DVT prophylaxis.  Limited code (no CPR, no intubation).  Discussed with patient, family, and nursing staff.  Anticipate discharge home with home health in 2-3 days.    Expected Discharge Date: 8/6/2024; Expected Discharge Time:       Lg Mera MD  [facility]  08/06/24  13:27 EDT          "

## 2024-08-06 NOTE — PLAN OF CARE
Goal Outcome Evaluation:  Plan of Care Reviewed With: patient, spouse, son        Progress: no change  Outcome Evaluation: Pt was seen for PT/OT co-tx. Pt had eyes open at beg of session although confusion limiting throughout session. Pt req max A x 1 for supine to sit. Pt stood w/ mod A x 2 for 1st attempt then improved to min A x 2 for second attempt w/ B knees and trunk in flexion. Pt amb further distance of approx 45' total req min A x 2 w/ no AD. Pt was unsteady and shuffling. Pt performed PROM B LE ther ex w/ a little resistance then req max A x 2 for sit to supine. PT will prog as pt mabel.      Anticipated Discharge Disposition (PT): home with 24/7 care, home with home health, skilled nursing facility

## 2024-08-06 NOTE — CASE MANAGEMENT/SOCIAL WORK
Continued Stay Note  [facility]     Patient Name: Marcial Bernard  MRN: [id number]  Today's Date: 8/6/2024    Admit Date: 7/23/2024    Plan: Home with family support   Discharge Plan       Row Name 08/06/24 1433       Plan    Plan Home with family support    Plan Comments Met with patient and family at bedside. Per daughter patient has SSN, this information was given to  to update records. Family states that patient has a Temporary, Permanent resident card and is seeking asylum. Family states they have not filed for Medicare or Medicaid, but they do have to paperwork to get the process started. Explained to family that without a payment source they will not be able to find any services unless they pay privately. Family states their plan is to take patient home. Family will provide transportation home. Will continue to monitor for new or changing discharge needs. Griselda CAI RN CCP                   Discharge Codes    No documentation.                 Expected Discharge Date and Time       Expected Discharge Date Expected Discharge Time    Aug 6, 2024               Griselda Ozuna RN

## 2024-08-06 NOTE — PLAN OF CARE
Goal Outcome Evaluation:  Plan of Care Reviewed With: patient, spouse, son        Progress: no change  Outcome Evaluation: pt seen for OT/PT this AM to maximize therapeutic benefit. He requires max A x1 for sup <>sit, noted some improvement with initiationfor bed mobility. Max cues required for sequencing this date. He requires mod A x2 for first stand, min Ax2 for second stand with HHA. Min A x2 for slight improvement noted with overall activity tolerance with functional mobility. remains resistive with UE AAROM in shoulder planes with poor carryover. Continue to progress as able.      Anticipated Discharge Disposition (OT): skilled nursing facility

## 2024-08-07 LAB
ALBUMIN SERPL-MCNC: 3.3 G/DL (ref 3.5–5.2)
ANION GAP SERPL CALCULATED.3IONS-SCNC: 15 MMOL/L (ref 5–15)
BASOPHILS # BLD AUTO: 0.04 10*3/MM3 (ref 0–0.2)
BASOPHILS NFR BLD AUTO: 0.6 % (ref 0–1.5)
BUN SERPL-MCNC: 109 MG/DL (ref 8–23)
BUN/CREAT SERPL: 22.7 (ref 7–25)
CALCIUM SPEC-SCNC: 9.7 MG/DL (ref 8.6–10.5)
CHLORIDE SERPL-SCNC: 106 MMOL/L (ref 98–107)
CO2 SERPL-SCNC: 24 MMOL/L (ref 22–29)
CREAT SERPL-MCNC: 4.81 MG/DL (ref 0.76–1.27)
DEPRECATED RDW RBC AUTO: 47.1 FL (ref 37–54)
EGFRCR SERPLBLD CKD-EPI 2021: 12.3 ML/MIN/1.73
EOSINOPHIL # BLD AUTO: 0.41 10*3/MM3 (ref 0–0.4)
EOSINOPHIL NFR BLD AUTO: 5.8 % (ref 0.3–6.2)
ERYTHROCYTE [DISTWIDTH] IN BLOOD BY AUTOMATED COUNT: 14.2 % (ref 12.3–15.4)
GLUCOSE BLDC GLUCOMTR-MCNC: 141 MG/DL (ref 70–130)
GLUCOSE BLDC GLUCOMTR-MCNC: 148 MG/DL (ref 70–130)
GLUCOSE BLDC GLUCOMTR-MCNC: 181 MG/DL (ref 70–130)
GLUCOSE BLDC GLUCOMTR-MCNC: 193 MG/DL (ref 70–130)
GLUCOSE SERPL-MCNC: 141 MG/DL (ref 65–99)
HCT VFR BLD AUTO: 29.6 % (ref 37.5–51)
HGB BLD-MCNC: 9.6 G/DL (ref 13–17.7)
IMM GRANULOCYTES # BLD AUTO: 0.02 10*3/MM3 (ref 0–0.05)
IMM GRANULOCYTES NFR BLD AUTO: 0.3 % (ref 0–0.5)
LYMPHOCYTES # BLD AUTO: 1.01 10*3/MM3 (ref 0.7–3.1)
LYMPHOCYTES NFR BLD AUTO: 14.2 % (ref 19.6–45.3)
MAGNESIUM SERPL-MCNC: 2.8 MG/DL (ref 1.6–2.4)
MCH RBC QN AUTO: 29.5 PG (ref 26.6–33)
MCHC RBC AUTO-ENTMCNC: 32.4 G/DL (ref 31.5–35.7)
MCV RBC AUTO: 91.1 FL (ref 79–97)
MONOCYTES # BLD AUTO: 0.48 10*3/MM3 (ref 0.1–0.9)
MONOCYTES NFR BLD AUTO: 6.8 % (ref 5–12)
NEUTROPHILS NFR BLD AUTO: 5.13 10*3/MM3 (ref 1.7–7)
NEUTROPHILS NFR BLD AUTO: 72.3 % (ref 42.7–76)
NRBC BLD AUTO-RTO: 0 /100 WBC (ref 0–0.2)
PHOSPHATE SERPL-MCNC: 5.5 MG/DL (ref 2.5–4.5)
PLATELET # BLD AUTO: 333 10*3/MM3 (ref 140–450)
PMV BLD AUTO: 10.8 FL (ref 6–12)
POTASSIUM SERPL-SCNC: 3.4 MMOL/L (ref 3.5–5.2)
RBC # BLD AUTO: 3.25 10*6/MM3 (ref 4.14–5.8)
SODIUM SERPL-SCNC: 145 MMOL/L (ref 136–145)
URATE SERPL-MCNC: 11 MG/DL (ref 3.4–7)
WBC NRBC COR # BLD AUTO: 7.09 10*3/MM3 (ref 3.4–10.8)

## 2024-08-07 PROCEDURE — 63710000001 INSULIN REGULAR HUMAN PER 5 UNITS: Performed by: INTERNAL MEDICINE

## 2024-08-07 PROCEDURE — 84550 ASSAY OF BLOOD/URIC ACID: CPT | Performed by: INTERNAL MEDICINE

## 2024-08-07 PROCEDURE — 82948 REAGENT STRIP/BLOOD GLUCOSE: CPT

## 2024-08-07 PROCEDURE — 25010000002 HEPARIN (PORCINE) PER 1000 UNITS: Performed by: INTERNAL MEDICINE

## 2024-08-07 PROCEDURE — 80069 RENAL FUNCTION PANEL: CPT | Performed by: INTERNAL MEDICINE

## 2024-08-07 PROCEDURE — 99253 IP/OBS CNSLTJ NEW/EST LOW 45: CPT | Performed by: NURSE PRACTITIONER

## 2024-08-07 PROCEDURE — 97530 THERAPEUTIC ACTIVITIES: CPT

## 2024-08-07 PROCEDURE — 85025 COMPLETE CBC W/AUTO DIFF WBC: CPT | Performed by: INTERNAL MEDICINE

## 2024-08-07 PROCEDURE — 83735 ASSAY OF MAGNESIUM: CPT | Performed by: INTERNAL MEDICINE

## 2024-08-07 RX ORDER — POTASSIUM CHLORIDE 750 MG/1
40 TABLET, FILM COATED, EXTENDED RELEASE ORAL ONCE
Status: DISCONTINUED | OUTPATIENT
Start: 2024-08-07 | End: 2024-08-07

## 2024-08-07 RX ORDER — POTASSIUM CHLORIDE 1.5 G/1.58G
40 POWDER, FOR SOLUTION ORAL ONCE
Status: COMPLETED | OUTPATIENT
Start: 2024-08-07 | End: 2024-08-07

## 2024-08-07 RX ADMIN — LEVOTHYROXINE SODIUM 100 MCG: 100 TABLET ORAL at 08:43

## 2024-08-07 RX ADMIN — AMLODIPINE BESYLATE 10 MG: 10 TABLET ORAL at 08:44

## 2024-08-07 RX ADMIN — HYDRALAZINE HYDROCHLORIDE 100 MG: 50 TABLET ORAL at 00:23

## 2024-08-07 RX ADMIN — HYDRALAZINE HYDROCHLORIDE 100 MG: 50 TABLET ORAL at 17:38

## 2024-08-07 RX ADMIN — INSULIN HUMAN 2 UNITS: 100 INJECTION, SOLUTION PARENTERAL at 12:49

## 2024-08-07 RX ADMIN — HYDRALAZINE HYDROCHLORIDE 100 MG: 50 TABLET ORAL at 08:44

## 2024-08-07 RX ADMIN — HEPARIN SODIUM 5000 UNITS: 5000 INJECTION INTRAVENOUS; SUBCUTANEOUS at 21:22

## 2024-08-07 RX ADMIN — Medication 10 ML: at 21:22

## 2024-08-07 RX ADMIN — HEPARIN SODIUM 5000 UNITS: 5000 INJECTION INTRAVENOUS; SUBCUTANEOUS at 08:44

## 2024-08-07 RX ADMIN — Medication 2.5 MG: at 21:22

## 2024-08-07 RX ADMIN — CARVEDILOL 25 MG: 25 TABLET, FILM COATED ORAL at 08:46

## 2024-08-07 RX ADMIN — Medication 10 ML: at 08:46

## 2024-08-07 RX ADMIN — SENNOSIDES AND DOCUSATE SODIUM 2 TABLET: 50; 8.6 TABLET ORAL at 08:45

## 2024-08-07 RX ADMIN — CARVEDILOL 25 MG: 25 TABLET, FILM COATED ORAL at 17:37

## 2024-08-07 RX ADMIN — POTASSIUM CHLORIDE 40 MEQ: 1.5 FOR SOLUTION ORAL at 12:49

## 2024-08-07 RX ADMIN — INSULIN HUMAN 2 UNITS: 100 INJECTION, SOLUTION PARENTERAL at 21:23

## 2024-08-07 NOTE — PLAN OF CARE
Goal Outcome Evaluation:  Plan of Care Reviewed With: patient, spouse, son        Progress: no change  Outcome Evaluation: Pt seen this date for OT/PT to maximize therapeutic benefit. He demo's improved activity tolerance with overall functional mobility, increased time for all processing required, son present to assist some with translation. BP assessed and RN reports ok to mobilize this session. Per CCP, pt plans to take family home at d/c, plan to educate family on any AE needs required for ADLs and functional transfer safety. continue to follow      Anticipated Discharge Disposition (OT): skilled nursing facility

## 2024-08-07 NOTE — THERAPY TREATMENT NOTE
Patient Name: Marcial Bernard  : 1955    MRN: [id number]                              Today's Date: 2024       Admit Date: 2024    Visit Dx:     ICD-10-CM ICD-9-CM   1. Chronic renal failure (CRF), stage 4 (severe)  N18.4 585.4   2. Hyponatremia  E87.1 276.1   3. Acute pulmonary edema with congestive heart failure  I50.1 428.1   4. Anemia due to stage 4 chronic kidney disease  N18.4 285.21    D63.1 585.4   5. Elevated troponin level not due myocardial infarction  R79.89 790.6     Patient Active Problem List   Diagnosis    Chronic renal failure (CRF), stage 4 (severe)    Hyponatremia    Acute pulmonary edema with congestive heart failure    Anemia due to stage 4 chronic kidney disease    Elevated troponin level not due myocardial infarction    Dementia     Past Medical History:   Diagnosis Date    Asthma     Dementia     Renal disorder      History reviewed. No pertinent surgical history.   General Information       Row Name 24 1043          Physical Therapy Time and Intention    Document Type therapy note (daily note)  -     Mode of Treatment co-treatment;occupational therapy;physical therapy  -       Row Name 24 1043          General Information    Existing Precautions/Restrictions fall  -     Barriers to Rehab medically complex;previous functional deficit;language barrier;cognitive status  -       Row Name 24 1043          Cognition    Orientation Status (Cognition) unable/difficult to assess  Pt is not verbally responsive to questions asked through person translating  -       Row Name 24 1043          Safety Issues, Functional Mobility    Impairments Affecting Function (Mobility) balance;cognition;endurance/activity tolerance;strength;postural/trunk control  -     Comment, Safety Issues/Impairments (Mobility) Co treatment medically appropriate and necessary due to patient acuity level, activity tolerance and safety of patient and staff. Treatment is focusing on  progression of care and goals established in the POC. gt belt and non skid socks donned  -PH               User Key  (r) = Recorded By, (t) = Taken By, (c) = Cosigned By      Initials Name Provider Type     Liliana Saravia PTA Physical Therapist Assistant                   Mobility       Row Name 08/07/24 1044          Bed Mobility    Bed Mobility supine-sit;scooting/bridging  -PH     Scooting/Bridging [place] (Bed Mobility) verbal cues;nonverbal cues (demo/gesture);dependent (less than 25% patient effort);1 person assist;2 person assist  -PH     Supine-Sit [place] (Bed Mobility) maximum assist (25% patient effort);1 person assist;verbal cues;nonverbal cues (demo/gesture)  -PH     Sit-Supine [place] (Bed Mobility) maximum assist (25% patient effort);2 person assist;verbal cues;nonverbal cues (demo/gesture)  -PH     Assistive Device (Bed Mobility) bed rails;head of bed elevated  -PH     Comment, (Bed Mobility) dep A to scoot to EOB and place B feet on floor then dep a x 2 for repositioning in bed  -PH       Row Name 08/07/24 1044          Gait/Stairs (Locomotion)    Distance in Feet (Gait) 260  80' x 2  -PH     Deviations/Abnormal Patterns (Gait) terri decreased;gait speed decreased;stride length decreased;base of support, narrow  -PH     Bilateral Gait Deviations heel strike decreased;forward flexed posture  -PH     Left Sided Gait Deviations decreased knee extension  -PH     Right Sided Gait Deviations decreased knee extension  -PH     [place] Level (Stairs) not tested  -PH     Comment, (Gait/Stairs) slow and shuffling w/ mini A initially then  incr assist to min/mod A x 2 w/ distance; B knee extension improved after cues given  -PH               User Key  (r) = Recorded By, (t) = Taken By, (c) = Cosigned By      Initials Name Provider Type     Liliana Saravia PTA Physical Therapist Assistant                   Obj/Interventions       Row Name 08/07/24 1047          Balance     Balance Assessment sitting static balance  -PH     Static Sitting Balance minimal assist;contact guard;verbal cues;non-verbal cues (demo/gesture)  -PH     Static Standing Balance minimal assist;verbal cues;non-verbal cues (demo/gesture);2-person assist  -PH     Dynamic Standing Balance other (see comments)  HHA x 2  -PH     Balance Interventions sitting;supported  -PH     Comment, Balance B knees and hips in flexion although much improved from prior session; poor endurance  -PH               User Key  (r) = Recorded By, (t) = Taken By, (c) = Cosigned By      Initials Name Provider Type     Liliana Saravia PTA Physical Therapist Assistant                   Goals/Plan    No documentation.                  Clinical Impression       Row Name 08/07/24 1048          Pain    Pre/Posttreatment Pain Comment pt non verbal w/ no indications of pain noted  -PH       Row Name 08/07/24 1048          Plan of Care Review    Plan of Care Reviewed With patient;spouse;son  -PH     Progress improving  -PH     Outcome Evaluation Pt was seen for PT/OT co-treat this AM. Pt was in bed w/ eyes open although mostly not verbally responsive or providing eye contact during course of session.  -PH       Row Name 08/07/24 1048          Vital Signs    Pre SpO2 (%) 98  -PH     O2 Delivery Pre Treatment nasal cannula  -PH     Intra SpO2 (%) 100  -PH     O2 Delivery Intra Treatment nasal cannula  -PH     Post SpO2 (%) 100  -PH     O2 Delivery Post Treatment nasal cannula  -PH       Row Name 08/07/24 1048          Positioning and Restraints    Pre-Treatment Position in bed  -PH     Post Treatment Position bed  -PH     In Bed fowlers;call light within reach;encouraged to call for assist;exit alarm on;notified nsg  -PH               User Key  (r) = Recorded By, (t) = Taken By, (c) = Cosigned By      Initials Name Provider Type     Liliana Saravia PTA Physical Therapist Assistant                   Outcome Measures       Row Name  08/07/24 1051          How much help from another person do you currently need...    Turning from your back to your side while in flat bed without using bedrails? 2  -PH     Moving from lying on back to sitting on the side of a flat bed without bedrails? 2  -PH     Moving to and from a bed to a chair (including a wheelchair)? 2  -PH     Standing up from a chair using your arms (e.g., wheelchair, bedside chair)? 2  -PH     Climbing 3-5 steps with a railing? 1  -PH     To walk in hospital room? 2  -PH     AM-PAC 6 Clicks Score (PT) 11  -PH     Highest Level of Mobility Goal 4 --> Transfer to chair/commode  -PH       Row Name 08/07/24 1051          Functional Assessment    Outcome Measure Options AM-PAC 6 Clicks Basic Mobility (PT)  -PH               User Key  (r) = Recorded By, (t) = Taken By, (c) = Cosigned By      Initials Name Provider Type    PH Liliana Saravia PTA Physical Therapist Assistant                                 Physical Therapy Education       Title: PT OT SLP Therapies (In Progress)       Topic: Physical Therapy (In Progress)       Point: Mobility training (Done)       Learning Progress Summary             Patient Acceptance, E,TB, DU,NR by  at 8/7/2024 1052    Acceptance, E,TB,D, NR,NL by  at 8/6/2024 1007    Acceptance, E,TB,D, NR,NL by  at 8/5/2024 0936    Nonacceptance, E, NL by  at 8/2/2024 1425    Acceptance, E,TB, VU by  at 8/2/2024 1343    Acceptance, E, NL by  at 7/31/2024 1544    Acceptance, E,TB, NR by  at 7/30/2024 1002    Acceptance, E, NR by  at 7/29/2024 1539   Family Acceptance, E, VU by  at 8/6/2024 1849    Nonacceptance, E, NL by DJ at 8/2/2024 1425    Acceptance, E,TB, VU by  at 8/2/2024 1343    Acceptance, E, NL by  at 7/31/2024 1544                         Point: Home exercise program (In Progress)       Learning Progress Summary             Patient Acceptance, E,TB,D, NR,NL by  at 8/6/2024 1007    Acceptance, E,TB,D, NR,NL by PH at 8/5/2024 0981     Acceptance, E,TB, VU by RW at 8/2/2024 1343    Acceptance, E, NL by DJ at 7/31/2024 1544    Acceptance, E, NR by SM at 7/29/2024 1539   Family Acceptance, E, VU by MR at 8/6/2024 1849    Acceptance, E,TB, VU by RW at 8/2/2024 1343    Acceptance, E, NL by DJ at 7/31/2024 1544                         Point: Body mechanics (Done)       Learning Progress Summary             Patient Acceptance, E,TB, DU,NR by PH at 8/7/2024 1052    Acceptance, E,TB,D, NR,NL by PH at 8/6/2024 1007    Acceptance, E,TB,D, NR,NL by PH at 8/5/2024 0936    Nonacceptance, E, NL by DJ at 8/2/2024 1425    Acceptance, E,TB, VU by RW at 8/2/2024 1343    Acceptance, E, NL by DJ at 7/31/2024 1544    Acceptance, E,TB, NR by PH at 7/30/2024 1002    Acceptance, E, NR by SM at 7/29/2024 1539   Family Acceptance, E, VU by MR at 8/6/2024 1849    Nonacceptance, E, NL by DJ at 8/2/2024 1425    Acceptance, E,TB, VU by RW at 8/2/2024 1343    Acceptance, E, NL by DJ at 7/31/2024 1544                         Point: Precautions (Done)       Learning Progress Summary             Patient Acceptance, E,TB, DU,NR by PH at 8/7/2024 1052    Acceptance, E,TB,D, NR,NL by PH at 8/6/2024 1007    Acceptance, E,TB,D, NR,NL by PH at 8/5/2024 0936    Nonacceptance, E, NL by DJ at 8/2/2024 1425    Acceptance, E,TB, VU by RW at 8/2/2024 1343    Acceptance, E, NL by DJ at 7/31/2024 1544    Acceptance, E,TB, NR by PH at 7/30/2024 1002    Acceptance, E, NR by SM at 7/29/2024 1539   Family Acceptance, E, VU by MR at 8/6/2024 1849    Nonacceptance, E, NL by  at 8/2/2024 1425    Acceptance, E,TB, VU by RW at 8/2/2024 1343    Acceptance, E, NL by MAYELIN at 7/31/2024 1544                                         User Key       Initials Effective Dates Name Provider Type Discipline     06/16/21 -  Anthony Verde, RN Registered Nurse Nurse    PH 06/16/21 -  Liliana Saravia PTA Physical Therapist Assistant PT    MAYELIN 10/25/19 -  Leti Hopkins, RAYSHAWN Physical Therapist PT     05/02/22 -   Rama Peralta, PT Physical Therapist PT    MR 08/03/23 -  Asia Perez, RN Registered Nurse Nurse                  PT Recommendation and Plan     Plan of Care Reviewed With: patient, spouse, son  Progress: improving  Outcome Evaluation: Pt was seen for PT/OT co-treat this AM. Pt was in bed w/ eyes open although mostly not verbally responsive or providing eye contact during course of session.     Time Calculation:         PT Charges       Row Name 08/07/24 1052             Time Calculation    Start Time 1023  -PH      Stop Time 1041  -PH      Time Calculation (min) 18 min  -PH      PT Received On 08/07/24  -PH      PT - Next Appointment 08/08/24  -PH         Timed Charges    65811 - PT Therapeutic Activity Minutes 18  -PH         Total Minutes    Timed Charges Total Minutes 18  -PH       Total Minutes 18  -PH                User Key  (r) = Recorded By, (t) = Taken By, (c) = Cosigned By      Initials Name Provider Type    PH Liliana Saravia, PTA Physical Therapist Assistant                  Therapy Charges for Today       Code Description Service Date Service Provider Modifiers Qty    85507967354 HC PT THERAPEUTIC ACT EA 15 MIN 8/6/2024 Liliana Saravia, PTA GP 1    23887295238 HC PT THERAPEUTIC ACT EA 15 MIN 8/7/2024 Liliana Saravia, PTA GP 1            PT G-Codes  Outcome Measure Options: AM-PAC 6 Clicks Basic Mobility (PT)  AM-PAC 6 Clicks Score (PT): 11  AM-PAC 6 Clicks Score (OT): 7  Modified [place] Scale: 4 - Moderately severe disability.  Unable to walk without assistance, and unable to attend to own bodily needs without assistance.  PT Discharge Summary  Anticipated Discharge Disposition (PT): home with 24/7 care, home with home health, skilled nursing facility    Liliana Saravia PTA  8/7/2024

## 2024-08-07 NOTE — PROGRESS NOTES
Name: Marcial Bernard ADMIT: 2024   : 1955  PCP: Justina Liriano APRN    MRN: [id number] LOS: 15 days   AGE/SEX: 69 y.o. male  ROOM: [facility]     Subjective   Subjective   Patient very hard of hearing and not participating in conversation.  Follows some basic commands.  Wife and son-in-law at bedside         Objective   Objective   Vital Signs  Temp:  [97.3 °F (36.3 °C)-99 °F (37.2 °C)] 97.9 °F (36.6 °C)  Heart Rate:  [72-84] 80  Resp:  [18-19] 18  BP: (145-204)/() 149/74  SpO2:  [97 %-100 %] 99 %  on  Flow (L/min):  [1] 1;   Device (Oxygen Therapy): room air  Body mass index is 24.12 kg/m².  Physical Exam  Vitals and nursing note reviewed.   Constitutional:       General: He is not in acute distress.     Comments: Elderly, frail   Cardiovascular:      Rate and Rhythm: Normal rate and regular rhythm.   Pulmonary:      Effort: Pulmonary effort is normal. No respiratory distress.   Abdominal:      General: Abdomen is flat. There is no distension.      Tenderness: There is no abdominal tenderness.   Musculoskeletal:         General: No swelling or deformity.   Skin:     General: Skin is warm and dry.   Neurological:      Mental Status: He is alert. Mental status is at baseline. He is disoriented.         Results Review     I reviewed the patient's new clinical results.  Results from last 7 days   Lab Units 24  0656 24  0624  0647 24  0455   WBC 10*3/mm3 7.09 7.41 5.97 6.69   HEMOGLOBIN g/dL 9.6* 9.5* 9.5* 9.7*   PLATELETS 10*3/mm3 333 321 322 320     Results from last 7 days   Lab Units 24  0656 24  0612 24  0647 24  0455   SODIUM mmol/L 145 144 147* 142   POTASSIUM mmol/L 3.4* 3.3* 3.4* 3.5   CHLORIDE mmol/L 106 105 103 100   CO2 mmol/L 24.0 27.0 27.1 28.0   BUN mg/dL 109* 110* 101* 101*   CREATININE mg/dL 4.81* 5.38* 4.80* 5.37*   GLUCOSE mg/dL 141* 142* 126* 134*   Estimated Creatinine Clearance: 15 mL/min (A) (by C-G formula based on SCr  of 4.81 mg/dL (H)).  Results from last 7 days   Lab Units 08/07/24  0656 08/06/24  0612 08/04/24  0455 08/03/24  0835   ALBUMIN g/dL 3.3* 3.2* 3.2* 3.1*     Results from last 7 days   Lab Units 08/07/24  0656 08/06/24  0612 08/05/24  0647 08/04/24  0455 08/03/24  0835 08/02/24  0458   CALCIUM mg/dL 9.7 9.7 9.5 9.4 9.5 9.2   ALBUMIN g/dL 3.3* 3.2*  --  3.2* 3.1* 3.1*   MAGNESIUM mg/dL 2.8* 2.7*  --   --  2.4 2.8*   PHOSPHORUS mg/dL 5.5* 6.5*  --  6.4* 6.0* 4.9*       COVID19   Date Value Ref Range Status   07/23/2024 Not Detected Not Detected - Ref. Range Final     Glucose   Date/Time Value Ref Range Status   08/07/2024 1056 181 (H) 70 - 130 mg/dL Final   08/07/2024 0610 148 (H) 70 - 130 mg/dL Final   08/06/2024 2017 157 (H) 70 - 130 mg/dL Final   08/06/2024 1607 166 (H) 70 - 130 mg/dL Final   08/06/2024 1102 189 (H) 70 - 130 mg/dL Final   08/06/2024 0630 148 (H) 70 - 130 mg/dL Final   08/05/2024 2023 175 (H) 70 - 130 mg/dL Final       CT Head Without Contrast  CT HEAD WO CONTRAST-     HISTORY:  dementia and altered mental status; N18.4-Chronic kidney  disease, stage 4 (severe); E87.1-Bats-vlhmpbjyan and hyponatremia;  I50.1-Left ventricular failure, unspecified; N18.4-Chronic kidney  disease, stage 4 (severe); D63.1-Anemia in chronic kidney disease;  R79.89-Other specified abnormal findings of blood chemistry     COMPARISON: None     FINDINGS: The brain and ventricles are symmetrical. There is mild to  moderate diffuse atrophy with secondary ventriculomegaly. Extensive  vascular calcification is noted. No focal area of decreased attenuation  to suggest acute infarction is identified. Areas of decreased  attenuation are appreciated involving the white matter of cerebral  hemispheres bilaterally consistent with moderate to severe small vessel  ischemic disease. Further evaluation could be performed with a MRI  examination of the brain as indicated.     This report was finalized on 7/31/2024 2:18 PM by Dr. Pearson  ANDRÉS Patiño  on Workstation: [id number]       I reviewed the patient's daily medications.  Scheduled Medications  amLODIPine, 10 mg, Oral, Q24H  carvedilol, 25 mg, Oral, BID With Meals  cloNIDine, 1 patch, Transdermal, Weekly  heparin (porcine), 5,000 Units, Subcutaneous, Q12H  hydrALAZINE, 100 mg, Oral, Q8H  insulin regular, 2-7 Units, Subcutaneous, 4x Daily AC & at Bedtime  levothyroxine, 100 mcg, Oral, Daily  melatonin, 2.5 mg, Oral, Nightly  potassium chloride, 40 mEq, Oral, Once  senna-docusate sodium, 2 tablet, Oral, BID  sodium chloride, 10 mL, Intravenous, Q12H    Infusions   Diet  Diet: Regular/House, Cardiac, Fluid Restriction (240 mL/tray), Diabetic; Low Sodium (2g); Consistent Carbohydrate; 1500 mL/day; Texture: Regular (IDDSI 7); Fluid Consistency: Thin (IDDSI 0)         I have personally reviewed:  [x]  Laboratory   []  Microbiology   [x]  Radiology   []  EKG/Telemetry   []  Cardiology/Vascular   []  Pathology   [x]  Records     Assessment/Plan     Active Hospital Problems    Diagnosis  POA    **Chronic renal failure (CRF), stage 4 (severe) [N18.4]  Unknown    Dementia [F03.90]  Unknown    Hyponatremia [E87.1]  Unknown    Acute pulmonary edema with congestive heart failure [I50.1]  Unknown    Anemia due to stage 4 chronic kidney disease [N18.4, D63.1]  Unknown    Elevated troponin level not due myocardial infarction [R79.89]  Unknown      Resolved Hospital Problems   No resolved problems to display.       69 y.o. male admitted with Chronic renal failure (CRF), stage 4 (severe).      LIDIA on CKD 4  -Renal consulted. Holding diuretic  -Creatinine improved, BUN unchanged, appears euvolemic  -Renal discussed with family that patient currently does not need dialysis and that he is a very poor candidate for dialysis given his severe dementia and recommended consideration of palliative care    Acute on chronic diastolic heart failure  -Improved with HD    Diabetes type 2, A1c 5.3%  -continue  SSI    Hypertension  Sinus bradycardia  -Continue clonidine patch, Coreg, amlodipine, hydralazine    Dementia  -Mentation has been stable but worse overall since admission.     Goals of care-discussed with wife and son-in-law that while patient does not currently need HD if he were to need HD in the future he would be a poor candidate because of his severe dementia and would recommend focusing on his comfort at this time.  Have discussed with palliative care and they will meet with the wife and daughter tomorrow at 10AM.  Patient certainly appropriate to go home with hospice.    SCDs for DVT prophylaxis.  Limited code (no CPR, no intubation).  Discussed with patient, family, and nursing staff.  Anticipate discharge home with home health in 2-3 days.    Expected Discharge Date: 8/6/2024; Expected Discharge Time:       Lg Mera MD  [facility]  08/07/24  12:37 EDT

## 2024-08-07 NOTE — PLAN OF CARE
Goal Outcome Evaluation:           Progress: no change  Outcome Evaluation: Pt arouses to voice, and is disoriented, family at the bedside, obeys some command, PRN hydralizine given as order, no new complains, good urine output, plan of care continued

## 2024-08-07 NOTE — CONSULTS
.            [facility] Palliative Care Services    Palliative Care Initial Consult   Attending Physician: Lg Mera MD  Referring Provider: Dr Mera    Reason for Referral: assistance with clarification of goals of care and hospice referral or discussion  Family/Support: spouse and children     Code Status and Medical Interventions: No CPR (Do Not Attempt to Resuscitate); Limited Support; No intubation (DNI)   Ordered at: 08/06/24 1329     Medical Intervention Limits:    No intubation (DNI)     Code Status (Patient has no pulse and is not breathing):    No CPR (Do Not Attempt to Resuscitate)     Medical Interventions (Patient has pulse or is breathing):    Limited Support     Goals of Care: TBD.    HPI:   69 y.o. male  with history of dementia, chronic kidney disease, diabetes, and hypertension. He resided at home prior to admission.   Patient presented to [facility] on 7/23/2024 related to generalized weakness, swelling in lower extremities and cough. Workup in ER, revealed severe hyponatremia (116), worsening kidney function and fluid overload. He was admitted to intensive care unit and consults placed for nephrology and cardiology. His medications have been adjusted, along with short term IV diuretics. He had ECHO on 7/24/2024 that revealed EF 61-65% and small pericardial effusion and moderate left pleural effusion. He has had close monitor of kidney function and discussion was given towards dialysis. However, this morning nephrology spoke with family and didn't recommend dialysis due to dementia. The palliative care team was consulted for support with goals of care conversation.   Palliative Care Spoke With: family  Quality of life: fair  Functional Status: Pt req max A x 1 for supine to sit. Pt stood w/ mod A x 2 for 1st attempt then improved to min A x 2 for second attempt w/ B knees and trunk in flexion. Pt amb further distance of approx 45' total req min A x 2 w/ no AD. Pt  was unsteady and shuffling. Pt performed PROM B LE ther ex w/ a little resistance then req max A x 2 for sit to supine. Per PT notes on 2024           Review of Systems   Unable to perform ROS: Dementia       1- Pain Assessment  Nonverbal Indicators of Pain: nonverbal indicators absent  CPOT Facial Expression: 0-->relaxed, neutral  CPOT Body Movements: 0-->absence of movements  CPOT Muscle Tension: 0-->relaxed  Ventilator Compliance/Vocalization: 0-->talking in normal tone or no sound  CPOT Score: 0  PAINAD Breathin-->normal  PAINAD Negative Vocalization: 0-->none  PAINAD Facial Expression: 0-->smiling or inexpressive  PAINAD Body Language: 0-->relaxed  PAINAD Consolability: 0-->no need to console  PAINAD Score: 0  Pain Location: abdomen, hip    Past Medical History:   Diagnosis Date    Asthma     Dementia     Renal disorder      History reviewed. No pertinent surgical history.  Social History     Socioeconomic History    Marital status:    Tobacco Use    Smoking status: Unknown     Passive exposure: Never   Vaping Use    Vaping status: Never Used   Substance and Sexual Activity    Alcohol use: Never    Drug use: Never    Sexual activity: Not Currently       Current Facility-Administered Medications   Medication Dose Route Frequency Provider Last Rate Last Admin    acetaminophen (TYLENOL) tablet 650 mg  650 mg Oral Q4H PRN Laura Jewell APRN   650 mg at 24 2211    Or    acetaminophen (TYLENOL) suppository 650 mg  650 mg Rectal Q4H PRN Laura Jewell APRN        amLODIPine (NORVASC) tablet 10 mg  10 mg Oral Q24H Neptali Becker Jr., MD   10 mg at 24 0844    sennosides-docusate (PERICOLACE) 8.6-50 MG per tablet 2 tablet  2 tablet Oral BID Laura Jewell APRN   2 tablet at 24 0845    And    polyethylene glycol (MIRALAX) packet 17 g  17 g Oral Daily PRN Laura Jewell APRN        And    bisacodyl (DULCOLAX) EC tablet 5 mg  5 mg Oral Daily PRN Laura Jewell APRN        And     bisacodyl (DULCOLAX) suppository 10 mg  10 mg Rectal Daily PRN Laura Jewell APRN        carvedilol (COREG) tablet 25 mg  25 mg Oral BID With Meals Richard Lewis MD   25 mg at 08/07/24 0846    cloNIDine (CATAPRES-TTS) 0.1 MG/24HR patch 1 patch  1 patch Transdermal Weekly Margaret Duran APRN   1 patch at 08/01/24 1153    dextrose (D50W) (25 g/50 mL) IV injection 25 g  25 g Intravenous Q15 Min PRN Laura Jewell APRN        dextrose (GLUTOSE) oral gel 15 g  15 g Oral Q15 Min PRN Laura Jewell APRN        glucagon (GLUCAGEN) injection 1 mg  1 mg Intramuscular Q15 Min PRN Laura Jewell APRN        heparin (porcine) 5000 UNIT/ML injection 5,000 Units  5,000 Units Subcutaneous Q12H Cristian Chinchilla MD   5,000 Units at 08/07/24 0844    hydrALAZINE (APRESOLINE) injection 20 mg  20 mg Intravenous Q4H PRN Cristian Chinchilla MD   20 mg at 08/06/24 2220    hydrALAZINE (APRESOLINE) tablet 100 mg  100 mg Oral Q8H Neymar Barriga MD   100 mg at 08/07/24 0844    insulin regular (humuLIN R,novoLIN R) injection 2-7 Units  2-7 Units Subcutaneous 4x Daily AC & at Bedtime Cristian Chinchilla MD   2 Units at 08/06/24 2208    levothyroxine (SYNTHROID, LEVOTHROID) tablet 100 mcg  100 mcg Oral Daily Juan Ventura Jr., MD   100 mcg at 08/07/24 0843    melatonin tablet 2.5 mg  2.5 mg Oral Nightly Chavo Martins MD   2.5 mg at 08/06/24 2207    nitroglycerin (NITROSTAT) SL tablet 0.4 mg  0.4 mg Sublingual Q5 Min PRN Laura Jewell APRN        ondansetron (ZOFRAN) injection 4 mg  4 mg Intravenous Q6H PRN Laura Jewell APRN        potassium chloride (K-DUR,KLOR-CON) ER tablet 40 mEq  40 mEq Oral Once Mike Flannery MD        sodium chloride 0.9 % flush 10 mL  10 mL Intravenous Q12H Laura Jewell APRN   10 mL at 08/07/24 0846    sodium chloride 0.9 % flush 10 mL  10 mL Intravenous PRN Laura Jewell, GIULIANO        sodium chloride 0.9 % infusion 40 mL  40 mL Intravenous PRN Laura Jewell, APRN               "acetaminophen **OR** acetaminophen    senna-docusate sodium **AND** polyethylene glycol **AND** bisacodyl **AND** bisacodyl    dextrose    dextrose    glucagon (human recombinant)    hydrALAZINE    nitroglycerin    ondansetron    sodium chloride    sodium chloride    No Known Allergies  Attest that current medications reviewed  including but not limited to prescriptions, over-the counter, herbals and vitamin/mineral/dietary (nutritional) supplements for name, route of administration, type, dose and frequency and are current using all immediate resources available at time of dictation.      Intake/Output Summary (Last 24 hours) at 8/7/2024 1000  Last data filed at 8/7/2024 0825  Gross per 24 hour   Intake 300 ml   Output 2350 ml   Net -2050 ml       Physical Exam:    Diagnostics: Reviewed  BP (!) 204/106 (BP Location: Right arm, Patient Position: Lying)   Pulse 80   Temp 97.3 °F (36.3 °C) (Oral)   Resp 18   Ht 175.3 cm (69\")   Wt 74.1 kg (163 lb 5.8 oz)   SpO2 99%   BMI 24.12 kg/m²     Constitutional:       General: Sleeping.      Appearance: Not in distress.   Pulmonary:      Effort: Pulmonary effort is normal.   Cardiovascular:      PMI at left midclavicular line. Normal rate.   Edema:     Peripheral edema absent.   Abdominal:      General: Bowel sounds are normal.      Palpations: Abdomen is soft.      Tenderness: There is no abdominal tenderness.   Genitourinary:     Comments: Purewick with prasad urine noted   Neurological:      Mental Status: Easily aroused. Disoriented.   Psychiatric:         Cognition and Memory: Cognition is impaired.       Patient status: Disease state: Controlled with current treatments.  Functional status: Palliative Performance Scale Score: Performance 40% based on the following measures: Ambulation: Mainly in bed, Activity and Evidence of Disease: Unable to do any work, extensive evidence of disease, Self-Care: Mainly assistance required,  Intake: Normal or reduced, LOC: Full, " drowsy or confusion   Nutritional status: Albumin  3.3 on 8/7/2024 Body mass index is 24.12 kg/m².   Family support: The patient receives support from his wife and children..  Advance Directives: Advance Directive Status: Patient does not have advance directive   POA/Healthcare surrogate-n/a.        Impression/Problem List:    Acute on chronic kidney disease stage IV  Hypernatremia  Acute on chronic diastolic heart failure  Dementia   Diabetes mellitus type II  Hypertension         Recommendations/Plan:  1. Provide support with goals of care  2. Family meeting scheduled for 8/8/2024 @ 1000         2.  Palliative care encounter  The patient is unable to participate in goals of care conversation. At the time of my initial visit his son-in-law was present and informed me  the patient spouse would be here later in morning. I returned to bedside approximately 1120 and the patient spouse, Evy was at bedside.I used Malaysian interpretor, Ashley (ID # [id number]). I introduced myself and my role. I asked patient spouse what her understanding of her husbands' conditions and she became very emotional and tells she didn't wish to review it again as it was very upsetting. She also requested her daughter, Asia be present to support her with decision making.  The patient's son-in-law called Asia and she was unable to be at bedside until later this afternoon, due to prior obligations/appointments. We scheduled a meeting for tomorrow morning at 1000. I did offer spiritual support for spouse and she informed me that her  had visit and provided support. I spoke with Dr Mera and KYREE Moran.       Thank you for this consult and allowing us to participate in patient's plan of care. Palliative Care Team will continue to follow patient.     Time spent:45 minutes spent reviewing medical and medication records, assessing and examining patient, discussing with family, answering questions, providing some guidance about a plan and  documentation of care, and coordinating care with other healthcare members, with > 50% time spent face to face.        Zahira Sanchez, GIULIANO  8/7/2024  10:00 EDT

## 2024-08-07 NOTE — PLAN OF CARE
Goal Outcome Evaluation:  Plan of Care Reviewed With: patient           Outcome Evaluation: Pt disoriented x 4. Does not verbally respond to questions. BP elevated at times, but improves with scheduled medications. Family has a meeting with palliative care tomorrow morning.

## 2024-08-07 NOTE — PROGRESS NOTES
"Nutrition Services    Patient Name:  Marcial Bernard  YOB: 1955  MRN: [id number]  Admit Date:  7/23/2024    Assessment Date:  08/07/24    NUTRITION SCREENING      Reason for Encounter Follow-up protocol   Diagnosis/Problem CRF       PO Diet Diet: Regular/House, Cardiac, Fluid Restriction (240 mL/tray), Diabetic; Low Sodium (2g); Consistent Carbohydrate; 1500 mL/day; Texture: Regular (IDDSI 7); Fluid Consistency: Thin (IDDSI 0)   Supplements    PO Intake % 100% per EMR - total feed       Medications MAR reviewed by RD   Labs  Listed below, reviewed   Physical Findings Disoriented, arouses to voice, flat affect, confusion, tooth/teeth missing   GI Function Nondistended, normoactive, fecal incontinence, Last BM 8/1   Skin Status Bruising       Height  Weight  BMI  Weight Trend     Height: 175.3 cm (69\")  Weight: 74.1 kg (163 lb 5.8 oz) (08/07/24 0414)  Body mass index is 24.12 kg/m².  Loss       Nutrition Problem (PES) Inadequate oral intake related to AMS, history of HTN, DM, hypothyroidism, diabetic retinopathy and CKD as evidenced by po intake, report/observation.        Intervention/Plan Cont. Boost Glucose Control daily with lunch to support po intake.  Encourage good po intakes.    RD to follow up per protocol.     Results from last 7 days   Lab Units 08/07/24  0656 08/06/24  0612 08/05/24  0647   SODIUM mmol/L 145 144 147*   POTASSIUM mmol/L 3.4* 3.3* 3.4*   CHLORIDE mmol/L 106 105 103   CO2 mmol/L 24.0 27.0 27.1   BUN mg/dL 109* 110* 101*   CREATININE mg/dL 4.81* 5.38* 4.80*   CALCIUM mg/dL 9.7 9.7 9.5   GLUCOSE mg/dL 141* 142* 126*     Results from last 7 days   Lab Units 08/07/24  0656 08/06/24  0612 08/04/24  0455 08/03/24  0835   MAGNESIUM mg/dL 2.8* 2.7*  --  2.4   PHOSPHORUS mg/dL 5.5* 6.5*   < > 6.0*   HEMOGLOBIN g/dL 9.6* 9.5*   < > 10.3*   HEMATOCRIT % 29.6* 30.0*   < > 31.9*    < > = values in this interval not displayed.     Lab Results   Component Value Date    HGBA1C 6.10 (H) " 07/23/2024         Electronically signed by:  Zehra Reyna  08/07/24 11:45 EDT

## 2024-08-07 NOTE — PLAN OF CARE
Goal Outcome Evaluation:  Plan of Care Reviewed With: patient, spouse, son        Progress: improving  Outcome Evaluation: Pt was seen for PT/OT co-treat this AM. Pt was in bed w/ eyes open although mostly not verbally responsive or providing eye contact during course of session.      Anticipated Discharge Disposition (PT): home with 24/7 care, home with home health, skilled nursing facility

## 2024-08-07 NOTE — PROGRESS NOTES
[facility] Progress Note      Patient Name: Marcial Bernard  : 1955  MRN: [id number]  Primary Care Physician:  Justina Liriano APRN  Date of admission: 2024    Subjective     Interval History:   Follow-up acute kidney injury on chronic kidney disease and hyponatremia  The patient remains very confused, his creatinine slightly down to 4.8 there is no indication for dialysis I discussed the case with the family with the use of audiovisual  at the bedside also in the presence of the patient's nurse.  Review of Systems:   Not obtainable    Objective     Vitals:   Temp:  [97.3 °F (36.3 °C)-99 °F (37.2 °C)] 97.3 °F (36.3 °C)  Heart Rate:  [72-84] 80  Resp:  [18-19] 18  BP: (145-204)/() 204/106  Flow (L/min):  [1] 1    Intake/Output Summary (Last 24 hours) at 2024 0928  Last data filed at 2024 0825  Gross per 24 hour   Intake 300 ml   Output 2350 ml   Net -2050 ml       Physical Exam:    General Appearance: Awake, disoriented, chronically ill, no acute distress   Skin: warm and dry  HEENT: oral mucosa normal, nonicteric sclera  Neck: no JVD again today  Lungs: Bilateral rhonchi no crackles, breathing effort not labored  Heart: RRR, no S3, no rub  Abdomen: soft, nontender, nondistended  : no palpable bladder  Extremities: No ankle edema lower extremity edema  Neuro: Moving all extremities    Scheduled Meds:     amLODIPine, 10 mg, Oral, Q24H  carvedilol, 25 mg, Oral, BID With Meals  cloNIDine, 1 patch, Transdermal, Weekly  heparin (porcine), 5,000 Units, Subcutaneous, Q12H  hydrALAZINE, 100 mg, Oral, Q8H  insulin regular, 2-7 Units, Subcutaneous, 4x Daily AC & at Bedtime  levothyroxine, 100 mcg, Oral, Daily  melatonin, 2.5 mg, Oral, Nightly  senna-docusate sodium, 2 tablet, Oral, BID  sodium chloride, 10 mL, Intravenous, Q12H      IV Meds:          Results Reviewed:   I have personally reviewed the results from the time of this admission to 2024  09:28 EDT     Results from last 7 days   Lab Units 08/07/24  0656 08/06/24  0612 08/05/24  0647   SODIUM mmol/L 145 144 147*   POTASSIUM mmol/L 3.4* 3.3* 3.4*   CHLORIDE mmol/L 106 105 103   CO2 mmol/L 24.0 27.0 27.1   BUN mg/dL 109* 110* 101*   CREATININE mg/dL 4.81* 5.38* 4.80*   CALCIUM mg/dL 9.7 9.7 9.5   GLUCOSE mg/dL 141* 142* 126*       Estimated Creatinine Clearance: 15 mL/min (A) (by C-G formula based on SCr of 4.81 mg/dL (H)).    Results from last 7 days   Lab Units 08/07/24  0656 08/06/24  0612 08/04/24  0455 08/03/24  0835   MAGNESIUM mg/dL 2.8* 2.7*  --  2.4   PHOSPHORUS mg/dL 5.5* 6.5* 6.4* 6.0*       Results from last 7 days   Lab Units 08/07/24  0656 08/06/24  0612 08/03/24  0835 08/02/24  0458 08/01/24  0538   URIC ACID mg/dL 11.0* 11.0* 10.0* 10.4* 10.3*       Results from last 7 days   Lab Units 08/07/24  0656 08/06/24  0612 08/05/24  0647 08/04/24  0455 08/03/24  0835   WBC 10*3/mm3 7.09 7.41 5.97 6.69 6.96   HEMOGLOBIN g/dL 9.6* 9.5* 9.5* 9.7* 10.3*   PLATELETS 10*3/mm3 333 321 322 320 333             Assessment / Plan     ASSESSMENT:  Acute kidney injury on chronic kidney disease stage IV, the creatinine today is 4.81 slightly improved since yesterday and potassium 3.4 the patient remains obtunded without any changes in his mental status he appears to be euvolemic  chronic kidney disease stage IV baseline creatinine about 2.4-second diabetic and hypertensive nephrosclerosis  Acute on chronic diastolic congestive heart failure with cardiorenal syndrome, volume status improving with diuresis  Diabetes mellitus type 2 with renal complication and diabetic retinopathy  Hypertension with chronic kidney disease  Dementia.  And altered mental status without any change practically since admit  Hypokalemia potassium is 3.4      PLAN:  No diuretics today  Continue the same treatment  I discussed the case with the family and the use of an audiovisual  I am not recommending dialysis at this time  and I told them that he is going to be a very poor candidate for dialysis given his severe dementia and I recommended that they will consider palliative care but there was no response from the family.  Replete potassium  Surveillance labs    I reviewed the chart and other providers notes, reviewed labs.  Copied text in this note has been reviewed and is accurate as of 08/07/24.       Thank you for involving us in the care of Marcial Bernard.  Please feel free to call with any questions.    Mike Flannery MD  08/07/24  09:28 EDT    [facility]  [phone]

## 2024-08-07 NOTE — THERAPY TREATMENT NOTE
Patient Name: Marcial Bernard  : 1955    MRN: [id number]                              Today's Date: 2024       Admit Date: 2024    Visit Dx:     ICD-10-CM ICD-9-CM   1. Chronic renal failure (CRF), stage 4 (severe)  N18.4 585.4   2. Hyponatremia  E87.1 276.1   3. Acute pulmonary edema with congestive heart failure  I50.1 428.1   4. Anemia due to stage 4 chronic kidney disease  N18.4 285.21    D63.1 585.4   5. Elevated troponin level not due myocardial infarction  R79.89 790.6     Patient Active Problem List   Diagnosis    Chronic renal failure (CRF), stage 4 (severe)    Hyponatremia    Acute pulmonary edema with congestive heart failure    Anemia due to stage 4 chronic kidney disease    Elevated troponin level not due myocardial infarction    Dementia     Past Medical History:   Diagnosis Date    Asthma     Dementia     Renal disorder      History reviewed. No pertinent surgical history.   General Information       Row Name 24 [facility]6          OT Time and Intention    Document Type therapy note (daily note)  -MM     Mode of Treatment co-treatment;physical therapy;occupational therapy  -MM       Row Name 24 1206          General Information    Patient Profile Reviewed yes  -MM     Existing Precautions/Restrictions fall  -MM       Row Name 24 1206          Cognition    Orientation Status (Cognition) unable/difficult to assess  minimal to no verbalizations, son present to help with translation  -MM       Row Name 24 1206          Safety Issues, Functional Mobility    Impairments Affecting Function (Mobility) balance;cognition;endurance/activity tolerance;strength;postural/trunk control  -MM     Comment, Safety Issues/Impairments (Mobility) Co-treatment medically necessary and appropriate d/t pt's acuity level, decreased endurance and activity tolerance, and safety of patient and staff. Treatment focused on progression of care and goals established in POC. Gait belt and non-skid socks  worn.  -MM               User Key  (r) = Recorded By, (t) = Taken By, (c) = Cosigned By      Initials Name Provider Type    MM Zehra Adams OT Occupational Therapist                     Mobility/ADL's       Row Name 08/07/24 1211          Bed Mobility    Bed Mobility supine-sit;scooting/bridging  -MM     Scooting/Bridging Dare (Bed Mobility) verbal cues;nonverbal cues (demo/gesture);dependent (less than 25% patient effort);1 person assist;2 person assist  -MM     Supine-Sit Dare (Bed Mobility) maximum assist (25% patient effort);1 person assist;verbal cues;nonverbal cues (demo/gesture)  -MM     Sit-Supine Dare (Bed Mobility) maximum assist (25% patient effort);2 person assist;verbal cues;nonverbal cues (demo/gesture)  -MM     Assistive Device (Bed Mobility) bed rails;head of bed elevated  -MM       Row Name 08/07/24 1211          Transfers    Transfers sit-stand transfer  -MM       Row Name 08/07/24 1211          Sit-Stand Transfer    Sit-Stand Dare (Transfers) 2 person assist;verbal cues;nonverbal cues (demo/gesture);minimum assist (75% patient effort)  -MM     Comment, (Sit-Stand Transfer) x2 STS from EOB  -MM       Row Name 08/07/24 1211          Functional Mobility    Functional Mobility- Ind. Level minimum assist (75% patient effort);2 person assist required  -MM     Functional Mobility- Comment improved activity tolerance noted with func mob, increased distance this date, min-mod a x2 as pt fatigued  -MM       Row Name 08/07/24 1211          Activities of Daily Living    BADL Assessment/Intervention lower body dressing;toileting  -MM       Row Name 08/07/24 1211          Lower Body Dressing Assessment/Training    Dare Level (Lower Body Dressing) socks;don;dependent (less than 25% patient effort)  -MM       Row Name 08/07/24 1211          Toileting Assessment/Training    Dare Level (Toileting) toileting skills;dependent (less than 25% patient effort)  -MM                User Key  (r) = Recorded By, (t) = Taken By, (c) = Cosigned By      Initials Name Provider Type    Zehra Uribe OT Occupational Therapist                   Obj/Interventions       Row Name 08/07/24 1213          Motor Skills    Motor Skills functional endurance  -MM     Functional Endurance poor  -MM       Row Name 08/07/24 1213          Balance    Balance Assessment sitting dynamic balance;sitting static balance;sit to stand dynamic balance;standing static balance;standing dynamic balance  -MM     Static Sitting Balance contact guard;minimal assist  -MM     Dynamic Sitting Balance moderate assist  -MM     Position, Sitting Balance sitting edge of bed;unsupported  -MM     Sit to Stand Dynamic Balance minimal assist;2-person assist  -MM     Static Standing Balance minimal assist;moderate assist;2-person assist  -MM     Dynamic Standing Balance minimal assist;moderate assist;2-person assist  -MM     Position/Device Used, Standing Balance supported;other (see comments)  HHAx2  -MM               User Key  (r) = Recorded By, (t) = Taken By, (c) = Cosigned By      Initials Name Provider Type    Zehra Uribe OT Occupational Therapist                   Goals/Plan    No documentation.                  Clinical Impression       Row Name 08/07/24 1214          Pain Assessment    Pre/Posttreatment Pain Comment no facial grimacing noted  -MM       Row Name 08/07/24 1214          Plan of Care Review    Plan of Care Reviewed With patient;spouse;son  -MM     Progress no change  -MM     Outcome Evaluation Pt seen this date for OT/PT to maximize therapeutic benefit. He demo's improved activity tolerance with overall functional mobility, increased time for all processing required, son present to assist some with translation. BP assessed and RN reports ok to mobilize this session. Per CCP, pt plans to take family home at d/c, plan to educate family on any AE needs required for ADLs and functional transfer safety.  continue to follow  -MM       Row Name 08/07/24 1214          Therapy Plan Review/Discharge Plan (OT)    Anticipated Discharge Disposition (OT) skilled nursing facility  -MM       Row Name 08/07/24 1214          Vital Signs    O2 Delivery Pre Treatment nasal cannula  -MM     O2 Delivery Intra Treatment room air  -MM     O2 Delivery Post Treatment nasal cannula  -MM       Row Name 08/07/24 1214          Positioning and Restraints    Pre-Treatment Position in bed  -MM     Post Treatment Position bed  -MM     In Bed notified nsg;fowlers;call light within reach;exit alarm on;encouraged to call for assist;with family/caregiver;side rails up x3  -MM               User Key  (r) = Recorded By, (t) = Taken By, (c) = Cosigned By      Initials Name Provider Type    Zehra Uribe OT Occupational Therapist                   Outcome Measures       Row Name 08/07/24 1216          How much help from another is currently needed...    Putting on and taking off regular lower body clothing? 1  -MM     Bathing (including washing, rinsing, and drying) 1  -MM     Toileting (which includes using toilet bed pan or urinal) 1  -MM     Putting on and taking off regular upper body clothing 1  -MM     Taking care of personal grooming (such as brushing teeth) 1  -MM     Eating meals 2  -MM     AM-PAC 6 Clicks Score (OT) 7  -MM       Row Name 08/07/24 1051          How much help from another person do you currently need...    Turning from your back to your side while in flat bed without using bedrails? 2  -PH     Moving from lying on back to sitting on the side of a flat bed without bedrails? 2  -PH     Moving to and from a bed to a chair (including a wheelchair)? 2  -PH     Standing up from a chair using your arms (e.g., wheelchair, bedside chair)? 2  -PH     Climbing 3-5 steps with a railing? 1  -PH     To walk in hospital room? 2  -PH     AM-PAC 6 Clicks Score (PT) 11  -PH     Highest Level of Mobility Goal 4 --> Transfer to chair/commode   -PH       Row Name 08/07/24 1216 08/07/24 1051       Functional Assessment    Outcome Measure Options AM-PAC 6 Clicks Daily Activity (OT)  -MM AM-PAC 6 Clicks Basic Mobility (PT)  -PH              User Key  (r) = Recorded By, (t) = Taken By, (c) = Cosigned By      Initials Name Provider Type    PH Liliana Saravia, PTA Physical Therapist Assistant    Zehra Uribe OT Occupational Therapist                    Occupational Therapy Education       Title: PT OT SLP Therapies (In Progress)       Topic: Occupational Therapy (Done)       Point: ADL training (Done)       Description:   Instruct learner(s) on proper safety adaptation and remediation techniques during self care or transfers.   Instruct in proper use of assistive devices.                  Learning Progress Summary             Patient Acceptance, E,TB, VU by  at 8/2/2024 1343    Acceptance, E, VU,NR by MM at 7/29/2024 1546    Comment: role of OT, d/c rec   Family Acceptance, E, VU by  at 8/6/2024 1849    Acceptance, E,TB, VU by RW at 8/2/2024 1343    Acceptance, E, VU,NR by MM at 7/29/2024 1546    Comment: role of OT, d/c rec                         Point: Precautions (Done)       Description:   Instruct learner(s) on prescribed precautions during self-care and functional transfers.                  Learning Progress Summary             Patient Acceptance, E,TB, VU by RW at 8/2/2024 1343    Acceptance, E, VU,NR by MM at 7/29/2024 1546    Comment: role of OT, d/c rec   Family Acceptance, E, VU by  at 8/6/2024 1849    Acceptance, E,TB, VU by RW at 8/2/2024 1343    Acceptance, E, VU,NR by MM at 7/29/2024 1546    Comment: role of OT, d/c rec                         Point: Body mechanics (Done)       Description:   Instruct learner(s) on proper positioning and spine alignment during self-care, functional mobility activities and/or exercises.                  Learning Progress Summary             Patient Acceptance, E,TB, VU by RW at 8/2/2024 1343     Acceptance, E, VU,NR by MM at 7/29/2024 1546    Comment: role of OT, d/c rec   Family Acceptance, E, VU by MR at 8/6/2024 1849    Acceptance, E,TB, VU by RW at 8/2/2024 1343    Acceptance, E, VU,NR by MM at 7/29/2024 1546    Comment: role of OT, d/c rec                                         User Key       Initials Effective Dates Name Provider Type Discipline    RW 06/16/21 -  Anthony Verde, RN Registered Nurse Nurse     05/31/24 -  Zehra Adams OT Occupational Therapist OT    MR 08/03/23 -  Asia Perez, RN Registered Nurse Nurse                  OT Recommendation and Plan  Planned Therapy Interventions (OT): activity tolerance training, BADL retraining, neuromuscular control/coordination retraining, patient/caregiver education/training, transfer/mobility retraining, cognitive/visual perception retraining, strengthening exercise, ROM/therapeutic exercise, occupation/activity based interventions, functional balance retraining  Therapy Frequency (OT): 5 times/wk  Plan of Care Review  Plan of Care Reviewed With: patient, spouse, son  Progress: no change  Outcome Evaluation: Pt seen this date for OT/PT to maximize therapeutic benefit. He demo's improved activity tolerance with overall functional mobility, increased time for all processing required, son present to assist some with translation. BP assessed and RN reports ok to mobilize this session. Per CCP, pt plans to take family home at d/c, plan to educate family on any AE needs required for ADLs and functional transfer safety. continue to follow     Time Calculation:   Evaluation Complexity (OT)  Review Occupational Profile/Medical/Therapy History Complexity: expanded/moderate complexity  Assessment, Occupational Performance/Identification of Deficit Complexity: 5 or more performance deficits  Clinical Decision Making Complexity (OT): detailed assessment/moderate complexity  Overall Complexity of Evaluation (OT): moderate complexity     Time Calculation- OT        Row Name 08/07/24 1216             Time Calculation- OT    OT Start Time 1023  -MM      OT Stop Time 1041  -MM      OT Time Calculation (min) 18 min  -MM      Total Timed Code Minutes- OT 18 minute(s)  -MM      OT Received On 08/07/24  -MM      OT - Next Appointment 08/08/24  -MM         Timed Charges    45300 - OT Therapeutic Activity Minutes 18  -MM         Total Minutes    Timed Charges Total Minutes 18  -MM       Total Minutes 18  -MM                User Key  (r) = Recorded By, (t) = Taken By, (c) = Cosigned By      Initials Name Provider Type    Zehra Uribe OT Occupational Therapist                  Therapy Charges for Today       Code Description Service Date Service Provider Modifiers Qty    07305965088 HC OT THERAPEUTIC ACT EA 15 MIN 8/6/2024 Zehra Adams OT GO 1    01880247224 HC OT THERAPEUTIC ACT EA 15 MIN 8/7/2024 Zehra Adams OT GO 1                 Zehra Adams OT  8/7/2024

## 2024-08-08 LAB
ALBUMIN SERPL-MCNC: 3.4 G/DL (ref 3.5–5.2)
ANION GAP SERPL CALCULATED.3IONS-SCNC: 12.3 MMOL/L (ref 5–15)
BASOPHILS # BLD AUTO: 0.05 10*3/MM3 (ref 0–0.2)
BASOPHILS NFR BLD AUTO: 0.7 % (ref 0–1.5)
BUN SERPL-MCNC: 101 MG/DL (ref 8–23)
BUN/CREAT SERPL: 20.8 (ref 7–25)
CALCIUM SPEC-SCNC: 10.1 MG/DL (ref 8.6–10.5)
CHLORIDE SERPL-SCNC: 108 MMOL/L (ref 98–107)
CO2 SERPL-SCNC: 24.7 MMOL/L (ref 22–29)
CREAT SERPL-MCNC: 4.85 MG/DL (ref 0.76–1.27)
DEPRECATED RDW RBC AUTO: 47.2 FL (ref 37–54)
EGFRCR SERPLBLD CKD-EPI 2021: 12.2 ML/MIN/1.73
EOSINOPHIL # BLD AUTO: 0.37 10*3/MM3 (ref 0–0.4)
EOSINOPHIL NFR BLD AUTO: 5.2 % (ref 0.3–6.2)
ERYTHROCYTE [DISTWIDTH] IN BLOOD BY AUTOMATED COUNT: 14.2 % (ref 12.3–15.4)
GLUCOSE BLDC GLUCOMTR-MCNC: 131 MG/DL (ref 70–130)
GLUCOSE BLDC GLUCOMTR-MCNC: 155 MG/DL (ref 70–130)
GLUCOSE SERPL-MCNC: 129 MG/DL (ref 65–99)
HCT VFR BLD AUTO: 30.8 % (ref 37.5–51)
HGB BLD-MCNC: 9.9 G/DL (ref 13–17.7)
IMM GRANULOCYTES # BLD AUTO: 0.02 10*3/MM3 (ref 0–0.05)
IMM GRANULOCYTES NFR BLD AUTO: 0.3 % (ref 0–0.5)
LYMPHOCYTES # BLD AUTO: 1.31 10*3/MM3 (ref 0.7–3.1)
LYMPHOCYTES NFR BLD AUTO: 18.4 % (ref 19.6–45.3)
MAGNESIUM SERPL-MCNC: 2.9 MG/DL (ref 1.6–2.4)
MCH RBC QN AUTO: 29.2 PG (ref 26.6–33)
MCHC RBC AUTO-ENTMCNC: 32.1 G/DL (ref 31.5–35.7)
MCV RBC AUTO: 90.9 FL (ref 79–97)
MONOCYTES # BLD AUTO: 0.53 10*3/MM3 (ref 0.1–0.9)
MONOCYTES NFR BLD AUTO: 7.4 % (ref 5–12)
NEUTROPHILS NFR BLD AUTO: 4.85 10*3/MM3 (ref 1.7–7)
NEUTROPHILS NFR BLD AUTO: 68 % (ref 42.7–76)
NRBC BLD AUTO-RTO: 0 /100 WBC (ref 0–0.2)
PHOSPHATE SERPL-MCNC: 5.8 MG/DL (ref 2.5–4.5)
PLATELET # BLD AUTO: 351 10*3/MM3 (ref 140–450)
PMV BLD AUTO: 11 FL (ref 6–12)
POTASSIUM SERPL-SCNC: 3.4 MMOL/L (ref 3.5–5.2)
RBC # BLD AUTO: 3.39 10*6/MM3 (ref 4.14–5.8)
SODIUM SERPL-SCNC: 145 MMOL/L (ref 136–145)
URATE SERPL-MCNC: 10.1 MG/DL (ref 3.4–7)
WBC NRBC COR # BLD AUTO: 7.13 10*3/MM3 (ref 3.4–10.8)

## 2024-08-08 PROCEDURE — 99233 SBSQ HOSP IP/OBS HIGH 50: CPT | Performed by: NURSE PRACTITIONER

## 2024-08-08 PROCEDURE — 83735 ASSAY OF MAGNESIUM: CPT | Performed by: INTERNAL MEDICINE

## 2024-08-08 PROCEDURE — 84550 ASSAY OF BLOOD/URIC ACID: CPT | Performed by: INTERNAL MEDICINE

## 2024-08-08 PROCEDURE — 25010000002 HEPARIN (PORCINE) PER 1000 UNITS: Performed by: INTERNAL MEDICINE

## 2024-08-08 PROCEDURE — 82948 REAGENT STRIP/BLOOD GLUCOSE: CPT

## 2024-08-08 PROCEDURE — 80069 RENAL FUNCTION PANEL: CPT | Performed by: INTERNAL MEDICINE

## 2024-08-08 PROCEDURE — 85025 COMPLETE CBC W/AUTO DIFF WBC: CPT | Performed by: INTERNAL MEDICINE

## 2024-08-08 PROCEDURE — 99497 ADVNCD CARE PLAN 30 MIN: CPT | Performed by: NURSE PRACTITIONER

## 2024-08-08 RX ORDER — GLYCOPYRROLATE 0.2 MG/ML
0.4 INJECTION INTRAMUSCULAR; INTRAVENOUS
Status: DISCONTINUED | OUTPATIENT
Start: 2024-08-08 | End: 2024-08-13 | Stop reason: HOSPADM

## 2024-08-08 RX ORDER — DIPHENOXYLATE HYDROCHLORIDE AND ATROPINE SULFATE 2.5; .025 MG/1; MG/1
1 TABLET ORAL
Status: DISCONTINUED | OUTPATIENT
Start: 2024-08-08 | End: 2024-08-13 | Stop reason: HOSPADM

## 2024-08-08 RX ORDER — GLYCOPYRROLATE 0.2 MG/ML
0.2 INJECTION INTRAMUSCULAR; INTRAVENOUS
Status: DISCONTINUED | OUTPATIENT
Start: 2024-08-08 | End: 2024-08-13 | Stop reason: HOSPADM

## 2024-08-08 RX ORDER — ACETAMINOPHEN 325 MG/1
650 TABLET ORAL EVERY 4 HOURS PRN
Status: DISCONTINUED | OUTPATIENT
Start: 2024-08-08 | End: 2024-08-13 | Stop reason: HOSPADM

## 2024-08-08 RX ORDER — HYDROXYZINE HYDROCHLORIDE 25 MG/1
25 TABLET, FILM COATED ORAL ONCE
Status: COMPLETED | OUTPATIENT
Start: 2024-08-08 | End: 2024-08-08

## 2024-08-08 RX ORDER — POTASSIUM CHLORIDE 750 MG/1
40 TABLET, FILM COATED, EXTENDED RELEASE ORAL ONCE
Status: COMPLETED | OUTPATIENT
Start: 2024-08-08 | End: 2024-08-08

## 2024-08-08 RX ORDER — LORAZEPAM 2 MG/ML
1 INJECTION INTRAMUSCULAR
Status: ACTIVE | OUTPATIENT
Start: 2024-08-08 | End: 2024-08-13

## 2024-08-08 RX ORDER — LORAZEPAM 2 MG/ML
2 INJECTION INTRAMUSCULAR
Status: ACTIVE | OUTPATIENT
Start: 2024-08-08 | End: 2024-08-13

## 2024-08-08 RX ORDER — LORAZEPAM 2 MG/ML
2 CONCENTRATE ORAL
Status: ACTIVE | OUTPATIENT
Start: 2024-08-08 | End: 2024-08-13

## 2024-08-08 RX ORDER — HYDROMORPHONE HYDROCHLORIDE 1 MG/ML
0.5 INJECTION, SOLUTION INTRAMUSCULAR; INTRAVENOUS; SUBCUTANEOUS
Status: DISCONTINUED | OUTPATIENT
Start: 2024-08-08 | End: 2024-08-13 | Stop reason: HOSPADM

## 2024-08-08 RX ORDER — ACETAMINOPHEN 160 MG/5ML
650 SOLUTION ORAL EVERY 4 HOURS PRN
Status: DISCONTINUED | OUTPATIENT
Start: 2024-08-08 | End: 2024-08-13 | Stop reason: HOSPADM

## 2024-08-08 RX ORDER — ACETAMINOPHEN 650 MG/1
650 SUPPOSITORY RECTAL EVERY 4 HOURS PRN
Status: DISCONTINUED | OUTPATIENT
Start: 2024-08-08 | End: 2024-08-13 | Stop reason: HOSPADM

## 2024-08-08 RX ORDER — LORAZEPAM 2 MG/ML
1 CONCENTRATE ORAL
Status: ACTIVE | OUTPATIENT
Start: 2024-08-08 | End: 2024-08-13

## 2024-08-08 RX ORDER — LORAZEPAM 2 MG/ML
0.5 INJECTION INTRAMUSCULAR
Status: DISPENSED | OUTPATIENT
Start: 2024-08-08 | End: 2024-08-13

## 2024-08-08 RX ORDER — LORAZEPAM 2 MG/ML
0.5 CONCENTRATE ORAL
Status: DISPENSED | OUTPATIENT
Start: 2024-08-08 | End: 2024-08-13

## 2024-08-08 RX ORDER — LORAZEPAM 2 MG/ML
0.5 INJECTION INTRAMUSCULAR
Status: ACTIVE | OUTPATIENT
Start: 2024-08-08 | End: 2024-08-13

## 2024-08-08 RX ADMIN — HEPARIN SODIUM 5000 UNITS: 5000 INJECTION INTRAVENOUS; SUBCUTANEOUS at 08:28

## 2024-08-08 RX ADMIN — HYDROXYZINE HYDROCHLORIDE 25 MG: 25 TABLET ORAL at 01:35

## 2024-08-08 RX ADMIN — HYDRALAZINE HYDROCHLORIDE 100 MG: 50 TABLET ORAL at 08:28

## 2024-08-08 RX ADMIN — CARVEDILOL 25 MG: 25 TABLET, FILM COATED ORAL at 08:28

## 2024-08-08 RX ADMIN — HYDRALAZINE HYDROCHLORIDE 100 MG: 50 TABLET ORAL at 01:35

## 2024-08-08 RX ADMIN — LEVOTHYROXINE SODIUM 100 MCG: 100 TABLET ORAL at 08:27

## 2024-08-08 RX ADMIN — Medication 10 ML: at 08:29

## 2024-08-08 RX ADMIN — POTASSIUM CHLORIDE 40 MEQ: 750 TABLET, EXTENDED RELEASE ORAL at 12:53

## 2024-08-08 RX ADMIN — Medication 10 ML: at 22:05

## 2024-08-08 RX ADMIN — Medication 2.5 MG: at 22:06

## 2024-08-08 RX ADMIN — AMLODIPINE BESYLATE 10 MG: 10 TABLET ORAL at 08:29

## 2024-08-08 RX ADMIN — CLONIDINE 1 PATCH: 0.1 PATCH, EXTENDED RELEASE TRANSDERMAL at 08:33

## 2024-08-08 RX ADMIN — CARVEDILOL 25 MG: 25 TABLET, FILM COATED ORAL at 22:05

## 2024-08-08 RX ADMIN — HYDRALAZINE HYDROCHLORIDE 100 MG: 50 TABLET ORAL at 15:07

## 2024-08-08 NOTE — PROGRESS NOTES
Name: Marcial Bernard ADMIT: 2024   : 1955  PCP: Justina Liriano APRN    MRN: [id number] LOS: 16 days   AGE/SEX: 69 y.o. male  ROOM: [facility]     Subjective   Subjective   Patient very hard of hearing and not participating in conversation.  Follows some basic commands.  Son-in-law at bedside.  Patient ate few bites of breakfast.   utilized         Objective   Objective   Vital Signs  Temp:  [97.9 °F (36.6 °C)-99 °F (37.2 °C)] 98.2 °F (36.8 °C)  Heart Rate:  [59-88] 65  Resp:  [16-18] 16  BP: (148-217)/(72-97) 166/76  SpO2:  [96 %-100 %] 99 %  on  Flow (L/min):  [1] 1;   Device (Oxygen Therapy): nasal cannula  Body mass index is 23.57 kg/m².  Physical Exam  Vitals and nursing note reviewed.   Constitutional:       General: He is not in acute distress.     Comments: Elderly, frail   Cardiovascular:      Rate and Rhythm: Normal rate and regular rhythm.   Pulmonary:      Effort: Pulmonary effort is normal. No respiratory distress.   Abdominal:      General: Abdomen is flat. There is no distension.      Tenderness: There is no abdominal tenderness.   Musculoskeletal:         General: No swelling or deformity.   Skin:     General: Skin is warm and dry.   Neurological:      Mental Status: He is alert. Mental status is at baseline. He is disoriented.         Results Review     I reviewed the patient's new clinical results.  Results from last 7 days   Lab Units 24  0554 24  0656 24  0612 24  0647   WBC 10*3/mm3 7.13 7.09 7.41 5.97   HEMOGLOBIN g/dL 9.9* 9.6* 9.5* 9.5*   PLATELETS 10*3/mm3 351 333 321 322     Results from last 7 days   Lab Units 24  0554 24  0656 24  0612 24  0647   SODIUM mmol/L 145 145 144 147*   POTASSIUM mmol/L 3.4* 3.4* 3.3* 3.4*   CHLORIDE mmol/L 108* 106 105 103   CO2 mmol/L 24.7 24.0 27.0 27.1   BUN mg/dL 101* 109* 110* 101*   CREATININE mg/dL 4.85* 4.81* 5.38* 4.80*   GLUCOSE mg/dL 129* 141* 142* 126*   Estimated  Creatinine Clearance: 14.5 mL/min (A) (by C-G formula based on SCr of 4.85 mg/dL (H)).  Results from last 7 days   Lab Units 08/08/24  0554 08/07/24  0656 08/06/24  0612 08/04/24  0455   ALBUMIN g/dL 3.4* 3.3* 3.2* 3.2*     Results from last 7 days   Lab Units 08/08/24  0554 08/07/24  0656 08/06/24  0612 08/05/24  0647 08/04/24  0455 08/03/24  0835   CALCIUM mg/dL 10.1 9.7 9.7 9.5 9.4 9.5   ALBUMIN g/dL 3.4* 3.3* 3.2*  --  3.2* 3.1*   MAGNESIUM mg/dL 2.9* 2.8* 2.7*  --   --  2.4   PHOSPHORUS mg/dL 5.8* 5.5* 6.5*  --  6.4* 6.0*       COVID19   Date Value Ref Range Status   07/23/2024 Not Detected Not Detected - Ref. Range Final     Glucose   Date/Time Value Ref Range Status   08/08/2024 1119 155 (H) 70 - 130 mg/dL Final   08/08/2024 0602 131 (H) 70 - 130 mg/dL Final   08/07/2024 2010 193 (H) 70 - 130 mg/dL Final   08/07/2024 1612 141 (H) 70 - 130 mg/dL Final   08/07/2024 1056 181 (H) 70 - 130 mg/dL Final   08/07/2024 0610 148 (H) 70 - 130 mg/dL Final   08/06/2024 2017 157 (H) 70 - 130 mg/dL Final       CT Head Without Contrast  CT HEAD WO CONTRAST-     HISTORY:  dementia and altered mental status; N18.4-Chronic kidney  disease, stage 4 (severe); E87.0-Pvcn-yffarvailj and hyponatremia;  I50.1-Left ventricular failure, unspecified; N18.4-Chronic kidney  disease, stage 4 (severe); D63.1-Anemia in chronic kidney disease;  R79.89-Other specified abnormal findings of blood chemistry     COMPARISON: None     FINDINGS: The brain and ventricles are symmetrical. There is mild to  moderate diffuse atrophy with secondary ventriculomegaly. Extensive  vascular calcification is noted. No focal area of decreased attenuation  to suggest acute infarction is identified. Areas of decreased  attenuation are appreciated involving the white matter of cerebral  hemispheres bilaterally consistent with moderate to severe small vessel  ischemic disease. Further evaluation could be performed with a MRI  examination of the brain as indicated.      This report was finalized on 7/31/2024 2:18 PM by Dr. Michel Patiño M.D  on Workstation: [id number]       I reviewed the patient's daily medications.  Scheduled Medications  amLODIPine, 10 mg, Oral, Q24H  carvedilol, 25 mg, Oral, BID With Meals  cloNIDine, 1 patch, Transdermal, Weekly  hydrALAZINE, 100 mg, Oral, Q8H  levothyroxine, 100 mcg, Oral, Daily  melatonin, 2.5 mg, Oral, Nightly  potassium chloride, 40 mEq, Oral, Once  senna-docusate sodium, 2 tablet, Oral, BID  sodium chloride, 10 mL, Intravenous, Q12H    Infusions   Diet  Diet: Regular/House, Cardiac, Fluid Restriction (240 mL/tray), Diabetic; Low Sodium (2g); Consistent Carbohydrate; 1500 mL/day; Texture: Regular (IDDSI 7); Fluid Consistency: Thin (IDDSI 0)         I have personally reviewed:  [x]  Laboratory   []  Microbiology   [x]  Radiology   []  EKG/Telemetry   []  Cardiology/Vascular   []  Pathology   [x]  Records     Assessment/Plan     Active Hospital Problems    Diagnosis  POA    **Chronic renal failure (CRF), stage 4 (severe) [N18.4]  Unknown    Dementia [F03.90]  Unknown    Hyponatremia [E87.1]  Unknown    Acute pulmonary edema with congestive heart failure [I50.1]  Unknown    Anemia due to stage 4 chronic kidney disease [N18.4, D63.1]  Unknown    Elevated troponin level not due myocardial infarction [R79.89]  Unknown      Resolved Hospital Problems   No resolved problems to display.       69 y.o. male admitted with Chronic renal failure (CRF), stage 4 (severe).      LIDIA on CKD 4  -Renal consulted. Holding diuretic  -Creatinine improved, BUN unchanged, appears euvolemic  -Renal discussed with family that patient currently does not need dialysis and that he is a very poor candidate for dialysis given his severe dementia and recommended consideration of palliative care    Acute on chronic diastolic heart failure  -Improved with diuretics    Diabetes type 2, A1c 5.3%  -continue SSI    Hypertension  Sinus bradycardia  -Continue clonidine patch,  Coreg, amlodipine, hydralazine    Dementia  -Mentation has been stable but worse overall since admission.     Goals of care/comfort care status-discussed with condition with palliative care.  There was a family meeting and family has decided to transition to comfort care.  Transferred to [facility].  Stop labs.  Comfort care orders initiated.  Hospice consulted.    SCDs for DVT prophylaxis.  Limited code (no CPR, no intubation).  Discussed with patient, family, and nursing staff.  Anticipate discharge  TBD  timing yet to be determined.    Expected Discharge Date: 8/8/2024; Expected Discharge Time:       Lg Mera MD  [facility]  08/08/24  11:27 EDT

## 2024-08-08 NOTE — PROGRESS NOTES
.            [facility]    Palliative Care Daily Progress Note   Chief complaint-follow up goals of care/advanced care planning, support for patient/family, hospice referral/discussion, pain/symptom management, withdrawal of interventions, transfer to comfort care bed/unit, and comfort care    Code Status:   Code Status and Medical Interventions: No CPR (Do Not Attempt to Resuscitate); Comfort Measures   Ordered at: 24 1118     Code Status (Patient has no pulse and is not breathing):    No CPR (Do Not Attempt to Resuscitate)     Medical Interventions (Patient has pulse or is breathing):    Comfort Measures      Advanced Directives: Advance Directive Status: Patient does not have advance directive   Goals of Care: Ongoing.     S: Medical record reviewed. Events noted.  Patient resting in bed, appears comfortable. Family at bedside. I reviewed labs, medical notes, therapy notes, MAR and I/O's. He ate a few bites of breakfast per son in law. I spoke with Dr Mera and Crow RN.              Review of Systems   Unable to perform ROS: Dementia     Pain Assessment  Nonverbal Indicators of Pain: nonverbal indicators absent  CPOT and PAINAD Scales: PAINAD (Pain Assessment in Advance Dementia Scale)  CPOT Facial Expression: 0-->relaxed, neutral  CPOT Body Movements: 0-->absence of movements  CPOT Muscle Tension: 0-->relaxed  Ventilator Compliance/Vocalization: 0-->talking in normal tone or no sound  CPOT Score: 0  PAINAD Breathin-->normal  PAINAD Negative Vocalization: 0-->none  PAINAD Facial Expression: 0-->smiling or inexpressive  PAINAD Body Language: 0-->relaxed  PAINAD Consolability: 0-->no need to console  PAINAD Score: 0  Pain Location: abdomen, hip    O:     Intake/Output Summary (Last 24 hours) at 2024 1325  Last data filed at 2024 0505  Gross per 24 hour   Intake 240 ml   Output 1150 ml   Net -910 ml       Diagnostics and current medications:  Reviewed.    Current Facility-Administered Medications   Medication Dose Route Frequency Provider Last Rate Last Admin    acetaminophen (TYLENOL) tablet 650 mg  650 mg Oral Q4H PRN Lg Mera MD        Or    acetaminophen (TYLENOL) 160 MG/5ML oral solution 650 mg  650 mg Oral Q4H PRN Lg Mera MD        Or    acetaminophen (TYLENOL) suppository 650 mg  650 mg Rectal Q4H PRN Lg Mera MD        acetaminophen (TYLENOL) tablet 650 mg  650 mg Oral Q4H PRN Laura Jewell APRN   650 mg at 08/01/24 2211    Or    acetaminophen (TYLENOL) suppository 650 mg  650 mg Rectal Q4H PRN Laura Jewell APRN        amLODIPine (NORVASC) tablet 10 mg  10 mg Oral Q24H Neptali Becker Jr., MD   10 mg at 08/08/24 0829    sennosides-docusate (PERICOLACE) 8.6-50 MG per tablet 2 tablet  2 tablet Oral BID Laura Jewell APRN   2 tablet at 08/07/24 0845    And    polyethylene glycol (MIRALAX) packet 17 g  17 g Oral Daily PRN Laura Jewell APRN        And    bisacodyl (DULCOLAX) EC tablet 5 mg  5 mg Oral Daily PRN Laura Jewell APRN        And    bisacodyl (DULCOLAX) suppository 10 mg  10 mg Rectal Daily PRN Laura Jewell APRN        carvedilol (COREG) tablet 25 mg  25 mg Oral BID With Meals Richard Lewis MD   25 mg at 08/08/24 0828    cloNIDine (CATAPRES-TTS) 0.1 MG/24HR patch 1 patch  1 patch Transdermal Weekly Margaret Duran APRN   1 patch at 08/08/24 0833    dextrose (D50W) (25 g/50 mL) IV injection 25 g  25 g Intravenous Q15 Min PRN Laura Jewell APRN        dextrose (GLUTOSE) oral gel 15 g  15 g Oral Q15 Min PRN Laura Jewell APRN        diphenoxylate-atropine (LOMOTIL) 2.5-0.025 MG per tablet 1 tablet  1 tablet Oral Q2H PRN Lg Mera MD        glucagon (GLUCAGEN) injection 1 mg  1 mg Intramuscular Q15 Min PRN Laura Jewell APRN        glycopyrrolate (ROBINUL) injection 0.2 mg  0.2 mg Intravenous Q2H PRN Lg Mera MD        Or    glycopyrrolate (ROBINUL) injection 0.2 mg  0.2 mg  Subcutaneous Q2H PRN Lg Mera MD        Or    glycopyrrolate (ROBINUL) injection 0.4 mg  0.4 mg Intravenous Q2H PRN Lg Mera MD        Or    glycopyrrolate (ROBINUL) injection 0.4 mg  0.4 mg Subcutaneous Q2H PRN Lg Mera MD        hydrALAZINE (APRESOLINE) injection 20 mg  20 mg Intravenous Q4H PRN Cristian Chinchilla MD   20 mg at 08/06/24 2220    hydrALAZINE (APRESOLINE) tablet 100 mg  100 mg Oral Q8H Neymar Barriga MD   100 mg at 08/08/24 0828    HYDROmorphone (DILAUDID) injection 0.5 mg  0.5 mg Intravenous Q1H PRN Lg Mera MD        HYDROmorphone (DILAUDID) injection 1 mg  1 mg Intravenous Q1H PRN Lg Mera MD        levothyroxine (SYNTHROID, LEVOTHROID) tablet 100 mcg  100 mcg Oral Daily Juan Ventura Jr., MD   100 mcg at 08/08/24 0827    LORazepam (ATIVAN) injection 0.5 mg  0.5 mg Intravenous Q1H PRN Lg Mera MD        Or    LORazepam (ATIVAN) injection 0.5 mg  0.5 mg Subcutaneous Q1H PRN Lg Mera MD        Or    LORazepam (ATIVAN) 2 MG/ML concentrated solution 0.5 mg  0.5 mg Sublingual Q1H PRN Lg Mera MD        LORazepam (ATIVAN) injection 1 mg  1 mg Intravenous Q1H PRN Lg Mera MD        Or    LORazepam (ATIVAN) injection 1 mg  1 mg Subcutaneous Q1H PRN Lg Mera MD        Or    LORazepam (ATIVAN) 2 MG/ML concentrated solution 1 mg  1 mg Sublingual Q1H PRN Lg Mera MD        LORazepam (ATIVAN) injection 2 mg  2 mg Intravenous Q1H PRN Lg Mera MD        Or    LORazepam (ATIVAN) injection 2 mg  2 mg Subcutaneous Q1H PRN Lg Mera MD        Or    LORazepam (ATIVAN) 2 MG/ML concentrated solution 2 mg  2 mg Sublingual Q1H PRN Lg Mera MD        melatonin tablet 2.5 mg  2.5 mg Oral Nightly Chavo Martins MD   2.5 mg at 08/07/24 2122    nitroglycerin (NITROSTAT) SL tablet 0.4 mg  0.4 mg Sublingual Q5 Min PRN Laura Jewell, APRN        ondansetron (ZOFRAN) injection 4 mg  4 mg Intravenous Q6H PRN Laura Jewell, APRN         "Polyvinyl Alcohol-Povidone PF (HYPOTEARS) 1.4-0.6 % ophthalmic solution 1 drop  1 drop Both Eyes Q30 Min PRN Lg Mera MD        sodium chloride 0.9 % flush 10 mL  10 mL Intravenous Q12H Laura Jewell APRN   10 mL at 08/08/24 0829    sodium chloride 0.9 % flush 10 mL  10 mL Intravenous PRN Laura Jewell APRN        sodium chloride 0.9 % infusion 40 mL  40 mL Intravenous PRN Laura Jewell APRN              acetaminophen **OR** acetaminophen **OR** acetaminophen    acetaminophen **OR** acetaminophen    senna-docusate sodium **AND** polyethylene glycol **AND** bisacodyl **AND** bisacodyl    dextrose    dextrose    diphenoxylate-atropine    glucagon (human recombinant)    glycopyrrolate **OR** glycopyrrolate **OR** glycopyrrolate **OR** glycopyrrolate    hydrALAZINE    HYDROmorphone    HYDROmorphone    LORazepam **OR** LORazepam **OR** LORazepam    LORazepam **OR** LORazepam **OR** LORazepam    LORazepam **OR** LORazepam **OR** LORazepam    nitroglycerin    ondansetron    Polyvinyl Alcohol-Povidone PF    sodium chloride    sodium chloride  Attest that current medications reviewed including but not limited to prescriptions, over-the counter, herbals and vitamin/mineral/dietary (nutritional) supplements for name, route of administration, type, dose and frequency and are current using all immediate resources available at time of dictation.    A:    /76 (BP Location: Right arm, Patient Position: Lying)   Pulse 65   Temp 98.2 °F (36.8 °C) (Oral)   Resp 16   Ht 175.3 cm (69\")   Wt 72.4 kg (159 lb 9.8 oz)   SpO2 99%   BMI 23.57 kg/m²     Constitutional:       Appearance: Not in distress. Chronically ill-appearing.   Pulmonary:      Effort: Pulmonary effort is normal.   Cardiovascular:      PMI at left midclavicular line.   Edema:     Peripheral edema absent.   Genitourinary:     Comments: Purewick with prasad urine noted   Neurological:      Mental Status: Alert. Disoriented.   Psychiatric:         " Cognition and Memory: Cognition is impaired. Memory is impaired.         Patient status: Disease state: No further treatment being pursued.  Functional status: Palliative Performance Scale Score: Performance 30% based on the following measures: Ambulation: Totally bed bound, Activity and Evidence of Disease: Unable to do any work, extensive evidence of disease, Self-Care: Total care required,  Intake: Reduced, LOC: Full, drowsy or confusion   Nutritional status: Albumin  3.3 on 8/7/2024 Body mass index is 24.12 kg/m².   Family support: The patient receives support from his wife and children..  Advance Directives: Advance Directive Status: Patient does not have advance directive   POA/Healthcare surrogate-n/a.      Impression/Problem List:     Acute on chronic kidney disease stage IV  Hypernatremia  Acute on chronic diastolic heart failure  Dementia   Diabetes mellitus type II  Hypertension           Recommendations/Plan:  Transfer to inpatient palliative care unit for comfort  Comfort measures only  Initiate palliative care order set to be utilized as needed for symptom relief.   Continue medications for now and address with further conversation  Stop labs, procedures, PT/OT  [facility] consult  7.  Cheema prn comfort        2.  Palliative care encounter  8/7/2024- The patient is unable to participate in goals of care conversation. At the time of my initial visit his son-in-law was present and informed me  the patient spouse would be here later in morning. I returned to bedside approximately 1120 and the patient spouse, Evy was at bedside.I used [place]ian interpretor, Ashley (ID # [id number]). I introduced myself and my role. I asked patient spouse what her understanding of her husbands' conditions and she became very emotional and tells she didn't wish to review it again as it was very upsetting. She also requested her daughter, Asia be present to support her with decision making.  The patient's son-in-law called Asia  and she was unable to be at bedside until later this afternoon, due to prior obligations/appointments. We scheduled a meeting for tomorrow morning at 1000. I did offer spiritual support for spouse and she informed me that her  had visit and provided support. I spoke with Dr Mera and KYREE Moran.     8/8/2024-The patient remains unable to participate in goals of care conversation due to dementia. I spoke with patient spouse, daughter, Asia and son in law, Herendo. They requested to utilize a friend, Laura for translation. We reviewed patient hospital course and they have fairly good understanding of patient conditions. They understand overall short term/long term prognosis is poor. They understand he is not a candidate for dialysis in the future. Also aware dementia is progressive disease and not curable. We reviewed discharge options. Also discussed palliative care, inpatient palliative care unit and hospice (inpatient vs outpatient). At first discussion was geared towards patient returning to home setting with hospice support, however his spouse expressed her concerns with her ability to care for him full time. She would like to transition to inpatient palliative care unit. We discussed unit structure, visitor policy, potential medications to be utilized for symptom relief. We also discussed hospice support and they agreeable. They are aware if no symptom management and clinical decline, their may need to be further discussion regarding discharge planning. They are agreeable to stopping labs, PT/OT and procedures. They are requesting to continue medications for now and we discussed have further discussion regarding discontinue depending on patient clinical course. They were very emotional during conversation. We discussed grief as well. I offered spiritual consult and they declined. They are [employer] and their  has been providing support. I spoke with Crow Mason, KYREE, and SEA Meneses.         .Thank  you for this consult and allowing us to participate in patient's plan of care. Palliative Care Team will continue to follow patient.     Time spent:75 minutes spent reviewing medical and medication records, assessing and examining patient, discussing with family, answering questions, providing some guidance about a plan and documentation of care, and coordinating care with other healthcare members, with > 50% time spent face to face.   30 minutes spent on advance care planning.    GIULIANO Muñoz  8/8/2024  13:25 EDT

## 2024-08-08 NOTE — PLAN OF CARE
Problem: Adult Inpatient Plan of Care  Goal: Plan of Care Review  Outcome: Ongoing, Progressing  Flowsheets (Taken 8/8/2024 0358)  Progress: no change  Plan of Care Reviewed With: patient  Patient slept wellovernight. Patient complained of itching, atarax given with relief. VSS. Purewick in place. Q2 turn. All needs met. Care ongoing. Plan for palliative to talk to family this am.

## 2024-08-08 NOTE — PROGRESS NOTES
[facility] Progress Note      Patient Name: Marcial Bernard  : 1955  MRN: [id number]  Primary Care Physician:  Justina Liriano APRN  Date of admission: 2024    Subjective     Interval History:   Follow-up acute kidney injury on chronic kidney disease and hyponatremia      The patient is confused, very calm, today's creatinine is 4.85 very stable  Review of Systems:   Not obtainable    Objective     Vitals:   Temp:  [97.9 °F (36.6 °C)-99 °F (37.2 °C)] 98.2 °F (36.8 °C)  Heart Rate:  [59-88] 65  Resp:  [16-18] 16  BP: (148-217)/(72-97) 166/76  Flow (L/min):  [1] 1    Intake/Output Summary (Last 24 hours) at 2024 0922  Last data filed at 2024 0505  Gross per 24 hour   Intake 240 ml   Output 1150 ml   Net -910 ml       Physical Exam:    General Appearance: Awake, disoriented, chronically ill, no acute distress   Skin: warm and dry  HEENT: oral mucosa normal, nonicteric sclera  Neck: no JVD again today  Lungs: Bilateral rhonchi no crackles, breathing effort not labored  Heart: RRR, no S3, no rub  Abdomen: soft, nontender, nondistended  : no palpable bladder  Extremities: No ankle edema lower extremity edema  Neuro: Moving all extremities    Scheduled Meds:     amLODIPine, 10 mg, Oral, Q24H  carvedilol, 25 mg, Oral, BID With Meals  cloNIDine, 1 patch, Transdermal, Weekly  heparin (porcine), 5,000 Units, Subcutaneous, Q12H  hydrALAZINE, 100 mg, Oral, Q8H  insulin regular, 2-7 Units, Subcutaneous, 4x Daily AC & at Bedtime  levothyroxine, 100 mcg, Oral, Daily  melatonin, 2.5 mg, Oral, Nightly  senna-docusate sodium, 2 tablet, Oral, BID  sodium chloride, 10 mL, Intravenous, Q12H      IV Meds:          Results Reviewed:   I have personally reviewed the results from the time of this admission to 2024 09:22 EDT     Results from last 7 days   Lab Units 24  0554 24  0656 24  0612   SODIUM mmol/L 145 145 144   POTASSIUM mmol/L 3.4* 3.4* 3.3*   CHLORIDE  mmol/L 108* 106 105   CO2 mmol/L 24.7 24.0 27.0   BUN mg/dL 101* 109* 110*   CREATININE mg/dL 4.85* 4.81* 5.38*   CALCIUM mg/dL 10.1 9.7 9.7   GLUCOSE mg/dL 129* 141* 142*       Estimated Creatinine Clearance: 14.5 mL/min (A) (by C-G formula based on SCr of 4.85 mg/dL (H)).    Results from last 7 days   Lab Units 08/08/24  0554 08/07/24  0656 08/06/24  0612   MAGNESIUM mg/dL 2.9* 2.8* 2.7*   PHOSPHORUS mg/dL 5.8* 5.5* 6.5*       Results from last 7 days   Lab Units 08/08/24  0554 08/07/24  0656 08/06/24  0612 08/03/24  0835 08/02/24  0458   URIC ACID mg/dL 10.1* 11.0* 11.0* 10.0* 10.4*       Results from last 7 days   Lab Units 08/08/24  0554 08/07/24  0656 08/06/24  0612 08/05/24  0647 08/04/24  0455   WBC 10*3/mm3 7.13 7.09 7.41 5.97 6.69   HEMOGLOBIN g/dL 9.9* 9.6* 9.5* 9.5* 9.7*   PLATELETS 10*3/mm3 351 333 321 322 320             Assessment / Plan     ASSESSMENT:  Acute kidney injury on chronic kidney disease stage IV, the creatinine today is 4.85, stable since yesterday and potassium 3.4 the patient remains confused without any changes in his mental status he appears to be euvolemic  chronic kidney disease stage IV baseline creatinine about 2.4-second diabetic and hypertensive nephrosclerosis  Acute on chronic diastolic congestive heart failure with cardiorenal syndrome, volume status improving with diuresis  Diabetes mellitus type 2 with renal complication and diabetic retinopathy  Hypertension with chronic kidney disease  Dementia.  And altered mental status without any change practically since admit  Hypokalemia potassium is 3.4      PLAN:  No diuretics today  Continue the same treatment  No indication for dialysis at this time and even if dialysis is indicated the patient is a very poor candidate for dialysis I strongly recommend comfort.  Replete potassium  Surveillance labs    I reviewed the chart and other providers notes, reviewed labs.  I discussed the case with Dr. Lg Mera  Copied text in this  note has been reviewed and is accurate as of 08/08/24.       Thank you for involving us in the care of Marcial Bernard.  Please feel free to call with any questions.    Mike Flannery MD  08/08/24  09:22 EDT    [facility]  [phone]

## 2024-08-08 NOTE — PLAN OF CARE
Goal Outcome Evaluation:              Outcome Evaluation: Speech following for diet tolerance. Per chart, patient transferring to palliative unit for comfort measures only. Speech to s/o.

## 2024-08-08 NOTE — NURSING NOTE
Pt being transferred to 4P, wife at bsd informed of transfer, voiced understanding using  video tablet , report given to KYREE Schnieder. Pt calm, and content, pericare provided, had 1med soft bm. No s/sx of distress, sb-sr on tele, room air all day satting 97-98%.

## 2024-08-08 NOTE — PROGRESS NOTES
Continued Stay Note  [facility]     Patient Name: Marcial Bernard  MRN: [id number]  Today's Date: 8/8/2024    Admit Date: 7/23/2024    Plan: Home with family support   Discharge Plan       Row Name 08/08/24 1218       Plan    Plan Comments Pt with orders to transfer to  for comfort/palliative care.  Pt's family working with [id number]) on getting pt's copies of pt's passport. SS card and paperwork from the government saying the date he applied for asylum.                   Discharge Codes    No documentation.                 Expected Discharge Date and Time       Expected Discharge Date Expected Discharge Time    Aug 8, 2024               OLU Luis

## 2024-08-08 NOTE — PLAN OF CARE
Goal Outcome Evaluation:  Plan of Care Reviewed With: patient, spouse        Progress: no change  Outcome Evaluation: Pt alert, pleasant and cooperative with staff. Wife at bedside. Pt Brazilian speaking, interpretor tablet at bedside. F/C to bsd, pt denies pain. Ac&Hs accuchecks. IV to lft forearm, patent and saline locked. Nsg to cont with plan of care.

## 2024-08-09 PROCEDURE — 25010000002 LORAZEPAM PER 2 MG: Performed by: STUDENT IN AN ORGANIZED HEALTH CARE EDUCATION/TRAINING PROGRAM

## 2024-08-09 RX ADMIN — LORAZEPAM 0.5 MG: 2 INJECTION INTRAMUSCULAR; INTRAVENOUS at 08:37

## 2024-08-09 RX ADMIN — AMLODIPINE BESYLATE 10 MG: 10 TABLET ORAL at 10:15

## 2024-08-09 RX ADMIN — Medication 10 ML: at 10:16

## 2024-08-09 RX ADMIN — HYDRALAZINE HYDROCHLORIDE 100 MG: 50 TABLET ORAL at 10:15

## 2024-08-09 RX ADMIN — CARVEDILOL 25 MG: 25 TABLET, FILM COATED ORAL at 21:51

## 2024-08-09 RX ADMIN — Medication 2.5 MG: at 21:51

## 2024-08-09 RX ADMIN — HYDRALAZINE HYDROCHLORIDE 100 MG: 50 TABLET ORAL at 00:47

## 2024-08-09 RX ADMIN — Medication 10 ML: at 21:52

## 2024-08-09 RX ADMIN — LEVOTHYROXINE SODIUM 100 MCG: 100 TABLET ORAL at 10:16

## 2024-08-09 RX ADMIN — CARVEDILOL 25 MG: 25 TABLET, FILM COATED ORAL at 10:15

## 2024-08-09 RX ADMIN — HYDRALAZINE HYDROCHLORIDE 100 MG: 50 TABLET ORAL at 18:55

## 2024-08-09 NOTE — PROGRESS NOTES
Name: Marcial Bernard ADMIT: 2024   : 1955  PCP: Provider, No Known    MRN: [id number] LOS: 17 days   AGE/SEX: 69 y.o. male  ROOM: [facility]     Subjective   Subjective   Daughter at bedside.   utilized.  All questions answered.  Patient resting comfortably in bed, nonverbal.         Objective   Objective   Vital Signs  Temp:  [96.9 °F (36.1 °C)-97.5 °F (36.4 °C)] 96.9 °F (36.1 °C)  Heart Rate:  [72-78] 78  Resp:  [18] 18  BP: (161-190)/(59-90) 190/90  SpO2:  [98 %-99 %] 98 %  on   ;   Device (Oxygen Therapy): room air  Body mass index is 23.57 kg/m².  Physical Exam  Vitals and nursing note reviewed.   Constitutional:       General: He is not in acute distress.     Comments: Elderly, frail   Cardiovascular:      Rate and Rhythm: Normal rate and regular rhythm.   Pulmonary:      Effort: Pulmonary effort is normal. No respiratory distress.   Abdominal:      General: Abdomen is flat. There is no distension.      Tenderness: There is no abdominal tenderness.   Musculoskeletal:         General: No swelling or deformity.   Skin:     General: Skin is warm and dry.   Neurological:      Mental Status: He is alert. Mental status is at baseline. He is disoriented.         Results Review     I reviewed the patient's new clinical results.  Results from last 7 days   Lab Units 24  0554 24  0656 24  0612 24  0647   WBC 10*3/mm3 7.13 7.09 7.41 5.97   HEMOGLOBIN g/dL 9.9* 9.6* 9.5* 9.5*   PLATELETS 10*3/mm3 351 333 321 322     Results from last 7 days   Lab Units 24  0554 24  0656 24  0612 24  0647   SODIUM mmol/L 145 145 144 147*   POTASSIUM mmol/L 3.4* 3.4* 3.3* 3.4*   CHLORIDE mmol/L 108* 106 105 103   CO2 mmol/L 24.7 24.0 27.0 27.1   BUN mg/dL 101* 109* 110* 101*   CREATININE mg/dL 4.85* 4.81* 5.38* 4.80*   GLUCOSE mg/dL 129* 141* 142* 126*   Estimated Creatinine Clearance: 14.5 mL/min (A) (by C-G formula based on SCr of 4.85 mg/dL (H)).  Results from last 7  days   Lab Units 08/08/24  0554 08/07/24  0656 08/06/24  0612 08/04/24  0455   ALBUMIN g/dL 3.4* 3.3* 3.2* 3.2*     Results from last 7 days   Lab Units 08/08/24  0554 08/07/24  0656 08/06/24  0612 08/05/24  0647 08/04/24  0455 08/03/24  0835   CALCIUM mg/dL 10.1 9.7 9.7 9.5 9.4 9.5   ALBUMIN g/dL 3.4* 3.3* 3.2*  --  3.2* 3.1*   MAGNESIUM mg/dL 2.9* 2.8* 2.7*  --   --  2.4   PHOSPHORUS mg/dL 5.8* 5.5* 6.5*  --  6.4* 6.0*       COVID19   Date Value Ref Range Status   07/23/2024 Not Detected Not Detected - Ref. Range Final     Glucose   Date/Time Value Ref Range Status   08/08/2024 1119 155 (H) 70 - 130 mg/dL Final   08/08/2024 0602 131 (H) 70 - 130 mg/dL Final   08/07/2024 2010 193 (H) 70 - 130 mg/dL Final   08/07/2024 1612 141 (H) 70 - 130 mg/dL Final   08/07/2024 1056 181 (H) 70 - 130 mg/dL Final   08/07/2024 0610 148 (H) 70 - 130 mg/dL Final   08/06/2024 2017 157 (H) 70 - 130 mg/dL Final       CT Head Without Contrast  CT HEAD WO CONTRAST-     HISTORY:  dementia and altered mental status; N18.4-Chronic kidney  disease, stage 4 (severe); E87.6-Walg-ysmdvhtikm and hyponatremia;  I50.1-Left ventricular failure, unspecified; N18.4-Chronic kidney  disease, stage 4 (severe); D63.1-Anemia in chronic kidney disease;  R79.89-Other specified abnormal findings of blood chemistry     COMPARISON: None     FINDINGS: The brain and ventricles are symmetrical. There is mild to  moderate diffuse atrophy with secondary ventriculomegaly. Extensive  vascular calcification is noted. No focal area of decreased attenuation  to suggest acute infarction is identified. Areas of decreased  attenuation are appreciated involving the white matter of cerebral  hemispheres bilaterally consistent with moderate to severe small vessel  ischemic disease. Further evaluation could be performed with a MRI  examination of the brain as indicated.     This report was finalized on 7/31/2024 2:18 PM by Dr. Michel Patiño M.D  on Workstation: [id number]        I reviewed the patient's daily medications.  Scheduled Medications  amLODIPine, 10 mg, Oral, Q24H  carvedilol, 25 mg, Oral, BID With Meals  cloNIDine, 1 patch, Transdermal, Weekly  hydrALAZINE, 100 mg, Oral, Q8H  levothyroxine, 100 mcg, Oral, Daily  melatonin, 2.5 mg, Oral, Nightly  senna-docusate sodium, 2 tablet, Oral, BID  sodium chloride, 10 mL, Intravenous, Q12H    Infusions   Diet  Diet: Regular/House, Cardiac, Fluid Restriction (240 mL/tray), Diabetic; Low Sodium (2g); Consistent Carbohydrate; 1500 mL/day; Texture: Regular (IDDSI 7); Fluid Consistency: Thin (IDDSI 0)         I have personally reviewed:  []  Laboratory   []  Microbiology   []  Radiology   []  EKG/Telemetry   []  Cardiology/Vascular   []  Pathology   []  Records     Assessment/Plan     Active Hospital Problems    Diagnosis  POA    **Chronic renal failure (CRF), stage 4 (severe) [N18.4]  Unknown    Dementia [F03.90]  Unknown    Hyponatremia [E87.1]  Unknown    Acute pulmonary edema with congestive heart failure [I50.1]  Unknown    Anemia due to stage 4 chronic kidney disease [N18.4, D63.1]  Unknown    Elevated troponin level not due myocardial infarction [R79.89]  Unknown      Resolved Hospital Problems   No resolved problems to display.       69 y.o. male admitted with Chronic renal failure (CRF), stage 4 (severe).    Goals of care/comfort care status-comfort focused care.  Currently continuing hypertension medications.  Hospice consulted.  Comfort care order set initiated.  Consider Cheema catheter in the next couple days if needed    Other problems prior to transition to comfort care:    LIDIA on CKD 4  -Renal consulted. Holding diuretic  -Creatinine improved, BUN unchanged, appears euvolemic  -Renal discussed with family that patient currently does not need dialysis and that he is a very poor candidate for dialysis given his severe dementia and recommended consideration of palliative care    Acute on chronic diastolic heart  failure  -Improved with diuretics    Diabetes type 2, A1c 5.3%  -continue SSI    Hypertension  Sinus bradycardia  -Continue clonidine patch, Coreg, amlodipine, hydralazine    Dementia  -Mentation has been stable but worse overall since admission.         SCDs for DVT prophylaxis.  Limited code (no CPR, no intubation).  Discussed with patient, family, and nursing staff.  Anticipate discharge  TBD  timing yet to be determined.    Expected Discharge Date: 8/12/2024; Expected Discharge Time:       Lg Mera MD  [facility]  08/09/24  09:00 EDT

## 2024-08-09 NOTE — PLAN OF CARE
Goal Outcome Evaluation:  Plan of Care Reviewed With: patient, spouse        Progress: no change  Outcome Evaluation: Pt Yoruba speaking,  tablet at bedside. External f/c removed this morning as pt was pulling at it and would not leave alone. Lorazepam given x 1 during bath for signs of anxiety/agitation. Appeared comfortable rest of the shift. Son-in-law at bedside throughout day and spouse came in this evening. Cont with palliative care.

## 2024-08-09 NOTE — PROGRESS NOTES
Discharge Planning Assessment  [facility]     Patient Name: Marcial Bernard  MRN: [id number]  Today's Date: 8/9/2024    Admit Date: 7/23/2024    Plan: palliative and may need a medicaid pending palliative bed at nursing home. CLEMENTE Rodriguez RN, CCP.   Discharge Needs Assessment    No documentation.                  Discharge Plan       Row Name 08/09/24 1343       Plan    Plan palliative and may need a medicaid pending palliative bed at nursing home. CLEMENTE Rodriguez RN, CCP.    Plan Comments The patient transferred to [facility] from [address] [place] [address] on 8/8/24. [facility] states patient has N [phone], and he also has Temporary Permanent Resident card. Family working with [employer] (med assist) to get [place] medicaid.  Per RN the family can not care for him at home. Would need a medicaid pending bed at nursing home if the patient does not decline and meet HSB. PPS 30% today. CCP will continue to follow for any needs. CLEMENTE Rodriguez RN, CCP.                  Continued Care and Services - Admitted Since 7/23/2024    No active coordination exists for this encounter.       Expected Discharge Date and Time       Expected Discharge Date Expected Discharge Time    Aug 12, 2024            Demographic Summary    No documentation.                  Functional Status    No documentation.                  Psychosocial    No documentation.                  Abuse/Neglect    No documentation.                  Legal    No documentation.                  Substance Abuse    No documentation.                  Patient Forms    No documentation.                     Kim Rodriguez RN

## 2024-08-09 NOTE — PROGRESS NOTES
The patient was transition to palliative care, I will sign off but I will be available again if the situation changes.

## 2024-08-10 PROCEDURE — 25010000002 HYDRALAZINE PER 20 MG: Performed by: INTERNAL MEDICINE

## 2024-08-10 RX ADMIN — HYDRALAZINE HYDROCHLORIDE 20 MG: 20 INJECTION INTRAMUSCULAR; INTRAVENOUS at 01:41

## 2024-08-10 RX ADMIN — HYDRALAZINE HYDROCHLORIDE 100 MG: 50 TABLET ORAL at 16:14

## 2024-08-10 RX ADMIN — LEVOTHYROXINE SODIUM 100 MCG: 100 TABLET ORAL at 10:00

## 2024-08-10 RX ADMIN — CARVEDILOL 25 MG: 25 TABLET, FILM COATED ORAL at 17:07

## 2024-08-10 RX ADMIN — CARVEDILOL 25 MG: 25 TABLET, FILM COATED ORAL at 10:00

## 2024-08-10 RX ADMIN — HYDRALAZINE HYDROCHLORIDE 100 MG: 50 TABLET ORAL at 00:06

## 2024-08-10 RX ADMIN — HYDRALAZINE HYDROCHLORIDE 20 MG: 20 INJECTION INTRAMUSCULAR; INTRAVENOUS at 06:27

## 2024-08-10 RX ADMIN — Medication 10 ML: at 22:13

## 2024-08-10 RX ADMIN — AMLODIPINE BESYLATE 10 MG: 10 TABLET ORAL at 10:00

## 2024-08-10 RX ADMIN — HYDRALAZINE HYDROCHLORIDE 100 MG: 50 TABLET ORAL at 10:00

## 2024-08-10 RX ADMIN — HYDRALAZINE HYDROCHLORIDE 20 MG: 20 INJECTION INTRAMUSCULAR; INTRAVENOUS at 16:22

## 2024-08-10 RX ADMIN — LORAZEPAM 0.5 MG: 2 LIQUID ORAL at 22:12

## 2024-08-10 RX ADMIN — Medication 2.5 MG: at 22:12

## 2024-08-10 RX ADMIN — SENNOSIDES AND DOCUSATE SODIUM 2 TABLET: 50; 8.6 TABLET ORAL at 10:00

## 2024-08-10 RX ADMIN — Medication 10 ML: at 10:00

## 2024-08-10 NOTE — PLAN OF CARE
Goal Outcome Evaluation:      Pt remains hypertensive; PRN hydralazine administered X1. Bladder scan completed; order parameters not met for FC. Pt is voiding spontaneously; denies pain. Family remains bs and very supportive of pt. Pt appears to be resting comfortably with unlabored breathing. Will continue plan of comfort.

## 2024-08-10 NOTE — PROGRESS NOTES
Name: Marcial Bernard ADMIT: 2024   : 1955  PCP: Provider, No Known    MRN: [id number] LOS: 18 days   AGE/SEX: 69 y.o. male  ROOM: [facility]     Subjective   Subjective   Family bedside.   utilized.  All questions answered.  Patient about to sit up and eat breakfast.  More interactive today than seen in the past few days.  Denies any pain.         Objective   Objective   Vital Signs  Temp:  [98 °F (36.7 °C)-99.5 °F (37.5 °C)] 98.6 °F (37 °C)  Heart Rate:  [63-75] 75  Resp:  [14-18] 14  BP: (165-213)/(61-87) 190/70  SpO2:  [96 %-98 %] 97 %  on   ;   Device (Oxygen Therapy): room air  Body mass index is 23.57 kg/m².  Physical Exam  Vitals and nursing note reviewed.   Constitutional:       General: He is not in acute distress.     Comments: Elderly, frail   Cardiovascular:      Rate and Rhythm: Normal rate and regular rhythm.   Pulmonary:      Effort: Pulmonary effort is normal. No respiratory distress.   Abdominal:      General: Abdomen is flat. There is no distension.      Tenderness: There is no abdominal tenderness.   Musculoskeletal:         General: No swelling or deformity.   Skin:     General: Skin is warm and dry.   Neurological:      Mental Status: He is alert. Mental status is at baseline. He is disoriented.         Results Review     I reviewed the patient's new clinical results.  Results from last 7 days   Lab Units 24  0554 24  0656 24  0624  0647   WBC 10*3/mm3 7.13 7.09 7.41 5.97   HEMOGLOBIN g/dL 9.9* 9.6* 9.5* 9.5*   PLATELETS 10*3/mm3 351 333 321 322     Results from last 7 days   Lab Units 24  0554 24  0656 24  0612 24  0647   SODIUM mmol/L 145 145 144 147*   POTASSIUM mmol/L 3.4* 3.4* 3.3* 3.4*   CHLORIDE mmol/L 108* 106 105 103   CO2 mmol/L 24.7 24.0 27.0 27.1   BUN mg/dL 101* 109* 110* 101*   CREATININE mg/dL 4.85* 4.81* 5.38* 4.80*   GLUCOSE mg/dL 129* 141* 142* 126*   Estimated Creatinine Clearance: 14.5 mL/min (A) (by  C-G formula based on SCr of 4.85 mg/dL (H)).  Results from last 7 days   Lab Units 08/08/24  0554 08/07/24  0656 08/06/24  0612 08/04/24  0455   ALBUMIN g/dL 3.4* 3.3* 3.2* 3.2*     Results from last 7 days   Lab Units 08/08/24  0554 08/07/24  0656 08/06/24  0612 08/05/24  0647 08/04/24  0455   CALCIUM mg/dL 10.1 9.7 9.7 9.5 9.4   ALBUMIN g/dL 3.4* 3.3* 3.2*  --  3.2*   MAGNESIUM mg/dL 2.9* 2.8* 2.7*  --   --    PHOSPHORUS mg/dL 5.8* 5.5* 6.5*  --  6.4*       COVID19   Date Value Ref Range Status   07/23/2024 Not Detected Not Detected - Ref. Range Final     Glucose   Date/Time Value Ref Range Status   08/08/2024 1119 155 (H) 70 - 130 mg/dL Final   08/08/2024 0602 131 (H) 70 - 130 mg/dL Final   08/07/2024 2010 193 (H) 70 - 130 mg/dL Final   08/07/2024 1612 141 (H) 70 - 130 mg/dL Final   08/07/2024 1056 181 (H) 70 - 130 mg/dL Final       CT Head Without Contrast  CT HEAD WO CONTRAST-     HISTORY:  dementia and altered mental status; N18.4-Chronic kidney  disease, stage 4 (severe); E87.6-Igxu-zzxdfheblj and hyponatremia;  I50.1-Left ventricular failure, unspecified; N18.4-Chronic kidney  disease, stage 4 (severe); D63.1-Anemia in chronic kidney disease;  R79.89-Other specified abnormal findings of blood chemistry     COMPARISON: None     FINDINGS: The brain and ventricles are symmetrical. There is mild to  moderate diffuse atrophy with secondary ventriculomegaly. Extensive  vascular calcification is noted. No focal area of decreased attenuation  to suggest acute infarction is identified. Areas of decreased  attenuation are appreciated involving the white matter of cerebral  hemispheres bilaterally consistent with moderate to severe small vessel  ischemic disease. Further evaluation could be performed with a MRI  examination of the brain as indicated.     This report was finalized on 7/31/2024 2:18 PM by Dr. Michel Patiño M.D  on Workstation: [id number]       I reviewed the patient's daily medications.  Scheduled  Medications  amLODIPine, 10 mg, Oral, Q24H  carvedilol, 25 mg, Oral, BID With Meals  cloNIDine, 1 patch, Transdermal, Weekly  hydrALAZINE, 100 mg, Oral, Q8H  levothyroxine, 100 mcg, Oral, Daily  melatonin, 2.5 mg, Oral, Nightly  senna-docusate sodium, 2 tablet, Oral, BID  sodium chloride, 10 mL, Intravenous, Q12H    Infusions   Diet  Diet: Regular/House, Cardiac, Fluid Restriction (240 mL/tray), Diabetic; Low Sodium (2g); Consistent Carbohydrate; 1500 mL/day; Texture: Regular (IDDSI 7); Fluid Consistency: Thin (IDDSI 0)         I have personally reviewed:  []  Laboratory   []  Microbiology   []  Radiology   []  EKG/Telemetry   []  Cardiology/Vascular   []  Pathology   []  Records     Assessment/Plan     Active Hospital Problems    Diagnosis  POA    **Chronic renal failure (CRF), stage 4 (severe) [N18.4]  Unknown    Dementia [F03.90]  Unknown    Hyponatremia [E87.1]  Unknown    Acute pulmonary edema with congestive heart failure [I50.1]  Unknown    Anemia due to stage 4 chronic kidney disease [N18.4, D63.1]  Unknown    Elevated troponin level not due myocardial infarction [R79.89]  Unknown      Resolved Hospital Problems   No resolved problems to display.       69 y.o. male admitted with Chronic renal failure (CRF), stage 4 (severe).    Goals of care/comfort care status-comfort focused care.  Currently continuing hypertension medications.  Hospice consulted.  Comfort care order set initiated.  Consider Cheema catheter in the next couple days if needed    Other problems prior to transition to comfort care:    LIDIA on CKD 4  -Renal consulted. Holding diuretic  -Creatinine improved, BUN unchanged, appears euvolemic  -Renal discussed with family that patient currently does not need dialysis and that he is a very poor candidate for dialysis given his severe dementia and recommended consideration of palliative care    Acute on chronic diastolic heart failure  -Improved with diuretics    Diabetes type 2, A1c 5.3%  -continue  SSI    Hypertension  Sinus bradycardia  -Continue clonidine patch, Coreg, amlodipine, hydralazine    Dementia  -Mentation has been stable but worse overall since admission.         SCDs for DVT prophylaxis.  Limited code (no CPR, no intubation).  Discussed with patient, family, and nursing staff.  Anticipate discharge  TBD  timing yet to be determined.    Expected Discharge Date: 8/14/2024; Expected Discharge Time:       Lg Mera MD  [facility]  08/10/24  09:19 EDT

## 2024-08-10 NOTE — PLAN OF CARE
Goal Outcome Evaluation:           Progress: no change  Outcome Evaluation: Pt hypertensive this shift, PRN hydralazine given. Pt took pills crushed in applesauce. Family bedside and incredibly supportive of paitent. Bladder scan completed as ordered. No needs at this time, plan of care ongoing.

## 2024-08-11 PROCEDURE — 25010000002 HYDROMORPHONE PER 4 MG: Performed by: STUDENT IN AN ORGANIZED HEALTH CARE EDUCATION/TRAINING PROGRAM

## 2024-08-11 PROCEDURE — 25010000002 HYDRALAZINE PER 20 MG: Performed by: INTERNAL MEDICINE

## 2024-08-11 RX ORDER — MORPHINE SULFATE 20 MG/ML
10 SOLUTION ORAL
Status: DISCONTINUED | OUTPATIENT
Start: 2024-08-11 | End: 2024-08-13 | Stop reason: HOSPADM

## 2024-08-11 RX ORDER — CLONIDINE 0.2 MG/24H
1 PATCH, EXTENDED RELEASE TRANSDERMAL WEEKLY
Status: DISCONTINUED | OUTPATIENT
Start: 2024-08-11 | End: 2024-08-13

## 2024-08-11 RX ORDER — MORPHINE SULFATE 10 MG/ML
6 INJECTION INTRAMUSCULAR; INTRAVENOUS; SUBCUTANEOUS
Status: DISCONTINUED | OUTPATIENT
Start: 2024-08-11 | End: 2024-08-13 | Stop reason: HOSPADM

## 2024-08-11 RX ORDER — HYDROMORPHONE HYDROCHLORIDE 2 MG/ML
1.5 INJECTION, SOLUTION INTRAMUSCULAR; INTRAVENOUS; SUBCUTANEOUS
Status: DISCONTINUED | OUTPATIENT
Start: 2024-08-11 | End: 2024-08-13 | Stop reason: HOSPADM

## 2024-08-11 RX ORDER — HYDROMORPHONE HYDROCHLORIDE 1 MG/ML
0.5 INJECTION, SOLUTION INTRAMUSCULAR; INTRAVENOUS; SUBCUTANEOUS
Status: DISCONTINUED | OUTPATIENT
Start: 2024-08-11 | End: 2024-08-13 | Stop reason: HOSPADM

## 2024-08-11 RX ORDER — MORPHINE SULFATE 4 MG/ML
4 INJECTION, SOLUTION INTRAMUSCULAR; INTRAVENOUS
Status: DISCONTINUED | OUTPATIENT
Start: 2024-08-11 | End: 2024-08-13 | Stop reason: HOSPADM

## 2024-08-11 RX ORDER — KETOROLAC TROMETHAMINE 15 MG/ML
15 INJECTION, SOLUTION INTRAMUSCULAR; INTRAVENOUS EVERY 6 HOURS PRN
Status: DISCONTINUED | OUTPATIENT
Start: 2024-08-11 | End: 2024-08-13 | Stop reason: HOSPADM

## 2024-08-11 RX ORDER — MORPHINE SULFATE 2 MG/ML
2 INJECTION, SOLUTION INTRAMUSCULAR; INTRAVENOUS
Status: DISCONTINUED | OUTPATIENT
Start: 2024-08-11 | End: 2024-08-13 | Stop reason: HOSPADM

## 2024-08-11 RX ORDER — MORPHINE SULFATE 20 MG/ML
20 SOLUTION ORAL
Status: DISCONTINUED | OUTPATIENT
Start: 2024-08-11 | End: 2024-08-13 | Stop reason: HOSPADM

## 2024-08-11 RX ORDER — MORPHINE SULFATE 20 MG/ML
5 SOLUTION ORAL
Status: DISCONTINUED | OUTPATIENT
Start: 2024-08-11 | End: 2024-08-13 | Stop reason: HOSPADM

## 2024-08-11 RX ADMIN — HYDRALAZINE HYDROCHLORIDE 100 MG: 50 TABLET ORAL at 00:35

## 2024-08-11 RX ADMIN — AMLODIPINE BESYLATE 10 MG: 10 TABLET ORAL at 09:11

## 2024-08-11 RX ADMIN — ACETAMINOPHEN 325MG 650 MG: 325 TABLET ORAL at 21:46

## 2024-08-11 RX ADMIN — Medication 10 ML: at 09:06

## 2024-08-11 RX ADMIN — HYDRALAZINE HYDROCHLORIDE 100 MG: 50 TABLET ORAL at 09:00

## 2024-08-11 RX ADMIN — LORAZEPAM 0.5 MG: 2 LIQUID ORAL at 11:12

## 2024-08-11 RX ADMIN — CARVEDILOL 25 MG: 25 TABLET, FILM COATED ORAL at 17:16

## 2024-08-11 RX ADMIN — Medication 10 ML: at 21:47

## 2024-08-11 RX ADMIN — HYDROMORPHONE HYDROCHLORIDE 0.5 MG: 1 INJECTION, SOLUTION INTRAMUSCULAR; INTRAVENOUS; SUBCUTANEOUS at 11:12

## 2024-08-11 RX ADMIN — CARVEDILOL 25 MG: 25 TABLET, FILM COATED ORAL at 09:00

## 2024-08-11 RX ADMIN — HYDRALAZINE HYDROCHLORIDE 100 MG: 50 TABLET ORAL at 21:46

## 2024-08-11 RX ADMIN — HYDRALAZINE HYDROCHLORIDE 20 MG: 20 INJECTION INTRAMUSCULAR; INTRAVENOUS at 06:20

## 2024-08-11 RX ADMIN — LEVOTHYROXINE SODIUM 100 MCG: 100 TABLET ORAL at 09:06

## 2024-08-11 RX ADMIN — Medication 10 ML: at 11:12

## 2024-08-11 RX ADMIN — SENNOSIDES AND DOCUSATE SODIUM 2 TABLET: 50; 8.6 TABLET ORAL at 21:46

## 2024-08-11 RX ADMIN — Medication 5 MG: at 21:46

## 2024-08-11 RX ADMIN — SENNOSIDES AND DOCUSATE SODIUM 2 TABLET: 50; 8.6 TABLET ORAL at 09:06

## 2024-08-11 RX ADMIN — HYDRALAZINE HYDROCHLORIDE 100 MG: 50 TABLET ORAL at 17:16

## 2024-08-11 NOTE — PROGRESS NOTES
Name: Marcial Bernard ADMIT: 2024   : 1955  PCP: Provider, No Known    MRN: [id number] LOS: 19 days   AGE/SEX: 69 y.o. male  ROOM: [facility]     Subjective   Subjective   Family at bedside.   utilized.  All questions answered.  Patient's blood pressure has been elevated.  Patient has not been sleeping well and family requesting increase in melatonin.   family also requesting way to manage urinary incontinence         Objective   Objective   Vital Signs  Temp:  [97 °F (36.1 °C)-98.3 °F (36.8 °C)] 97 °F (36.1 °C)  Heart Rate:  [64-88] 77  Resp:  [16] 16  BP: (118-178)/(53-77) 152/73  SpO2:  [97 %] 97 %  on   ;   Device (Oxygen Therapy): room air  Body mass index is 23.57 kg/m².  Physical Exam  Vitals and nursing note reviewed.   Constitutional:       General: He is not in acute distress.     Comments: Elderly, frail   Cardiovascular:      Rate and Rhythm: Normal rate and regular rhythm.   Pulmonary:      Effort: Pulmonary effort is normal. No respiratory distress.   Abdominal:      General: Abdomen is flat. There is no distension.      Tenderness: There is no abdominal tenderness.   Musculoskeletal:         General: No swelling or deformity.   Skin:     General: Skin is warm and dry.   Neurological:      Mental Status: He is alert. Mental status is at baseline. He is disoriented.         Results Review     I reviewed the patient's new clinical results.  Results from last 7 days   Lab Units 24  0554 24  0656 24  0612 24  0647   WBC 10*3/mm3 7.13 7.09 7.41 5.97   HEMOGLOBIN g/dL 9.9* 9.6* 9.5* 9.5*   PLATELETS 10*3/mm3 351 333 321 322     Results from last 7 days   Lab Units 24  0554 24  0656 24  0612 24  0647   SODIUM mmol/L 145 145 144 147*   POTASSIUM mmol/L 3.4* 3.4* 3.3* 3.4*   CHLORIDE mmol/L 108* 106 105 103   CO2 mmol/L 24.7 24.0 27.0 27.1   BUN mg/dL 101* 109* 110* 101*   CREATININE mg/dL 4.85* 4.81* 5.38* 4.80*   GLUCOSE mg/dL 129* 141*  142* 126*   Estimated Creatinine Clearance: 14.5 mL/min (A) (by C-G formula based on SCr of 4.85 mg/dL (H)).  Results from last 7 days   Lab Units 08/08/24  0554 08/07/24  0656 08/06/24  0612   ALBUMIN g/dL 3.4* 3.3* 3.2*     Results from last 7 days   Lab Units 08/08/24  0554 08/07/24  0656 08/06/24  0612 08/05/24  0647   CALCIUM mg/dL 10.1 9.7 9.7 9.5   ALBUMIN g/dL 3.4* 3.3* 3.2*  --    MAGNESIUM mg/dL 2.9* 2.8* 2.7*  --    PHOSPHORUS mg/dL 5.8* 5.5* 6.5*  --        COVID19   Date Value Ref Range Status   07/23/2024 Not Detected Not Detected - Ref. Range Final     Glucose   Date/Time Value Ref Range Status   08/08/2024 1119 155 (H) 70 - 130 mg/dL Final       CT Head Without Contrast  CT HEAD WO CONTRAST-     HISTORY:  dementia and altered mental status; N18.4-Chronic kidney  disease, stage 4 (severe); E87.0-Upmh-nsjvbjkasz and hyponatremia;  I50.1-Left ventricular failure, unspecified; N18.4-Chronic kidney  disease, stage 4 (severe); D63.1-Anemia in chronic kidney disease;  R79.89-Other specified abnormal findings of blood chemistry     COMPARISON: None     FINDINGS: The brain and ventricles are symmetrical. There is mild to  moderate diffuse atrophy with secondary ventriculomegaly. Extensive  vascular calcification is noted. No focal area of decreased attenuation  to suggest acute infarction is identified. Areas of decreased  attenuation are appreciated involving the white matter of cerebral  hemispheres bilaterally consistent with moderate to severe small vessel  ischemic disease. Further evaluation could be performed with a MRI  examination of the brain as indicated.     This report was finalized on 7/31/2024 2:18 PM by Dr. Michel Patiño M.D  on Workstation: [id number]       I reviewed the patient's daily medications.  Scheduled Medications  amLODIPine, 10 mg, Oral, Q24H  carvedilol, 25 mg, Oral, BID With Meals  cloNIDine, 1 patch, Transdermal, Weekly  hydrALAZINE, 100 mg, Oral, Q8H  levothyroxine, 100 mcg,  Oral, Daily  melatonin, 5 mg, Oral, Nightly  senna-docusate sodium, 2 tablet, Oral, BID  sodium chloride, 10 mL, Intravenous, Q12H    Infusions   Diet  Diet: Regular/House, Cardiac, Fluid Restriction (240 mL/tray), Diabetic; Low Sodium (2g); Consistent Carbohydrate; 1500 mL/day; Texture: Regular (IDDSI 7); Fluid Consistency: Thin (IDDSI 0)         I have personally reviewed:  []  Laboratory   []  Microbiology   []  Radiology   []  EKG/Telemetry   []  Cardiology/Vascular   []  Pathology   []  Records     Assessment/Plan     Active Hospital Problems    Diagnosis  POA    **Chronic renal failure (CRF), stage 4 (severe) [N18.4]  Unknown    Dementia [F03.90]  Unknown    Hyponatremia [E87.1]  Unknown    Acute pulmonary edema with congestive heart failure [I50.1]  Unknown    Anemia due to stage 4 chronic kidney disease [N18.4, D63.1]  Unknown    Elevated troponin level not due myocardial infarction [R79.89]  Unknown      Resolved Hospital Problems   No resolved problems to display.       69 y.o. male admitted with Chronic renal failure (CRF), stage 4 (severe).    Goals of care/comfort care status-comfort focused care.  Currently continuing hypertension medications.  Increase clonidine patch to 0.2 mg hospice consulted.  Comfort care order set initiated.  Place Cheema catheter for comfort care.  Increase melatonin to 5 mg    Other problems prior to transition to comfort care:    LIDIA on CKD 4  -Renal consulted. Holding diuretic  -Creatinine improved, BUN unchanged, appears euvolemic  -Renal discussed with family that patient currently does not need dialysis and that he is a very poor candidate for dialysis given his severe dementia and recommended consideration of palliative care    Acute on chronic diastolic heart failure  -Improved with diuretics    Diabetes type 2, A1c 5.3%  -continue SSI    Hypertension  Sinus bradycardia  -Continue clonidine patch, Coreg, amlodipine, hydralazine    Dementia  -Mentation has been stable but  worse overall since admission.         SCDs for DVT prophylaxis.  Limited code (no CPR, no intubation).  Discussed with patient, family, and nursing staff.  Anticipate discharge  TBD  timing yet to be determined.    Expected Discharge Date: 8/14/2024; Expected Discharge Time:       Lg Mera MD  [facility]  08/11/24  10:20 EDT

## 2024-08-11 NOTE — PLAN OF CARE
Goal Outcome Evaluation:      Pt resting comfortably and tolerating turns. Pt denies pain. Cheema catheter placed per MD orders. Premedicated before FC placement with 0.5mg IV dilaudid and 0.5mg SL ativan X1. Family remains bs and very supportive of pt. Pt appears to be resting comfortably with unlabored breathing. Will continue plan of comfort.

## 2024-08-11 NOTE — PLAN OF CARE
Goal Outcome Evaluation:           Progress: no change  Outcome Evaluation: Pt resting well this shift. PRN ativan 0.5mg sublingual given once. Patient hypertensive but scheduled hydralazine given. No needs at this time, plan of care ongoing.

## 2024-08-12 RX ADMIN — HYDRALAZINE HYDROCHLORIDE 100 MG: 50 TABLET ORAL at 22:33

## 2024-08-12 RX ADMIN — CARVEDILOL 25 MG: 25 TABLET, FILM COATED ORAL at 18:03

## 2024-08-12 RX ADMIN — LEVOTHYROXINE SODIUM 100 MCG: 100 TABLET ORAL at 09:39

## 2024-08-12 RX ADMIN — AMLODIPINE BESYLATE 10 MG: 10 TABLET ORAL at 09:38

## 2024-08-12 RX ADMIN — HYDRALAZINE HYDROCHLORIDE 100 MG: 50 TABLET ORAL at 18:04

## 2024-08-12 RX ADMIN — Medication 5 MG: at 21:27

## 2024-08-12 RX ADMIN — Medication 10 ML: at 21:27

## 2024-08-12 RX ADMIN — HYDRALAZINE HYDROCHLORIDE 100 MG: 50 TABLET ORAL at 09:39

## 2024-08-12 RX ADMIN — Medication 10 ML: at 09:40

## 2024-08-12 RX ADMIN — ACETAMINOPHEN 325MG 650 MG: 325 TABLET ORAL at 21:27

## 2024-08-12 RX ADMIN — CARVEDILOL 25 MG: 25 TABLET, FILM COATED ORAL at 09:39

## 2024-08-12 NOTE — PROGRESS NOTES
Name: Marcial Bernard ADMIT: 2024   : 1955  PCP: Provider, No Known    MRN: [id number] LOS: 20 days   AGE/SEX: 69 y.o. male  ROOM: [facility]     Subjective   Subjective   Family at bedside.   utilized.  All questions answered.  Patient slept very well overnight, currently still currently sleeping.  Patient much more comfortable with Cheema catheter.         Objective   Objective   Vital Signs  Temp:  [97.1 °F (36.2 °C)-97.2 °F (36.2 °C)] 97.2 °F (36.2 °C)  Heart Rate:  [58-70] 68  Resp:  [14-16] 14  BP: (150-199)/(65-95) 185/75  SpO2:  [97 %-98 %] 97 %  on   ;   Device (Oxygen Therapy): room air  Body mass index is 23.57 kg/m².  Physical Exam  Vitals and nursing note reviewed.   Constitutional:       General: He is not in acute distress.     Comments: Elderly, frail   Cardiovascular:      Rate and Rhythm: Normal rate and regular rhythm.   Pulmonary:      Effort: Pulmonary effort is normal. No respiratory distress.   Abdominal:      General: Abdomen is flat. There is no distension.      Tenderness: There is no abdominal tenderness.   Musculoskeletal:         General: No swelling or deformity.   Skin:     General: Skin is warm and dry.   Neurological:      Mental Status: He is alert. Mental status is at baseline. He is disoriented.         Results Review     I reviewed the patient's new clinical results.  Results from last 7 days   Lab Units 24  0554 24  0656 24  0612   WBC 10*3/mm3 7.13 7.09 7.41   HEMOGLOBIN g/dL 9.9* 9.6* 9.5*   PLATELETS 10*3/mm3 351 333 321     Results from last 7 days   Lab Units 24  0554 24  0656 24  0612   SODIUM mmol/L 145 145 144   POTASSIUM mmol/L 3.4* 3.4* 3.3*   CHLORIDE mmol/L 108* 106 105   CO2 mmol/L 24.7 24.0 27.0   BUN mg/dL 101* 109* 110*   CREATININE mg/dL 4.85* 4.81* 5.38*   GLUCOSE mg/dL 129* 141* 142*   Estimated Creatinine Clearance: 14.5 mL/min (A) (by C-G formula based on SCr of 4.85 mg/dL (H)).  Results from last 7  "days   Lab Units 08/08/24  0554 08/07/24  0656 08/06/24  0612   ALBUMIN g/dL 3.4* 3.3* 3.2*     Results from last 7 days   Lab Units 08/08/24  0554 08/07/24  0656 08/06/24  0612   CALCIUM mg/dL 10.1 9.7 9.7   ALBUMIN g/dL 3.4* 3.3* 3.2*   MAGNESIUM mg/dL 2.9* 2.8* 2.7*   PHOSPHORUS mg/dL 5.8* 5.5* 6.5*       COVID19   Date Value Ref Range Status   07/23/2024 Not Detected Not Detected - Ref. Range Final     No results found for: \"HGBA1C\", \"POCGLU\"      CT Head Without Contrast  CT HEAD WO CONTRAST-     HISTORY:  dementia and altered mental status; N18.4-Chronic kidney  disease, stage 4 (severe); E87.9-Iwvj-xsedjkpxqm and hyponatremia;  I50.1-Left ventricular failure, unspecified; N18.4-Chronic kidney  disease, stage 4 (severe); D63.1-Anemia in chronic kidney disease;  R79.89-Other specified abnormal findings of blood chemistry     COMPARISON: None     FINDINGS: The brain and ventricles are symmetrical. There is mild to  moderate diffuse atrophy with secondary ventriculomegaly. Extensive  vascular calcification is noted. No focal area of decreased attenuation  to suggest acute infarction is identified. Areas of decreased  attenuation are appreciated involving the white matter of cerebral  hemispheres bilaterally consistent with moderate to severe small vessel  ischemic disease. Further evaluation could be performed with a MRI  examination of the brain as indicated.     This report was finalized on 7/31/2024 2:18 PM by Dr. Michel Patiño M.D  on Workstation: [id number]       I reviewed the patient's daily medications.  Scheduled Medications  amLODIPine, 10 mg, Oral, Q24H  carvedilol, 25 mg, Oral, BID With Meals  cloNIDine, 1 patch, Transdermal, Weekly  hydrALAZINE, 100 mg, Oral, Q8H  levothyroxine, 100 mcg, Oral, Daily  melatonin, 5 mg, Oral, Nightly  senna-docusate sodium, 2 tablet, Oral, BID  sodium chloride, 10 mL, Intravenous, Q12H    Infusions   Diet  Diet: Regular/House, Cardiac, Fluid Restriction (240 " mL/tray), Diabetic; Low Sodium (2g); Consistent Carbohydrate; 1500 mL/day; Texture: Regular (IDDSI 7); Fluid Consistency: Thin (IDDSI 0)         I have personally reviewed:  []  Laboratory   []  Microbiology   []  Radiology   []  EKG/Telemetry   []  Cardiology/Vascular   []  Pathology   []  Records     Assessment/Plan     Active Hospital Problems    Diagnosis  POA    **Chronic renal failure (CRF), stage 4 (severe) [N18.4]  Unknown    Dementia [F03.90]  Unknown    Hyponatremia [E87.1]  Unknown    Acute pulmonary edema with congestive heart failure [I50.1]  Unknown    Anemia due to stage 4 chronic kidney disease [N18.4, D63.1]  Unknown    Elevated troponin level not due myocardial infarction [R79.89]  Unknown      Resolved Hospital Problems   No resolved problems to display.       69 y.o. male admitted with Chronic renal failure (CRF), stage 4 (severe).    Goals of care/comfort care status-comfort focused care.  Currently continuing hypertension medications.  Hospice evaluated, patient plans to go home with family tomorrow with hospice.  Comfort care order set initiated.  Place Cheema catheter for comfort care.     Other problems prior to transition to comfort care:    LIDIA on CKD 4  -Renal consulted. Holding diuretic  -Creatinine improved, BUN unchanged, appears euvolemic  -Renal discussed with family that patient currently does not need dialysis and that he is a very poor candidate for dialysis given his severe dementia and recommended consideration of palliative care    Acute on chronic diastolic heart failure  -Improved with diuretics    Diabetes type 2, A1c 5.3%  -continue SSI    Hypertension  Sinus bradycardia  -Continue clonidine patch, Coreg, amlodipine, hydralazine    Dementia  -Mentation has been stable but worse overall since admission.         SCDs for DVT prophylaxis.  Limited code (no CPR, no intubation).  Discussed with patient, family, and nursing staff.  Anticipate discharge  home with hospice   tomorrow.    Expected Discharge Date: 8/13/2024; Expected Discharge Time:       Lg Mera MD  [facility]  08/12/24  13:43 EDT

## 2024-08-12 NOTE — PROGRESS NOTES
Discharge Planning Assessment  [facility]     Patient Name: Marcial Bernard  MRN: [id number]  Today's Date: 8/12/2024    Admit Date: 7/23/2024    Plan: [facility] just met with the family and the discharge plans are home with [facility] at discharge. CLEMENTE Rodriguez RN, CCP.   Discharge Needs Assessment    No documentation.                  Discharge Plan       Row Name 08/12/24 1159       Plan    Plan Comments Ambulance set up for 13:30 tomorrow with [facility]. CLEMENTE Rodriguez RN, CCP.      Row Name 08/12/24 1146       Plan    Plan Comments I have been talking with the family. They would like to be discharged tomorrow so that they can get the house arranged for the hospital bed that is being ordered. The patient will need ambulance set up for tomorrow in which I will do. [facility] is scheduled to come tomorrow at 10:30 to assist with the discharge and order DME. [facility] MD will order only the symptom mgmt medication tomorrow if okay with MD. The family does not have a ramp to get the patient into the home. Ambulance DNR completed and in CCP office.Discharge plan is home with [facility] tomorrow.  CLEMENTE Rodriguez RN, CCP      Row Name 08/12/24 0955       Plan    Plan [facility] just met with the family and the discharge plans are home with [facility] at discharge. CLEMENTE Rodriguez RN, CCP.    Plan Comments [facility] just met with the family and the discharge plans are home with [facility] at discharge. CCP will continue to follow. CLEMENTE Rodriguez RN, CCP.                  Continued Care and Services - Admitted Since 7/23/2024       Home Medical Care       Service Provider Request Status Selected Services Address Phone Fax Patient Preferred    [facility]  Selected Home Hospice [address], [facility] [address] [phone] --                  Expected Discharge Date and Time       Expected Discharge Date Expected Discharge Time    Aug 13, 2024            Demographic Summary    No documentation.                   Functional Status    No documentation.                  Psychosocial    No documentation.                  Abuse/Neglect    No documentation.                  Legal    No documentation.                  Substance Abuse    No documentation.                  Patient Forms    No documentation.                     Kim Rodriguez RN

## 2024-08-12 NOTE — PROGRESS NOTES
Discharge Planning Assessment  [facility]     Patient Name: Marcial Bernard  MRN: [id number]  Today's Date: 8/12/2024    Admit Date: 7/23/2024    Plan: [facility] just met with the family and the discharge plans are home with [facility] at discharge. CLEMENTE Rodriguez RN, CCP.   Discharge Needs Assessment    No documentation.                  Discharge Plan       Row Name 08/12/24 0955       Plan    Plan [facility] just met with the family and the discharge plans are home with [facility] at discharge. CLEMENTE Rodriguez RN, CCP.    Plan Comments [facility] just met with the family and the discharge plans are home with [facility] at discharge. CCP will continue to follow. CLEMENTE Rodriguez RN, CCP.                  Continued Care and Services - Admitted Since 7/23/2024    No active coordination exists for this encounter.       Expected Discharge Date and Time       Expected Discharge Date Expected Discharge Time    Aug 14, 2024            Demographic Summary    No documentation.                  Functional Status    No documentation.                  Psychosocial    No documentation.                  Abuse/Neglect    No documentation.                  Legal    No documentation.                  Substance Abuse    No documentation.                  Patient Forms    No documentation.                     Kim Rodriguez RN

## 2024-08-12 NOTE — CONSULTS
\[facility]\"" Visit Report    Marcial Bernard  [id number]  8/12/2024    \[facility]\"" consult received and records reviewed with \[facility]\"" medical officer Dr Bessie Silva. Patient is medically eligible for services at discharge--    Met with patient's daughter Asia Bernard at bedside.  Dani, [id number] provided service. Detailed explanation of services provided for routine home services. Daughter's wishes are for patient to return to her home at [address], [address].    \[facility]\"" will continue to follow up remotely/daily for discharge plan and meet again with Asia to order needed DME/meds prior to discharge.     Please call with any updates or change in care needs.    Thank you for allowing \[facility]\"" to participate with this patient's and family care needs.    Mel Sow RN   \[facility]\"" Admissions Coordinator  [phone]

## 2024-08-12 NOTE — PLAN OF CARE
Goal Outcome Evaluation:  Plan of Care Reviewed With: patient, spouse, daughter, family        Progress: no change  Outcome Evaluation: Pt appeared to sleep well between care overnight. Medications crushed in applesauce. Tylenol x1 for c/o headache. BM this morning. Will contine to monitor for comfort.

## 2024-08-12 NOTE — PROGRESS NOTES
Discharge Planning Assessment  [facility]     Patient Name: Marcial Bernard  MRN: [id number]  Today's Date: 8/12/2024    Admit Date: 7/23/2024    Plan: [facility] just met with the family and the discharge plans are home with [facility] at discharge. CLEMENTE Rodriguez RN, CCP.   Discharge Needs Assessment    No documentation.                  Discharge Plan       Row Name 08/12/24 1146       Plan    Plan Comments I have been talking with the family. They would like to be discharged tomorrow so that they can get the house arranged for the hospital bed that is being ordered. The patient will need ambulance set up for tomorrow in which I will do. [facility] is scheduled to come tomorrow at 10:30 to assist with the discharge and order DME. [facility] MD will order only the symptom mgmt medication tomorrow if okay with MD. The family does not have a ramp to get the patient into the home. Ambulance DNR completed and in CCP office.Discharge plan is home with [facility] tomorrow.  CLEMENTE Rodriguez RN, CCP      Row Name 08/12/24 0955       Plan    Plan [facility] just met with the family and the discharge plans are home with [facility] at discharge. CLEMENTE Rodriguez RN, CCP.    Plan Comments [facility] just met with the family and the discharge plans are home with [facility] at discharge. CCP will continue to follow. CLEMENTE Rodriguez RN, CCP.                  Continued Care and Services - Admitted Since 7/23/2024       Home Medical Care       Service Provider Request Status Selected Services Address Phone Fax Patient Preferred    [facility]  [facility] [address] [facility] [address] [phone] --                  Expected Discharge Date and Time       Expected Discharge Date Expected Discharge Time    Aug 13, 2024            Demographic Summary    No documentation.                  Functional Status    No documentation.                  Psychosocial    No documentation.                  Abuse/Neglect    No  documentation.                  Legal    No documentation.                  Substance Abuse    No documentation.                  Patient Forms    No documentation.                     Kim Rodriguez RN

## 2024-08-12 NOTE — PLAN OF CARE
Goal Outcome Evaluation:  Plan of Care Reviewed With: family, spouse        Progress: no change  Outcome Evaluation: Pt alert but not oriented. Pt up to chair and back to bed throughout day. Able to transfer to chair and BSC x 2 asst. Multiple BMs today. Pt maintains good appetite. Pt makes no complaints of pain. Pt and family voices no needs at this time.

## 2024-08-13 VITALS
TEMPERATURE: 97.2 F | SYSTOLIC BLOOD PRESSURE: 215 MMHG | OXYGEN SATURATION: 99 % | HEART RATE: 79 BPM | HEIGHT: 69 IN | RESPIRATION RATE: 18 BRPM | BODY MASS INDEX: 23.64 KG/M2 | DIASTOLIC BLOOD PRESSURE: 98 MMHG | WEIGHT: 159.61 LBS

## 2024-08-13 PROBLEM — Z66 DNR (DO NOT RESUSCITATE): Status: ACTIVE | Noted: 2024-08-13

## 2024-08-13 PROBLEM — Z51.5 HOSPICE CARE PATIENT: Status: ACTIVE | Noted: 2024-08-13

## 2024-08-13 RX ORDER — HYDRALAZINE HYDROCHLORIDE 100 MG/1
100 TABLET, FILM COATED ORAL EVERY 8 HOURS SCHEDULED
Qty: 90 TABLET | Refills: 0 | Status: SHIPPED | OUTPATIENT
Start: 2024-08-13 | End: 2024-09-12

## 2024-08-13 RX ORDER — CLONIDINE 0.2 MG/24H
1 PATCH, EXTENDED RELEASE TRANSDERMAL WEEKLY
Qty: 4 PATCH | Refills: 0 | Status: SHIPPED | OUTPATIENT
Start: 2024-08-20 | End: 2024-09-19

## 2024-08-13 RX ORDER — AMOXICILLIN 250 MG
2 CAPSULE ORAL 2 TIMES DAILY
Qty: 120 TABLET | Refills: 0 | Status: SHIPPED | OUTPATIENT
Start: 2024-08-13 | End: 2024-09-12

## 2024-08-13 RX ORDER — CLONIDINE 0.2 MG/24H
1 PATCH, EXTENDED RELEASE TRANSDERMAL WEEKLY
Status: DISCONTINUED | OUTPATIENT
Start: 2024-08-13 | End: 2024-08-13 | Stop reason: HOSPADM

## 2024-08-13 RX ORDER — LORAZEPAM 2 MG/ML
0.5 CONCENTRATE ORAL EVERY 4 HOURS PRN
Qty: 30 ML | Refills: 0 | Status: SHIPPED | OUTPATIENT
Start: 2024-08-13

## 2024-08-13 RX ADMIN — AMLODIPINE BESYLATE 10 MG: 10 TABLET ORAL at 08:01

## 2024-08-13 RX ADMIN — LEVOTHYROXINE SODIUM 100 MCG: 100 TABLET ORAL at 08:02

## 2024-08-13 RX ADMIN — CLONIDINE 1 PATCH: 0.2 PATCH TRANSDERMAL at 08:02

## 2024-08-13 RX ADMIN — HYDRALAZINE HYDROCHLORIDE 100 MG: 50 TABLET ORAL at 05:10

## 2024-08-13 RX ADMIN — CARVEDILOL 25 MG: 25 TABLET, FILM COATED ORAL at 08:01

## 2024-08-13 RX ADMIN — LORAZEPAM 0.5 MG: 2 LIQUID ORAL at 01:14

## 2024-08-13 NOTE — PROGRESS NOTES
"Physicians Statement of Medical Necessity for  Ambulance Transportation    GENERAL INFORMATION     Name: Marcial Bernard  YOB: 1955  MEDICAID PENDING: YES  Transport Date: 8/13/24 (Valid for round trips this date, or for scheduled repetitive trips for 60 days from the date signed below.)  [facility]: [facility] [phone]   Destination: [address] [place], [place] [address] [place] [address] ([phone])  Is the Patient's stay covered under Medicare Part A (PPS/DRG?)No   Closest appropriate facility? Yes  If this a hosp-hosp transfer? No  Is this a hospice patient? No [facility] pre-admit and will finish the admit this evening once patient arrives at home.       ORIGINAL AMBULANCE DNR FORM ATTACHED TO THIS FORM. PLEASE LEAVE AT THE PATIENT'S HOUSE ONCE YOU HAVE TRANSFERRED.     MEDICAL NECESSITY QUESTIONAIRE    Ambulance Transportation is medically necessary only if other means of transportation are contraindicated or would be potentially harmful to the patient.  To meet this requirement, the patient must be either \"bed confined\" or suffer from a condition such that transport by means other than an ambulance is contraindicated by the patient's condition.  The following questions must be answered by the healthcare professional signing below for this form to be valid:     1) Describe the MEDICAL CONDITION (physical and/or mental) of this patient AT THE TIME OF AMBULANCE TRANSPORT that requires the patient to be transported in an ambulance, and why transport by other means is contraindicated by the patient's condition:    Past Medical History:   Diagnosis Date    Asthma     Dementia     Renal disorder       History reviewed. No pertinent surgical history.   2) Is this patient \"bed confined\" as defined below?Yes   To be \"bed confined\" the patient must satisfy all three of the following criteria:  (1) unable to get up from bed without assistance; AND (2) unable to ambulate;  AND (3) unable " to sit in a chair or wheelchair.  3) Can this patient safely be transported by car or wheelchair van (I.e., may safely sit during transport, without an attendant or monitoring?)No   4. In addition to completing questions 1-3 above, please check any of the following conditions that apply*:          *Note: supporting documentation for any boxes checked must be maintained in the patient's medical records Moderate/severe pain on movement patient is very weak and can not do the stairs (there are 3 steps to enter into the home)      SIGNATURE OF PHYSICIAN OR OTHER AUTHORIZED HEALTHCARE PROFESSIONAL    I certify that the above information is true and correct based on my evaluation of this patient, and represent that the patient requires transport by ambulance and that other forms of transport are contraindicated.  I understand that this information will be used by the Centers for Medicare and Medicaid Services (CMS) to support the determiniation of medical necessity for ambulance services, and I represent that I have personal knowledge of the patient's condition at the time of transport.        If this box is checked, I also certify that the patient is physically or mentally incapable of signing the ambulance service's claim form and that the institution with which I am affiliated has furnished care, services or assistance to the patient.  My signature below is made on behalf of the patient pursuant to 42 .36(b)(4). In accordance with 42 .37, the specific reason(s) that the patient is physically or mentally incapable of signing the claim for is as follows:      Signature of Physician or Healthcare Professional  Date/Time:   8/13/24 @ 13:00     (For Scheduled repetitive transport, this form is not valid for transports performed more than 60 days after this date).    Kim Rodriguez, JACOBN, RN, CDP, CCP                                                                                                                                             -----------------------------------------------------------  Printed Name and Credentials of Physician or Authorized Healthcare Professional     *Form must be signed by patient's attending physician for scheduled, repetitive transports,.  For non-repetitive ambulance transports, if unable to obtain the signature of the attending physician, any of the following may sign (please select below):     Physician  Clinical Nurse Specialist  X Registered Nurse     Physician Assistant  X Discharge Planner  Licensed Practical Nurse     Nurse Practitioner  X

## 2024-08-13 NOTE — PLAN OF CARE
Goal Outcome Evaluation:  Plan of Care Reviewed With: daughter, family        Progress: no change  Outcome Evaluation: Pt alert through the night and did not appear to rest well as last night. Daughter at bedside. Frequest BP checks per family request. BP elevated this AM, scheduled hydralazine given early per family request. Pt pulled out IV overnight and was restless at times, 0.5 ativan SL given x1. Plan to DC home today with [facility], EMS at 1330. Will contiue to monitor for comfort.

## 2024-08-13 NOTE — DISCHARGE SUMMARY
Patient Name: Marcial Bernard  : 1955  MRN: [id number]    Date of Admission: 2024  Date of Discharge:  2024  Primary Care Physician: Provider, No Known      Chief Complaint:   Cough (FAMILY STATES AROUND 2200 TONIGHT PT WAS HAVING TROUBLE BREATHING; HX OF ASTHMA, CKD WITHOUT DIALYSIS) and Leg Swelling (FAMILY STATES PT HAS BEEN HAVING LEG SWELLING; PT HAS APPOINTMENT ON AUG. 1 WITH PCP FOR ISSUE; UNABLE TO AMBULATE AT THIS TIME )      Discharge Diagnoses     Active Hospital Problems    Diagnosis  POA    **Chronic renal failure (CRF), stage 4 (severe) [N18.4]  Yes    DNR (do not resuscitate) [Z66]  Yes    Hospice care patient [Z51.5]  Not Applicable    Dementia [F03.90]  Yes    Hyponatremia [E87.1]  Yes    Acute pulmonary edema with congestive heart failure [I50.1]  Yes    Anemia due to stage 4 chronic kidney disease [N18.4, D63.1]  Yes    Elevated troponin level not due myocardial infarction [R79.89]  Yes      Resolved Hospital Problems   No resolved problems to display.        Hospital Course     Mr. Bernard is a 69 y.o. male with a history of CKD 4, chronic diastolic heart failure, diabetes type 2, hypertension, dementia presenting with altered mental status, LIDIA, acute on chronic diastolic heart failure.  Renal and cardiology were consulted and patient was diuresed.  Patient was near requiring hemodialysis, but after multiple goals of care discussions with family patient was transition to hospice.  Patient's mental status has been stable since admission but overall worse per family prior to admission.  This is progression of his dementia.  Plan to discharge home with hospice.    At this time we will continue blood pressure medications per family request.-Increase his clonidine to 0.2 mg patch.  Have added amlodipine 10 mg and hydralazine 100 mg 3 times daily.  His valsartan and allopurinol have been discontinued because of his worsening creatinine.    Cheema has been placed for comfort, have  discussed with family and they initially requested it.      Hospice to manage comfort medications.    At the time of discharge patient was told to take all medications as prescribed, keep all follow-up appointments, and call their doctor or return to the hospital with any worsening or concerning symptoms.    Day of Discharge     Subjective:  Patient resting comfortably bed.  Son-in-law at bedside.  Daughter-in-law on the phone.  Answered all questions with .  Plan to discharge home at 130 today.        Physical Exam:  Temp:  [97.2 °F (36.2 °C)-97.8 °F (36.6 °C)] 97.2 °F (36.2 °C)  Heart Rate:  [66-79] 79  Resp:  [16-18] 18  BP: (169-215)/(71-98) 215/98  Body mass index is 23.57 kg/m².  Physical Exam  Vitals and nursing note reviewed.   Constitutional:       General: He is not in acute distress.     Comments: Elderly, frail   Cardiovascular:      Rate and Rhythm: Normal rate and regular rhythm.   Pulmonary:      Effort: Pulmonary effort is normal. No respiratory distress.   Abdominal:      General: Abdomen is flat. There is no distension.      Tenderness: There is no abdominal tenderness.   Musculoskeletal:         General: No swelling or deformity.   Skin:     General: Skin is warm and dry.   Neurological:      Mental Status: He is alert. Mental status is at baseline. He is disoriented.   Consultants     Consult Orders (all) (From admission, onward)       Start     Ordered    08/08/24 1123  Inpatient Hospice / [facility] Consult  Once        Specialty:  Hospice and Palliative Medicine  Provider:  (Not yet assigned)    08/08/24 1122    08/06/24 1339  Inpatient Palliative Care Team Consult  Once        Comments: Please see 8/7   Provider:  (Not yet assigned)    08/06/24 1339    08/01/24 2257  Inpatient Case Management  Consult  Once        Provider:  (Not yet assigned)    08/01/24 2256    07/27/24 1315  Inpatient Hospitalist Consult  Once        Specialty:  Hospitalist  Provider:  Bhupinder  MD Bryan    07/27/24 1315    07/23/24 1007  Inpatient Cardiology Consult  Once        Specialty:  Cardiology  Provider:  Neptali Becker Jr., MD    07/23/24 1007    07/23/24 0445  Inpatient Nephrology Consult  Once        Specialty:  Nephrology  Provider:  João Gee MD    07/23/24 0446                  Procedures     Imaging Results (All)       Procedure Component Value Units Date/Time    CT Head Without Contrast [[id number]] Collected: 07/31/24 1404     Updated: 07/31/24 1421    Narrative:      CT HEAD WO CONTRAST-     HISTORY:  dementia and altered mental status; N18.4-Chronic kidney  disease, stage 4 (severe); E87.2-Dhwf-vrwaobmwxk and hyponatremia;  I50.1-Left ventricular failure, unspecified; N18.4-Chronic kidney  disease, stage 4 (severe); D63.1-Anemia in chronic kidney disease;  R79.89-Other specified abnormal findings of blood chemistry     COMPARISON: None     FINDINGS: The brain and ventricles are symmetrical. There is mild to  moderate diffuse atrophy with secondary ventriculomegaly. Extensive  vascular calcification is noted. No focal area of decreased attenuation  to suggest acute infarction is identified. Areas of decreased  attenuation are appreciated involving the white matter of cerebral  hemispheres bilaterally consistent with moderate to severe small vessel  ischemic disease. Further evaluation could be performed with a MRI  examination of the brain as indicated.     This report was finalized on 7/31/2024 2:18 PM by Dr. Michel Patiño M.D  on Workstation: [id number]       XR Chest 1 View [[id number]] Collected: 07/28/24 0643     Updated: 07/28/24 0648    Narrative:      ONE-VIEW PORTABLE CHEST     HISTORY: Congestive heart failure.     FINDINGS: There is cardiomegaly with improvement in previous generalized  vascular congestion noted on the study of 5 days ago. There is also  improvement in an area of superimposed possible pneumonia in the right  upper lobe but there is a  suggestion of a small cavitary lesion in this  area measuring up to 1.6 cm and further evaluation with CT scan is  recommended when possible. There remains some localized atelectasis and  possible pneumonia at the left base unchanged.     This report was finalized on 7/28/2024 6:44 AM by Dr. Brayan Deutsch M.D  on Workstation: [id number]       XR Chest 1 View [[id number]] Collected: 07/23/24 0152     Updated: 07/23/24 0155    Narrative:      SINGLE VIEW OF THE CHEST     HISTORY: Cough and leg swelling     COMPARISON: None available.     FINDINGS:  There is cardiomegaly. There are bilateral alveolar and interstitial  infiltrates. Some of this may be related to edema. Superimposed  pneumonia is not excluded, particularly within the right upper lobe. No  pneumothorax is seen. I cannot exclude a trace left pleural effusion.  There is calcification of the aorta.       Impression:      Bilateral alveolar and interstitial infiltrates.     This report was finalized on 7/23/2024 1:52 AM by Dr. Giovana Koch M.D on Workstation: [id number]               Pertinent Labs     Results from last 7 days   Lab Units 08/08/24  0554 08/07/24  0656   WBC 10*3/mm3 7.13 7.09   HEMOGLOBIN g/dL 9.9* 9.6*   PLATELETS 10*3/mm3 351 333     Results from last 7 days   Lab Units 08/08/24  0554 08/07/24  0656   SODIUM mmol/L 145 145   POTASSIUM mmol/L 3.4* 3.4*   CHLORIDE mmol/L 108* 106   CO2 mmol/L 24.7 24.0   BUN mg/dL 101* 109*   CREATININE mg/dL 4.85* 4.81*   GLUCOSE mg/dL 129* 141*   Estimated Creatinine Clearance: 14.5 mL/min (A) (by C-G formula based on SCr of 4.85 mg/dL (H)).  Results from last 7 days   Lab Units 08/08/24  0554 08/07/24  0656   ALBUMIN g/dL 3.4* 3.3*     Results from last 7 days   Lab Units 08/08/24  0554 08/07/24  0656   CALCIUM mg/dL 10.1 9.7   ALBUMIN g/dL 3.4* 3.3*   MAGNESIUM mg/dL 2.9* 2.8*   PHOSPHORUS mg/dL 5.8* 5.5*         Results from last 7 days   Lab Units 08/08/24  0554   URIC ACID mg/dL 10.1*      "    Invalid input(s): \"LDLCALC\"        Test Results Pending at Discharge       Discharge Details        Discharge Medications        New Medications        Instructions Start Date   cloNIDine 0.2 MG/24HR patch  Commonly known as: CATAPRES-TTS   Place 1 patch on the skin as directed by provider 1 (One) Time Per Week.   Start Date: August 20, 2024     hydrALAZINE 100 MG tablet  Commonly known as: APRESOLINE   100 mg, Oral, Every 8 Hours Scheduled      melatonin 5 MG tablet tablet   5 mg, Oral, Nightly      sennosides-docusate 8.6-50 MG per tablet  Commonly known as: PERICOLACE   2 tablets, Oral, 2 Times Daily             Continue These Medications        Instructions Start Date   amLODIPine 10 MG tablet  Commonly known as: NORVASC   1 tablet, Oral, Every 12 Hours Scheduled      carvedilol 25 MG tablet  Commonly known as: COREG   25 mg, Oral, 2 Times Daily      levothyroxine 100 MCG tablet  Commonly known as: SYNTHROID, LEVOTHROID   100 mcg, Oral, Daily             Stop These Medications      allopurinol 100 MG tablet  Commonly known as: ZYLOPRIM     valsartan 160 MG tablet  Commonly known as: DIOVAN              No Known Allergies      Discharge Disposition:  Hospice/Home    Discharge Diet:  Diet Order   Procedures    Diet: Regular/House, Cardiac, Fluid Restriction (240 mL/tray), Diabetic; Low Sodium (2g); Consistent Carbohydrate; 1500 mL/day; Texture: Regular (IDDSI 7); Fluid Consistency: Thin (IDDSI 0)       Discharge Activity:   As tolerated    CODE STATUS:    Code Status and Medical Interventions: No CPR (Do Not Attempt to Resuscitate); Comfort Measures   Ordered at: 08/11/24 1217     Level Of Support Discussed With:    Next of Kin (If No Surrogate)     Code Status (Patient has no pulse and is not breathing):    No CPR (Do Not Attempt to Resuscitate)     Medical Interventions (Patient has pulse or is breathing):    Comfort Measures       No future appointments.   Contact information for follow-up providers       " Provider, No Known .    Contact information:  [facility] [address]  [phone]                       Contact information for after-discharge care       Home Medical Care       [facility] .    Service: Home Hospice  Contact information:  [address]  [facility] [address] [phone]                                   Time Spent on Discharge:  Greater than 30 minutes      Lg Mera MD  [facility]  08/13/24  09:16 EDT

## 2024-08-14 NOTE — PROGRESS NOTES
Case Management Discharge Note      Final Note: Discharged to home with [facility] on 8/13/24. [facility] to transport. CLEMENTE Rodriguez RN, CCP.         Selected Continued Care - Discharged on 8/13/2024 Admission date: 7/23/2024 - Discharge disposition: Hospice/Home      Destination    No services have been selected for the patient.                Durable Medical Equipment    No services have been selected for the patient.                Dialysis/Infusion    No services have been selected for the patient.                Home Medical Care       Service Provider Selected Services Address Phone Fax Patient Preferred    [facility] [address] [place] [address] [phone] --              Therapy    No services have been selected for the patient.                Community Resources    No services have been selected for the patient.                Community & DME    No services have been selected for the patient.                    Transportation Services  Ambulance: [facility]    Final Discharge Disposition Code: 50 - home with hospice